# Patient Record
Sex: FEMALE | Race: WHITE | NOT HISPANIC OR LATINO | Employment: OTHER | URBAN - METROPOLITAN AREA
[De-identification: names, ages, dates, MRNs, and addresses within clinical notes are randomized per-mention and may not be internally consistent; named-entity substitution may affect disease eponyms.]

---

## 2017-12-13 LAB
ALT SERPL-CCNC: 24 U/L (ref 9–41)
AST SERPL-CCNC: 19 U/L (ref 12–38)
CREAT SERPL-MCNC: 0.8 MG/DL (ref 0.7–1.4)
GFR SERPL CREATININE-BSD FRML MDRD: >60 ML/MIN/1.73M2
GLUCOSE SERPL-MCNC: 103 MG/DL (ref 64–112)
POTASSIUM SERPL-SCNC: 3 MMOL/L (ref 3.5–5.3)

## 2017-12-20 LAB
ALT SERPL-CCNC: 28 U/L (ref 9–41)
AST SERPL-CCNC: 22 U/L (ref 12–38)
CREAT SERPL-MCNC: 0.8 MG/DL (ref 0.7–1.4)
GFR SERPL CREATININE-BSD FRML MDRD: >60 ML/MIN/1.73M2
GLUCOSE SERPL-MCNC: 117 MG/DL (ref 64–112)
POTASSIUM SERPL-SCNC: 3.6 MMOL/L (ref 3.5–5.3)

## 2018-01-04 ENCOUNTER — TRANSFERRED RECORDS (OUTPATIENT)
Dept: HEALTH INFORMATION MANAGEMENT | Facility: CLINIC | Age: 48
End: 2018-01-04

## 2018-01-05 ENCOUNTER — TRANSFERRED RECORDS (OUTPATIENT)
Dept: HEALTH INFORMATION MANAGEMENT | Facility: CLINIC | Age: 48
End: 2018-01-05

## 2018-02-05 ENCOUNTER — TRANSFERRED RECORDS (OUTPATIENT)
Dept: HEALTH INFORMATION MANAGEMENT | Facility: CLINIC | Age: 48
End: 2018-02-05

## 2018-02-06 ENCOUNTER — TRANSFERRED RECORDS (OUTPATIENT)
Dept: HEALTH INFORMATION MANAGEMENT | Facility: CLINIC | Age: 48
End: 2018-02-06

## 2018-02-06 LAB — HEP C HIM: NORMAL

## 2018-02-07 LAB
CREAT SERPL-MCNC: 0.6 MG/DL (ref 0.7–1.2)
GLUCOSE SERPL-MCNC: 115 MG/DL (ref 60–99)
POTASSIUM SERPL-SCNC: 4.3 MEQ/L (ref 3.5–5.1)

## 2018-02-19 ENCOUNTER — TRANSFERRED RECORDS (OUTPATIENT)
Dept: HEALTH INFORMATION MANAGEMENT | Facility: CLINIC | Age: 48
End: 2018-02-19

## 2018-02-20 ENCOUNTER — TRANSFERRED RECORDS (OUTPATIENT)
Dept: HEALTH INFORMATION MANAGEMENT | Facility: CLINIC | Age: 48
End: 2018-02-20

## 2018-03-13 ENCOUNTER — TRANSFERRED RECORDS (OUTPATIENT)
Dept: HEALTH INFORMATION MANAGEMENT | Facility: CLINIC | Age: 48
End: 2018-03-13

## 2018-03-13 LAB
ALT SERPL-CCNC: 19 IU/L (ref 6–31)
AST SERPL-CCNC: 16 IU/L (ref 10–40)
CREAT SERPL-MCNC: 0.66 MG/DL (ref 0.4–1)
GLUCOSE SERPL-MCNC: 108 MG/DL (ref 70–100)
POTASSIUM SERPL-SCNC: 3 MEQ/L (ref 3.4–5.1)
TSH SERPL-ACNC: 1.21 UIU/ML (ref 0.4–3.99)

## 2018-03-14 ENCOUNTER — HOSPITAL ENCOUNTER (INPATIENT)
Facility: HOSPITAL | Age: 48
LOS: 22 days | Discharge: HOME | DRG: 885 | End: 2018-04-05
Attending: PSYCHIATRY & NEUROLOGY | Admitting: PSYCHIATRY & NEUROLOGY
Payer: COMMERCIAL

## 2018-03-14 DIAGNOSIS — F20.89 OTHER SCHIZOPHRENIA (H): Primary | ICD-10-CM

## 2018-03-14 PROBLEM — F29 PSYCHOSIS (H): Status: ACTIVE | Noted: 2018-03-14

## 2018-03-14 PROCEDURE — 99223 1ST HOSP IP/OBS HIGH 75: CPT | Performed by: NURSE PRACTITIONER

## 2018-03-14 PROCEDURE — 25000132 ZZH RX MED GY IP 250 OP 250 PS 637: Performed by: NURSE PRACTITIONER

## 2018-03-14 PROCEDURE — 12400000 ZZH R&B MH

## 2018-03-14 RX ORDER — HYDROXYZINE HYDROCHLORIDE 25 MG/1
25-50 TABLET, FILM COATED ORAL EVERY 4 HOURS PRN
Status: DISCONTINUED | OUTPATIENT
Start: 2018-03-14 | End: 2018-04-05 | Stop reason: HOSPADM

## 2018-03-14 RX ORDER — OLANZAPINE 10 MG/1
10 TABLET ORAL 3 TIMES DAILY PRN
Status: DISCONTINUED | OUTPATIENT
Start: 2018-03-14 | End: 2018-03-27

## 2018-03-14 RX ORDER — PHENOBARBITAL 32.4 MG/1
32.4 TABLET ORAL 3 TIMES DAILY
Status: DISCONTINUED | OUTPATIENT
Start: 2018-03-14 | End: 2018-03-19

## 2018-03-14 RX ORDER — ALBUTEROL SULFATE 90 UG/1
1-2 AEROSOL, METERED RESPIRATORY (INHALATION) EVERY 4 HOURS PRN
Status: DISCONTINUED | OUTPATIENT
Start: 2018-03-14 | End: 2018-04-05 | Stop reason: HOSPADM

## 2018-03-14 RX ORDER — ALUMINA, MAGNESIA, AND SIMETHICONE 2400; 2400; 240 MG/30ML; MG/30ML; MG/30ML
30 SUSPENSION ORAL EVERY 4 HOURS PRN
Status: DISCONTINUED | OUTPATIENT
Start: 2018-03-14 | End: 2018-04-05 | Stop reason: HOSPADM

## 2018-03-14 RX ORDER — ALBUTEROL SULFATE 90 UG/1
1-2 AEROSOL, METERED RESPIRATORY (INHALATION) EVERY 4 HOURS PRN
Status: ON HOLD | COMMUNITY
End: 2020-08-11

## 2018-03-14 RX ORDER — DEXTROAMPHETAMINE SACCHARATE, AMPHETAMINE ASPARTATE, DEXTROAMPHETAMINE SULFATE AND AMPHETAMINE SULFATE 5; 5; 5; 5 MG/1; MG/1; MG/1; MG/1
20 TABLET ORAL EVERY MORNING
Status: ON HOLD | COMMUNITY
End: 2018-04-04

## 2018-03-14 RX ORDER — OLANZAPINE 10 MG/2ML
10 INJECTION, POWDER, FOR SOLUTION INTRAMUSCULAR 3 TIMES DAILY PRN
Status: DISCONTINUED | OUTPATIENT
Start: 2018-03-14 | End: 2018-03-27

## 2018-03-14 RX ORDER — ACETAMINOPHEN 325 MG/1
650 TABLET ORAL EVERY 4 HOURS PRN
Status: DISCONTINUED | OUTPATIENT
Start: 2018-03-14 | End: 2018-04-05 | Stop reason: HOSPADM

## 2018-03-14 RX ORDER — DEXTROAMPHETAMINE SACCHARATE, AMPHETAMINE ASPARTATE, DEXTROAMPHETAMINE SULFATE AND AMPHETAMINE SULFATE 2.5; 2.5; 2.5; 2.5 MG/1; MG/1; MG/1; MG/1
10 TABLET ORAL DAILY
Status: ON HOLD | COMMUNITY
End: 2018-04-04

## 2018-03-14 RX ADMIN — NICOTINE POLACRILEX 4 MG: 2 GUM, CHEWING ORAL at 19:42

## 2018-03-14 RX ADMIN — PHENOBARBITAL 32.4 MG: 32.4 TABLET ORAL at 22:51

## 2018-03-14 ASSESSMENT — ACTIVITIES OF DAILY LIVING (ADL)
COGNITION: 0 - NO COGNITION ISSUES REPORTED
GROOMING: INDEPENDENT
TRANSFERRING: 0-->INDEPENDENT
DRESS: SCRUBS (BEHAVIORAL HEALTH)
TOILETING: 0-->INDEPENDENT
DRESS: SCRUBS (BEHAVIORAL HEALTH);INDEPENDENT
AMBULATION: 0-->INDEPENDENT
ORAL_HYGIENE: INDEPENDENT
SWALLOWING: 0-->SWALLOWS FOODS/LIQUIDS WITHOUT DIFFICULTY
GROOMING: INDEPENDENT
ORAL_HYGIENE: INDEPENDENT
GROOMING: INDEPENDENT
FALL_HISTORY_WITHIN_LAST_SIX_MONTHS: NO
DRESS: 0-->INDEPENDENT
DRESS: SCRUBS (BEHAVIORAL HEALTH)
BATHING: 0-->INDEPENDENT
RETIRED_EATING: 0-->INDEPENDENT
ORAL_HYGIENE: INDEPENDENT
RETIRED_COMMUNICATION: 0-->UNDERSTANDS/COMMUNICATES WITHOUT DIFFICULTY

## 2018-03-14 NOTE — PLAN OF CARE
Face to face end of shift report received from JULIA Leija. Rounding completed. Patient observed.     Caro King  3/14/2018  4:08 PM

## 2018-03-14 NOTE — PROGRESS NOTES
03/14/18 0550   Patient Belongings   Did you bring any home meds/supplements to the hospital?  Yes   Patient Belongings cell phone/electronics;clothing;glasses;keys;money (see comment);necklace;shoes;wallet;purse   Disposition of Belongings Other (see comment)   Belongings Search Yes   Clothing Search Yes   Second Staff Ana   General Info Comment [employer] blue jeans, brown belt, tan bra, brown boots, pink underwear, green socks, six lighters, 27 camel blues smokes, 2 sets of keys, brown purse, misc cards and papers, 2 cover girl jumbo lip gloss,  NYX luquid lip gloss, clear eyes, usb drive, 4 pens, purfom, karina brand purfum , mentos, glasses, 3 month recovery chip, brown wallet,     List items sent to safe: [employer], check book # 3041, # 2424 to [place], #9773 void check to [employer], void check to [employer] $ 51.28, check to PT for $1,000.00, [place] cash outs $.25, $.80, $.21, $ 35.15, $.25, $69.30, $91.90, 8 one dollar bills, one $10 bill, 9 $20 bills, [place] '[place] license not [place], and 2 PT  license one cut in half, S.S Card, Cyril's reward cards, Gold in color neckles with a gold color matt with red and white color stones, Neckles with different color stone, with rock matt  All other belongings put in assigned cubby in belongings room.     I have reviewed my belongings list on admission and verify that it is correct.     Patient signature_______________________________    Second staff witness (if patient unable to sign) ______________________________       I have received all my belongings at discharge.    Patient signature________________________________    Lilli  3/14/2018  5:55 AM
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
## 2018-03-14 NOTE — PLAN OF CARE
"Problem: Patient Care Overview  Goal: Individualization & Mutuality  Contract for safety on admission  Cooperative with admission process  Oriented to person, place and time within 2 days of admission  No harm to self or others while on unit  Will deny suicidal ideation on discharge  Safe withdrawal from medication / stable vitals  Will agree to tx team recommendations for meds and follow up care  Will take meds as prescribed while on unit  Delusions about being pregnant will subside by discharge date  Will attend 75% of groups   Outcome: No Change  Pt. Continues to be delusional and thinking she is pregnant with twins, has had no harm to self, denies suicidal ideation, attended one group briefly, then walked out, states \"I don't know why I am here\", reoriented to place, time and circumstance. Pt. Showered and applied clean scrubs, appetite poor, complains of feeling nauseated, states her abdomen is \"sore\", will continue to monitor progress.    Problem: Thought Process Alteration (Adult)  Goal: Improved Thought Process  Will have reality based conversation by discharge date   Outcome: No Change  Pt. Has not had any reality based conversation yet this shift, continues to believe she is pregnant with twins.      "

## 2018-03-14 NOTE — PLAN OF CARE
"Problem: Patient Care Overview  Goal: Individualization & Mutuality  Contract for safety on admission  Cooperative with admission process  Oriented to person, place and time within 2 days of admission  No harm to self or others while on unit  Will deny suicidal ideation on discharge  Safe withdrawal from medication / stable vitals  Will agree to tx team recommendations for meds and follow up care  Will take meds as prescribed while on unit  Delusions about being pregnant will subside by discharge date  Will attend 75% of groups   Outcome: No Change  Pt. Lying in bed awake upon arrival, alert et disoriented to time, situation, et place. Denies SI/HI ideation or thoughts. Continues delusional thinking re: pregnancy. Displays feelings of anxiety et fear, \"I just want to know what's going on and why I'm here, I'm pregnant and need this taking care of.\" \"My father has dementia and I have to get back to him.\" Out for dinner. CNP into visit patient. Verbalizes agreement with safety contract.     S/s of anxiety after dinner, \"I want my purse, I do not belong in a mental institute.\" redirected without difficulty with reassurance et orientation to situation. PRN nicotine administered x1. Pt. isolated self much of the afternoon.     Pt. Signed release for [facility]; form faxed to records release.     Awaiting orders for phenobarbital dx: Clonazepam withdrawal from CNP.    Problem: Thought Process Alteration (Adult)  Goal: Improved Thought Process  Will have reality based conversation by discharge date   Outcome: No Change  Pt. Continues delusional thoughts about being pregnant.       "

## 2018-03-14 NOTE — IP AVS SNAPSHOT
MRN:[id number]                      After Visit Summary   3/14/2018    Glenda Robins    MRN: [id number]           Thank you!     Thank you for choosing [facility] for your care. Our goal is always to provide you with excellent care. Hearing back from our patients is one way we can continue to improve our services. Please take a few minutes to complete the written survey that you may receive in the mail after you visit with us. Thank you!        Patient Information     Date Of Birth          1970        Designated Caregiver       Most Recent Value    Caregiver    Will someone help with your care after discharge? no      About your hospital stay     You were admitted on:  March 14, 2018 You last received care in the:  [facility]    You were discharged on:  April 5, 2018       Who to Call     For medical emergencies, please call 911.  For non-urgent questions about your medical care, please call your primary care provider or clinic, [phone]          Attending Provider     Provider Specialty    Luis Alfredo Yang MD Psychiatry       Primary Care Provider Office Phone # Fax #    Madison BURT Jasonmary [phone]      Your next 10 appointments already scheduled     Apr 12, 2018 10:00 AM CDT   Evaluation with [facility]   [facility] ([facility] )    [address] [place] [id number] [phone]              Further instructions from your care team       Behavioral Discharge Planning and Instructions    Summary: Glenda was admitted to  due to psychosis     Main Diagnosis: Schizophrenia by  History    Major Treatments, Procedures and Findings: Stabilize with medications, connect with community programs.    Symptoms to Report: feeling more aggressive, increased confusion, losing more sleep, mood getting worse or thoughts of suicide    Lifestyle Adjustment: Take all medications as prescribed, meet with doctor/ medication provider, out patient  therapist, , and [facility] worker as scheduled. Abstain from alcohol or any unprescribed drugs.    Psychiatry Follow-up:     [facility]- Brenda Dick- 4/30 @3   [address], [place]  [phone]  Fax [phone]      [facility]- 4/12 @10  *Check into admitting before the first appointment   5th Floor Room 546  [address] [address]  Phone: [phone] ext. [phone]  Fax: [phone]      [facility]  PCP- 4/5 @11 Yahaira Shell   [address] [place] [address]  Phone - [phone]   Fax - [phone]    Resources:   Crisis Intervention: 499.682.3261 or 239-723-8555 (TTY: 855.538.3797).  Call anytime for help.  National [place] on Mental Illness (www.[place].neo.org): 852.679.7968 or 147-261-4202.  Alcoholics Anonymous (www.alcoholics-anonymous.org): Check your phone book for your local chapter.  Suicide Awareness Voices of Education (SAVE) (www.save.org): 220-052-LBJC (5620)  National Suicide Prevention Line (www.mentalhealthmn.org): 635-428-GHDZ (5551)  Mental Health Consumer/Survivor Network of [place] (www.mhcsn.net): 162.729.9383 or 955-311-2983  Mental Health Association of [place] (www.mentalhealth.org): 994.752.4402 or 957-091-9363    General Medication Instructions:   See your medication sheet(s) for instructions.   Take all medicines as directed.  Make no changes unless your doctor suggests them.   Go to all your doctor visits.  Be sure to have all your required lab tests. This way, your medicines can be refilled on time.  Do not use any drugs not prescribed by your doctor.  Avoid alcohol.    [place] Area:  [facility], Crisis stabilization [facility]- [phone]  [facility], [facility] Crisis Line: 8-847-124-2992  Advocates For Family Peace: 077-2993  Sexual Assault Program [facility]: 348-178-6654 or 2-881-090-4979  [place] Battered Women's Program: 8-225-564-0552 Ext: 279       Calls answered Mon-Fri-8:00  "am--4:30 pm    [place]:  [facility]ates for Family Peace: 1-917.325.4775  [facility] first call for help: 3-206-562-7553  [facility]:  (930) 330-1265      [place] Area:  Warm Line: 5-145-475-4698       Calls answered Tuesday--Saturday 4:00 pm--10:00 pm  [place] Crisis Line - 784.294.2988  [place] [place] [facility] Stabil[facility] 331-692-2688    [place] Statewide:  [place] Crisis and Referral Services: 1-689-708-0490  National Suicide Prevention Lifeline: 3-237-120-TALK (6182)   - mvd7nzcu- Text  Life  to [address]  First Call for Help:   [facility] Helpline- 0-794-GVCF-HELP      Pending Results     No orders found from 3/12/2018 to 3/15/2018.            Statement of Approval     Ordered          18 1830  I have reviewed and agree with all the recommendations and orders detailed in this document.  EFFECTIVE NOW     Approved and electronically signed by:  Nohelia Ching NP             Admission Information     Date & Time Provider Department Dept. Phone    3/14/2018 Luis Alfredo Yang MD [facility] [phone]      Your Vitals Were     Blood Pressure Pulse Temperature Respirations Height Weight    115/71 103 97.7  F (36.5  C) (Tympanic) 16 1.524 m (5') 59.8 kg (131 lb 14.4 oz)    Pulse Oximetry BMI (Body Mass Index)                96% 25.76 kg/m2          [employer] Information     [employer] lets you send messages to your doctor, view your test results, renew your prescriptions, schedule appointments and more. To sign up, go to [email or web address] . Click on \"Log in\" on the left side of the screen, which will take you to the Welcome page. Then click on \"Sign up Now\" on the right side of the page.     You will be asked to enter the access code listed below, as well as some personal information. Please follow the directions to create your username and password.     Your access code is: [email or web address]  Expires: 7/3/2018  6:53 PM     Your access code will  in 90 days. If you need help or a new " code, please call your [place] clinic or [phone].        Care EveryWhere ID     This is your Care EveryWhere ID. This could be used by other organizations to access your [place] medical records  [id number]        Equal Access to Services     KATRIN ALCANTAR : Hadwili adria araujo arieso Sobreeali, waaxda luqadaha, qaybta kaalmada adeegyada, hi rosan waqar pierce laDebbisreekanth roberson. So [facility] [phone].    ATENCIÓN: Si habla español, tiene a maradiaga disposición servicios gratuitos de asistencia lingüística. [facility] [phone].    We comply with applicable federal civil rights laws and [place] laws. We do not discriminate on the basis of race, color, national origin, age, disability, sex, sexual orientation, or gender identity.               Review of your medicines      START taking        Dose / Directions    ARIPiprazole 15 MG tablet   Commonly known as:  ABILIFY        Dose:  15 mg   Take 1 tablet (15 mg) by mouth At Bedtime   Quantity:  30 tablet   Refills:  0       sennosides 8.6 MG tablet   Commonly known as:  SENOKOT        Dose:  2 tablet   Take 2 tablets by mouth 2 times daily as needed for constipation   Quantity:  120 each   Refills:  0         CONTINUE these medicines which have NOT CHANGED        Dose / Directions    albuterol 108 (90 BASE) MCG/ACT Inhaler   Commonly known as:  PROAIR HFA/PROVENTIL HFA/VENTOLIN HFA   Indication:  Asthma        Dose:  1-2 puff   Inhale 1-2 puffs into the lungs every 4 hours as needed for shortness of breath / dyspnea or wheezing   Refills:  0         STOP taking     amphetamine-dextroamphetamine 10 MG per tablet   Commonly known as:  ADDERALL           amphetamine-dextroamphetamine 20 MG per tablet   Commonly known as:  ADDERALL           ESTAZOLAM PO           KLONOPIN PO                Where to get your medicines      These medications were sent to [id number] - FAUSTO BERUMEN - [address] AT [facility] [address], [place] [address]      Phone:  [phone]     ARIPiprazole 15 MG tablet    sennosides 8.6 MG tablet                Protect others around you: Learn how to safely use, store and throw away your medicines at www.disposemymeds.org.             Medication List: This is a list of all your medications and when to take them. Check marks below indicate your daily home schedule. Keep this list as a reference.      Medications           Morning Afternoon Evening Bedtime As Needed    albuterol 108 (90 BASE) MCG/ACT Inhaler   Commonly known as:  PROAIR HFA/PROVENTIL HFA/VENTOLIN HFA   Inhale 1-2 puffs into the lungs every 4 hours as needed for shortness of breath / dyspnea or wheezing                                ARIPiprazole 15 MG tablet   Commonly known as:  ABILIFY   Take 1 tablet (15 mg) by mouth At Bedtime   Last time this was given:  15 mg on 4/4/2018  5:35 PM                                sennosides 8.6 MG tablet   Commonly known as:  SENOKOT   Take 2 tablets by mouth 2 times daily as needed for constipation   Last time this was given:  8.6 mg on 4/1/2018 10:02 AM

## 2018-03-14 NOTE — IP AVS SNAPSHOT
[facility]    [address] [place] [address]    Phone:  [phone]    Fax:  [phone]                                       After Visit Summary   3/14/2018    Glenda Robins    MRN: [id number]           After Visit Summary Signature Page     I have received my discharge instructions, and my questions have been answered. I have discussed any challenges I see with this plan with the nurse or doctor.    ..........................................................................................................................................  Patient/Patient Representative Signature      ..........................................................................................................................................  Patient Representative Print Name and Relationship to Patient    ..................................................               ................................................  Date                                            Time    ..........................................................................................................................................  Reviewed by Signature/Title    ...................................................              ..............................................  Date                                                            Time

## 2018-03-14 NOTE — PLAN OF CARE
Problem: Patient Care Overview  Goal: Team Discussion  Team Plan:   BEHAVIORAL TEAM DISCUSSION    Participants: Haleigh Kolb NP, Poornima Tucker NP,  Tootie Maria NP, Kim Linda LICSW, Sue Morton LIC, Mehnaz SALAZAR, Pushpa Kelly RN, Marlo Vargas RN. [facility], [facility] OT, Poornima Reyna OT  Progress: new patient to assess, delusional- drug use  Continued Stay Criteria/Rationale: new patient, NP and SW to assess  Medical/Physical: delusions about being pregnant  Precautions:   Behavioral Orders   Procedures     Code 1 - Restrict to Unit     Routine Programming     As clinically indicated     Self Injury Precaution     Bathroom door to remain locked until after provider evaluation     Status 15     Every 15 minutes.     Plan: new patient to assess  Rationale for change in precautions or plan: none

## 2018-03-14 NOTE — H&P
"Psychiatric Eval/H&P    Patient Name: Glenda Robins   YOB: 1970  Age: 47 year old  [id number]    Primary Physician: Madison Villarreal   Completed by LAURO KnightESTELA  :none  ARHMS:none  Primary psychiatrist/NP Jigna Tineo [facility]  Therapist:none  Family contact: refuses to give info          CC: \"im 7 months pregnant\"    Glenda is a very poor historian due to her delusions and info was retrieved from [facility] as well as the [facility] documentation from her progress notes at her clinic from 2014.       HPI   Glenda Robins is a 47 year old single  female who brought herself in due to beliefs she as pregnant. She feels she has abdominal enlargement, breast sensitivity, weight gain and \"feels life\". She had a hysterectomy when she was 29. hcg negative and UDS positive for benzodiazapine's and amphetamines which she is prescribed. She goes to [facility] on an outpatient basis and has also been hospitalized at [facility] psychiatric  Unit though I do not have the records from [facility]. I will try to obtain the records. She is not aggressive though is very irritbale. When I ask her if she knows why she is here she state \"i dont know why Im on this airplane\" I asked her If she had said airplane and she then said \"im on a mental health unit\" and smiled. She has a odd smile most of the conversation. She holds her abdomen andtells me she is 7 months pregnant. I told her that she had hystectomy when she was younger and she stated \"miracles happen\". She then told me she needed to speak with \"The Very important person\".she made another odd comment regarding some Jew content and  I ask her if she thought that she was impregnated by god she shook her head yes then smiled. When I asked her about her psychiatric history she became very gaurded and slighlty irritable. She denied that she had ever been inptient at [facility]. She denied ever having a commitment which I am unsure if she has " "or not. When I asked to call someone in her facility she said \"no. I need my purse and my cell phone so I can call the very important one. She has not done anything dangerous that I am aware of. She is not currently on a hold and has not asked to leave or sign a 12 hour intent. At this time I would like to monitor her furhter to see if there are othersymptoms such as mood related, woud like to monitor her sleep and gather collateral if possible.      [facility]     Past Psychiatric History: she did have an      was hospitalized at [facility] and diagnosed with schizophrenia.     Social History: at this time it is very difficult to get any info from her.   jared living wither her father to help take care of him. She did graduate HS and received degree in cosmetology afterward.  She has been wit hher significant other for 14 years. They are not   Has 2 children born in 1991 and 1993.     Chemical Use History: denies any alcohol or drug use     Family Psychiatric History: unknown     Medical History and ROS      No current outpatient prescriptions on file.     Allergies   Allergen Reactions     Shrimp Anaphylaxis     No past medical history on file.  She had a partial hysterectomy in her early 30's.     Current Medications   Prescription Medications as of 3/14/2018             amphetamine-dextroamphetamine (ADDERALL) 10 MG per tablet Take 10 mg by mouth daily    amphetamine-dextroamphetamine (ADDERALL) 20 MG per tablet Take 20 mg by mouth every morning    ClonazePAM (KLONOPIN PO) Take 0.5 mg by mouth 4 times daily as needed     albuterol (PROAIR HFA/PROVENTIL HFA/VENTOLIN HFA) 108 (90 BASE) MCG/ACT Inhaler Inhale 1-2 puffs into the lungs every 4 hours as needed for shortness of breath / dyspnea or wheezing    ESTAZOLAM PO Take 3 mg by mouth At Bedtime      Facility Administered Medications as of 3/14/2018             hydrOXYzine (ATARAX) tablet 25-50 mg Take 1-2 tablets (25-50 mg) by mouth every 4 hours " "as needed for anxiety    acetaminophen (TYLENOL) tablet 650 mg Take 2 tablets (650 mg) by mouth every 4 hours as needed for mild pain    alum & mag hydroxide-simethicone (MYLANTA ES/MAALOX  ES) suspension 30 mL Take 30 mLs by mouth every 4 hours as needed for indigestion    magnesium hydroxide (MILK OF MAGNESIA) suspension 30 mL Take 30 mLs by mouth nightly as needed for constipation    OLANZapine (zyPREXA) tablet 10 mg Take 1 tablet (10 mg) by mouth 3 times daily as needed for agitation (associated with psychosis or debbie)    Linked Group 1:  \"Or\" Linked Group Details     OLANZapine (zyPREXA) injection 10 mg Inject 10 mg into the muscle 3 times daily as needed for agitation (associated with psychosis or debbie)    Linked Group 1:  \"Or\" Linked Group Details     nicotine polacrilex (NICORETTE) gum 2-4 mg Place 1-2 each (2-4 mg) inside cheek every hour as needed for other (nicotine withdrawal symptoms)    albuterol (PROAIR HFA/PROVENTIL HFA/VENTOLIN HFA) Inhaler 1-2 puff Inhale 1-2 puffs into the lungs every 4 hours as needed for shortness of breath / dyspnea or wheezing              Past Psychiatric Medications Tried: unknown    Physical Exam    She appears to be well nourushed though older than her stated age. She is not willing to be physically assessed by me other than 'make sure my ovaries are ok\" she has her hands around her abdomen stating she is 7 months pregnant though when I reminded her of her hysterectomy 30 years ago she said \"miracles happen\". She is not in any apparent respiratory distress.   /74  Pulse 85  Temp 97.6  F (36.4  C) (Tympanic)  Resp 15  Ht 1.524 m (5')  Wt 59.1 kg (130 lb 6.4 oz)  SpO2 95%  BMI 25.47 kg/m2      Review of Systems:  She currently will not answer any questions and tells me that she is pregnant and having some abdominal discomfort. She was fully worked up in [facility] ER and had an abdominal CT as well as head CT. Abdominal CT was abnormal showing liver nodules though " "not contributing to current presentation. Her head CT was normal.          MSE/PSYCH  PSYCHIATRIC EXAM  /70  Pulse 100  Temp 98.5  F (36.9  C) (Tympanic)  Resp 16  Ht 1.524 m (5')  Wt 59.1 kg (130 lb 6.4 oz)  SpO2 96%  BMI 25.47 kg/m2  -Appearance/Behavior: mildly disheveled.    -Motor: psychomotor retardation  -Gait: intact  -Abnormal involuntary movements: none  -Mood: mildy irritbale though mosty very flat  -Affect: blunted  -Speech: monotone      -Thought process/associations: Loose associations.  -Thought content: paranoid delusions and Religion delusions   -Perceptual disturbances: Frequent hallucinations..              -Suicidal/Homicidal Ideation: denies  -Judgment: Limited.  -Insight: Poor.  *Orientation: time and place, person though initially though she was on \"a plane\"  *Memory: difficult to asseses  *Attention: poor  *Language: fluent, no aphasias, able to repeat phrases and name objects. Vocab intact.  *Fund of information: average  *Cognitive functioning estimate: 1 - slightly impaired.     Labs: cmp and cbc unremarkable. uds positive for benzodiazapines and amphetamines which she is prescribed. Alcohol negative. hcg negative. Ct head negative. Ct abd showed nodule/lesion on liver though non contributory to presentation     Assessment/Impression: This is a 47 year old yo female with a hx of schizophrenia who is only on prosom, klonopin and adderall for her psychiatric medications. She denies any psych history htough records from [facility] indicate she had been IP at [facility] psych unknown how long ago. She is delusional currenly believing she is pregnant with gods child. She has no uterus and I did remind her that she had a hysterectomy age 29. She answered \"miracles do happen\". She isnt asking to leave though isnt willing to take antipsychotics and currently would need a substitute decision maker if she did start some.   Educated regarding medication indications, risks, benefits, side " effects, contraindications and possible interactions. Verbally expressed understanding.     DX:   Schizophrenia by  History                         Psychosis symptoms severity: Scores 0-4  Hallucinations:3  Delusions:4  Disorganized speech:2  Abnormal psychomotor behavior:2  Negative symptoms:2  Impaired cognition:2  Depression:1  Rosaline:2                                                            Plan:     Labs:  None needed     Med changes:  Start phenobarbital  At this time she refuses medications and there is not a significnat danger factor or need to force medicate    DC planning:  At this time she is not on a hold. I would like to evaluate her for another day to make sure there is not collateral information that arises that would indicate a need to file a petition for commitment. She is not willing to take any medications at this time and there is no need to force medicate as she is not in imminent risk of harming herself or others        Anticipated length of stay 3 days

## 2018-03-14 NOTE — PLAN OF CARE
Problem: Patient Care Overview  Goal: Individualization & Mutuality  Contract for safety on admission  Cooperative with admission process  Oriented to person, place and time within 2 days of admission  No harm to self or others while on unit  Will deny suicidal ideation on discharge  Safe withdrawal from medication / stable vitals  Will agree to tx team recommendations for meds and follow up care  Will take meds as prescribed while on unit  Delusions about being pregnant will subside by discharge date  Will attend 75% of groups   ADMISSION NOTE    Reason for admission psychosis, delusional.  Safety concerns none at this time.  Risk for or history of violence none noted.  Skin: Pt skin is intact, no areas of concern noted. Pt has blotchy patches across her shoulder blades which she says is from an autoimmune disease.      Patient arrived on unit from [facility] accompanied by ambulance crew and security on 3/14/2018  0430 AM.   Status on arrival: Pt irritated and started to become uncooperative with admission protocol. Pt began demanding to have an ultrasound to prove that she is pregnant with twins. Pt informed of tests completed at ER and the stated if she didn't get an ultrasound immediately then she would start screaming. Pt redirected and explained the procedures of the unit and that there are alternative beds in the locked unit if pt chooses to be uncooperative with unit admission procedures and starts to scream. Pt calmed and began to cooperate with admission. Saline lock removed from top of pt L) hand.   /75  Pulse 97  Temp 98.6  F (37  C) (Tympanic)  Resp 16  Ht 1.524 m (5')  Wt 59.1 kg (130 lb 6.4 oz)  SpO2 96%  BMI 25.47 kg/m2  Patient given tour of unit and Welcome to  unit papers given to patient, wanding completed, belongings inventoried, and admission assessment completed.   Patient's legal status on arrival is voluntary. Appropriate legal rights discussed with and copy given  to patient. Patient Bill of Rights discussed with and copy given to patient.   Patient denies SI, HI, and thoughts of self harm and contracts for safety while on unit.      Pt went to the ER reporting that she is pregnant with twins. Pt has had a hysterectomy. Pt reports stomach becoming larger, being tired, breast enlargement and feeling the babies movement. HCG negative, Head CT completed with negative results, Abdominal CT completed which showed polycystic liver, gas distention and no obstruction. Pt denies SI at this time or for the duration of her life. Pt denies hallucinations. Pt has a reported history of ADHD and depression. Pt sees Jigna Tineo at [facility] signed and faxed. When asked about any family members or significant others being placed on her SHELL pt said she wants Eugene Bruce to know everything that is going on with her, pt then became quiet and looked at the floor and provided the names and numbers of her 2 daughters. Pt appeared to be sleeping after 0500 checks, normal respirations and position changes noted.   Aurora Mendez  3/14/2018  6:11 AM

## 2018-03-14 NOTE — PLAN OF CARE
Face to face end of shift report received from Aurora LARA RN. Rounding completed. Patient observed.     Liss Espinal  3/14/2018  7:41 AM

## 2018-03-15 PROCEDURE — 25000132 ZZH RX MED GY IP 250 OP 250 PS 637: Performed by: NURSE PRACTITIONER

## 2018-03-15 PROCEDURE — 12400000 ZZH R&B MH

## 2018-03-15 PROCEDURE — 99233 SBSQ HOSP IP/OBS HIGH 50: CPT | Performed by: NURSE PRACTITIONER

## 2018-03-15 RX ADMIN — NICOTINE POLACRILEX 2 MG: 2 GUM, CHEWING ORAL at 21:15

## 2018-03-15 RX ADMIN — PHENOBARBITAL 32.4 MG: 32.4 TABLET ORAL at 21:15

## 2018-03-15 RX ADMIN — HYDROXYZINE HYDROCHLORIDE 50 MG: 25 TABLET ORAL at 17:13

## 2018-03-15 RX ADMIN — NICOTINE POLACRILEX 2 MG: 2 GUM, CHEWING ORAL at 17:06

## 2018-03-15 RX ADMIN — PHENOBARBITAL 32.4 MG: 32.4 TABLET ORAL at 13:49

## 2018-03-15 RX ADMIN — PHENOBARBITAL 32.4 MG: 32.4 TABLET ORAL at 08:14

## 2018-03-15 ASSESSMENT — ACTIVITIES OF DAILY LIVING (ADL)
GROOMING: INDEPENDENT
LAUNDRY: UNABLE TO COMPLETE
ORAL_HYGIENE: INDEPENDENT
ORAL_HYGIENE: INDEPENDENT
DRESS: INDEPENDENT;SCRUBS (BEHAVIORAL HEALTH)
GROOMING: INDEPENDENT
DRESS: SCRUBS (BEHAVIORAL HEALTH);INDEPENDENT

## 2018-03-15 NOTE — PLAN OF CARE
Face to face end of shift report received from JULIA Elizondo. Rounding completed. Patient observed resting in bed.     HASMUKH RITTER  3/15/2018  12:37 AM

## 2018-03-15 NOTE — PLAN OF CARE
"Problem: Patient Care Overview  Goal: Individualization & Mutuality  Contract for safety on admission  Cooperative with admission process  Oriented to person, place and time within 2 days of admission  No harm to self or others while on unit  Will deny suicidal ideation on discharge  Safe withdrawal from medication / stable vitals  Will agree to tx team recommendations for meds and follow up care  Will take meds as prescribed while on unit  Delusions about being pregnant will subside by discharge date  Will attend 75% of groups   Outcome: No Change  Pt. Visiting with NP upon arrival; Patient alert et oriented to person et place, disoriented to time et situation.  Denies SI/HI ideation or thoughts, Denies hallucinations, depression, anxiety et pain. Slight improvement noted staying on task et subject. Delusional thinking continues, pt. States, \"Yes, I did have a hysterectomy but miracles do happen.\"  Pt. States, \"I should not be here in a mental hospital, my physical health is not being treated.\" Awaiting records from [facility]. Pt. Re-directed without difficulty; assurance provided, allowed feelings to be expressed. C/o of mild anxiety, 1st dose of PRN 50 mg Atarax given; which has been effective. Has been isolated et withdrawn from unit.     72 Hour Hold Rights read to patient @ 0258.     Daughter Stacy called, updated staff that mother has been missing for one week et has started drinking again after seven months of being sober.     Attended first group this shift. Cooperative, pleasant in bed @ 0565. Noted slight decrease in delusions.            Problem: Thought Process Alteration (Adult)  Goal: Improved Thought Process  Will have reality based conversation by discharge date   Outcome: No Change  Pt. Continues delusional thoughts about being pregnant.       "

## 2018-03-15 NOTE — PLAN OF CARE
Face to face end of shift report received from Raven PRICE RN. Rounding completed. Patient observed.    Chante Agrawal  3/15/2018  7:41 AM

## 2018-03-15 NOTE — PLAN OF CARE
Face to face end of shift report received from JULIA Sharif. Rounding completed. Patient observed with NP.     Caro King  3/15/2018  4:28 PM

## 2018-03-15 NOTE — PLAN OF CARE
Problem: Patient Care Overview  Goal: Team Discussion  Team Plan:   BEHAVIORAL TEAM DISCUSSION    Participants: Haleigh Kolb NP, Poornima Tucker NP,  Tootie Maria NP, Kim Linda [facility], Sue Morton [facility], Mehnaz Crowley [facility], Marbella Campbell RN,  Pushpa Kelly RN, Chante Agrawal RN, Elizabeth Schulz [employer] [facility], Jennifer Kwan OT, Poornima Reyna OT  Progress: None  Continued Stay Criteria/Rationale: Delusional  Medical/Physical: Liver nodules  Precautions:   Behavioral Orders   Procedures     Code 1 - Restrict to Unit     Routine Programming     As clinically indicated     Self Injury Precaution     Bathroom door to remain locked until after provider evaluation     Status 15     Every 15 minutes.     Plan: Provider to gain collateral information. Patient currently refusing medications.  Rationale for change in precautions or plan: None

## 2018-03-15 NOTE — PLAN OF CARE
Problem: Patient Care Overview  Goal: Individualization & Mutuality  Contract for safety on admission  Cooperative with admission process  Oriented to person, place and time within 2 days of admission  No harm to self or others while on unit  Will deny suicidal ideation on discharge  Safe withdrawal from medication / stable vitals  Will agree to tx team recommendations for meds and follow up care  Will take meds as prescribed while on unit  Delusions about being pregnant will subside by discharge date  Will attend 75% of groups   Pt appears to be sleeping in bed with eyes closed, having regular respirations and position changes. 15 minutes and PRN safety checks completed with no noted complains. Will continue to monitor.         Problem: Thought Process Alteration (Adult)  Goal: Improved Thought Process  Will have reality based conversation by discharge date   Pt appears to be sleeping in bed with eyes closed, having regular respirations and position changes. 15 minutes and PRN safety checks completed with no noted complains. Will continue to monitor.

## 2018-03-16 PROCEDURE — 12400000 ZZH R&B MH

## 2018-03-16 PROCEDURE — 25000132 ZZH RX MED GY IP 250 OP 250 PS 637: Performed by: NURSE PRACTITIONER

## 2018-03-16 PROCEDURE — 99233 SBSQ HOSP IP/OBS HIGH 50: CPT | Performed by: NURSE PRACTITIONER

## 2018-03-16 RX ADMIN — NICOTINE POLACRILEX 4 MG: 2 GUM, CHEWING ORAL at 12:51

## 2018-03-16 RX ADMIN — PHENOBARBITAL 32.4 MG: 32.4 TABLET ORAL at 21:10

## 2018-03-16 RX ADMIN — HYDROXYZINE HYDROCHLORIDE 50 MG: 25 TABLET ORAL at 12:51

## 2018-03-16 RX ADMIN — PHENOBARBITAL 32.4 MG: 32.4 TABLET ORAL at 14:14

## 2018-03-16 RX ADMIN — NICOTINE POLACRILEX 2 MG: 2 GUM, CHEWING ORAL at 21:10

## 2018-03-16 ASSESSMENT — ACTIVITIES OF DAILY LIVING (ADL)
ORAL_HYGIENE: INDEPENDENT
ORAL_HYGIENE: INDEPENDENT
GROOMING: INDEPENDENT
LAUNDRY: UNABLE TO COMPLETE
GROOMING: INDEPENDENT
DRESS: SCRUBS (BEHAVIORAL HEALTH);INDEPENDENT
DRESS: INDEPENDENT;SCRUBS (BEHAVIORAL HEALTH)

## 2018-03-16 NOTE — PLAN OF CARE
Patient requested and given hydroxyzine 50 mg po for complaints of anxiey. Patient states that she doesn't feel she needs to be here and wonders when she will be released.

## 2018-03-16 NOTE — PROGRESS NOTES
"[facility]  Psychiatric Progress Note      Impression:   Initially when we started talking she seemed more oraganized. After a couple min into the conversatino she appeared to be becoming diosrganized and paranoid. She did not bring up her delusional thoughts pregnancy immediately in conversation though asked me \"do you know my friend\"? I asked her what the friends name was and she smiled, held her abdomen  Area and said \"sylvie caro\". I asked how longhad known him and she smiled and said \"we are not really friends\". 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 She would not go into detail about how they were business partners. She then asked to have adderall back and I declined. She did not become angry though did ask to leave. I did tell her I would place her on a hold as I did not feel she could take care of herself at all and that I wanted to be able to contact her daughter still. SS did speak with her daughterand her daughter requested commitment though there is lack of severe harm to self or others that I can see. I am going to call the daughter and try to get more info from  Her. She still refuses to take any medications. She did [facility] though the reecords are not yet here.      Educated regarding medication indications, risks, benefits, side effects, contraindications and possible interactions. Verbally expressed understanding.        DIagnoses:   Schizophrenia                         Psychosis symptoms severity: Scores 0-4  Hallucinations:4  Delusions:4  Disorganized speech:2  Abnormal psychomotor behavior:1  Negative symptoms:2  Impaired cognition:3  Depression:0  Rosaline:0                                                         Attestation:  Patient has been seen and evaluated by me,  Haleigh Kolb NP          Interim History:   The patient's care was discussed with the treatment team and chart notes were reviewed.          " Medications:       PHENobarbital  32.4 mg Oral TID              10 point ROS negative        Allergies:     Allergies   Allergen Reactions     Shrimp Anaphylaxis            Psychiatric Examination:   /72  Pulse 92  Temp 97.8  F (36.6  C) (Tympanic)  Resp 15  Ht 1.524 m (5')  Wt 59.1 kg (130 lb 6.4 oz)  SpO2 97%  BMI 25.47 kg/m2  Weight is 130 lbs 6.4 oz  Body mass index is 25.47 kg/(m^2).    Appearance:  awake, alert, appeared older than stated age and poorly groomed  Attitude:  guarded  Eye Contact:  fair  Mood:  anxious  Affect:  intensity is flat  Speech:  decreased prosody  Psychomotor Behavior:  no evidence of tardive dyskinesia, dystonia, or tics  Thought Process:  disorganized and evidence of thought blocking present  Associations:  loosening of associations present  Thought Content:  no evidence of suicidal ideation or homicidal ideation and patient appears to be responding to internal stimuli  Insight:  none  Judgment:  poor  Oriented to:  time, person, and place  Attention Span and Concentration:  limited  Recent and Remote Memory:  fair  Fund of Knowledge: low-normal  Muscle Strength and Tone: normal  Gait and Station: Normal           Labs:     Results for orders placed or performed in visit on 01/24/14   MR Lumbar Spine w/o & w Contrast    Narrative    LUMBAR SPINE MRI  TECHNIQUE:  Images were obtained sagittally T1 proton density and  T2-weighted, axially proton density and T2-weighted.  FINDINGS:  The T11-T12, T12-L1 and L1-L2 disks are normal in height.  There is no evidence of disk herniation or protrusion.  There is mild  annular bulging at L2-L3 without significant thecal sac or nerve root  compression.  The L3-L4 disk is normal in height.  There is no  evidence of disk herniation or protrusion.  There is minimal annular  bulging at L4-L5 without significant thecal sac or nerve root  compression.  The L5-S1 disk appears normal.  The facet joints are  normal in appearance.  There is no  central or lateral spinal stenosis.  Intradurally, the nerves of the cauda equina and conus appear normal.  The paravertebral soft tissues appear normal.  IMPRESSION:  NEGATIVE MR OF THE LUMBAR SPINE.    Exam Date: Jan 30, 2014 10:39:00 AM  Author: ARAM MCMANUS  This report is final and signed                  Plan:     Placed on 72 hour hold today. She was asking to leave  Both daughters feel she needs to be committed though at this time there is not a significant danger factor.   Her delusions alone are not necessarily dangerous though will try to find out more info from daughters

## 2018-03-16 NOTE — PLAN OF CARE
Problem: Patient Care Overview  Goal: Team Discussion  Team Plan:   BEHAVIORAL TEAM DISCUSSION    Participants:  Poornima Tucker NP,  Tootie Maria NP, Kim KEARNEY, Mehnaz LAWRENCE, Marbella Campbell RN,  Jennifer Kwan OT, Poornima Reyna OT  Progress: delusional, pleasant, not taking meds  Continued Stay Criteria/Rationale: Delusional -daughter reports she was missing for a week and started drinking - look at possibly filing for commitment  Medical/Physical: Liver nodules  Precautions:   Behavioral Orders   Procedures     Code 1 - Restrict to Unit     Routine Programming     As clinically indicated     Status 15     Every 15 minutes.     Plan: Still refusing meds - likely filing for commitment as the patient was missing for a week  Rationale for change in precautions or plan: None

## 2018-03-16 NOTE — PROGRESS NOTES
"[facility]  Psychiatric Progress Note      Impression:     Today she denied she was pregnant when I asked her. She actually said yes initially though tried to correct herself and said she did not believe she was pregnant. She then said \"you are the one that believes in miracles\". She asked to leave and I did tell her I had placed her on a hold and that now [facility], decided I need to fill out a petition for commitment. She left [facility] after 10-12 day stay. She was not committed though the provider did contemplate commitment as she was not able to care for herself. She left [facility] AMA. She returned home and decompensated. Daughter, charo, stated that she quit taking her meds immediately. She then left the house, with the family dog at 2 am and did not tell anyone where she was going. She was gone for 2 days and family still is unsure where she was during this time. She did return home with the dog. Daughter states that she did go to cd treatment for etoh recently at [facility]. Will try to talk to providers there if she signs a release. She is still preoccupied and paranoid. She is upset when I tell her im filling out petition for commitment.       Educated regarding medication indications, risks, benefits, side effects, contraindications and possible interactions. Verbally expressed understanding.        DIagnoses:   Schizophrenia                         Psychosis symptoms severity: Scores 0-4  Hallucinations:4  Delusions:4  Disorganized speech:2  Abnormal psychomotor behavior:1  Negative symptoms:2  Impaired cognition:3  Depression:0  Rosaline:0                   etoh use disorder, unknown amount used.                                         Attestation:  Patient has been seen and evaluated by me,  Haleigh Kolb NP          Interim History:   The patient's care was discussed with the treatment team and chart notes were reviewed.          Medications:       " PHENobarbital  32.4 mg Oral TID              10 point ROS negative        Allergies:     Allergies   Allergen Reactions     Shrimp Anaphylaxis            Psychiatric Examination:   /71  Pulse 83  Temp 98.7  F (37.1  C) (Tympanic)  Resp 16  Ht 1.524 m (5')  Wt 59.1 kg (130 lb 6.4 oz)  SpO2 93%  BMI 25.47 kg/m2  Weight is 130 lbs 6.4 oz  Body mass index is 25.47 kg/(m^2).    Appearance:  awake, alert, appeared older than stated age and poorly groomed  Attitude:  uncooperative  Eye Contact:  fair  Mood:  anxious  Affect:  blunted  Speech:  decreased prosody  Psychomotor Behavior:  no evidence of tardive dyskinesia, dystonia, or tics  Thought Process:  disorganized and evidence of thought blocking present  Associations:  loosening of associations present  Thought Content:  no evidence of suicidal ideation or homicidal ideation and patient appears to be responding to internal stimuli  Insight:  none  Judgment:  poor  Oriented to:  time, person, and place  Attention Span and Concentration:  limited  Recent and Remote Memory:  fair  Fund of Knowledge: low-normal  Muscle Strength and Tone: normal  Gait and Station: Normal           Labs:     Results for orders placed or performed in visit on 01/24/14   MR Lumbar Spine w/o & w Contrast    Narrative    LUMBAR SPINE MRI  TECHNIQUE:  Images were obtained sagittally T1 proton density and  T2-weighted, axially proton density and T2-weighted.  FINDINGS:  The T11-T12, T12-L1 and L1-L2 disks are normal in height.  There is no evidence of disk herniation or protrusion.  There is mild  annular bulging at L2-L3 without significant thecal sac or nerve root  compression.  The L3-L4 disk is normal in height.  There is no  evidence of disk herniation or protrusion.  There is minimal annular  bulging at L4-L5 without significant thecal sac or nerve root  compression.  The L5-S1 disk appears normal.  The facet joints are  normal in appearance.  There is no central or lateral  spinal stenosis.  Intradurally, the nerves of the cauda equina and conus appear normal.  The paravertebral soft tissues appear normal.  IMPRESSION:  NEGATIVE MR OF THE LUMBAR SPINE.    Exam Date: Jan 30, 2014 10:39:00 AM  Author: ARAM MCMANUS  This report is final and signed                  Plan:     No meds ordered on scheduled basis other than phenobarbital  Did get collateral info from daughter and from [facility] where she was one month ago  Will fill out petition for commitment.

## 2018-03-16 NOTE — PLAN OF CARE
Problem: Patient Care Overview  Goal: Individualization & Mutuality  Contract for safety on admission  Cooperative with admission process  Oriented to person, place and time within 2 days of admission  No harm to self or others while on unit  Will deny suicidal ideation on discharge  Safe withdrawal from medication / stable vitals  Will agree to tx team recommendations for meds and follow up care  Will take meds as prescribed while on unit  Delusions about being pregnant will subside by discharge date  Will attend 75% of groups   Outcome: No Change  Patient oriented to person and place.  Patient is irritable isolative and withdrawn this shift.  Patient states she will not go into withdrawal and refuses AM scheduled phenobarbital.  Patient denies SI, HI, hallucinations, pain, depression, and states she is ready to be released.  Explained 72 hour hold but patient shows no insight into this.  Patient states she is anxious and other RN gave PRN atarax.  Will continue to monitor.    Problem: Thought Process Alteration (Adult)  Goal: Improved Thought Process  Will have reality based conversation by discharge date   Outcome: No Change  Patient unable to hold reality based conversation.

## 2018-03-16 NOTE — PLAN OF CARE
Face to face end of shift report received from Raven PRICE RN. Rounding completed. Patient observed.     Chante Agrawal  3/16/2018  7:32 AM

## 2018-03-16 NOTE — PLAN OF CARE
Face to face end of shift report received from JULIA Sharif. Rounding completed. Patient observed.     Caro King  3/16/2018  3:58 PM

## 2018-03-16 NOTE — PLAN OF CARE
Face to face end of shift report received from JULIA Elizondo. Rounding completed. Patient observed resting in bed.     HASMUKH RITTER  3/15/2018  11:49 PM

## 2018-03-17 PROCEDURE — 25000132 ZZH RX MED GY IP 250 OP 250 PS 637: Performed by: NURSE PRACTITIONER

## 2018-03-17 PROCEDURE — 99233 SBSQ HOSP IP/OBS HIGH 50: CPT | Performed by: NURSE PRACTITIONER

## 2018-03-17 PROCEDURE — 12400000 ZZH R&B MH

## 2018-03-17 RX ORDER — NICOTINE 21 MG/24HR
1 PATCH, TRANSDERMAL 24 HOURS TRANSDERMAL DAILY
Status: DISCONTINUED | OUTPATIENT
Start: 2018-03-17 | End: 2018-04-05 | Stop reason: HOSPADM

## 2018-03-17 RX ADMIN — PHENOBARBITAL 32.4 MG: 32.4 TABLET ORAL at 15:34

## 2018-03-17 RX ADMIN — ACETAMINOPHEN 650 MG: 325 TABLET, FILM COATED ORAL at 11:18

## 2018-03-17 RX ADMIN — PHENOBARBITAL 32.4 MG: 32.4 TABLET ORAL at 08:25

## 2018-03-17 RX ADMIN — NICOTINE POLACRILEX 4 MG: 2 GUM, CHEWING ORAL at 16:21

## 2018-03-17 RX ADMIN — NICOTINE 1 PATCH: 21 PATCH, EXTENDED RELEASE TRANSDERMAL at 16:19

## 2018-03-17 RX ADMIN — PHENOBARBITAL 32.4 MG: 32.4 TABLET ORAL at 20:55

## 2018-03-17 ASSESSMENT — ACTIVITIES OF DAILY LIVING (ADL)
ORAL_HYGIENE: INDEPENDENT
DRESS: SCRUBS (BEHAVIORAL HEALTH);INDEPENDENT
LAUNDRY: UNABLE TO COMPLETE
GROOMING: INDEPENDENT

## 2018-03-17 NOTE — PLAN OF CARE
Face to face end of shift report received from Augustus BOB RN. Rounding completed. Patient observed Pt is in room laying in bed.     Karen Hamilton  3/17/2018  3:59 PM

## 2018-03-17 NOTE — PLAN OF CARE
Problem: Thought Process Alteration (Adult)  Goal: Improved Thought Process  Will have reality based conversation by discharge date   Outcome: No Change  Continues delusional thoughts about being pregnant.

## 2018-03-17 NOTE — PLAN OF CARE
"Problem: Patient Care Overview  Goal: Individualization & Mutuality  Contract for safety on admission  Cooperative with admission process  Oriented to person, place and time within 2 days of admission  No harm to self or others while on unit  Will deny suicidal ideation on discharge  Safe withdrawal from medication / stable vitals  Will agree to tx team recommendations for meds and follow up care  Will take meds as prescribed while on unit  Delusions about being pregnant will subside by discharge date  Will attend 75% of groups   Outcome: No Change  Patient alert et oriented to person et place, disoriented to time et situation. No change in psychological et mentation status as compared to yesterday.  Denies SI/HI ideation or thoughts, Denies hallucinations, depression, anxiety et pain.  Delusional thinking continues.  Pt. Re-directed without difficulty; assurance provided, allowed feelings to be expressed. Has been isolated et withdrawn from unit. Resting on et off via out the shift. No group participation as of present.      Pt. Resting in bed, compliant with medication administration. Mouth checks implemented. Denies anxiety, depression, et pain. Questioned \"I know next week I should be able to leave, but I think that doctor wants to keep me here longer.\" \" I need to go be home and take care of my dad.\"  Caro King RN on 3/16/2018 at 10:00 PM  \    "

## 2018-03-17 NOTE — PLAN OF CARE
Face to face end of shift report received from Caro DUEÑAS RN. Rounding completed. Patient observed in bed, appears asleep, respirations observed.     Africa Carroll  3/17/2018  12:01 AM

## 2018-03-17 NOTE — PROGRESS NOTES
"[facility]  Psychiatric Progress Note      Impression:     I tried to talk  with Glenda again about taking medications to treat some of her mood issues and abnormal thoughts.  She becomes very upset when she hears the word psychosis or schizophrenia.  I spoke in depth to the provider that worked with her at [facility] last month and she had told me that the patient becomes very upset when she hears the word schizophrenia. She became a bit irritable with me and  Very dismissive when I started talking about medications for her symptoms.  She told me that the only symptoms she had was  difficulty focusing\" Because it was related to being or Adderall.  She states the only medication she is willing ot take is adderall.  She is not willing to take any medications at this time I cannot force her.  I did try to contact her daughter this morning and did leave a message.  I did tell her daughter that I believe that she would  be getting screened by the [facility] tomorrow. I also told Glenda this. She rolled her eyes.  She told me \".  Jelena Barakat is making me stay here. Isnt he?\" I asked who that was and she told her that that was her significant other.  She told me she did not want me contact though has allowed me to contact her [email or web address]      Educated regarding medication indications, risks, benefits, side effects, contraindications and possible interactions. Verbally expressed understanding.        DIagnoses:   Schizophrenia                         Psychosis symptoms severity: Scores 0-4  Hallucinations:4  Delusions:4  Disorganized speech:2  Abnormal psychomotor behavior:1  Negative symptoms:2  Impaired cognition:3  Depression:0  Rosaline:0                   etoh use disorder, unknown amount used.                                         Attestation:  Patient has been seen and evaluated by me,  Haleigh Kolb NP          Interim History:   The patient's care was discussed with the treatment team and chart " notes were reviewed.          Medications:       nicotine   Transdermal Q8H     [START ON 3/18/2018] nicotine   Transdermal Daily     nicotine  1 patch Transdermal Daily     PHENobarbital  32.4 mg Oral TID              10 point ROS negative        Allergies:     Allergies   Allergen Reactions     Shrimp Anaphylaxis     Risperdal [Risperidone] Other (See Comments)     Elevated prolactin            Psychiatric Examination:   /64  Pulse 97  Temp 98.2  F (36.8  C) (Tympanic)  Resp 18  Ht 1.524 m (5')  Wt 59.1 kg (130 lb 6.4 oz)  SpO2 96%  BMI 25.47 kg/m2  Weight is 130 lbs 6.4 oz  Body mass index is 25.47 kg/(m^2).    Appearance:  awake, alert, appeared older than stated age and poorly groomed  Attitude:  Somewhat cooperative  Eye Contact:  fair  Mood:  anxious  Affect:  blunted  Speech:  decreased prosody  Psychomotor Behavior:  no evidence of tardive dyskinesia, dystonia, or tics  Thought Process:  disorganized and evidence of thought blocking present  Associations:  loosening of associations present  Thought Content:  no evidence of suicidal ideation or homicidal ideation and patient appears to be responding to internal stimuli  Insight:  none  Judgment:  poor  Oriented to:  time, person, and place  Attention Span and Concentration:  limited  Recent and Remote Memory:  fair  Fund of Knowledge: low-normal  Muscle Strength and Tone: normal  Gait and Station: Normal           Labs:     Results for orders placed or performed in visit on 01/24/14   MR Lumbar Spine w/o & w Contrast    Narrative    LUMBAR SPINE MRI  TECHNIQUE:  Images were obtained sagittally T1 proton density and  T2-weighted, axially proton density and T2-weighted.  FINDINGS:  The T11-T12, T12-L1 and L1-L2 disks are normal in height.  There is no evidence of disk herniation or protrusion.  There is mild  annular bulging at L2-L3 without significant thecal sac or nerve root  compression.  The L3-L4 disk is normal in height.  There is  no  evidence of disk herniation or protrusion.  There is minimal annular  bulging at L4-L5 without significant thecal sac or nerve root  compression.  The L5-S1 disk appears normal.  The facet joints are  normal in appearance.  There is no central or lateral spinal stenosis.  Intradurally, the nerves of the cauda equina and conus appear normal.  The paravertebral soft tissues appear normal.  IMPRESSION:  NEGATIVE MR OF THE LUMBAR SPINE.    Exam Date: Jan 30, 2014 10:39:00 AM  Author: ARAM MCMANUS  This report is final and signed                  Plan:     Petition was filled out yesterday. No medications ordered to target psychosis as she refuses medications

## 2018-03-17 NOTE — PLAN OF CARE
Problem: Patient Care Overview  Goal: Individualization & Mutuality  Contract for safety on admission  Cooperative with admission process  Oriented to person, place and time within 2 days of admission  No harm to self or others while on unit  Will deny suicidal ideation on discharge  Safe withdrawal from medication / stable vitals  Will agree to tx team recommendations for meds and follow up care  Will take meds as prescribed while on unit  Delusions about being pregnant will subside by discharge date  Will attend 75% of groups   Outcome: Improving  Pt has been in bed with eyes closed and regular respirations observed all night. Will continue to monitor.

## 2018-03-17 NOTE — PLAN OF CARE
Problem: Patient Care Overview  Goal: Individualization & Mutuality  Safe withdrawal from medication / stable vitals  Will agree to tx team recommendations for meds and follow up care.  Will attend >75% of groups.   Outcome: No Change  Patient denied anxiety and depression, HI/SI and hallucinations. She was irritable and seemed frustrated because she explained she came here to see a doctor about her muscle pain and hands. She did ask for tylenol at 11:18 for this generalized muscle pain she described as 5 of 10. Patient showered. She isolated to her room and was withdrawn. She was tense and irritable with nursing questions. She did not attend groups.     Problem: Thought Process Alteration (Adult)  Goal: Improved Thought Process  Patient will sleep >6 hours per night.  Patient will be free of delusions by discharge.    Outcome: Improving  Progress made towards goals.

## 2018-03-17 NOTE — PLAN OF CARE
Face to face end of shift report received from Africa TRIANA RN. Rounding completed. Patient observed in room.     Augustus Chow  3/17/2018  8:01 AM

## 2018-03-18 PROCEDURE — 99233 SBSQ HOSP IP/OBS HIGH 50: CPT | Performed by: NURSE PRACTITIONER

## 2018-03-18 PROCEDURE — 12400000 ZZH R&B MH

## 2018-03-18 PROCEDURE — 25000132 ZZH RX MED GY IP 250 OP 250 PS 637: Performed by: NURSE PRACTITIONER

## 2018-03-18 RX ADMIN — PHENOBARBITAL 32.4 MG: 32.4 TABLET ORAL at 21:30

## 2018-03-18 RX ADMIN — PHENOBARBITAL 32.4 MG: 32.4 TABLET ORAL at 14:51

## 2018-03-18 RX ADMIN — PHENOBARBITAL 32.4 MG: 32.4 TABLET ORAL at 08:16

## 2018-03-18 RX ADMIN — NICOTINE 1 PATCH: 21 PATCH, EXTENDED RELEASE TRANSDERMAL at 08:15

## 2018-03-18 RX ADMIN — OLANZAPINE 10 MG: 10 TABLET, FILM COATED ORAL at 21:30

## 2018-03-18 ASSESSMENT — ACTIVITIES OF DAILY LIVING (ADL)
DRESS: INDEPENDENT;SCRUBS (BEHAVIORAL HEALTH)
ORAL_HYGIENE: INDEPENDENT
LAUNDRY: UNABLE TO COMPLETE
GROOMING: INDEPENDENT

## 2018-03-18 NOTE — PLAN OF CARE
Face to face end of shift report received from Marleny PRITCHETT RN. Rounding completed. Patient observed.     Augustus Chow  3/18/2018  7:34 AM

## 2018-03-18 NOTE — PLAN OF CARE
Face to face end of shift report received from Augustus BOB RN. Rounding completed. Patient observed in room laying in bed.     Karen Hamilton  3/18/2018  3:38 PM

## 2018-03-18 NOTE — PLAN OF CARE
"Problem: Patient Care Overview  Goal: Individualization & Mutuality  Safe withdrawal from medication / stable vitals  Will agree to tx team recommendations for meds and follow up care.  Will attend >75% of groups.    Outcome: No Change  Pt isolates to her room throughout most of this shift pt does attend 2 groups, she eats her meal in the lounge. However, she does not engage in conversation with peers. Pt keeps conversation short with this writer. When asked to take her medication pt states \"again\" while rolling her eyes. Pt is compliant with medications. She does not have any delusional conversation with nurse however, she does not participate in conversation, she answers yes and no. She denies SI, HI, anxiety, depression, and hallucinations.     Problem: Thought Process Alteration (Adult)  Goal: Improved Thought Process  Patient will sleep >6 hours per night.  Patient will be free of delusions by discharge.     Outcome: No Change  Pt is free of delusional speaking however, pt does not engage in conversation with nursing.   Pt reports that he vomited after dinner tonight, he states he would like to loose 15 pounds, reporting he is worried about gaining weight while he is here. Pt is informed that he will need to spend a half hour in staff sight after meals.  "

## 2018-03-18 NOTE — PLAN OF CARE
"Problem: Patient Care Overview  Goal: Individualization & Mutuality  Safe withdrawal from medication / stable vitals  Will agree to tx team recommendations for meds and follow up care.  Will attend >75% of groups.    3/17/18 patient to spend a half hour in the lounge or group after meals due to vomiting after meals.   Outcome: No Change  Pt has been in bed with eyes closed and regular respirations. 15 minute and PRN checks all night. Pt. C/O moderate ABD pain @ 0015. Pt. afebrile, active bowel sounds in all quadrants, hyper active to Right upper quadrent. Pt. Offered PRN Mylanta or tylenol. Pt. Reports \" I feel like its labor.\" Pt. Refusing PRNs. Pt. Reporting \"I'm just going to lie down.\" Pt. Encouraged to lie on her left side. Will continue to monitor.    Pt. Wondering hallway. Appears responding to internal stimuli. Smiling inappropriately. Pt. observed walking in entrance of peers room @ 0230. Pt. Self redirected as staff rounding. Pt. Returned to her room.     0310 Pt. Ambulating to entrance. Pt. observed attempting to pull on door and badge reader.     0540 Pt. attempting to enter peers room. Pt. Redirected. \"I'm sorry I didn't know I couldn't\"     Marleny Singh  3/18/2018  12:22 AM        "

## 2018-03-18 NOTE — PLAN OF CARE
"Problem: Patient Care Overview  Goal: Individualization & Mutuality  Safe withdrawal from medication / stable vitals  Will agree to tx team recommendations for meds and follow up care.  Will attend >75% of groups.        Outcome: No Change  Pt does not display s/s of withdrawal at this time, she is compliant with medications. Pt denies SI, HI, depression, anxiety, and hallucinations. Pt is encouraged to take a PRN Zyprexa due to her continued delusional thinking and not sleeping for 24 hours. At the time that nurse attempts to administer PRN pt is laying in bed, she states \"I was just dozing off\" pt did get out of bed 10 minutes after nurse approached her, she is attending groups. Pt slowly walks through the halls she does not engage in conversation with peers but will stand next to them and stare at the table they are sitting at or at them.  Pt walks the halls rubbing her stomach but does not make any comments about giving birth or being pregnant this shift.  Pt did not sleep throughout this shift. When asked about depression and anxiety pt states, \"no sweaty there is nothing wrong, you can go now\". Pt is offered a Zyprexa again with her bedtime medications, pt did accept and given prn at 2130. After administering medication patient does state \"I don't like the light it is hurting my eyes\" she taps nurses leg and states \"you can go now\" pt follows nurse to door and states \"I would like to close my door and shut the light off\" pt is currently laying in bed with eyes closed.     Problem: Thought Process Alteration (Adult)  Goal: Improved Thought Process  Patient will sleep >6 hours per night.  Patient will be free of delusions by discharge.     Outcome: No Change  Pt does not sleep last night and has not slept today.      "

## 2018-03-18 NOTE — PLAN OF CARE
"Problem: Patient Care Overview  Goal: Individualization & Mutuality  Safe withdrawal from medication / stable vitals  Will agree to tx team recommendations for meds and follow up care.  Will attend >75% of groups.       Outcome: Declining  Patient denied anxiety, depression, HI/SI and hallucinations. She paces in the halls and at times she stops and stares into space. At one point she was seen with a hand on the guardrail and a hand on her abdomen with a concerned look on her face. She denies pain and is dismissive to this writer when asking questions. She was observed by staff to be standing in her room between the beds staring at the wall. She showered. Patient verbalized to staff that she will \"be having twins any day.\" Multiple times this shift patient has stood at the unit doors and stared out the windows. A few times she pulled on the door handle. She denies this.     Problem: Thought Process Alteration (Adult)  Goal: Improved Thought Process  Patient will sleep >6 hours per night.  Patient will be free of delusions by discharge.     Outcome: Declining  Patient did not nap on this shift even though staff reported she did not sleep throughout the night.       "

## 2018-03-18 NOTE — PLAN OF CARE
Face to face end of shift report received from Karen POP RN. Rounding completed. Patient observed.     Marleny Singh  3/18/2018  12:00 AM

## 2018-03-19 PROCEDURE — 99232 SBSQ HOSP IP/OBS MODERATE 35: CPT | Performed by: NURSE PRACTITIONER

## 2018-03-19 PROCEDURE — 12400000 ZZH R&B MH

## 2018-03-19 PROCEDURE — 25000132 ZZH RX MED GY IP 250 OP 250 PS 637: Performed by: NURSE PRACTITIONER

## 2018-03-19 RX ORDER — PHENOBARBITAL 32.4 MG/1
32.4 TABLET ORAL AT BEDTIME
Status: COMPLETED | OUTPATIENT
Start: 2018-03-20 | End: 2018-03-21

## 2018-03-19 RX ORDER — PHENOBARBITAL 32.4 MG/1
32.4 TABLET ORAL 2 TIMES DAILY
Status: DISCONTINUED | OUTPATIENT
Start: 2018-03-19 | End: 2018-03-19

## 2018-03-19 RX ORDER — PHENOBARBITAL 32.4 MG/1
32.4 TABLET ORAL AT BEDTIME
Status: DISCONTINUED | OUTPATIENT
Start: 2018-03-20 | End: 2018-03-19

## 2018-03-19 RX ADMIN — PHENOBARBITAL 32.4 MG: 32.4 TABLET ORAL at 20:03

## 2018-03-19 RX ADMIN — PHENOBARBITAL 32.4 MG: 32.4 TABLET ORAL at 08:37

## 2018-03-19 RX ADMIN — LORAZEPAM 1.5 MG: 1 TABLET ORAL at 12:05

## 2018-03-19 RX ADMIN — NICOTINE 1 PATCH: 21 PATCH, EXTENDED RELEASE TRANSDERMAL at 08:43

## 2018-03-19 ASSESSMENT — ACTIVITIES OF DAILY LIVING (ADL)
DRESS: SCRUBS (BEHAVIORAL HEALTH);INDEPENDENT
ORAL_HYGIENE: INDEPENDENT
GROOMING: INDEPENDENT

## 2018-03-19 NOTE — PLAN OF CARE
Face to face end of shift report received from JULIA Sellers. Rounding completed. Patient observed in bed, appears asleep. Regular respirations noted.     Kathryn Conley  3/19/2018  4:09 PM

## 2018-03-19 NOTE — PROGRESS NOTES
Face to face end of shift report received from adelina webber at 2300 report.. Rounding completed. Patient observed.     Fadia Jaquez  3/19/2018  1:27 AM

## 2018-03-19 NOTE — PLAN OF CARE
Face to face end of shift report received from JULIA Gutierrez. Rounding completed. Patient observed in hallway.      Marlo Vargas  3/19/2018  8:00 AM

## 2018-03-19 NOTE — PROGRESS NOTES
"[facility]  Psychiatric Progress Note      Impression:     Glenda is upset that I filed petition for commitment. She was coming to the window at the nursing station constantly asking for scrubs. I was unaware of this. Nursing was unsure why she was asking for scrubs repetatively. The first couple of days she was here she would repetatively ask me to get her phone so I could call Mark Russell. She was very repetative in speech, would ask the same questions to staff, with same affect, tone and nearly verbatim wording. When I tried to pilar with her today she was staring at the ceiling. I asked her how she was doing and she was very delayed. She then said \"im busy, can you come back later\". She came and found me 15 min later and I asked her if I had interrupted her praying. She did state that is what she was doing. She is extremely guarded still. I did try to call her daughter this morning and did leave a message      Educated regarding medication indications, risks, benefits, side effects, contraindications and possible interactions. Verbally expressed understanding.        DIagnoses:   Schizophrenia                         Psychosis symptoms severity: Scores 0-4  Hallucinations:4  Delusions:4  Disorganized speech:2  Abnormal psychomotor behavior:1  Negative symptoms:2  Impaired cognition:3  Depression:0  Rosaline:0                   etoh use disorder, unknown amount used.                                         Attestation:  Patient has been seen and evaluated by me,  Haleigh Kolb NP          Interim History:   The patient's care was discussed with the treatment team and chart notes were reviewed.          Medications:       nicotine   Transdermal Q8H     nicotine   Transdermal Daily     nicotine  1 patch Transdermal Daily     PHENobarbital  32.4 mg Oral TID              10 point ROS negative        Allergies:     Allergies   Allergen Reactions     Shrimp Anaphylaxis     Risperdal [Risperidone] " Other (See Comments)     Elevated prolactin            Psychiatric Examination:   /68  Pulse 87  Temp 98.2  F (36.8  C) (Tympanic)  Resp 16  Ht 1.524 m (5')  Wt 58.3 kg (128 lb 9.6 oz)  SpO2 96%  BMI 25.12 kg/m2  Weight is 128 lbs 9.6 oz  Body mass index is 25.12 kg/(m^2).    Appearance:  awake, alert, appeared older than stated age and poorly groomed  Attitude:  Somewhat cooperative  Eye Contact:  fair  Mood:  anxious  Affect:  blunted  Speech:  decreased prosody  Psychomotor Behavior:  no evidence of tardive dyskinesia, dystonia, or tics  Thought Process:  disorganized and evidence of thought blocking present perseverative on specific topics .  Associations:  loosening of associations present  Thought Content:  no evidence of suicidal ideation or homicidal ideation and patient appears to be responding to internal stimuli  Insight:  none  Judgment:  poor  Oriented to:  time, person, and place  Attention Span and Concentration:  limited  Recent and Remote Memory:  fair  Fund of Knowledge: low-normal  Muscle Strength and Tone: normal  Gait and Station: Normal           Labs:     Results for orders placed or performed in visit on 01/24/14   MR Lumbar Spine w/o & w Contrast    Narrative    LUMBAR SPINE MRI  TECHNIQUE:  Images were obtained sagittally T1 proton density and  T2-weighted, axially proton density and T2-weighted.  FINDINGS:  The T11-T12, T12-L1 and L1-L2 disks are normal in height.  There is no evidence of disk herniation or protrusion.  There is mild  annular bulging at L2-L3 without significant thecal sac or nerve root  compression.  The L3-L4 disk is normal in height.  There is no  evidence of disk herniation or protrusion.  There is minimal annular  bulging at L4-L5 without significant thecal sac or nerve root  compression.  The L5-S1 disk appears normal.  The facet joints are  normal in appearance.  There is no central or lateral spinal stenosis.  Intradurally, the nerves of the cauda  equina and conus appear normal.  The paravertebral soft tissues appear normal.  IMPRESSION:  NEGATIVE MR OF THE LUMBAR SPINE.    Exam Date: Jan 30, 2014 10:39:00 AM  Author: ARAM MCMANUS  This report is final and signed                  Plan:     Petition for commitment was filled out. Likely will be screened today.

## 2018-03-19 NOTE — PLAN OF CARE
Problem: Patient Care Overview  Goal: Team Discussion  Team Plan:   BEHAVIORAL TEAM DISCUSSION    Participants: Haleigh Kolb NP, Poornima Tuckre NP, Tootie Maria NP, Sue Morton [facility], Mehnaz Crowley [facility], Marbella Campbell RN, Pushpa Kelly RN, Marlo Vargas RN. [facility], [facility] OT, Poornima Reyna OT  Progress: none- patient refusing treatment  Continued Stay Criteria/Rationale: delusional about being pregnant, very repetitive speech, patient lacks insight   Medical/Physical: liver nodules  Precautions:   Behavioral Orders   Procedures     Code 1 - Restrict to Unit     Routine Programming     As clinically indicated     Status 15     Every 15 minutes.     Plan: filed for commitment, plan to file pop  Rationale for change in precautions or plan: none

## 2018-03-19 NOTE — PLAN OF CARE
Problem: Patient Care Overview  Goal: Individualization & Mutuality  Safe withdrawal from medication / stable vitals  Will agree to tx team recommendations for meds and follow up care.  Will attend >75% of groups.        0115 in bed with easy respirations, presenting sleeping. 0528 patient continues to sleep at this time.    Problem: Thought Process Alteration (Adult)  Goal: Identify Related Risk Factors and Signs and Symptoms  Related risk factors and signs and symptoms are identified upon initiation of Human Response Clinical Practice Guideline (CPG).   0115 in bed with easy respirations, presenting sleeping.

## 2018-03-19 NOTE — PROGRESS NOTES
[facility]  Psychiatric Progress Note      Impression:     Today the [facility] screener was here to see glenn. He is asking for documentaiton showing that she is refusing meidications. I offerred her abilify on a scheduled basis though she would not take it therefor it was not ordered. Repeatedly states the only med she needs is adderall which she has been told multiple times she will not be getting here. She did take prn ativan for a one time dose today which decreased the laucity of her speech. Her speech is much more flent though she is still  delsional and very gaurded. Hope to find out tomorrow if [facility] going to persue MI commitment.      Educated regarding medication indications, risks, benefits, side effects, contraindications and possible interactions. Verbally expressed understanding.        DIagnoses:   Schizophrenia                         Psychosis symptoms severity: Scores 0-4  Hallucinations:4  Delusions:4  Disorganized speech:2  Abnormal psychomotor behavior:1  Negative symptoms:2  Impaired cognition:3  Depression:0  Rosaline:0                   etoh use disorder, unknown amount used.                                         Attestation:  Patient has been seen and evaluated by me,  Haleigh Kolb NP          Interim History:   The patient's care was discussed with the treatment team and chart notes were reviewed.          Medications:       PHENobarbital  32.4 mg Oral BID     LORazepam  1.5 mg Oral Once     nicotine   Transdermal Q8H     nicotine   Transdermal Daily     nicotine  1 patch Transdermal Daily              10 point ROS negative        Allergies:     Allergies   Allergen Reactions     Shrimp Anaphylaxis     Risperdal [Risperidone] Other (See Comments)     Elevated prolactin            Psychiatric Examination:   /68  Pulse 87  Temp 98.2  F (36.8  C) (Tympanic)  Resp 16  Ht 1.524 m (5')  Wt 58.3 kg (128 lb 9.6 oz)  SpO2 96%  BMI 25.12 kg/m2  Weight is 128 lbs 9.6  oz  Body mass index is 25.12 kg/(m^2).    Appearance:  awake, alert, appeared older than stated age and poorly groomed  Attitude:  Somewhat cooperative  Eye Contact:  fair  Mood:  anxious  Affect:  blunted  Speech:  decreased prosody  Psychomotor Behavior:  no evidence of tardive dyskinesia, dystonia, or tics  Thought Process:  disorganized and evidence of thought blocking present perseverative on specific topics .  Associations:  loosening of associations present  Thought Content:  no evidence of suicidal ideation or homicidal ideation and patient appears to be responding to internal stimuli  Insight:  none  Judgment:  poor  Oriented to:  time, person, and place  Attention Span and Concentration:  limited  Recent and Remote Memory:  fair  Fund of Knowledge: low-normal  Muscle Strength and Tone: normal  Gait and Station: Normal           Labs:     Results for orders placed or performed in visit on 01/24/14   MR Lumbar Spine w/o & w Contrast    Narrative    LUMBAR SPINE MRI  TECHNIQUE:  Images were obtained sagittally T1 proton density and  T2-weighted, axially proton density and T2-weighted.  FINDINGS:  The T11-T12, T12-L1 and L1-L2 disks are normal in height.  There is no evidence of disk herniation or protrusion.  There is mild  annular bulging at L2-L3 without significant thecal sac or nerve root  compression.  The L3-L4 disk is normal in height.  There is no  evidence of disk herniation or protrusion.  There is minimal annular  bulging at L4-L5 without significant thecal sac or nerve root  compression.  The L5-S1 disk appears normal.  The facet joints are  normal in appearance.  There is no central or lateral spinal stenosis.  Intradurally, the nerves of the cauda equina and conus appear normal.  The paravertebral soft tissues appear normal.  IMPRESSION:  NEGATIVE MR OF THE LUMBAR SPINE.    Exam Date: Jan 30, 2014 10:39:00 AM  Author: ARAM MCMANUS  This report is final and signed                  Plan:    Decrease phenobarbital    Trial of 1.5 mg ativan po now one time dose.     Continue to offer prn zyprexa  County screener was [place] today. He is going to let us know if the commitment will be going forward

## 2018-03-19 NOTE — PLAN OF CARE
"Problem: Patient Care Overview  Goal: Individualization & Mutuality  Safe withdrawal from medication / stable vitals  Will agree to tx team recommendations for meds and follow up care.  Will attend >75% of groups.        She is up on the unit. She doesn't offer any conversation. Her affect is flat. She says that she came to the hospital because she had abdominal pain. When asked if she thought she was pregnant she states \"no\". She has a vacant look in her eyes. She denies feeling depressed or suicidal. She is delayed in her responses. She walks in the halls and tends to stand by the exits. She is noted to be touching the badge reader by the exit and doesn't redirect immediately. She did eventually move away from the door.     Problem: Thought Process Alteration (Adult)  Goal: Improved Thought Process  Patient will sleep >6 hours per night.  Patient will be free of delusions by discharge.     Delayed responses. She denies hallucinations. But is preoccupied and delayed in her responses.     Her daughter Jami updated on patients condition and petition for committment  "

## 2018-03-20 PROCEDURE — 25000132 ZZH RX MED GY IP 250 OP 250 PS 637: Performed by: NURSE PRACTITIONER

## 2018-03-20 PROCEDURE — 99232 SBSQ HOSP IP/OBS MODERATE 35: CPT | Performed by: NURSE PRACTITIONER

## 2018-03-20 PROCEDURE — 12400011

## 2018-03-20 RX ORDER — ARIPIPRAZOLE 5 MG/1
5 TABLET ORAL AT BEDTIME
Status: DISCONTINUED | OUTPATIENT
Start: 2018-03-20 | End: 2018-03-21

## 2018-03-20 RX ADMIN — ARIPIPRAZOLE 5 MG: 5 TABLET ORAL at 20:23

## 2018-03-20 RX ADMIN — OLANZAPINE 10 MG: 10 TABLET, FILM COATED ORAL at 00:26

## 2018-03-20 RX ADMIN — PHENOBARBITAL 32.4 MG: 32.4 TABLET ORAL at 20:23

## 2018-03-20 RX ADMIN — NICOTINE 1 PATCH: 21 PATCH, EXTENDED RELEASE TRANSDERMAL at 10:32

## 2018-03-20 RX ADMIN — ALUMINUM HYDROXIDE, MAGNESIUM HYDROXIDE, AND DIMETHICONE 30 ML: 400; 400; 40 SUSPENSION ORAL at 16:36

## 2018-03-20 ASSESSMENT — ACTIVITIES OF DAILY LIVING (ADL)
LAUNDRY: UNABLE TO COMPLETE
GROOMING: WITH SUPERVISION
ORAL_HYGIENE: INDEPENDENT
DRESS: SCRUBS (BEHAVIORAL HEALTH)
ORAL_HYGIENE: INDEPENDENT
LAUNDRY: WITH SUPERVISION
GROOMING: INDEPENDENT
DRESS: INDEPENDENT;SCRUBS (BEHAVIORAL HEALTH)

## 2018-03-20 NOTE — PLAN OF CARE
Problem: Patient Care Overview  Goal: Individualization & Mutuality  Safe withdrawal from medication / stable vitals  Will agree to tx team recommendations for meds and follow up care.  Will attend >75% of groups.        0015 patient has been up in the duque and to the ice machine twice since this shift started. She then went to  front of the group room door for a couple of minutes. Patient was asked if she would like to have a prn and she asked for and received her prn zyprexa 10 mg .  When asked she was feeling agitated and restless She did turn to walk away prior to take any water but was asked to swallow and cooperative with mouth check. She then went to bed.  And sleep. 0520 patient continues to sleep at this time.

## 2018-03-20 NOTE — PROGRESS NOTES
Face to face end of shift report received from lisandra webber at 2300 report.. Rounding completed. Patient observed.     Fadia Jaquez  3/20/2018  1:28 AM        .

## 2018-03-20 NOTE — PLAN OF CARE
Face to face end of shift report received from Fadia ONEAL RN. Rounding completed. Patient observed.     Crystal Cr  3/20/2018  7:48 AM

## 2018-03-20 NOTE — PLAN OF CARE
Problem: Patient Care Overview  Goal: Individualization & Mutuality  Safe withdrawal from medication / stable vitals  Will agree to tx team recommendations for meds and follow up care.  Will attend >75% of groups.      Outcome: No Change  Pt denies all mental health criteria. Her affect is blunted and flat. Responses are delayed. She appears preoccupied and does appear responding to internal stimuli at times. She does not attend groups and spends most of the shift in her room. She makes no mention of pregnancy this shift. Not noted to be hovering near exit door this shift.     2003- Pt took scheduled phenobarbital with no issue. She is initially hesitant to take med, states that she does not need it as she is not going through withdrawal. Pt does agree to take med after being reminded that practitioner is going to gradually reduce it. Pt noted to be looking off into space and laughing, appears responding to internal stimuli. Has been slowly pacing the unit.     2150- Pt noted to be standing in the lounge with her face and body inches from the wall. Pt then walks to the exit doors and presses her face to the door. She was approached by staff. She states that she is meditating. Pt was informed that she needs to stay away from the exit doors. She mutters under her breath and walks away from this writer. She is offered PRN zyprexa but she declines. Pt noted to be standing against MHICU doors.     2235- Pt noted to be standing against exit doors again. She was told that she needs to move away from the doors. Pt noted to be crying. She does not answer this writer when she is asked what is wrong. Does not answer when asked if she needs a PRN.    Problem: Thought Process Alteration (Adult)  Goal: Improved Thought Process  Patient will sleep >6 hours per night.  Patient will be free of delusions by discharge.   Outcome: No Change  .

## 2018-03-20 NOTE — PLAN OF CARE
Face to face end of shift report received from Crystal DANIELLE RN. Rounding completed. Patient observed.     Radha Ayoub  3/20/2018  3:59 PM

## 2018-03-20 NOTE — PLAN OF CARE
Problem: Thought Process Alteration (Adult)  Goal: Identify Related Risk Factors and Signs and Symptoms  Related risk factors and signs and symptoms are identified upon initiation of Human Response Clinical Practice Guideline (CPG).   Patient's thoughts will be reality based by discharge   0030 see patient care note. Patient is in bed at 0130 and presenting sleeping.

## 2018-03-20 NOTE — PLAN OF CARE
Problem: Patient Care Overview  Goal: Team Discussion  Team Plan:   Outcome: Improving  BEHAVIORAL TEAM DISCUSSION    Participants: Haleigh Kolb NP, Poornima Tucker NP,  Tootie Maria NP, Katelyn Meredith [facility], Sue Morton [facility],  Chante Agrawal RN, Mehnaz Allred LSW,  Pushpa Kelly RN, Elizabeth Schulz [facility], [facility]  Progress: minimal  Continued Stay Criteria/Rationale: medication monitoring and adjustment  Medical/Physical: none  Precautions:   Behavioral Orders   Procedures     Code 1 - Restrict to Unit     Routine Programming     As clinically indicated     Status 15     Every 15 minutes.     Plan: medication management, signed voluntary in lieu  Rationale for change in precautions or plan: none

## 2018-03-20 NOTE — PLAN OF CARE
"Problem: Patient Care Overview  Goal: Individualization & Mutuality  Safe withdrawal from medication / stable vitals  Will agree to tx team recommendations for meds and follow up care.  Will attend >75% of groups.        Outcome: Therapy, progress towards functional goals is fair  Patient presents calm, laying in bed in her room. Patient has very delayed to no response to assessment questions. Patient states, \" I am fine.\" Patient denies all critiera. Patient has been seen walking around the unit holding her abdomen, laughing and smiling to herself. Patient becomes irritable with any assessment questions. Patient did use nicotine patch as scheduled. Patient has not attended group, however has been out in the community area for meals. Patient does not often interact with others. Patient has not exhibited any withdrawal symptoms.     Patient has asked three times for shower supplies, however then does not proceed to shower. The third time the patient did shower. Patient seen walking around the halls, staring at the wall for prolonged periods of time.   "

## 2018-03-20 NOTE — PROGRESS NOTES
"[facility]  Psychiatric Progress Note      Impression:     Reminded patiten today that she signed voluntary in liey of commitment. She said \"yeah that's only beucase you guys dont believe I pregnant.\" I told her again that there is no way she could be pregnant she doesn't have a uterus. She looked at me with a smirk. She was irritable. I told her she has a diagnosis of schizophrenia and she needs to start taking a medications to treat thought disorder/delusions and she said \"im not taking anything\". I did tell her the county would be picking up on her comitment if she did not take meds and agree with the treatment care team.     She also indicated  Educated regarding medication indications, risks, benefits, side effects, contraindications and possible interactions. Verbally expressed understanding.        DIagnoses:   Schizophrenia                         Psychosis symptoms severity: Scores 0-4  Hallucinations:4  Delusions:4  Disorganized speech:2  Abnormal psychomotor behavior:1  Negative symptoms:2  Impaired cognition:3  Depression:0  Rosaline:0                   etoh use disorder, unknown amount used.                                         Attestation:  Patient has been seen and evaluated by me,  Haleigh Kolb NP          Interim History:   The patient's care was discussed with the treatment team and chart notes were reviewed.          Medications:       ARIPiprazole  5 mg Oral At Bedtime     PHENobarbital  32.4 mg Oral At Bedtime     nicotine   Transdermal Q8H     nicotine   Transdermal Daily     nicotine  1 patch Transdermal Daily              10 point ROS RUQ abdominal pain. Had CT of abdomen        Allergies:     Allergies   Allergen Reactions     Shrimp Anaphylaxis     Risperdal [Risperidone] Other (See Comments)     Elevated prolactin            Psychiatric Examination:   /84  Pulse 81  Temp 98.6  F (37  C) (Tympanic)  Resp 16  Ht 1.524 m (5')  Wt 58.3 kg (128 lb 9.6 oz)  " SpO2 97%  BMI 25.12 kg/m2  Weight is 128 lbs 9.6 oz  Body mass index is 25.12 kg/(m^2).    Appearance:  awake, alert, appeared older than stated age and poorly groomed  Attitude:  uncooperative  Eye Contact:  fair  Mood:  Irritable.   Affect:  blunted  Speech:  decreased prosody  Psychomotor Behavior:  no evidence of tardive dyskinesia, dystonia, or tics  Thought Process:  disorganized and evidence of thought blocking present perseverative on specific topics .  Associations:  loosening of associations present delusional  Thought Content:  no evidence of suicidal ideation or homicidal ideation and patient appears to be responding to internal stimuli  Insight:  none  Judgment:  poor  Oriented to:  time, person, and place  Attention Span and Concentration:  limited  Recent and Remote Memory:  fair  Fund of Knowledge: low-normal  Muscle Strength and Tone: normal  Gait and Station: Normal           Labs:     Results for orders placed or performed in visit on 01/24/14   MR Lumbar Spine w/o & w Contrast    Narrative    LUMBAR SPINE MRI  TECHNIQUE:  Images were obtained sagittally T1 proton density and  T2-weighted, axially proton density and T2-weighted.  FINDINGS:  The T11-T12, T12-L1 and L1-L2 disks are normal in height.  There is no evidence of disk herniation or protrusion.  There is mild  annular bulging at L2-L3 without significant thecal sac or nerve root  compression.  The L3-L4 disk is normal in height.  There is no  evidence of disk herniation or protrusion.  There is minimal annular  bulging at L4-L5 without significant thecal sac or nerve root  compression.  The L5-S1 disk appears normal.  The facet joints are  normal in appearance.  There is no central or lateral spinal stenosis.  Intradurally, the nerves of the cauda equina and conus appear normal.  The paravertebral soft tissues appear normal.  IMPRESSION:  NEGATIVE MR OF THE LUMBAR SPINE.    Exam Date: Jan 30, 2014 10:39:00 AM  Author: ARAM  MARIETTA  This report is final and signed                  Plan:     Voluntary in lieu of committment.     Start abilify at night . Refused to sign neuroleptics consent as she does not believe she needs to take medications though the county screener says we need to show more documentation she refuses meds.    Call [facility] once she starts refusing meds.     If she takes meds, she needs to have a substitute decision maker .

## 2018-03-21 ENCOUNTER — APPOINTMENT (OUTPATIENT)
Dept: CT IMAGING | Facility: HOSPITAL | Age: 48
DRG: 885 | End: 2018-03-21
Attending: HOSPITALIST
Payer: COMMERCIAL

## 2018-03-21 PROBLEM — F20.9 SCHIZOPHRENIA (H): Status: ACTIVE | Noted: 2018-03-21

## 2018-03-21 LAB
ALBUMIN SERPL-MCNC: 3.7 G/DL (ref 3.4–5)
ALP SERPL-CCNC: 91 U/L (ref 40–150)
ALT SERPL W P-5'-P-CCNC: 20 U/L (ref 0–50)
ANION GAP SERPL CALCULATED.3IONS-SCNC: 7 MMOL/L (ref 3–14)
AST SERPL W P-5'-P-CCNC: 16 U/L (ref 0–45)
BILIRUB SERPL-MCNC: 0.2 MG/DL (ref 0.2–1.3)
BUN SERPL-MCNC: 19 MG/DL (ref 7–30)
CALCIUM SERPL-MCNC: 9.4 MG/DL (ref 8.5–10.1)
CHLORIDE SERPL-SCNC: 102 MMOL/L (ref 94–109)
CO2 SERPL-SCNC: 30 MMOL/L (ref 20–32)
CREAT SERPL-MCNC: 0.62 MG/DL (ref 0.52–1.04)
GFR SERPL CREATININE-BSD FRML MDRD: >90 ML/MIN/1.7M2
GLUCOSE SERPL-MCNC: 112 MG/DL (ref 70–99)
HCG SERPL QL: NEGATIVE
LACTATE SERPL-SCNC: 1 MMOL/L (ref 0.4–2)
POTASSIUM SERPL-SCNC: 3.4 MMOL/L (ref 3.4–5.3)
PROT SERPL-MCNC: 7.4 G/DL (ref 6.8–8.8)
SODIUM SERPL-SCNC: 139 MMOL/L (ref 133–144)

## 2018-03-21 PROCEDURE — 25000132 ZZH RX MED GY IP 250 OP 250 PS 637: Performed by: NURSE PRACTITIONER

## 2018-03-21 PROCEDURE — 80053 COMPREHEN METABOLIC PANEL: CPT | Performed by: HOSPITALIST

## 2018-03-21 PROCEDURE — 84703 CHORIONIC GONADOTROPIN ASSAY: CPT | Performed by: HOSPITALIST

## 2018-03-21 PROCEDURE — 83605 ASSAY OF LACTIC ACID: CPT | Performed by: HOSPITALIST

## 2018-03-21 PROCEDURE — 74176 CT ABD & PELVIS W/O CONTRAST: CPT | Mod: TC

## 2018-03-21 PROCEDURE — 99232 SBSQ HOSP IP/OBS MODERATE 35: CPT | Performed by: NURSE PRACTITIONER

## 2018-03-21 PROCEDURE — 12400011

## 2018-03-21 PROCEDURE — 36415 COLL VENOUS BLD VENIPUNCTURE: CPT | Performed by: HOSPITALIST

## 2018-03-21 RX ORDER — SENNOSIDES 8.6 MG
8.6 TABLET ORAL 2 TIMES DAILY PRN
Status: DISCONTINUED | OUTPATIENT
Start: 2018-03-21 | End: 2018-04-05 | Stop reason: HOSPADM

## 2018-03-21 RX ORDER — ARIPIPRAZOLE 5 MG/1
5 TABLET ORAL DAILY PRN
Status: DISCONTINUED | OUTPATIENT
Start: 2018-03-21 | End: 2018-03-22

## 2018-03-21 RX ADMIN — OLANZAPINE 10 MG: 10 TABLET, FILM COATED ORAL at 08:33

## 2018-03-21 RX ADMIN — PHENOBARBITAL 32.4 MG: 32.4 TABLET ORAL at 22:56

## 2018-03-21 RX ADMIN — HYDROXYZINE HYDROCHLORIDE 50 MG: 25 TABLET ORAL at 22:56

## 2018-03-21 RX ADMIN — ACETAMINOPHEN 650 MG: 325 TABLET, FILM COATED ORAL at 17:24

## 2018-03-21 RX ADMIN — NICOTINE 1 PATCH: 21 PATCH, EXTENDED RELEASE TRANSDERMAL at 08:34

## 2018-03-21 ASSESSMENT — ACTIVITIES OF DAILY LIVING (ADL)
GROOMING: INDEPENDENT
GROOMING: INDEPENDENT
ORAL_HYGIENE: INDEPENDENT
LAUNDRY: WITH SUPERVISION
ORAL_HYGIENE: INDEPENDENT
DRESS: SCRUBS (BEHAVIORAL HEALTH);INDEPENDENT
DRESS: SCRUBS (BEHAVIORAL HEALTH);INDEPENDENT

## 2018-03-21 NOTE — PLAN OF CARE
Problem: Patient Care Overview  Goal: Individualization & Mutuality  Safe withdrawal from medication / stable vitals  Will agree to tx team recommendations for meds and follow up care.  Will attend >75% of groups.        2345 in bed with easy respirations. Presenting sleeping.    Problem: Thought Process Alteration (Adult)  Goal: Identify Related Risk Factors and Signs and Symptoms  Related risk factors and signs and symptoms are identified upon initiation of Human Response Clinical Practice Guideline (CPG).   Patient's thoughts will be reality based by discharge   2345 in bed with easy respirations, presenting sleeping.   0500 Patient was awake from about 0210 to 0315. Staff reported that she walked to the back door of the mhicu area and they heard a noise resembling hitting the door. This writer talked with patient and she did not want any prns. She was asked not to go by the mhicu , if she wanted to walk, she could so in the other areas,  which she did. so as not to wake up those patients in the mhicu. and to not hit anything and so far she has abided to those requests. . She did go back to bed and sleep at about 0315. Woke up at about 0505 for water and then returned to bed. She is steady on feet and had no complaints of pain or discomfort.

## 2018-03-21 NOTE — PLAN OF CARE
Problem: Patient Care Overview  Goal: Individualization & Mutuality  Safe withdrawal from medication / stable vitals  Will agree to tx team recommendations for meds and follow up care.  Will attend >75% of groups.        Outcome: Improving  Pt does not appear to have symptoms of withdrawal today. Patient denies SI, HI, depression, anxiety, and hallucinations. Patient is compliant with medications she is administered Zyprexa 10 mg p.o. At 0833 For anxiety and responding as patient requested something for her anxiety.However, nurse does observe patient in her room talking and laughing to herself. Pt is calm, cooperative, she paces the hallways,she is withdrawn from others. Pt does walk through group today and does sit down from time to time but does not attend an entire group. She is currently laying in her bed.     Problem: Thought Process Alteration (Adult)  Goal: Improved Thought Process  Patient will sleep >6 hours per night.  Patient will be free of delusions by discharge.     Outcome: No Change  Patient has not made comments about her belief of being pregnant today.

## 2018-03-21 NOTE — PLAN OF CARE
Face to face end of shift report received from Fadia ONEAL RN. Rounding completed. Patient observed in room laying in bed.     Karen Hamilton  3/21/2018  8:15 AM

## 2018-03-21 NOTE — PLAN OF CARE
Face to face end of shift report received from Karen BROTHERS RN. Rounding completed. Patient observed resting in bed.     Radha Loaiza  3/21/2018  4:06 PM

## 2018-03-21 NOTE — PLAN OF CARE
"Spoke with patient. Patient states that she has not been taking schizophrenic medications and she is feeling better. Pt states the [employer] said she will be getting out of the hospital today at 3. She states she has a lot of money in her bank account and will have no problem buying her own home. Pt asked me to be patient with her because she has been in rehab since September. She states she is tired of being analyzed. Pt just wants to liver on her own with her father in [place] and get off the schizophrenic medications and restart her Adderall. She also states that she wants staff \"to stop riding her ass.\"   "

## 2018-03-21 NOTE — PROGRESS NOTES
Face to face end of shift report received from zion chow rn at 2300 report.. Rounding completed. Patient observed.     Fadia Jaquez  3/21/2018  12:09 AM

## 2018-03-21 NOTE — PROGRESS NOTES
"[facility]  Psychiatric Progress Note      Impression:   Glenda Robins was admitted on 3/14/18 for delusional thoughts that she was pregnant. States she previously had hysterectomy when she was 29.  UDS positive for benzodiazapine's and amphetamines which she is prescribed. She goes to [facility] on an outpatient basis and has also been hospitalized at [facility] for Inpatient Behavioral Health in past several months.    Today she reports she is having side effects of dry mouth and feeling sedated on medications. States she is \"much less\" sedated today and did take Abilify last night.  Reports she has not done well with antidepressants in the past and does not believe she needs to be on \"schizophrenic medication\". Discussed that Abilify is also used for depression. She is currently voluntary in lieu of commitment. Today she denies she is pregnant but does admit to having had those thoughts recently. States she slept poorly initially then changed her answer to not having any sleep issues. She states her outpatient medication prescriber wanted her to go off her medications, which led to being hospitalized at [facility] earlier this year.  She appears flat and depressed but logical and linear in conversation today. Has attended some groups today, indicating one was not interesting.    Talked with patient later in day, reviewed Neuroleptic Consent Form and uses of Abilify. She states she does not feel she has schizophrenia and that she was \"misdiagnosed\".  Does not want to take \"schizophrenic med\". Patient does not have Tineo, will continue prn Abilify.     Educated regarding medication indications, risks, benefits, side effects, contraindications and possible interactions. Verbally expressed understanding.        Diagnoses:   Schizophrenia  Sedative, Hypnotic, or Anxiolytic Withdrawal  Stimulant Use Disorder,  amphetamines  H/O Alcohol Use Disorder   H/O Depression  H/O ADHD  H/O Sleep " Disorder  Polycystic Liver                                Attestation:  Patient has been seen and evaluated by me,  MADI Soto CNP          Interim History:   The patient's care was discussed with the treatment team and chart notes were reviewed.          Medications:     Current Facility-Administered Medications   Medication     ARIPiprazole (ABILIFY) tablet 5 mg     PHENobarbital (LUMINAL) tablet 32.4 mg     nicotine Patch in Place     nicotine patch REMOVAL     nicotine (NICODERM CQ) 21 MG/24HR 24 hr patch 1 patch     hydrOXYzine (ATARAX) tablet 25-50 mg     acetaminophen (TYLENOL) tablet 650 mg     alum & mag hydroxide-simethicone (MYLANTA ES/MAALOX  ES) suspension 30 mL     magnesium hydroxide (MILK OF MAGNESIA) suspension 30 mL     OLANZapine (zyPREXA) tablet 10 mg    Or     OLANZapine (zyPREXA) injection 10 mg     nicotine polacrilex (NICORETTE) gum 2-4 mg     albuterol (PROAIR HFA/PROVENTIL HFA/VENTOLIN HFA) Inhaler 1-2 puff     10 point ROS positive for abdominal fullness. Had CT of abdomen        Allergies:     Allergies   Allergen Reactions     Shrimp Anaphylaxis     Risperdal [Risperidone] Other (See Comments)     Elevated prolactin          Psychiatric Examination:   /78  Pulse 78  Temp 97.4  F (36.3  C) (Tympanic)  Resp 18  Ht 1.524 m (5')  Wt 58.3 kg (128 lb 9.6 oz)  SpO2 97%  BMI 25.12 kg/m2  Weight is 128 lbs 9.6 oz  Body mass index is 25.12 kg/(m^2).    Appearance:  awake, alert, appeared older than stated age and poorly groomed  Attitude:  Partly cooperative  Eye Contact:  fair  Mood:  depressed   Affect:  Correlated to mood.   Speech:  clear, coherent and normal prosody  Psychomotor Behavior:  no evidence of tardive dyskinesia, dystonia, or tics  Thought Process:  disorganized and evidence of thought blocking present perseverative on specific topics .  Associations:  loosening of associations present delusional  Thought Content:  no evidence of suicidal ideation or homicidal  ideation and patient appears to be responding to internal stimuli  Insight:  none  Judgment:  poor  Oriented to:  time, person, and place  Attention Span and Concentration:  limited  Recent and Remote Memory:  fair  Fund of Knowledge: low-normal  Muscle Strength and Tone: normal  Gait and Station: Normal         Labs:     Results for orders placed or performed in visit on 01/24/14   MR Lumbar Spine w/o & w Contrast    Narrative    LUMBAR SPINE MRI  TECHNIQUE:  Images were obtained sagittally T1 proton density and  T2-weighted, axially proton density and T2-weighted.  FINDINGS:  The T11-T12, T12-L1 and L1-L2 disks are normal in height.  There is no evidence of disk herniation or protrusion.  There is mild  annular bulging at L2-L3 without significant thecal sac or nerve root  compression.  The L3-L4 disk is normal in height.  There is no  evidence of disk herniation or protrusion.  There is minimal annular  bulging at L4-L5 without significant thecal sac or nerve root  compression.  The L5-S1 disk appears normal.  The facet joints are  normal in appearance.  There is no central or lateral spinal stenosis.  Intradurally, the nerves of the cauda equina and conus appear normal.  The paravertebral soft tissues appear normal.  IMPRESSION:  NEGATIVE MR OF THE LUMBAR SPINE.    Exam Date: Jan 30, 2014 10:39:00 AM  Author: ARAM MCMANUS  This report is final and signed            Plan:   Continue Inpatient Hospitalization  Continue to provide a safe environment and therapeutic milieu.  Continue to offer medications. Refused to sign [facility]. [facility] would like documentation of medication refusal.   Abilify as needed.   Voluntary in lieu of commitment.      ELOS: 3-5 days

## 2018-03-21 NOTE — PLAN OF CARE
Problem: Patient Care Overview  Goal: Team Discussion  Team Plan:   BEHAVIORAL TEAM DISCUSSION    Participants: Nohelia Ching NP, Jose Angel Buckner NP, Arina Reza NP, Sue Morton [facility], Mehnaz SALAZARW, Marbella Campbell RN, Pushpa Kelly RN, Aurora Mendez RN. [facility] Therapy, [facility] OT, Poornima Reyna OT  Progress: minimal, slow improvement  Continued Stay Criteria/Rationale: on phenobarbital taper, started abilify- accepted yesterday, lacking insight  Medical/Physical: none known  Precautions:   Behavioral Orders   Procedures     Code 1 - Restrict to Unit     Routine Programming     As clinically indicated     Status 15     Every 15 minutes.     Plan: voluntary in lieu, medication stabilization  Rationale for change in precautions or plan: none

## 2018-03-21 NOTE — PLAN OF CARE
"Problem: Patient Care Overview  Goal: Individualization & Mutuality  Safe withdrawal from medication / stable vitals  Will agree to tx team recommendations for meds and follow up care.  Will attend >75% of groups.        Outcome: No Change  Patient has been up and ambulating on the unit without issues this afternoon. Patient ate well at supper meal and states that she is having regular bowel movements. Patient asked nurse to look at her abdomen which is distended. Bowel sounds are active throughout fields. She notes some tenderness in the lower right quadrants with more density in that same area noted. Patient complains of pain in her \"vagina\" and calls the pain \"labor\". I discussed with patient that she has previously had a hysterectomy and she agreed that she had. Offered patient Maalox for complaints of discomfort that she rated a 6 on 0-10 scale and she took about 15 ml of Maalox for discomfort. Tells writer that she feels that the medication she has taken isn't helpful for her and causes her side effects. Patient has a sad flat affect but is independent on the unit and does attend some groups. She appears reluctant to discuss much with staff or other patients. She states that she doesn't feel she should be here because she came in for abdominal pain. Requested and given items to shower in the evening.    Problem: Thought Process Alteration (Adult)  Goal: Identify Related Risk Factors and Signs and Symptoms  Related risk factors and signs and symptoms are identified upon initiation of Human Response Clinical Practice Guideline (CPG).   Patient's thoughts will be reality based by discharge   Outcome: No Change  Patient is not able to have a reality based conversation as of yet.  Goal: Improved Thought Process  Patient will sleep >6 hours per night.  Patient will be free of delusions by discharge.     Outcome: No Change  Patient continues to have delusions. Able to sleep throughout the night.      "

## 2018-03-22 PROCEDURE — 12400011

## 2018-03-22 PROCEDURE — 25000132 ZZH RX MED GY IP 250 OP 250 PS 637: Performed by: HOSPITALIST

## 2018-03-22 PROCEDURE — 25000132 ZZH RX MED GY IP 250 OP 250 PS 637: Performed by: NURSE PRACTITIONER

## 2018-03-22 PROCEDURE — 99232 SBSQ HOSP IP/OBS MODERATE 35: CPT | Performed by: NURSE PRACTITIONER

## 2018-03-22 RX ORDER — ARIPIPRAZOLE 5 MG/1
5 TABLET ORAL DAILY
Status: DISCONTINUED | OUTPATIENT
Start: 2018-03-23 | End: 2018-03-22

## 2018-03-22 RX ORDER — ARIPIPRAZOLE 5 MG/1
5 TABLET ORAL DAILY
Status: DISCONTINUED | OUTPATIENT
Start: 2018-03-23 | End: 2018-03-23

## 2018-03-22 RX ADMIN — SENNOSIDES 8.6 MG: 8.6 TABLET, FILM COATED ORAL at 18:16

## 2018-03-22 RX ADMIN — ARIPIPRAZOLE 5 MG: 5 TABLET ORAL at 08:24

## 2018-03-22 RX ADMIN — OLANZAPINE 10 MG: 10 TABLET, FILM COATED ORAL at 20:49

## 2018-03-22 RX ADMIN — NICOTINE 1 PATCH: 21 PATCH, EXTENDED RELEASE TRANSDERMAL at 08:22

## 2018-03-22 ASSESSMENT — ACTIVITIES OF DAILY LIVING (ADL)
DRESS: INDEPENDENT;PROMPTS
GROOMING: INDEPENDENT
LAUNDRY: WITH SUPERVISION
ORAL_HYGIENE: INDEPENDENT

## 2018-03-22 NOTE — PLAN OF CARE
"Problem: Patient Care Overview  Goal: Individualization & Mutuality  Safe withdrawal from medication / stable vitals  Will agree to tx team recommendations for meds and follow up care.  Will attend >75% of groups.        Outcome: No Change  Pt was given PRN Tylenol per her request at 1724 for c/o generalized aches and pains. During suppertime, patient was observed talking to herself in the lounge. She was overheard saying \"my name is Glenda and you're the father to my unborn babies.\" Pt's daughter came for visiting hours tonight - when pt was informed that her daughter was here to visit, it took her a few minutes to come out of her room. She was observed from the nurses station talking to herself in bed. Per daughter, conversation did not go well during visit. Daughter stated that her mom was saying \"I don't want to be on your show anymore Dr. Leach\" and other random delusional statements. As pt's daughter tearfully walked off the unit following the visit, pt stated \"I'm not schizophrenic.\" Pt appears preoccupied as she has been observed talking to herself and making various hand gestures. Pt was visibly upset regarding her visit with her daughter - pt was irritable and verbalized that she did not want to talk to this writer. At 1850, pt was heard repeatedly yelling out nurse from her bed. When staff went into pt's room, pt was swearing and yelling \"I need to see a fucking doctor. Get me on a gurney. I need to go to the ER now.\" Patient was holding her abdomen and had facial grimacing. She c/o RUQ pain and rib pain. Manual BP was 168/90 and pulse was 99. Bowel sounds were hypoactive x4. Abdomen was distended. On-call provider was called and stated that she was going to look into pt's chart and will call the unit back. Provider called the unit back and put in an order for a Hospitalist consult. Pt has Dx: Polycystic liver disease.  At 1910, this writer went to check on pt. Pt had her shirt pulled up so that her abdomen " "was exposed. At this time she stated \"I think I'm pregnant. Get me an OBGYN.\" , patient is sitting in the lounge on a chiar by the window holding her abdomen. , patient requested her purse and stated \"I need to give the cigarettes to my boyfriend. I'm going to be having a baby.\" , patient was seen by the Hospitalist. During  medication pass, pt refused her phenobarbital. She stated \"I'm having a  tonight. I'm not taking that.\"     Problem: Thought Process Alteration (Adult)  Goal: Improved Thought Process  Patient will sleep >6 hours per night.  Patient will be free of delusions by discharge.     Outcome: No Change  Patient continues to have delusional thinking.       "

## 2018-03-22 NOTE — PLAN OF CARE
Face to face end of shift report received from Africa TRIANA RN. Rounding completed. Patient observed in room laying in bed.     Karen Hamilton  3/22/2018  7:31 AM

## 2018-03-22 NOTE — PROGRESS NOTES
Asked to assess patient for abdominal pain  Patient complaining of abdominal pain; RUQ for 2 months; worsened tonight  Complains of abdominal distention    Exam  Gen: no acute distress  CV: RRR normal s1 s2  Lungs: CTAB  Abdom: generalized tenderness; no rebound, guarding, or tenderness      Assessment   1) Abdominal pain of unknown etiology  2) Plan; labs CMP, Lactate, CT AP WO     Choco Chawla MD      Addendum  Impression: There is a moderate to large volume of urine within the  bladder. There is a moderate to large volume of stool in the colon.  There is no evidence of bowel obstruction.     No renal or ureteral calculi are present. There is no hydronephrosis.     Innumerable hepatic cysts. An increased hepatic density suggesting  excessive iron deposition or hemachromatosis.         Bowel regimen ordered. Recommend outpatient GI evaluation to evaluate hepatic cysts and evaluation for hemachromatosis

## 2018-03-22 NOTE — PLAN OF CARE
"Problem: Patient Care Overview  Goal: Individualization & Mutuality  Safe withdrawal from medication / stable vitals  Will agree to tx team recommendations for meds and follow up care.  Will attend >75% of groups.        Outcome: No Change  Pt is withdrawn from her peers. Pt isolates to her room, she does come out for meals then lays back down in her bed. Pt has not gotten up to pace the hallway. Nurse did offer Abilify to patient with morning medications due to talking to herself in her room as well as laughing. In conversation with patient she stops and looks off to the side prior to answering nurse. Pt did take Abilify 5 mg at 0824Speech is pressured and answers are short and to the point. When asked to elaborate pt looks away from nurse and will then turn back to nurse and state \"I am fine\" pt will often ask nurse to leave her room. She states this is due to her eyes hurting and vision blurry due to the lights being too bright. Pt states \"its like being in [employer] all day.    Pt does get up after lunch pt then paces the hallway throughout the rest of the day and is seen talking to walls.  Problem: Thought Process Alteration (Adult)  Goal: Improved Thought Process  Patient will sleep >6 hours per night.  Patient will be free of delusions by discharge.     Outcome: No Change  Pt continues to appear to be paranoid, she keeps answers to questions short and to the point.       "

## 2018-03-22 NOTE — PLAN OF CARE
Face to face end of shift report received from Radha POP RN. Rounding completed. Patient observed in bed, appears asleep, respirations osberved.     Africa Carroll  3/22/2018  12:19 AM

## 2018-03-22 NOTE — PLAN OF CARE
Problem: Patient Care Overview  Goal: Team Discussion  Team Plan:   BEHAVIORAL TEAM DISCUSSION    Participants: Nohelia Ching NP,Jose Angel Buckner NP, Arina Reza NP,  Katelyn Meredith [facility],Sue Morton [facility], Mehnaz Crowley [facility] [place] [facility]\"", Marbella Campbell RN, Gopi Sue RN,  Pushpa Kelly RN, Elizabeth Schulz [facility], Jennifer Kwan OT, Poornima Reyna OT  Progress: minimal, slow improvement  Continued Stay Criteria/Rationale: on phenobarbital taper, started abilify- accepted yesterday, lacking insight  Medical/Physical: abdominal pain CT reveals full/mostly full bowel and bladder.   Precautions:   Behavioral Orders   Procedures     Code 1 - Restrict to Unit     Routine Programming     As clinically indicated     Status 15     Every 15 minutes.     Plan: voluntary in lieu refusing medication plan to notify county, medication stabilization as able.   Rationale for change in precautions or plan: none

## 2018-03-22 NOTE — PROGRESS NOTES
"[facility]  Psychiatric Progress Note      Impression:   Glenda Robins was admitted on 3/14/18 for delusional thoughts that she was pregnant. States she previously had hysterectomy when she was 29.  UDS positive for benzodiazapine's and amphetamines which she is prescribed. [facility] and has also been hospitalized at [facility] for Inpatient [facility] Health in past several months.    Today nursing reports patient observed talking to herself in her room as well as laughing out loud. In conversation with patient nurse reports she stopped and looked off to the side prior to answering nurse. Clarified that patient refused to sign NCF but is Voluntary in Lieu of Commitment so Abilify is now scheduled.  Patient interviewed and presents logical linear in conversation. Stated pain she had last night is not present today. Discussed suppository for constipation but declined. States she slept \"really well\" last night and \"quicker to respond\" to conversations this morning, which she attributes to Abilify. Discussed scheduling this as she is Voluntary in Lieu of Commitment. Reports some sedation with this medication in morning. Review of record notes she took Abilify this morning. Denies feeling depressed today, stated \"I'm Happy\". Reports having a good visit with her daughter, Stacy, last night. Does not endorse anxiety when asked today. Discussed groups with patient who scrunches up nose and states \"they're ok\" .    Patient was seen by Hospitalist last night due to abdominal pain and distention, which was thought to be related to constipation rather than polycystic liver. She was placed on Senokot.           Diagnoses:   Schizophrenia  Sedative, Hypnotic, or Anxiolytic Withdrawal  Stimulant Use Disorder,  Amphetamine type substance  H/O Alcohol Use Disorder   H/O Depression  H/O ADHD  H/O Sleep Disorder  Polycystic Liver                              "   Attestation:  Patient has been seen and evaluated by me,  MADI Soto CNP          Interim History:   The patient's care was discussed with the treatment team and chart notes were reviewed.          Medications:     Current Facility-Administered Medications   Medication     ARIPiprazole (ABILIFY) tablet 5 mg     sennosides (SENOKOT) tablet 8.6 mg     nicotine Patch in Place     nicotine patch REMOVAL     nicotine (NICODERM CQ) 21 MG/24HR 24 hr patch 1 patch     hydrOXYzine (ATARAX) tablet 25-50 mg     acetaminophen (TYLENOL) tablet 650 mg     alum & mag hydroxide-simethicone (MYLANTA ES/MAALOX  ES) suspension 30 mL     magnesium hydroxide (MILK OF MAGNESIA) suspension 30 mL     OLANZapine (zyPREXA) tablet 10 mg    Or     OLANZapine (zyPREXA) injection 10 mg     nicotine polacrilex (NICORETTE) gum 2-4 mg     albuterol (PROAIR HFA/PROVENTIL HFA/VENTOLIN HFA) Inhaler 1-2 puff     10 point ROS positive for abdominal fullness        Allergies:     Allergies   Allergen Reactions     Shrimp Anaphylaxis     Risperdal [Risperidone] Other (See Comments)     Elevated prolactin          Psychiatric Examination:   /77  Pulse 72  Temp 97.5  F (36.4  C) (Tympanic)  Resp 12  Ht 1.524 m (5')  Wt 58.3 kg (128 lb 9.6 oz)  SpO2 97%  BMI 25.12 kg/m2  Weight is 128 lbs 9.6 oz  Body mass index is 25.12 kg/(m^2).    Appearance:  awake, alert and well groomed  Attitude:  cooperative  Eye Contact:  good  Mood:  depressed   Affect:  Correlated to mood.   Speech:  clear, coherent and normal prosody  Psychomotor Behavior:  no evidence of tardive dyskinesia, dystonia, or tics  Thought Process:  linear and goal oriented   Associations:  no loose associations  Thought Content:  no evidence of suicidal ideation or homicidal ideation and patient appears to be responding to internal stimuli  Insight:  limited  Judgment:  limited  Oriented to:  time, person, and place  Attention Span and Concentration:  intact  Recent and Remote  Memory:  fair  Fund of Knowledge: appropriate for age and level of education  Muscle Strength and Tone: normal  Gait and Station: Normal         Labs:     Results for orders placed or performed during the hospital encounter of 03/14/18 (from the past 24 hour(s))   Comprehensive metabolic panel   Result Value Ref Range    Sodium 139 133 - 144 mmol/L    Potassium 3.4 3.4 - 5.3 mmol/L    Chloride 102 94 - 109 mmol/L    Carbon Dioxide 30 20 - 32 mmol/L    Anion Gap 7 3 - 14 mmol/L    Glucose 112 (H) 70 - 99 mg/dL    Urea Nitrogen 19 7 - 30 mg/dL    Creatinine 0.62 0.52 - 1.04 mg/dL    GFR Estimate >90 >60 mL/min/1.7m2    GFR Estimate If Black >90 >60 mL/min/1.7m2    Calcium 9.4 8.5 - 10.1 mg/dL    Bilirubin Total 0.2 0.2 - 1.3 mg/dL    Albumin 3.7 3.4 - 5.0 g/dL    Protein Total 7.4 6.8 - 8.8 g/dL    Alkaline Phosphatase 91 40 - 150 U/L    ALT 20 0 - 50 U/L    AST 16 0 - 45 U/L   Lactic acid   Result Value Ref Range    Lactic Acid 1.0 0.4 - 2.0 mmol/L   HCG qualitative Blood   Result Value Ref Range    HCG Qualitative Serum Negative NEG^Negative   CT Abdomen Pelvis w/o Contrast    Narrative    Exam:CT ABDOMEN PELVIS W/O CONTRAST    History:  47 years Female with abdominal pain      Technique: Axial CT imaging of the abdomen and pelvis was performed  without contrast. Coronal and sagittal reconstructions were obtained      Findings:      Lung bases:The lung bases are clear.        Kidneys:No renal or ureteral calculi are present. There is no  hydronephrosis or hydroureter.       Abdomen: Evaluation is limited due to lack of intravenous contrast.  Innumerable small hepatic cysts are present ranging from 3 mm to 3.9  cm in diameter. Density of the liver is high suggesting presence of  excessive iron to deposition.  There is a moderate large volume of stool. There is no evidence of  bowel obstruction or free air. The appendix is unremarkable.         Pelvis:There is no mass or lymphadenopathy. No abnormal fluid  collections  are present.    There is a moderate to large volume of urine within the bladder.                Impression    Impression: There is a moderate to large volume of urine within the  bladder. There is a moderate to large volume of stool in the colon.  There is no evidence of bowel obstruction.    No renal or ureteral calculi are present. There is no hydronephrosis.    Innumerable hepatic cysts. An increased hepatic density suggesting  excessive iron deposition or hemachromatosis.    RUTH MAURER MD          Plan:   Continue Inpatient Hospitalization  Continue to provide a safe environment and therapeutic milieu.  Continue medications. County would like documentation of medication refusal.   Abilify scheduled at nighttime as patient voluntary in Lieu of Commitment.      ELOS: 3-5 days

## 2018-03-22 NOTE — PLAN OF CARE
Problem: Patient Care Overview  Goal: Individualization & Mutuality  Safe withdrawal from medication / stable vitals  Will agree to tx team recommendations for meds and follow up care.  Will attend >75% of groups.        Outcome: Improving  Pt has been in bed with eyes closed and regular respirations observed all night. Will continue to monitor.

## 2018-03-22 NOTE — PLAN OF CARE
Face to face end of shift report received from JULIA Hay. Rounding completed. Patient observed in [facility].    Marlo Vargas  3/22/2018  3:45 PM

## 2018-03-22 NOTE — PLAN OF CARE
"Problem: Patient Care Overview  Goal: Individualization & Mutuality  Safe withdrawal from medication / stable vitals  Will agree to tx team recommendations for meds and follow up care.  Will attend >75% of groups.        She is up on the unit and is walking in the halls. Sometimes she is standing near a wall or door. She is preoccupied and noted to be laughing in her room alone. She walks with her hands on her abdomen. She doesn't answer the question regarding if she feels that she is pregnant. She has poor eye contact.  She is compliant with her medication. She is maintaining appropriate boundaries with staff and peers.   1816: patient accepted senna 8.6mg at this time. She states that she had a bowel movement \"a couple days ago\".   2049: patient laughing maniacally. She continues to respond to internal stimuli.  She accepts zyprexa 10mg po for her increased psychotic symptoms.   Problem: Thought Process Alteration (Adult)  Goal: Improved Thought Process  Patient will sleep >6 hours per night.  Patient will be free of delusions by discharge.     She continues with delusional thinking. She is preoccupied and responding to internal stimuli.       "

## 2018-03-23 PROCEDURE — 25000132 ZZH RX MED GY IP 250 OP 250 PS 637: Performed by: NURSE PRACTITIONER

## 2018-03-23 PROCEDURE — 12400011

## 2018-03-23 PROCEDURE — 25000132 ZZH RX MED GY IP 250 OP 250 PS 637: Performed by: HOSPITALIST

## 2018-03-23 RX ORDER — BISACODYL 10 MG
10 SUPPOSITORY, RECTAL RECTAL DAILY PRN
Status: DISCONTINUED | OUTPATIENT
Start: 2018-03-23 | End: 2018-04-05 | Stop reason: HOSPADM

## 2018-03-23 RX ADMIN — SENNOSIDES 8.6 MG: 8.6 TABLET, FILM COATED ORAL at 17:00

## 2018-03-23 RX ADMIN — NICOTINE 1 PATCH: 21 PATCH, EXTENDED RELEASE TRANSDERMAL at 08:43

## 2018-03-23 RX ADMIN — ARIPIPRAZOLE 7 MG: 2 TABLET ORAL at 20:48

## 2018-03-23 RX ADMIN — OLANZAPINE 10 MG: 10 TABLET, FILM COATED ORAL at 17:00

## 2018-03-23 ASSESSMENT — ACTIVITIES OF DAILY LIVING (ADL)
GROOMING: INDEPENDENT
ORAL_HYGIENE: INDEPENDENT
DRESS: SCRUBS (BEHAVIORAL HEALTH);INDEPENDENT

## 2018-03-23 NOTE — PLAN OF CARE
Face to face end of shift report received from JULIA Bill. Rounding completed. Patient observed in room.      Marlo Vargas  3/23/2018  3:59 PM

## 2018-03-23 NOTE — PLAN OF CARE
"Problem: Patient Care Overview  Goal: Individualization & Mutuality  Will agree to tx team recommendations for meds and follow up care.  Will attend >75% of groups.         She is up on the unit at times. She is preoccupied and responding to internal stimuli. She is visible frustrated with staff asking her questions but does answer them. She is given zyprexa 10mg at 1700 along with senna 8.6 mg. She states \"I'm good\". She paces in the halls and tends to stop either facing the wall or a door and is noted to be talking to herself. She is compliant with medications. She is maintaining appropriate boundaries with staff and peers.     Problem: Thought Process Alteration (Adult)  Goal: Improved Thought Process  Patient will sleep >6 hours per night.  Patient will be free of delusions by discharge.     She states that she slept well. She continues with delusional thinking. She is preoccupied and noted to be talking to herself.      "

## 2018-03-23 NOTE — PLAN OF CARE
Face to face end of shift report received from DALTON Carroll RN. Rounding completed. Patient observed, up on the unit.     Janel Sue  3/23/2018  8:35 AM

## 2018-03-23 NOTE — PLAN OF CARE
Problem: Patient Care Overview  Goal: Team Discussion  Team Plan:   Outcome: Improving  BEHAVIORAL TEAM DISCUSSION    Participants: Nohelia Ching NP,Jose Agnel Buckner NP, Arina Reza NP,  Katelyn Meredith [facility] Hospital[facility],Sue Morton [facility], Mehnaz Crowley LSW, Gopi Sue RN,  Poornima Reyna OT  Progress: attends some groups, sleeping better  Continued Stay Criteria/Rationale: continued medication adjustment; vol in lieu  Medical/Physical: constipated, polycystic liver  Precautions:   Behavioral Orders   Procedures     Code 1 - Restrict to Unit     Routine Programming     As clinically indicated     Status 15     Every 15 minutes.     Plan: medication monitoring and return to home  Rationale for change in precautions or plan: none

## 2018-03-23 NOTE — PLAN OF CARE
Face to face end of shift report received from Marlo CHRISTIANSEN RN. Rounding completed. Patient observed in bed, appears asleep, respirations observed.     Africa Carroll  3/22/2018  11:44 PM

## 2018-03-24 PROCEDURE — 25000132 ZZH RX MED GY IP 250 OP 250 PS 637: Performed by: NURSE PRACTITIONER

## 2018-03-24 PROCEDURE — 12400011

## 2018-03-24 PROCEDURE — 99232 SBSQ HOSP IP/OBS MODERATE 35: CPT | Performed by: NURSE PRACTITIONER

## 2018-03-24 RX ADMIN — NICOTINE 1 PATCH: 21 PATCH, EXTENDED RELEASE TRANSDERMAL at 08:53

## 2018-03-24 RX ADMIN — ARIPIPRAZOLE 7 MG: 2 TABLET ORAL at 20:01

## 2018-03-24 ASSESSMENT — ACTIVITIES OF DAILY LIVING (ADL)
DRESS: SCRUBS (BEHAVIORAL HEALTH)
ORAL_HYGIENE: INDEPENDENT
ORAL_HYGIENE: INDEPENDENT
GROOMING: INDEPENDENT
DRESS: SCRUBS (BEHAVIORAL HEALTH);INDEPENDENT
GROOMING: INDEPENDENT
LAUNDRY: UNABLE TO COMPLETE

## 2018-03-24 NOTE — PROGRESS NOTES
"[facility]  Psychiatric Progress Note      Impression:   Glenda Robins was admitted on 3/14/18 for delusional thoughts that she was pregnant. States she previously had hysterectomy when she was 29.  UDS positive for benzodiazapine's and amphetamines which she is prescribed. She goes to [facility] on an outpatient basis and has also been hospitalized at [facility] for Inpatient Behavioral Health in past several months.    Today patient presents with logical and linear thoughts during interview. States she had large BM today \"feel much better\" and does not endorse thoughts of being pregnant. She expressed desire to discharge home to be with father. She states he has dementia, which she relates to him not having ADHD medication after heart surgery.  Discussed that multiple reasons for dementia and may not be directly related to not having amphetamines. She states desire to restart amphetamines.  Candidly educated her on amphetamines potentially contributing to paranoia, hallucinations and delusions. Do not plan to restart stimulants and told patient directly. Recommend retesting for ADHD in outpatient setting and if has continued issues with focus and concentration (not observed during interviews) would recommend non stimulant options such as Wellbutrin, Strattera or Guanfacine. Patient reports benefits to walking in hallway but does not endorse benefits from group attendance. Tolerating increased Abilify well and will continue on current dose. Patient compliant with medication.           Diagnoses:   Schizophrenia  Sedative, Hypnotic, or Anxiolytic Withdrawal  Stimulant Use Disorder,  Amphetamine type substance  H/O Alcohol Use Disorder   H/O Depression  H/O ADHD  H/O Sleep Disorder  Polycystic Liver                                Attestation:  Patient has been seen and evaluated by me,  MADI Soto CNP          Interim History:   The patient's care was discussed with the treatment team and " chart notes were reviewed.          Medications:     Current Facility-Administered Medications   Medication     ARIPiprazole (ABILIFY) tablet 7 mg     bisacodyl (DULCOLAX) Suppository 10 mg     sennosides (SENOKOT) tablet 8.6 mg     nicotine Patch in Place     nicotine patch REMOVAL     nicotine (NICODERM CQ) 21 MG/24HR 24 hr patch 1 patch     hydrOXYzine (ATARAX) tablet 25-50 mg     acetaminophen (TYLENOL) tablet 650 mg     alum & mag hydroxide-simethicone (MYLANTA ES/MAALOX  ES) suspension 30 mL     magnesium hydroxide (MILK OF MAGNESIA) suspension 30 mL     OLANZapine (zyPREXA) tablet 10 mg    Or     OLANZapine (zyPREXA) injection 10 mg     nicotine polacrilex (NICORETTE) gum 2-4 mg     albuterol (PROAIR HFA/PROVENTIL HFA/VENTOLIN HFA) Inhaler 1-2 puff     10 point ROS positive for abdominal fullness        Allergies:     Allergies   Allergen Reactions     Shrimp Anaphylaxis     Risperdal [Risperidone] Other (See Comments)     Elevated prolactin          Psychiatric Examination:   /76  Pulse 71  Temp 96.7  F (35.9  C) (Tympanic)  Resp 18  Ht 1.524 m (5')  Wt 58.3 kg (128 lb 9.6 oz)  SpO2 95%  BMI 25.12 kg/m2  Weight is 128 lbs 9.6 oz  Body mass index is 25.12 kg/(m^2).    Appearance:  awake, alert and well groomed  Attitude:  cooperative  Eye Contact:  good  Mood:  depressed   Affect:  Correlated to mood.   Speech:  clear, coherent and normal prosody  Psychomotor Behavior:  no evidence of tardive dyskinesia, dystonia, or tics  Thought Process:  linear and goal oriented   Associations:  no loose associations  Thought Content:  no evidence of suicidal ideation or homicidal ideation and patient appears to be responding to internal stimuli  Insight:  limited  Judgment:  limited  Oriented to:  time, person, and place  Attention Span and Concentration:  intact  Recent and Remote Memory:  fair  Fund of Knowledge: appropriate for age and level of education  Muscle Strength and Tone: normal  Gait and Station:  Normal         Labs:     No results found for this or any previous visit (from the past 24 hour(s)).       Plan:   Continue Inpatient Hospitalization  Continue to provide a safe environment and therapeutic milieu.  Increase Abilify to 7 mg at bedtime. County would like documentation of medication refusal.   Patient voluntary in Lieu of Commitment.   Will order cognitive evaluation from OT     ELOS: 3-5 days

## 2018-03-24 NOTE — PLAN OF CARE
Face to face end of shift report received from Fadia DUMONT Rounding completed. Patient observed.     Duane Perdomo  3/24/2018  0800 AM

## 2018-03-24 NOTE — PROGRESS NOTES
Face to face end of shift report received from moisés webber at 2300 report.. Rounding completed. Patient observed.     Fadia Jaquez  3/24/2018  1:45 AM

## 2018-03-24 NOTE — PLAN OF CARE
"Problem: Patient Care Overview  Goal: Individualization & Mutuality  Will agree to tx team recommendations for meds and follow up care.  Will attend >75% of groups.         Outcome: Improving  Pt denies depression or SI or voices  Does have some delayed speech  States had a small BM this am  States feels happy today  Likes some groups \"the talk groups\"  Cooperative and attended am group      "

## 2018-03-24 NOTE — PLAN OF CARE
Face to face end of shift report received from JULIA Zepeda. Rounding completed. Patient observed laying in bed.     Pushpa Soto  3/24/2018  4:03 PM

## 2018-03-24 NOTE — PLAN OF CARE
Problem: Patient Care Overview  Goal: Individualization & Mutuality  Will agree to tx team recommendations for meds and follow up care.  Will attend >75% of groups.         0140 in bed with easy respirations, presenting sleeping. 0500 slept except for up once to the bathroom and for juice.    Problem: Thought Process Alteration (Adult)  Goal: Identify Related Risk Factors and Signs and Symptoms  Related risk factors and signs and symptoms are identified upon initiation of Human Response Clinical Practice Guideline (CPG).   Patient's thoughts will be reality based by discharge   0140 in bed with easy respirations, presenting sleeping.

## 2018-03-25 PROCEDURE — 25000132 ZZH RX MED GY IP 250 OP 250 PS 637: Performed by: NURSE PRACTITIONER

## 2018-03-25 PROCEDURE — 12400011

## 2018-03-25 RX ORDER — MINERAL OIL/HYDROPHIL PETROLAT
OINTMENT (GRAM) TOPICAL 2 TIMES DAILY PRN
Status: DISCONTINUED | OUTPATIENT
Start: 2018-03-25 | End: 2018-04-05 | Stop reason: HOSPADM

## 2018-03-25 RX ADMIN — NICOTINE 1 PATCH: 21 PATCH, EXTENDED RELEASE TRANSDERMAL at 08:42

## 2018-03-25 RX ADMIN — ARIPIPRAZOLE 7 MG: 2 TABLET ORAL at 20:23

## 2018-03-25 RX ADMIN — HYDROXYZINE HYDROCHLORIDE 25 MG: 25 TABLET ORAL at 00:42

## 2018-03-25 ASSESSMENT — ACTIVITIES OF DAILY LIVING (ADL)
ORAL_HYGIENE: INDEPENDENT
ORAL_HYGIENE: INDEPENDENT
DRESS: SCRUBS (BEHAVIORAL HEALTH);INDEPENDENT
GROOMING: INDEPENDENT
LAUNDRY: UNABLE TO COMPLETE
GROOMING: INDEPENDENT

## 2018-03-25 NOTE — PLAN OF CARE
Face to face end of shift report received from Fadia DUMONT Rounding completed. Patient observed.     Duane Perdomo  3/25/2018  0800 AM

## 2018-03-25 NOTE — PLAN OF CARE
"Problem: Patient Care Overview  Goal: Individualization & Mutuality  Will agree to tx team recommendations for meds and follow up care.  Will attend >75% of groups.         Pt was cooperative with nursing assessment and medication.  She denies all criteria.  When asked if she had a BM she said \"I lost most of what's in there.\"  She said that her stomach was hard still.  When asked what she meant she said that \"it's medical stuff.\"   She said this with a big grin on her face.   When writer asked her to elaborate she said \"It is what it is. I'll be leaving on Tuesday.  Well hopefully.\"  Staff reported to writer that she was walking the hallway rubbing her stomach and saying \"mommy's here.\"  Pt walked the hallways talking to herself.  She was isolative and withdrawn.      2255 Pt looked up at camera and pleaded \"Please give me a sign.  Can this baby be born now?\"    Staff overheard pt talking to herself saying \"Don't worry.  You, me, and the babies will be fine.   Goal: Team Discussion  Team Plan:   BEHAVIORAL TEAM DISCUSSION    Participants:   Progress:   Continued Stay Criteria/Rationale:   Medical/Physical:   Precautions:   Behavioral Orders   Procedures     Code 1 - Restrict to Unit     Routine Programming     As clinically indicated     Status 15     Every 15 minutes.     Plan:   Rationale for change in precautions or plan:       Problem: Thought Process Alteration (Adult)  Goal: Improved Thought Process  Patient will sleep >6 hours per night.  Patient will be free of delusions by discharge.     Pt appears to be delusional with thinking she is pregnant.       "

## 2018-03-25 NOTE — PROGRESS NOTES
Face to face end of shift report received from jose macario rn at 2300 report.. Rounding completed. Patient observed.     Fadia Jaquez  3/25/2018  1:30 AM

## 2018-03-25 NOTE — PLAN OF CARE
"Problem: Patient Care Overview  Goal: Individualization & Mutuality  Will agree to tx team recommendations for meds and follow up care.  Will attend >75% of groups.         0015 patient had been in bed until now. Walked down to the end of the duque by the unit door and was having an outward conversation with no one  And looking at the unit entry camera on the ceiling. Asked patient if she needed anything and she said \"no\". Asked patient not to be standing at the unit door and she did start to walk back onto the unit and asked if she did have a bm today and she smirked and said \"yeh\" as she turned and walked back to her room. Her abdomen did not seem as pronounced as previously noted. Offered prns and or food and fluids and she declined. 0042 patient asking for prn for anxiety and so her  Prn vistaril 25 mg (she only wanted this dose), given. She allowed vitals but only standing. 0115 getting into bed. 0345 patient up to the unit door swiping her her id band on the \"elopement sign\" and when she returned to her room on her own, she was asked if she needed prn and she said \"no\". 0400 heard yelling from her room and bed and she was asked and she said that it was a bad dream. She was offered prns, snack  and she refused.  0410 patient walking in duque and was over heard looking at the camera in the hallway and addressing the comment to a dr. Felt (phenetically ? \"just shoot me i'm done with this stuff. \" and she returned to her room. 0523 patient has slept maybe 2 hours. She is in bed at this time. Has had several glasses of water throughout the night and voided several times. Did have odor of bm in bathroom this shift.    Problem: Thought Process Alteration (Adult)  Goal: Identify Related Risk Factors and Signs and Symptoms  Related risk factors and signs and symptoms are identified upon initiation of Human Response Clinical Practice Guideline (CPG).   Patient's thoughts will be reality based by discharge   0015 see note " from indicidualization.

## 2018-03-25 NOTE — PLAN OF CARE
Face to face end of shift report received from Duane PRITCHETT RN. Rounding completed. Patient observed laying in bed and awake.    Chante Agrawal  3/25/2018  3:54 PM

## 2018-03-25 NOTE — PLAN OF CARE
Problem: Patient Care Overview  Goal: Individualization & Mutuality  Will agree to tx team recommendations for meds and follow up care.  Will attend >75% of groups.         Outcome: Improving  Eating okay c/o poor sleep last night  Blames being awake on meds  Denies depression SI or voices  Attended group  Have not heard delusional talk this am  Brighter  Does giggle when alone  Pt attended afternoon group and then danced down the duque backward giggling  Appears to be responding to internal stimuli

## 2018-03-25 NOTE — PLAN OF CARE
"Problem: Patient Care Overview  Goal: Individualization & Mutuality  Will agree to tx team recommendations for meds and follow up care.  Will attend >75% of groups.         Patient makes no noted delusional statements to staff members but can be hear talking to herself in a delusional manner.  Patient does answer assessment questions but becomes slightly irritable.  Patient denies SI, HI, hallucinations, depression, anxiety, pain.  Patient had a gel pen from the group room in her room at the beginning of this shift and refused to give it to staff stating \"You can't use this pen.  This is the pen that going to get me out of here.\".  Another staff approached patient and was able to redirect her.  Patient then returned to group room to use this pen.  Patient often paces the hallways rubbing her stomach.  Patient initially refused to take Abilify from this writer but approached another nurse within 5 minutes and asked for it.  Patient did take Abilify at that time.    1730: Patient asked staff \"what is the zip code here?\".  When patient was told the zip code, she turned to the wall and said \"Now you know the zip code of where to pick me up.\".      1830:  Patient tells staff \"I'm getting a  tomorrow.\".      2030:  Patient heard taking to herself stating \"I think I need to see a doctor downstairs because they aren't doing anything here for me or our unborn child.\".      Problem: Thought Process Alteration (Adult)  Goal: Improved Thought Process  Patient will sleep >6 hours per night.  Patient will be free of delusions by discharge.     Outcome: No Change  Patient makes no noted delusional statements to staff members but can be hear talking to herself in a delusional manner.      "

## 2018-03-26 PROCEDURE — 25000132 ZZH RX MED GY IP 250 OP 250 PS 637: Performed by: NURSE PRACTITIONER

## 2018-03-26 PROCEDURE — 12400011

## 2018-03-26 PROCEDURE — 40000133 ZZH STATISTIC OT WARD VISIT

## 2018-03-26 PROCEDURE — 97165 OT EVAL LOW COMPLEX 30 MIN: CPT | Mod: GO

## 2018-03-26 PROCEDURE — 99232 SBSQ HOSP IP/OBS MODERATE 35: CPT | Performed by: NURSE PRACTITIONER

## 2018-03-26 RX ADMIN — NICOTINE 1 PATCH: 21 PATCH, EXTENDED RELEASE TRANSDERMAL at 08:20

## 2018-03-26 RX ADMIN — ARIPIPRAZOLE 7 MG: 2 TABLET ORAL at 17:09

## 2018-03-26 RX ADMIN — OLANZAPINE 10 MG: 10 TABLET, FILM COATED ORAL at 10:48

## 2018-03-26 ASSESSMENT — ACTIVITIES OF DAILY LIVING (ADL)
DRESS: SCRUBS (BEHAVIORAL HEALTH)
PREVIOUS_RESPONSIBILITIES: MEAL PREP;HOUSEKEEPING;LAUNDRY;SHOPPING;YARDWORK;MEDICATION MANAGEMENT;FINANCES;DRIVING
ORAL_HYGIENE: INDEPENDENT
LAUNDRY: UNABLE TO COMPLETE
GROOMING: INDEPENDENT
DRESS: SCRUBS (BEHAVIORAL HEALTH);INDEPENDENT
ORAL_HYGIENE: INDEPENDENT
GROOMING: INDEPENDENT;SHOWER

## 2018-03-26 NOTE — PLAN OF CARE
Face to face end of shift report received from KULDIP Jaquez RN. Rounding completed. Patient observed, up on the unit.     Janel Sue  3/26/2018  7:53 AM

## 2018-03-26 NOTE — PLAN OF CARE
Problem: Patient Care Overview  Goal: Individualization & Mutuality  Will agree to tx team recommendations for meds and follow up care.  Will attend >75% of groups.         Outcome: No Change  Pt has been up and about on the unit all shift. Walks up and down duque. Loiters near exit doors and has to be redirected. Not attending groups. Affect is noted to be brighter. Pt smiling during conversation with this nurse. Oriented to person, place, time and situation. Denied pain. Denied auditory and/or visual hallucinations. Does appear to be responding to internal stimuli, however--and, at one point, was observed sitting on the floor at the end of the duque, staring up at the ceiling and laughing. PRN Zyprexa 10 mg PO given at approximately 1045. Reported that she slept well last night.     Problem: Thought Process Alteration (Adult)  Goal: Improved Thought Process  Patient will sleep >6 hours per night.  Patient will be free of delusions by discharge.     Outcome: No Change  Continues to appear to be responding to internal stimuli. Observed talking to the wall and staring at the ceiling, laughing. No known delusional statements from pt today. Charting indicates that pt slept approximately 5 hours last night.

## 2018-03-26 NOTE — PLAN OF CARE
Face to face end of shift report received from Landy POP RN. Rounding completed. Patient observed in room awake.     Nat Hampton  3/26/2018  4:00 PM

## 2018-03-26 NOTE — PLAN OF CARE
"She is very preoccupied. She is laughing loudly and talking to herself. She is staring at the walls or at the ceiling and responding to internal stimuli. She stands next to the exit doors and other room staring at the walls. She is offered and accepts zyprexa 10mg orally at 1048.  She then states that \"can't anyone reminisce\". She talks of feeling ready to discharge as this is her baseline. She then excuses nurse from her room, \"ok good bye now\".  "

## 2018-03-26 NOTE — PLAN OF CARE
Problem: Patient Care Overview  Goal: Team Discussion  Team Plan:   BEHAVIORAL TEAM DISCUSSION    Participants: Nohelia Ching NP,Jose Angel Buckner NP, Arina Reza NP,  Kim Linda LICSW,Sue Morton LICSW, Mehnaz LAWRENCE, Gopi Sue RN, Poornima Reyna OT  Progress: same   Continued Stay Criteria/Rationale: Increased her Abilify   Medical/Physical: constipated, polycystic liver  Precautions:   Behavioral Orders   Procedures     Code 1 - Restrict to Unit     Routine Programming     As clinically indicated     Status 15     Every 15 minutes.     Plan: d/c home with services possibly later this week   Rationale for change in precautions or plan: none

## 2018-03-26 NOTE — PLAN OF CARE
Problem: Patient Care Overview  Goal: Individualization & Mutuality  Will agree to tx team recommendations for meds and follow up care.  Will attend >75% of groups.         0030 and 0145 patient in bed with easy respirations, presenting sleeping. 0520 patient slept most of the night. Was up to the bathroom and back to bed. 0540 patient blood pressure checked manually cause the monitor results were low. Patient cooperative, said she felt better and feels she slept well. She was going to go back to sleep. Water at the bedside for her to drink and she is aware. Better eye contact for that short encounter.     Problem: Thought Process Alteration (Adult)  Goal: Identify Related Risk Factors and Signs and Symptoms  Related risk factors and signs and symptoms are identified upon initiation of Human Response Clinical Practice Guideline (CPG).   Patient's thoughts will be reality based by discharge   0030 and 0145 in bed with easy respirations, presenting sleeping.

## 2018-03-26 NOTE — PROGRESS NOTES
Face to face end of shift report received from teto webber at 2300 report.. Rounding completed. Patient observed.     Fadia Jaquez  3/26/2018  1:53 AM

## 2018-03-26 NOTE — PROGRESS NOTES
"   03/26/18 0900   Quick Adds   Type of Visit Initial Occupational Therapy Evaluation   Living Environment   Lives With significant other   Home Accessibility no concerns   Transportation Available car  (pt drives)   Self-Care   Regular Exercise yes   Activity/Exercise Type swimming;other (see comments)  (yardwork, gardening)   Functional Level Prior   Ambulation 0-->independent   Transferring 0-->independent   Toileting 0-->independent   Bathing 0-->independent   Dressing 0-->independent   Eating 0-->independent   Communication 0-->understands/communicates without difficulty   Swallowing 0-->swallows foods/liquids without difficulty   Cognition 0 - no cognition issues reported   Which of the above functional risks had a recent onset or change? none   Prior Functional Level Comment independent   General Information   Onset of Illness/Injury or Date of Surgery - Date 03/14/18   Referring Physician Arina Reza   Patient/Family Goals Statement To discharge soon to her fathers home in [place]   Additional Occupational Profile Info/Pertinent History of Current Problem Pt is a 47 year old female presenting to therapy for cogntive assessment. Pt was admitted the the unit after being brought to the hospital for thinking she was pregnant, pt had a hysterectomy when she was 29.  Pt is alert and oriented.  When asked why she is in the hospital she states that its because of her distended stomach.  When asked if they know what is wrong with her stomach she states \"No they dont\". Pt made no comments about being pregnant this session which occured in the morning.  States that the size of her stomach is going down and that she has very minimal pain.  States that she was previously (I) with all ADLs and completed her own meds and money management.  Pt drives and likes to garden, swim, fourwheel, and do lawncare.  States that sleep is very important to her.  Reports that her coping skills include meditation, prayer, going for a walk " "and just \"letting it go, ruminating is a waste of time\".  Social supports include her daughers and friends.  Pt does state that she would like her Adderrall back because it makes her feel more alert, organized and better able to process information.  Overall, states that she is feeling good right now and feels her meds are where they need to be.    Cognitive Status Examination   Orientation orientation to person, place and time   Level of Consciousness alert   Able to Follow Commands WNL/WFL   Personal Safety (Cognitive) WNL/WFL   Memory intact   Attention Reports problems attending   Organization/Problem Solving Reports problems with organization   Executive Function No deficits were identified   Cognitive Comment Completed the SLUMS and pt scored 25/30 indicatingmild neuro cognitive impairment.  Also completed the ACLS and scored 5.8/5.8 indicating the person may work and live alone.  Pt does report difficulty with orgnization and concentration at times.  Would like her Aderrall back for that reason.     Instrumental Activities of Daily Living (IADL)   Previous Responsibilities meal prep;housekeeping;laundry;shopping;yardwork;medication management;finances;driving   Activities of Daily Living Analysis   Impairments Contributing to Impaired Activities of Daily Living cognition impaired  (delusional thinking )   General Therapy Interventions   Intervention Comments Eval only   Clinical Impression   Criteria for Skilled Therapeutic Interventions Met evaluation only   Risks and Benefits of Treatment have been explained. Yes   Patient, Family & other staff in agreement with plan of care Yes   Clinical Impression Comments Pt did score mild neuro cog deficit on the SLUMS but does reports some difficulty with organization and concentration since not having her adderall.  Received a perfect score in the ACL which assess safety, sequencing, executive functioning.  According to testing results, pt is safe to return home and " live independently.     Total Evaluation Time   Total Evaluation Time (Minutes) 32

## 2018-03-26 NOTE — PROGRESS NOTES
"[facility]  Psychiatric Progress Note      Impression:   Glenda Robins was admitted on 3/14/18 for delusional thoughts that she was pregnant. States she previously had hysterectomy when she was 29.  UDS positive for benzodiazapine's and amphetamines which she is prescribed. She goes to [facility] on an outpatient basis and has also been hospitalized at [facility] for [facility] in past several months.    Today patient sitting on hallway floor looking at magazine.  States she is \"mad\" as nursing presented her with PRN \"said I was hallucinating and I wasn't\". Stated she felt she needed to take medication \"so I can go home\".  Interview is logical and linear thoughts during interview. Requests to have medication at 17:00 as \"gives me energy\".  When questioned about being observed talking to walls and laughing out loud, patient looks confused and shakes head side to side, indicating \"no\". States she knows she is not pregnant and is ready to go home. When asked about caring for Dad and if extra help is needed, she states it \"helps keep me busy\" to care for her Dad and \"restless\" in hospital with \"nothing to do\". Does not desire to attend groups. Patient compliant with medication. Denies suicidal thoughts. Staff have observed patient rubbing and talking to stomach along with laughing out loud and talking to self when alone. Patient denies dissociation or hallucinations.     Patient completed Cognitive Evaluation with OT this morning.          Diagnoses:   Schizophrenia  Sedative, Hypnotic, or Anxiolytic Withdrawal  Stimulant Use Disorder,  Amphetamine type substance  H/O Alcohol Use Disorder   H/O Depression  H/O ADHD  H/O Sleep Disorder  Polycystic Liver                                Attestation:  Patient has been seen and evaluated by me,  MADI Soto CNP          Interim History:   The patient's care was discussed with the treatment team and chart notes were reviewed.          " Medications:     Current Facility-Administered Medications   Medication     ARIPiprazole (ABILIFY) tablet 7 mg     mineral oil-hydrophilic petrolatum (AQUAPHOR)     bisacodyl (DULCOLAX) Suppository 10 mg     sennosides (SENOKOT) tablet 8.6 mg     nicotine Patch in Place     nicotine patch REMOVAL     nicotine (NICODERM CQ) 21 MG/24HR 24 hr patch 1 patch     hydrOXYzine (ATARAX) tablet 25-50 mg     acetaminophen (TYLENOL) tablet 650 mg     alum & mag hydroxide-simethicone (MYLANTA ES/MAALOX  ES) suspension 30 mL     magnesium hydroxide (MILK OF MAGNESIA) suspension 30 mL     OLANZapine (zyPREXA) tablet 10 mg    Or     OLANZapine (zyPREXA) injection 10 mg     nicotine polacrilex (NICORETTE) gum 2-4 mg     albuterol (PROAIR HFA/PROVENTIL HFA/VENTOLIN HFA) Inhaler 1-2 puff     10 point ROS positive for abdominal fullness        Allergies:     Allergies   Allergen Reactions     Shrimp Anaphylaxis     Risperdal [Risperidone] Other (See Comments)     Elevated prolactin          Psychiatric Examination:   /60  Pulse 96  Temp 97.9  F (36.6  C) (Tympanic)  Resp 16  Ht 1.524 m (5')  Wt 58.8 kg (129 lb 11.2 oz)  SpO2 96%  BMI 25.33 kg/m2  Weight is 129 lbs 11.2 oz  Body mass index is 25.33 kg/(m^2).    Appearance:  awake, alert and well groomed  Attitude:  cooperative  Eye Contact:  good  Mood:  angry  Affect:  Correlated to mood.   Speech:  clear, coherent and normal prosody  Psychomotor Behavior:  no evidence of tardive dyskinesia, dystonia, or tics  Thought Process:  linear and goal oriented   Associations:  no loose associations  Thought Content:  no evidence of suicidal ideation or homicidal ideation and does not appear to be responding to internal stimuli during interview.  Insight:  limited  Judgment:  limited  Oriented to:  time, person, and place  Attention Span and Concentration:  intact  Recent and Remote Memory:  fair  Fund of Knowledge: appropriate for age and level of education  Muscle Strength and  Tone: normal  Gait and Station: Normal         Labs:     No results found for this or any previous visit (from the past 24 hour(s)).       Plan:   Continue Inpatient Hospitalization  Continue to provide a safe environment and therapeutic milieu.  Change Abilify 7 mg to dinner. Consider adding morning dose  County would like documentation of medication refusal.   Patient voluntary in Lieu of Commitment.     ELOS: >5 days due to stabilization on medication

## 2018-03-27 PROCEDURE — 12400011

## 2018-03-27 PROCEDURE — 99232 SBSQ HOSP IP/OBS MODERATE 35: CPT | Performed by: NURSE PRACTITIONER

## 2018-03-27 PROCEDURE — 25000132 ZZH RX MED GY IP 250 OP 250 PS 637: Performed by: NURSE PRACTITIONER

## 2018-03-27 PROCEDURE — 25000132 ZZH RX MED GY IP 250 OP 250 PS 637: Performed by: HOSPITALIST

## 2018-03-27 RX ORDER — OLANZAPINE 10 MG/2ML
5 INJECTION, POWDER, FOR SOLUTION INTRAMUSCULAR 3 TIMES DAILY PRN
Status: DISCONTINUED | OUTPATIENT
Start: 2018-03-27 | End: 2018-04-05 | Stop reason: HOSPADM

## 2018-03-27 RX ORDER — OLANZAPINE 5 MG/1
5 TABLET ORAL 3 TIMES DAILY PRN
Status: DISCONTINUED | OUTPATIENT
Start: 2018-03-27 | End: 2018-04-05 | Stop reason: HOSPADM

## 2018-03-27 RX ADMIN — NICOTINE 1 PATCH: 21 PATCH, EXTENDED RELEASE TRANSDERMAL at 08:23

## 2018-03-27 RX ADMIN — MAGNESIUM HYDROXIDE 30 ML: 400 SUSPENSION ORAL at 17:48

## 2018-03-27 RX ADMIN — ARIPIPRAZOLE 7 MG: 2 TABLET ORAL at 17:46

## 2018-03-27 RX ADMIN — SENNOSIDES 8.6 MG: 8.6 TABLET, FILM COATED ORAL at 17:47

## 2018-03-27 ASSESSMENT — ACTIVITIES OF DAILY LIVING (ADL)
GROOMING: INDEPENDENT
DRESS: SCRUBS (BEHAVIORAL HEALTH);INDEPENDENT
ORAL_HYGIENE: INDEPENDENT
GROOMING: INDEPENDENT
DRESS: SCRUBS (BEHAVIORAL HEALTH);INDEPENDENT
ORAL_HYGIENE: INDEPENDENT

## 2018-03-27 NOTE — PROGRESS NOTES
"[facility]  Psychiatric Progress Note      Impression:   Glenda Robins was admitted on 3/14/18 for delusional thoughts that she was pregnant. States she previously had hysterectomy when she was 29.  UDS positive for benzodiazapine's and amphetamines which she is prescribed. She goes to [facility] on an outpatient basis and [facility].    Today patient reports sleeping well last night. States she was dizzy and groggy after PRN Zyprexa yesterday. Talked with her about behaviors of laughing to self, talking out loud and staring at walls prior to PRN. She states \"just happy and remembering good times\".  Does not want to have to take Zyprexa again but reports benefits to hydroxyzine. Did attend groups yesterday but \"getting repetitive\" as topic was similar to one she had previously attended.  Reports taking Abilify at 17:00 was \"very helpful\" and \"easier to sleep\". Patient compliant with medication. Denies suicidal thoughts. Does not endorse thoughts of being pregnant with this provider. Would like to return home.  Patient denies dissociation or hallucinations.          Diagnoses:   Schizophrenia  Sedative, Hypnotic, or Anxiolytic Withdrawal  Stimulant Use Disorder,  Amphetamine type substance  H/O Alcohol Use Disorder   H/O Depression  H/O ADHD  H/O Sleep Disorder  Polycystic Liver                                Attestation:  Patient has been seen and evaluated by me,  MADI Soto CNP          Interim History:   The patient's care was discussed with the treatment team and chart notes were reviewed.          Medications:     Current Facility-Administered Medications   Medication     ARIPiprazole (ABILIFY) tablet 7 mg     mineral oil-hydrophilic petrolatum (AQUAPHOR)     bisacodyl (DULCOLAX) Suppository 10 mg     sennosides (SENOKOT) tablet 8.6 mg     nicotine Patch in Place     nicotine patch REMOVAL     nicotine " (NICODERM CQ) 21 MG/24HR 24 hr patch 1 patch     hydrOXYzine (ATARAX) tablet 25-50 mg     acetaminophen (TYLENOL) tablet 650 mg     alum & mag hydroxide-simethicone (MYLANTA ES/MAALOX  ES) suspension 30 mL     magnesium hydroxide (MILK OF MAGNESIA) suspension 30 mL     OLANZapine (zyPREXA) tablet 10 mg    Or     OLANZapine (zyPREXA) injection 10 mg     nicotine polacrilex (NICORETTE) gum 2-4 mg     albuterol (PROAIR HFA/PROVENTIL HFA/VENTOLIN HFA) Inhaler 1-2 puff     10 point ROS positive for abdominal fullness        Allergies:     Allergies   Allergen Reactions     Shrimp Anaphylaxis     Risperdal [Risperidone] Other (See Comments)     Elevated prolactin          Psychiatric Examination:   /68  Pulse 92  Temp 97.5  F (36.4  C) (Tympanic)  Resp 16  Ht 1.524 m (5')  Wt 58.8 kg (129 lb 11.2 oz)  SpO2 95%  BMI 25.33 kg/m2  Weight is 129 lbs 11.2 oz  Body mass index is 25.33 kg/(m^2).    Appearance:  awake, alert and well groomed  Attitude:  cooperative  Eye Contact:  good  Mood:  angry  Affect:  Correlated to mood.   Speech:  clear, coherent and normal prosody  Psychomotor Behavior:  no evidence of tardive dyskinesia, dystonia, or tics  Thought Process:  linear and goal oriented   Associations:  no loose associations  Thought Content:  no evidence of suicidal ideation or homicidal ideation and does not appear to be responding to internal stimuli during interview.  Insight:  limited  Judgment:  limited  Oriented to:  time, person, and place  Attention Span and Concentration:  intact  Recent and Remote Memory:  fair  Fund of Knowledge: appropriate for age and level of education  Muscle Strength and Tone: normal  Gait and Station: Normal         Labs:     No results found for this or any previous visit (from the past 24 hour(s)).       Plan:   Continue Inpatient Hospitalization  Continue to provide a safe environment and therapeutic milieu.  Continue Abilify 7 mg to dinner. Decreased PRN zyprexa  [facility] would like documentation of medication refusal.   Patient voluntary in Lieu of Commitment.     ELOS: >5 days due to stabilization on medication

## 2018-03-27 NOTE — PLAN OF CARE
Face to face end of shift report received from Janel BROTHERS RN. Rounding completed. Patient observed in the lounge.     Radha Loaiza  3/27/2018  3:39 PM

## 2018-03-27 NOTE — PLAN OF CARE
Problem: Patient Care Overview  Goal: Individualization & Mutuality  Will agree to tx team recommendations for meds and follow up care.  Will attend >75% of groups.         Outcome: No Change  Pt has been up and about on the unit all shift. Spends most of her time walking in the [place]. Does not attend groups. Keeps mostly to self, but has been observed talking with peers more today. Affect is brighter. Noted to be smiling frequently. Denied pain. Pt stated that she slept well last night. No overt delusional statements. Has not been observed talking and/or laughing to self and/or wall this shift.    Problem: Thought Process Alteration (Adult)  Goal: Improved Thought Process  Patient will sleep >6 hours per night.  Patient will be free of delusions by discharge.     Outcome: No Change  No overt delusional statements noted this shift. Has not been observed talking and/or laughing to self and/or wall. Reported that she slept well last night. Charting indicates that she slept approximately 5.5 hours.

## 2018-03-27 NOTE — PLAN OF CARE
Face to face end of shift report received from KULDIP Jaquez RN. Rounding completed. Patient observed, up on unit.     Janel Sue  3/27/2018  7:45 AM

## 2018-03-27 NOTE — PLAN OF CARE
Problem: Patient Care Overview  Goal: Team Discussion  Team Plan:   BEHAVIORAL TEAM DISCUSSION    Participants: Nohelia Ching NP,Jose Angel Buckner NP, Arina Reza NP,  Kim Linda LICSW, Sue Morton LICSW, Mehnaz LAWRENCE,Gopi Sue RN, Poornima Reyna OT  Progress: Going to groups, sleeping better   Continued Stay Criteria/Rationale: monitor medications for effectiveness   Medical/Physical: constipated, polycystic liver  Precautions:   Behavioral Orders   Procedures     Code 1 - Restrict to Unit     Routine Programming     As clinically indicated     Status 15     Every 15 minutes.     Plan: D/C home when stabilized with [facility] referral   Rationale for change in precautions or plan: none

## 2018-03-27 NOTE — PLAN OF CARE
Problem: Patient Care Overview  Goal: Individualization & Mutuality  Will agree to tx team recommendations for meds and follow up care.  Will attend >75% of groups.         Outcome: No Change  Patient has been calm, cooperative, and medication compliant this shift.  She denies all mental health criteria but appears to be responding to internal stimuli.  She is able to have a reality based conversation with this writer but is seen sitting alone staring at the walls and floor.  She is friendly with staff and peers and did attend a few groups.  Denies any pain.  VS WNL.     Problem: Thought Process Alteration (Adult)  Goal: Improved Thought Process  Patient will sleep >6 hours per night.  Patient will be free of delusions by discharge.     Outcome: No Change  Patient denies hallucinations but is seen sitting at the end of the duque staring at the ceiling and talking to herself.  She does appear to be responding to internal stimuli.

## 2018-03-27 NOTE — PLAN OF CARE
Problem: Patient Care Overview  Goal: Individualization & Mutuality  Will agree to tx team recommendations for meds and follow up care.  Will attend >75% of groups.         0000 in bed with easy respirations, presenting sleeping. 0200 up for water and back to bed and sleep.0500 has gotten up 2 times this shift so far, once for water and at about 0430 for a snack and she has returned to bed and sleep.    Problem: Thought Process Alteration (Adult)  Goal: Identify Related Risk Factors and Signs and Symptoms  Related risk factors and signs and symptoms are identified upon initiation of Human Response Clinical Practice Guideline (CPG).   Patient's thoughts will be reality based by discharge   0000 in bed with easy respirations, presenting sleeping.

## 2018-03-27 NOTE — PROGRESS NOTES
Face to face end of shift report received from jeramie webber at 2300 report.. Rounding completed. Patient observed.     Fadia Jaquez  3/27/2018  12:10 AM

## 2018-03-28 PROCEDURE — 12400011

## 2018-03-28 PROCEDURE — 25000132 ZZH RX MED GY IP 250 OP 250 PS 637: Performed by: NURSE PRACTITIONER

## 2018-03-28 PROCEDURE — 99232 SBSQ HOSP IP/OBS MODERATE 35: CPT | Performed by: NURSE PRACTITIONER

## 2018-03-28 RX ORDER — ARIPIPRAZOLE 15 MG/1
15 TABLET ORAL AT BEDTIME
Status: DISCONTINUED | OUTPATIENT
Start: 2018-03-29 | End: 2018-04-05 | Stop reason: HOSPADM

## 2018-03-28 RX ADMIN — NICOTINE 1 PATCH: 21 PATCH, EXTENDED RELEASE TRANSDERMAL at 08:29

## 2018-03-28 RX ADMIN — ARIPIPRAZOLE 7 MG: 2 TABLET ORAL at 16:41

## 2018-03-28 RX ADMIN — MAGNESIUM HYDROXIDE 30 ML: 400 SUSPENSION ORAL at 09:07

## 2018-03-28 ASSESSMENT — ACTIVITIES OF DAILY LIVING (ADL)
DRESS: SCRUBS (BEHAVIORAL HEALTH);INDEPENDENT
ORAL_HYGIENE: INDEPENDENT
LAUNDRY: UNABLE TO COMPLETE
GROOMING: INDEPENDENT
DRESS: INDEPENDENT;SCRUBS (BEHAVIORAL HEALTH)
LAUNDRY: UNABLE TO COMPLETE
ORAL_HYGIENE: INDEPENDENT
GROOMING: INDEPENDENT

## 2018-03-28 NOTE — PROGRESS NOTES
Face to face end of shift report received from zion macario rn at 2300 report.. Rounding completed. Patient observed.     Fadia Jaquez  3/28/2018  12:23 AM

## 2018-03-28 NOTE — PLAN OF CARE
Problem: Patient Care Overview  Goal: Individualization & Mutuality  Will agree to tx team recommendations for meds and follow up care.  Will attend >75% of groups.         0010 patient is up to get some water and then returned to bed. 0509 patient has been up to the bathroom and for ice otherwise she has slept mostly.     Problem: Thought Process Alteration (Adult)  Goal: Identify Related Risk Factors and Signs and Symptoms  Related risk factors and signs and symptoms are identified upon initiation of Human Response Clinical Practice Guideline (CPG).   Patient's thoughts will be reality based by discharge   0010 patient up for water and then back to bed.

## 2018-03-28 NOTE — PLAN OF CARE
Problem: Patient Care Overview  Goal: Team Discussion  Team Plan:   BEHAVIORAL TEAM DISCUSSION    Participants: Haleigh Kolb NP, Poornima Tucker NP,  Tootie Maria NP, Sue Morton [facility], Mehnaz SALAZARW, Marbella Campbell RN, Jennifer Kwan OT, Poornima Reyna OT  Progress: Going to groups, sleeping better   Continued Stay Criteria/Rationale: monitor medications for effectiveness. Increase Abilify. Decreased PRN zyprexa.   Medical/Physical: constipated, polycystic liver  Precautions:   Behavioral Orders   Procedures     Code 1 - Restrict to Unit     Routine Programming     As clinically indicated     Status 15     Every 15 minutes.     Plan: D/C home when stabilized with [facility] referral. OT eval completed for cognition and DC.   Rationale for change in precautions or plan: none

## 2018-03-28 NOTE — PLAN OF CARE
Face to face end of shift report received from Karen DUMONT. Rounding completed. Patient observed in dayroom and introduced to nursing for the shift.    Roman Braxton  3/28/2018  3:57 PM

## 2018-03-28 NOTE — PLAN OF CARE
Problem: Patient Care Overview  Goal: Individualization & Mutuality  Will agree to tx team recommendations for meds and follow up care.  Will attend >75% of groups.         Outcome: Improving  Pt continues to be compliant with medications. Pt does laugh to her self from time to time she is not seen talking to herself today. Pt is compliant with nursing assessment, however she denies hallucinations. She does appear to be responding. She does socialize with her peers. She paces the hallway, she does attend groups as well today. Pt eats well at meals. Pt.'s daughter Stacy called to get Patients car keys as her car is parked in the [facility] Parking lot and may be towed. Pt does approve of this nurse did talk with Supervisor and this is approved upon pt's approval. Pt does approve of this as well.     Problem: Thought Process Alteration (Adult)  Goal: Improved Thought Process  Patient will sleep >6 hours per night.  Patient will be free of delusions by discharge.     Outcome: Improving  Pt is seen in bed holding her stomach but she has not been walking the halls rubbing or holding her belly.     Pt request Milk of Magnesum due to c/o constipation. Nurse does administer MOM at 0907.    Pt is seen in the lounge talking to a chair, pt does look up and looks out of the corner of her eye at nurses station.

## 2018-03-28 NOTE — PLAN OF CARE
Face to face end of shift report received from Fadia ONEAL RN. Rounding completed. Patient observed in room laying in bed.     Karen Hamilton  3/28/2018  8:11 AM

## 2018-03-29 PROCEDURE — 25000132 ZZH RX MED GY IP 250 OP 250 PS 637: Performed by: NURSE PRACTITIONER

## 2018-03-29 PROCEDURE — 12400011

## 2018-03-29 RX ADMIN — NICOTINE 1 PATCH: 21 PATCH, EXTENDED RELEASE TRANSDERMAL at 08:33

## 2018-03-29 RX ADMIN — ARIPIPRAZOLE 15 MG: 15 TABLET ORAL at 18:34

## 2018-03-29 ASSESSMENT — ACTIVITIES OF DAILY LIVING (ADL)
DRESS: INDEPENDENT;SCRUBS (BEHAVIORAL HEALTH)
ORAL_HYGIENE: INDEPENDENT
DRESS: INDEPENDENT;SCRUBS (BEHAVIORAL HEALTH)
LAUNDRY: UNABLE TO COMPLETE
GROOMING: INDEPENDENT
LAUNDRY: UNABLE TO COMPLETE
GROOMING: INDEPENDENT
ORAL_HYGIENE: INDEPENDENT
GROOMING: INDEPENDENT
DRESS: SCRUBS (BEHAVIORAL HEALTH);INDEPENDENT
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
## 2018-03-29 NOTE — PLAN OF CARE
Problem: Patient Care Overview  Goal: Individualization & Mutuality  Will agree to tx team recommendations for meds and follow up care.  Will attend >75% of groups.      Patient will sleep >6 hours per night.  Patient will be free of delusions by discharge.     0030 in bed with easy respirations, presenting sleeping.0503 continues to sleep at this time.    Problem: Thought Process Alteration (Adult)  Goal: Identify Related Risk Factors and Signs and Symptoms  Related risk factors and signs and symptoms are identified upon initiation of Human Response Clinical Practice Guideline (CPG).   Patient's thoughts will be reality based by discharge   0030 in bed with easy respirations, presenting sleeping.

## 2018-03-29 NOTE — PLAN OF CARE
Problem: Patient Care Overview  Goal: Individualization & Mutuality  Will agree to tx team recommendations for meds and follow up care.  Will attend >75% of groups.      Patient will sleep >6 hours per night.  Patient will be free of delusions by discharge.     Pt reported sleeping well, had no complaints today. Does attend group, is somewhat withdrawn but interacts with peers and behavior is appropriate. Pt denied SI, HI, anxiety and depression. Denied hallucinations today, does not appear to be responding to internal stimuli today, but spends time blankly staring out window at times.     Problem: Thought Process Alteration (Adult)  Goal: Improved Thought Process  Patient will sleep >6 hours per night.  Patient will be free of delusions by discharge.     Reported adequate sleep last night. No delusional content to conversation today.
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
## 2018-03-29 NOTE — PROGRESS NOTES
Face to face end of shift report received from reymundo rn at 2300 report.. Rounding completed. Patient observed.     Fadia Jaquez  3/29/2018  12:43 AM

## 2018-03-29 NOTE — PLAN OF CARE
Face to face end of shift report received from Jade DUMONT. Rounding completed. Patient observed in dayroom and introduced to nursing for the shift.    Roman Braxton  3/29/2018  5:09 PM

## 2018-03-29 NOTE — PLAN OF CARE
Face to face end of shift report received from JULIA Loaiza. Rounding completed. Patient observed.     Jade Chavez  3/29/2018  9:33 AM

## 2018-03-29 NOTE — PLAN OF CARE
Problem: Patient Care Overview  Goal: Team Discussion  Team Plan:   BEHAVIORAL TEAM DISCUSSION    Participants: Haleigh Kolb NP, Poornima Tucker NP,  Tootie Maria NP, Sue Morton [facility], Mehnaz SALAZARW, Kirti Singh RN, Jennifer Kwan OT, Poornima Reyna OT  Progress: Going to groups, sleeping better   Continued Stay Criteria/Rationale: monitor medications for effectiveness. Will increase today dose of abilify to 15 mg.   Medical/Physical: constipated, polycystic liver  Precautions:   Behavioral Orders   Procedures     Code 1 - Restrict to Unit     Routine Programming     As clinically indicated     Status 15     Every 15 minutes.     Plan: IRTS referral sent. [facility] referral.  Rationale for change in precautions or plan: none

## 2018-03-29 NOTE — PROGRESS NOTES
"[facility]  Psychiatric Progress Note      Impression:   Glenda Robins was admitted on 3/14/18 for delusional thoughts that she was pregnant. States she previously had hysterectomy when she was 29.  UDS positive for benzodiazapine's and amphetamines which she is prescribed. She goes to [facility] on an outpatient basis and has also been hospitalized at [facility] in past several months.    She is laughing, almost uncontrollably in our meeting today. SHe tells em she is feeling great and laughs as she says it. She states \"that abilify makes me feel like a teenager who just smoked a joint\". She believes she had 4 hours of sleep last night and states 'thats really good\". She tells me she would like to dc to her boyfreinds home in [place]. She tells me his name. She states I can speak with him. I have not spke with her daughter charo since 15th. She did come to visit today to get her mothers keys. I was unable to meet with her. Glenda is upset when I tell her that my recommendation is an irts favility. Even though she states she is not happy about this, she smiles and continues to be very euphoric. Only on 7mg of abilify. stattes she doesn't wish to have an increase though I did explain how she seemed manic and it would target those sx. She then states she only needs to be on adderall.           Diagnoses:   Schizophrenia  Sedative, Hypnotic, or Anxiolytic Withdrawal  Stimulant Use Disorder,  Amphetamine type substance  H/O Alcohol Use Disorder   H/O Depression  H/O ADHD  H/O Sleep Disorder  Polycystic Liver                                Attestation:  Patient has been seen and evaluated by me,  Haleigh Kolb NP          Interim History:   The patient's care was discussed with the treatment team and chart notes were reviewed.          Medications:     Current Facility-Administered Medications   Medication     OLANZapine (zyPREXA) tablet 5 mg    Or     OLANZapine " (zyPREXA) injection 5 mg     ARIPiprazole (ABILIFY) tablet 7 mg     mineral oil-hydrophilic petrolatum (AQUAPHOR)     bisacodyl (DULCOLAX) Suppository 10 mg     sennosides (SENOKOT) tablet 8.6 mg     nicotine Patch in Place     nicotine patch REMOVAL     nicotine (NICODERM CQ) 21 MG/24HR 24 hr patch 1 patch     hydrOXYzine (ATARAX) tablet 25-50 mg     acetaminophen (TYLENOL) tablet 650 mg     alum & mag hydroxide-simethicone (MYLANTA ES/MAALOX  ES) suspension 30 mL     magnesium hydroxide (MILK OF MAGNESIA) suspension 30 mL     nicotine polacrilex (NICORETTE) gum 2-4 mg     albuterol (PROAIR HFA/PROVENTIL HFA/VENTOLIN HFA) Inhaler 1-2 puff     10 point ROS positive for abdominal fullness        Allergies:     Allergies   Allergen Reactions     Shrimp Anaphylaxis     Risperdal [Risperidone] Other (See Comments)     Elevated prolactin          Psychiatric Examination:   /76  Pulse 84  Temp 97.3  F (36.3  C) (Tympanic)  Resp 16  Ht 1.524 m (5')  Wt 58.8 kg (129 lb 11.2 oz)  SpO2 97%  BMI 25.33 kg/m2  Weight is 129 lbs 11.2 oz  Body mass index is 25.33 kg/(m^2).    Appearance:  awake, alert and well groomed  Attitude:  cooperative  Eye Contact:  good  Mood:  angry  Affect:  Correlated to mood.   Speech:  clear, coherent and normal prosody  Psychomotor Behavior:  no evidence of tardive dyskinesia, dystonia, or tics  Thought Process:  linear and goal oriented   Associations:  no loose associations  Thought Content:  no evidence of suicidal ideation or homicidal ideation and does not appear to be responding to internal stimuli during interview.  Insight:  limited  Judgment:  limited  Oriented to:  time, person, and place  Attention Span and Concentration:  intact  Recent and Remote Memory:  fair  Fund of Knowledge: appropriate for age and level of education  Muscle Strength and Tone: normal  Gait and Station: Normal         Labs:     No results found for this or any previous visit (from the past 24 hour(s)).        Plan:   Someone will have to make contact with daughter in the morning to see if she feels her mother Is at or close to baseline    Will increase tomorrows dose of abilify to 15 mg   I did speak with her about irts referral    ELOS: >5 days due to stabilization on medication

## 2018-03-30 LAB
ALBUMIN SERPL-MCNC: 3.8 G/DL (ref 3.4–5)
ALP SERPL-CCNC: 93 U/L (ref 40–150)
ALT SERPL W P-5'-P-CCNC: 28 U/L (ref 0–50)
AST SERPL W P-5'-P-CCNC: 14 U/L (ref 0–45)
BILIRUB DIRECT SERPL-MCNC: <0.1 MG/DL (ref 0–0.2)
BILIRUB SERPL-MCNC: 0.2 MG/DL (ref 0.2–1.3)
PROT SERPL-MCNC: 7.4 G/DL (ref 6.8–8.8)

## 2018-03-30 PROCEDURE — 36415 COLL VENOUS BLD VENIPUNCTURE: CPT | Performed by: NURSE PRACTITIONER

## 2018-03-30 PROCEDURE — 25000132 ZZH RX MED GY IP 250 OP 250 PS 637: Performed by: NURSE PRACTITIONER

## 2018-03-30 PROCEDURE — 80076 HEPATIC FUNCTION PANEL: CPT | Performed by: NURSE PRACTITIONER

## 2018-03-30 PROCEDURE — 12400011

## 2018-03-30 RX ORDER — BENZOCAINE/MENTHOL 6 MG-10 MG
LOZENGE MUCOUS MEMBRANE 2 TIMES DAILY
Status: DISCONTINUED | OUTPATIENT
Start: 2018-03-30 | End: 2018-04-05 | Stop reason: HOSPADM

## 2018-03-30 RX ADMIN — HYDROCORTISONE: 1 CREAM TOPICAL at 20:49

## 2018-03-30 RX ADMIN — NICOTINE 1 PATCH: 21 PATCH, EXTENDED RELEASE TRANSDERMAL at 08:25

## 2018-03-30 RX ADMIN — ARIPIPRAZOLE 15 MG: 15 TABLET ORAL at 18:27

## 2018-03-30 RX ADMIN — HYDROXYZINE HYDROCHLORIDE 50 MG: 25 TABLET ORAL at 18:30

## 2018-03-30 ASSESSMENT — ACTIVITIES OF DAILY LIVING (ADL)
GROOMING: INDEPENDENT
DRESS: SCRUBS (BEHAVIORAL HEALTH);INDEPENDENT
ORAL_HYGIENE: INDEPENDENT
LAUNDRY: UNABLE TO COMPLETE

## 2018-03-30 NOTE — PROGRESS NOTES
Face to face end of shift report received from reymundo rn at 2300 report.. Rounding completed. Patient observed.     Fadia Jaquez  3/30/2018  1:19 AM

## 2018-03-30 NOTE — PLAN OF CARE
Face to face end of shift report received from Fadia ONEAL RN. Rounding completed. Patient observed laying in bed awake. No requests at this time.     Rosa Gonzales  3/30/2018  9:14 AM

## 2018-03-30 NOTE — PLAN OF CARE
"Problem: Patient Care Overview  Goal: Individualization & Mutuality  Will agree to tx team recommendations for meds and follow up care.  Will attend >75% of groups.    Patient will sleep >6 hours per night.  Patient will be free of delusions by discharge.     Patient withdrawn/isolative laying in bedroom for majority of shift. Patient believes she is discharging this morning and asking numerous questions about going through belongings and making statements like \"I just need to go to the other floor\" and \"my  is coming and he just needs to sign me out\". Patient was calmly redirected by this writer each time. Flat/blunt affect, flight of ideas and appears to be looking passed this writer during parts of conversation. Out to day room for meals only. Declining group encouragements. Showered after lunch this shift. Patient appears confused this afternoon, dancing in hallways and feeling different door handles. Reoriented well with staff direction. Continue to monitor at this time.     Problem: Thought Process Alteration (Adult)  Goal: Identify Related Risk Factors and Signs and Symptoms  Related risk factors and signs and symptoms are identified upon initiation of Human Response Clinical Practice Guideline (CPG).   Patient's thoughts will be reality based by discharge   Continue to monitor at this time.   Goal: Improved Thought Process  Patient will sleep >6 hours per night.  Patient will be free of delusions by discharge.     Continue to monitor at this time.      "

## 2018-03-30 NOTE — PLAN OF CARE
Problem: Patient Care Overview  Goal: Team Discussion  Team Plan:   BEHAVIORAL TEAM DISCUSSION    Participants: Haleigh Kolb NP, Poornima Tucker NP,  Tootie Maria NP, Robyn Pierre LSW,  Priya Lind LSW, Rosa DUMONT, Jennifer Kwan OT.  Progress: Attending programming, sleeping better   Continued Stay Criteria/Rationale: monitor medications for effectiveness. Will increase today dose of abilify to 15 mg.   Medical/Physical: constipated, polycystic liver  Precautions:   Behavioral Orders   Procedures     Code 1 - Restrict to Unit     Routine Programming     As clinically indicated     Status 15     Every 15 minutes.     Plan: IRTS referral sent. [facility] referral  Rationale for change in precautions or plan: none
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
## 2018-03-30 NOTE — PLAN OF CARE
"Problem: Patient Care Overview  Goal: Individualization & Mutuality  Will agree to tx team recommendations for meds and follow up care.  Will attend >75% of groups.      Patient will sleep >6 hours per night.  Patient will be free of delusions by discharge.     0045 has been up in the duque and now awake in her bed in room after getting some juice. Asked patient if she wanted some food, or needed any prns and she said no. She was laughing /smiling . \"i'm ok\". 0330  patient came to the door to ask for towels to go take  a shower. She was oriented as to the time.  That the room mate is sleeping. She is smiling while coming to ask for towels and then she returned to bed.   She has had very little sleep. Did not want any prns. Has been in bed a lot of the night, awake when not sleeping. Redirects easily.    Problem: Thought Process Alteration (Adult)  Goal: Identify Related Risk Factors and Signs and Symptoms  Related risk factors and signs and symptoms are identified upon initiation of Human Response Clinical Practice Guideline (CPG).   Patient's thoughts will be reality based by discharge   0045 see individualization note.      "

## 2018-03-31 PROCEDURE — 25000132 ZZH RX MED GY IP 250 OP 250 PS 637: Performed by: NURSE PRACTITIONER

## 2018-03-31 PROCEDURE — 99232 SBSQ HOSP IP/OBS MODERATE 35: CPT | Performed by: NURSE PRACTITIONER

## 2018-03-31 PROCEDURE — 12400011

## 2018-03-31 RX ADMIN — NICOTINE 1 PATCH: 21 PATCH, EXTENDED RELEASE TRANSDERMAL at 08:32

## 2018-03-31 RX ADMIN — MAGNESIUM HYDROXIDE 30 ML: 400 SUSPENSION ORAL at 18:24

## 2018-03-31 RX ADMIN — HYDROCORTISONE: 1 CREAM TOPICAL at 08:33

## 2018-03-31 RX ADMIN — HYDROXYZINE HYDROCHLORIDE 50 MG: 25 TABLET ORAL at 17:17

## 2018-03-31 RX ADMIN — HYDROXYZINE HYDROCHLORIDE 50 MG: 25 TABLET ORAL at 12:56

## 2018-03-31 RX ADMIN — ARIPIPRAZOLE 15 MG: 15 TABLET ORAL at 17:17

## 2018-03-31 ASSESSMENT — ACTIVITIES OF DAILY LIVING (ADL)
LAUNDRY: UNABLE TO COMPLETE
ORAL_HYGIENE: INDEPENDENT
GROOMING: INDEPENDENT
DRESS: INDEPENDENT;SCRUBS (BEHAVIORAL HEALTH)

## 2018-03-31 NOTE — PLAN OF CARE
Face to face end of shift report received from Roman PRICE RN. Rounding completed. Patient observed.     Willis Romero  3/30/2018  11:50 PM

## 2018-03-31 NOTE — PLAN OF CARE
Problem: Patient Care Overview  Goal: Individualization & Mutuality  Will agree to tx team recommendations for meds and follow up care.  Will attend >75% of groups.      Patient will sleep >6 hours per night.  Patient will be free of delusions by discharge.     Outcome: No Change  Pt has been in bed with eyes closed and regular respirations observed all night. Will continue to monitor.

## 2018-03-31 NOTE — PROGRESS NOTES
[facility]  Psychiatric Progress Note      Impression:     Pt admitted on 3/14/18 with psychotic symptoms and concern she is pregnant with twins, from [facility]. Pt had hysterectomy at 29 years old. Previously on benzodiazepines & adderall at [facility].     Discussed plan of care with patient. Pt is angry that she is in a mental health unit after going to seek medical care at [facility]. Pt spoke of previous hospitalization 1 month prior at [facility] when she was diagnosed with schizophrenia and how she is irritated of the diagnosis; refused to take medications upon discharge. Pt denies that she has any mental health illness other than ADHD.   Pt has been compliant with Abilify with an increase to 15mg daily. Pt states she will continue to take this medication during this hospitalization but just does not want to take any zyprexa. Pt reports improved sleep during this hospitalization and good appetite. Denies hallucination, confusion, headache, SI, weakness.   Physical concerns include: hyperpigmented patches across back and lower extremities; abdominal swelling. Pt states the hyperpigmented areas have been present for 2 years and she has had the areas sent for biopsy with her PCP. Pt reports a continued swelling of lower abdominal area, not increased, but is concerned. Informed her that she has had 2 different CT scans ([facility] and here) with polycystic appearance and possible buildup of RBC in the area. Will plan on lab work today.  From the previous hospitalization and ED, pt has had hypokalemia and hypomagnesemia also. K+3.4 last week, will recheck e-s today.    Educated regarding medication indications, risks, benefits, side effects, contraindications and possible interactions. Verbally expressed understanding.        DIagnoses:   Schizophrenia  H/o substance use disorder  Polycystic liver   Hemochromatosis      Attestation:  Patient has been seen and evaluated by me,  Jose Antonio TRIANA  Mike          Interim History:   The patient's care was discussed with the treatment team and chart notes were reviewed.          Medications:   I have reviewed this patient's current medications  Current Facility-Administered Medications   Medication     hydrocortisone (CORTAID) 1 % cream     ARIPiprazole (ABILIFY) tablet 15 mg     OLANZapine (zyPREXA) tablet 5 mg    Or     OLANZapine (zyPREXA) injection 5 mg     mineral oil-hydrophilic petrolatum (AQUAPHOR)     bisacodyl (DULCOLAX) Suppository 10 mg     sennosides (SENOKOT) tablet 8.6 mg     nicotine Patch in Place     nicotine patch REMOVAL     nicotine (NICODERM CQ) 21 MG/24HR 24 hr patch 1 patch     hydrOXYzine (ATARAX) tablet 25-50 mg     acetaminophen (TYLENOL) tablet 650 mg     alum & mag hydroxide-simethicone (MYLANTA ES/MAALOX  ES) suspension 30 mL     magnesium hydroxide (MILK OF MAGNESIA) suspension 30 mL     nicotine polacrilex (NICORETTE) gum 2-4 mg     albuterol (PROAIR HFA/PROVENTIL HFA/VENTOLIN HFA) Inhaler 1-2 puff             10 point ROS negative        Allergies:     Allergies   Allergen Reactions     Shrimp Anaphylaxis     Risperdal [Risperidone] Other (See Comments)     Elevated prolactin            Psychiatric Examination:   /69  Pulse 93  Temp 97.9  F (36.6  C) (Tympanic)  Resp 14  Ht 1.524 m (5')  Wt 58.8 kg (129 lb 11.2 oz)  SpO2 95%  BMI 25.33 kg/m2  Weight is 129 lbs 11.2 oz  Body mass index is 25.33 kg/(m^2).    Appearance:  awake, alert, adequately groomed, dressed in hospital scrubs and appeared as age stated  Attitude:  cooperative  Eye Contact:  good  Mood:  better  Affect:  appropriate and in normal range  Speech:  clear, coherent  Psychomotor Behavior:  no evidence of tardive dyskinesia, dystonia, or tics  Thought Process:  logical  Associations:  no loose associations  Thought Content:  no evidence of suicidal ideation or homicidal ideation  Insight:  limited  Judgment:  limited  Oriented to:  time, person, and  place  Attention Span and Concentration:  fair  Recent and Remote Memory:  fair  Fund of Knowledge: appropriate  Muscle Strength and Tone: normal  Gait and Station: Normal           Labs:   CBC, CMP, Iron           Plan:     Voluntary in onesimo of committment   Continue Abilify 15mg  Monitor labs    Agree with above plan of care Haleigh Kolb

## 2018-03-31 NOTE — PLAN OF CARE
Face to face end of shift report received from Rosa DUMONT. Rounding completed. Patient observed in dayroom and introduced to nursing for the shift.    Roman Braxton  3/30/2018  1600

## 2018-03-31 NOTE — PLAN OF CARE
Face to face end of shift report received from Willis POP RN. Rounding completed. Patient observed sitting in day room with peers. No requests at this time.     Rosa Gonzales  3/31/2018  7:43 AM

## 2018-03-31 NOTE — PLAN OF CARE
Face to face end of shift report received from Rosa DUMONT. Rounding completed. Patient observed in dayroom and introduced to nursing for the shift.    Roman Braxton  3/31/2018  3:56 PM

## 2018-03-31 NOTE — PLAN OF CARE
"Problem: Patient Care Overview  Goal: Individualization & Mutuality  Will agree to tx team recommendations for meds and follow up care.  Will attend >75% of groups.    Patient will sleep >6 hours per night.  Patient will be free of delusions by discharge.     Patient has a brighter affect than previous shift with this writer. Socializing in day room with peers and attending groups on own. Scattered bruising areas noted over patient's back. Largest cluster mid back with a few smaller ones scattered higher and lower. Skin intact with no open or raised areas noted. Patient reports \"they itch\" and having some around knee areas also. Hydrocortisone cream applied this morning. Continues to have some confusion during conversation stating \"Mark will come get me when I'm ready\", reoriented well with staff direction. Showered in beginning of shift and in day room for meals.   1256- Requested/Received PRN Atarax 50 mg for anxiety rated 5/10. Reported relief shortly after.   Patient laying down to rest/nap at end of shift. Continue to monitor at this time.     Problem: Thought Process Alteration (Adult)  Goal: Identify Related Risk Factors and Signs and Symptoms  Related risk factors and signs and symptoms are identified upon initiation of Human Response Clinical Practice Guideline (CPG).   Patient's thoughts will be reality based by discharge   Continue to monitor at this time.   Goal: Improved Thought Process  Patient will sleep >6 hours per night.  Patient will be free of delusions by discharge.     Continue to monitor at this time.       "

## 2018-03-31 NOTE — PLAN OF CARE
Problem: Patient Care Overview  Goal: Individualization & Mutuality  Will agree to tx team recommendations for meds and follow up care.  Will attend >75% of groups.      Patient will sleep >6 hours per night.  Patient will be free of delusions by discharge.     Outcome: Improving  Pt has been very withdrawn and struggling with delusions tonight. She said that her daughter was coming to pick her up tonight.  I explained to her what the plan is and that she is not leaving today. She told me that she wants to  Mark so that she can leave the hospital and not be committed. She struggled accepting that Mark is not involved in her care.  She said that Mark is controlling us all and is keeping her here.  PT received PRN Hydroxyzine with some benefit.  She has been complaining of itching and rashes where she has bruises.  The provider was called and hydrocortisone was ordered.

## 2018-04-01 PROCEDURE — 25000132 ZZH RX MED GY IP 250 OP 250 PS 637: Performed by: NURSE PRACTITIONER

## 2018-04-01 PROCEDURE — 25000132 ZZH RX MED GY IP 250 OP 250 PS 637: Performed by: HOSPITALIST

## 2018-04-01 PROCEDURE — 99232 SBSQ HOSP IP/OBS MODERATE 35: CPT | Performed by: NURSE PRACTITIONER

## 2018-04-01 PROCEDURE — 12400011

## 2018-04-01 RX ADMIN — MAGNESIUM HYDROXIDE 30 ML: 400 SUSPENSION ORAL at 18:54

## 2018-04-01 RX ADMIN — HYDROCORTISONE: 1 CREAM TOPICAL at 08:39

## 2018-04-01 RX ADMIN — HYDROXYZINE HYDROCHLORIDE 50 MG: 25 TABLET ORAL at 20:09

## 2018-04-01 RX ADMIN — ARIPIPRAZOLE 15 MG: 15 TABLET ORAL at 18:30

## 2018-04-01 RX ADMIN — SENNOSIDES 8.6 MG: 8.6 TABLET, FILM COATED ORAL at 10:02

## 2018-04-01 RX ADMIN — HYDROXYZINE HYDROCHLORIDE 50 MG: 25 TABLET ORAL at 01:27

## 2018-04-01 RX ADMIN — HYDROXYZINE HYDROCHLORIDE 50 MG: 25 TABLET ORAL at 11:38

## 2018-04-01 RX ADMIN — HYDROXYZINE HYDROCHLORIDE 50 MG: 25 TABLET ORAL at 16:08

## 2018-04-01 RX ADMIN — NICOTINE 1 PATCH: 21 PATCH, EXTENDED RELEASE TRANSDERMAL at 08:38

## 2018-04-01 ASSESSMENT — ACTIVITIES OF DAILY LIVING (ADL)
DRESS: SCRUBS (BEHAVIORAL HEALTH);INDEPENDENT
ORAL_HYGIENE: INDEPENDENT
GROOMING: INDEPENDENT
DRESS: INDEPENDENT;SCRUBS (BEHAVIORAL HEALTH)
GROOMING: INDEPENDENT
ORAL_HYGIENE: INDEPENDENT
LAUNDRY: UNABLE TO COMPLETE
LAUNDRY: UNABLE TO COMPLETE

## 2018-04-01 NOTE — PLAN OF CARE
Problem: Patient Care Overview  Goal: Individualization & Mutuality  Will agree to tx team recommendations for meds and follow up care.  Will attend >75% of groups.      Patient will sleep >6 hours per night.  Patient will be free of delusions by discharge.     Outcome: Improving  PT has been up and active all shift. She attended most of the groups this evening.  She did go to bed early at 2000 and was sleeping soundly and did not have the cortizone applied.  Pt's thinking did not have delusional content today and she understood the rationale for her discharge planning.  Pt told me that the itching on her back and shins has been much better. She received Atarax once for anxiety early in the shift.

## 2018-04-01 NOTE — PLAN OF CARE
Face to face end of shift report received from Roman PRICE RN. Rounding completed. Patient observed resting in bed.     Willis Romero  3/31/2018  11:47 PM

## 2018-04-01 NOTE — PROGRESS NOTES
"[facility]  Psychiatric Progress Note      Impression:     She is brighter today and talking to me more than she has before. No debbie is noted. She tells me about her children and tells me what they do for work. She is not gaurded like she usually is. She does state she wishes she was not going to have ot go to \"a 90 day program after\". She would like to move in with her father. She states that his house is in her name. She denies any side effects of medications. Plan will still be to look into IRTS. She has been attending some groups.   Educated regarding medication indications, risks, benefits, side effects, contraindications and possible interactions. Verbally expressed understanding.        DIagnoses:   Schizophrenia  H/o substance use disorder  Polycystic liver   Hemochromatosis      Attestation:  Patient has been seen and evaluated by me,  Haleigh Kolb NP          Interim History:   The patient's care was discussed with the treatment team and chart notes were reviewed.          Medications:   I have reviewed this patient's current medications  Current Facility-Administered Medications   Medication     hydrocortisone (CORTAID) 1 % cream     ARIPiprazole (ABILIFY) tablet 15 mg     OLANZapine (zyPREXA) tablet 5 mg    Or     OLANZapine (zyPREXA) injection 5 mg     mineral oil-hydrophilic petrolatum (AQUAPHOR)     bisacodyl (DULCOLAX) Suppository 10 mg     sennosides (SENOKOT) tablet 8.6 mg     nicotine Patch in Place     nicotine patch REMOVAL     nicotine (NICODERM CQ) 21 MG/24HR 24 hr patch 1 patch     hydrOXYzine (ATARAX) tablet 25-50 mg     acetaminophen (TYLENOL) tablet 650 mg     alum & mag hydroxide-simethicone (MYLANTA ES/MAALOX  ES) suspension 30 mL     magnesium hydroxide (MILK OF MAGNESIA) suspension 30 mL     nicotine polacrilex (NICORETTE) gum 2-4 mg     albuterol (PROAIR HFA/PROVENTIL HFA/VENTOLIN HFA) Inhaler 1-2 puff             10 point ROS negative        Allergies: "     Allergies   Allergen Reactions     Shrimp Anaphylaxis     Risperdal [Risperidone] Other (See Comments)     Elevated prolactin            Psychiatric Examination:   /64  Pulse 75  Temp 97.9  F (36.6  C) (Tympanic)  Resp 16  Ht 1.524 m (5')  Wt 59.8 kg (131 lb 14.4 oz)  SpO2 97%  BMI 25.76 kg/m2  Weight is 131 lbs 14.4 oz  Body mass index is 25.76 kg/(m^2).    Appearance:  awake, alert, adequately groomed, dressed in hospital scrubs and appeared as age stated  Attitude:  cooperative  Eye Contact:  good  Mood:  better  Affect:  appropriate and in normal range  Speech:  clear, coherent  Psychomotor Behavior:  no evidence of tardive dyskinesia, dystonia, or tics  Thought Process:  logical  Associations:  no loose associations  Thought Content:  no evidence of suicidal ideation or homicidal ideation  Insight:  limited  Judgment:  limited  Oriented to:  time, person, and place  Attention Span and Concentration:  fair  Recent and Remote Memory:  fair  Fund of Knowledge: appropriate  Muscle Strength and Tone: normal  Gait and Station: Normal           Labs:   CBC, CMP, Iron           Plan:     Voluntary in onesimo of committment   Continue abilify 15 mg   Will order, cbc, cmp and iron

## 2018-04-01 NOTE — PLAN OF CARE
Face to face end of shift report received from Ashanti DUMONT. Rounding completed. Patient observed in dayroom and introduced.    Roman Braxton  4/1/2018  3:32 PM

## 2018-04-01 NOTE — PLAN OF CARE
Face to face end of shift report received from Willis Romero RN. Rounding completed. Patient observed.     Ashanti Ryan  4/1/2018  7:54 AM

## 2018-04-01 NOTE — PLAN OF CARE
Problem: Patient Care Overview  Goal: Individualization & Mutuality  Will agree to tx team recommendations for meds and follow up care.  Will attend >75% of groups.      Patient will sleep >6 hours per night.  Patient will be free of delusions by discharge.     Outcome: No Change  0127- Pt received PRN hydroxyzine for anxiety. Pt stated that she is also having issues sleeping due to nurses station door opening and closing causing the anxiety. Will continue to monitor.     0600- Pt appeared to be sleeping through the remainder of the night with eyes closed and respirations regular. Will continue to monitor.

## 2018-04-01 NOTE — PLAN OF CARE
Problem: Patient Care Overview  Goal: Individualization & Mutuality  Will agree to tx team recommendations for meds and follow up care.  Will attend >75% of groups.      Patient will sleep >6 hours per night.  Patient will be free of delusions by discharge.     Pt is calm and cooperative with this writer during nursing assessment. Pt presents with full range affect. Pt denies having suicidal ideation, anxiety, or hallucinations. Pt does endorse having depression related to being here on Easter. Pt states she did a lot of the cooking during the holidays. She is able to have reality based conversations. She does deny having illness. Pt did apply hydrocortisone cream to shins and needed assistance with application on back. Pt states it is helping and has not had any itching. Pt has been out on the open unit. She took a shower this morning. Pt has no questions regarding plan of care at this time. Pt does request PRN for constipation. Pt received PRN senna 8.6 mg PO at 1002.  Will continue to monitor and document any changes.   1138: Pt requested and received PRN atarax 50 mg PO for anxiety.   Problem: Thought Process Alteration (Adult)  Goal: Improved Thought Process  Patient will sleep >6 hours per night.  Patient will be free of delusions by discharge.     Pt reports sleeping well. Pt has not made any delusional statements. She is able to have reality based conversation. Pt able to track conversation.

## 2018-04-02 LAB
ALBUMIN SERPL-MCNC: 3.2 G/DL (ref 3.4–5)
ALP SERPL-CCNC: 84 U/L (ref 40–150)
ALT SERPL W P-5'-P-CCNC: 26 U/L (ref 0–50)
ANION GAP SERPL CALCULATED.3IONS-SCNC: 6 MMOL/L (ref 3–14)
AST SERPL W P-5'-P-CCNC: 16 U/L (ref 0–45)
BASOPHILS # BLD AUTO: 0 10E9/L (ref 0–0.2)
BASOPHILS NFR BLD AUTO: 0.6 %
BILIRUB SERPL-MCNC: 0.1 MG/DL (ref 0.2–1.3)
BUN SERPL-MCNC: 10 MG/DL (ref 7–30)
CALCIUM SERPL-MCNC: 9 MG/DL (ref 8.5–10.1)
CHLORIDE SERPL-SCNC: 106 MMOL/L (ref 94–109)
CO2 SERPL-SCNC: 27 MMOL/L (ref 20–32)
CREAT SERPL-MCNC: 0.63 MG/DL (ref 0.52–1.04)
DIFFERENTIAL METHOD BLD: ABNORMAL
EOSINOPHIL # BLD AUTO: 0.4 10E9/L (ref 0–0.7)
EOSINOPHIL NFR BLD AUTO: 5.5 %
ERYTHROCYTE [DISTWIDTH] IN BLOOD BY AUTOMATED COUNT: 12.1 % (ref 10–15)
GFR SERPL CREATININE-BSD FRML MDRD: >90 ML/MIN/1.7M2
GLUCOSE SERPL-MCNC: 100 MG/DL (ref 70–99)
HCT VFR BLD AUTO: 38.7 % (ref 35–47)
HGB BLD-MCNC: 13.5 G/DL (ref 11.7–15.7)
IMM GRANULOCYTES # BLD: 0 10E9/L (ref 0–0.4)
IMM GRANULOCYTES NFR BLD: 0.1 %
IRON SERPL-MCNC: 150 UG/DL (ref 35–180)
LYMPHOCYTES # BLD AUTO: 2.4 10E9/L (ref 0.8–5.3)
LYMPHOCYTES NFR BLD AUTO: 35.6 %
MCH RBC QN AUTO: 34.3 PG (ref 26.5–33)
MCHC RBC AUTO-ENTMCNC: 34.9 G/DL (ref 31.5–36.5)
MCV RBC AUTO: 98 FL (ref 78–100)
MONOCYTES # BLD AUTO: 0.6 10E9/L (ref 0–1.3)
MONOCYTES NFR BLD AUTO: 8.6 %
NEUTROPHILS # BLD AUTO: 3.4 10E9/L (ref 1.6–8.3)
NEUTROPHILS NFR BLD AUTO: 49.6 %
NRBC # BLD AUTO: 0 10*3/UL
NRBC BLD AUTO-RTO: 0 /100
PLATELET # BLD AUTO: 303 10E9/L (ref 150–450)
POTASSIUM SERPL-SCNC: 4.2 MMOL/L (ref 3.4–5.3)
PROT SERPL-MCNC: 6.3 G/DL (ref 6.8–8.8)
RBC # BLD AUTO: 3.94 10E12/L (ref 3.8–5.2)
SODIUM SERPL-SCNC: 139 MMOL/L (ref 133–144)
WBC # BLD AUTO: 6.9 10E9/L (ref 4–11)

## 2018-04-02 PROCEDURE — 80053 COMPREHEN METABOLIC PANEL: CPT | Performed by: NURSE PRACTITIONER

## 2018-04-02 PROCEDURE — 12400011

## 2018-04-02 PROCEDURE — 25000132 ZZH RX MED GY IP 250 OP 250 PS 637: Performed by: NURSE PRACTITIONER

## 2018-04-02 PROCEDURE — 36415 COLL VENOUS BLD VENIPUNCTURE: CPT | Performed by: NURSE PRACTITIONER

## 2018-04-02 PROCEDURE — 85025 COMPLETE CBC W/AUTO DIFF WBC: CPT | Performed by: NURSE PRACTITIONER

## 2018-04-02 PROCEDURE — 83540 ASSAY OF IRON: CPT | Performed by: NURSE PRACTITIONER

## 2018-04-02 RX ADMIN — HYDROXYZINE HYDROCHLORIDE 50 MG: 25 TABLET ORAL at 19:24

## 2018-04-02 RX ADMIN — NICOTINE 1 PATCH: 21 PATCH, EXTENDED RELEASE TRANSDERMAL at 08:16

## 2018-04-02 RX ADMIN — HYDROXYZINE HYDROCHLORIDE 50 MG: 25 TABLET ORAL at 09:37

## 2018-04-02 RX ADMIN — HYDROXYZINE HYDROCHLORIDE 50 MG: 25 TABLET ORAL at 14:28

## 2018-04-02 RX ADMIN — HYDROCORTISONE: 1 CREAM TOPICAL at 08:44

## 2018-04-02 RX ADMIN — ARIPIPRAZOLE 15 MG: 15 TABLET ORAL at 17:08

## 2018-04-02 RX ADMIN — HYDROCORTISONE: 1 CREAM TOPICAL at 20:20

## 2018-04-02 ASSESSMENT — ACTIVITIES OF DAILY LIVING (ADL)
LAUNDRY: UNABLE TO COMPLETE
ORAL_HYGIENE: INDEPENDENT
DRESS: INDEPENDENT;SCRUBS (BEHAVIORAL HEALTH)
GROOMING: INDEPENDENT

## 2018-04-02 NOTE — PLAN OF CARE
Problem: Patient Care Overview  Goal: Individualization & Mutuality  Will agree to tx team recommendations for meds and follow up care.  Will attend >75% of groups.      Patient will sleep >6 hours per night.  Patient will be free of delusions by discharge.     Outcome: Improving  Pt was up and active during most of the shift though she went to bed at 2030.  She was social and talkative with peers in the dayroom and in group.  She asked appropriate questions in the nursing group and asked for a hand out on the Abilify that she is taking.  She reported to me that she feels that the medication is working though she does report feeling restless right after taking it. She did not share any delusion thoughts this evening. She does feel depressed about being in the hospital during Easter and no came to visit her.  She did receive PRN Visteril twice during the shift for her anxiety. She declined to use the HS hydrocortisone at bedtime.

## 2018-04-02 NOTE — PLAN OF CARE
"Problem: Patient Care Overview  Goal: Individualization & Mutuality  Will agree to tx team recommendations for meds and follow up care.  Will attend >75% of groups.      Patient will sleep >6 hours per night.  Patient will be free of delusions by discharge.     Patient laying down for first hour of shift, getting up shortly before dinner. Reports feeling better stating \"it's been up and down all day, I keep eating crackers so I feel better right now\". In groups and sitting in day room with peers visiting appropriately for majority of afternoon. Compliant with scheduled medications. Conversation is more clear than previous shifts with this writer, smiling and making eye contact. This writer did not observe patient responding to internal stimuli this shift.   1925- Requested/Received PRN Atarax 50 mg for anxiety rated 6/10. Attended group for a little longer and then in bed to rest by 2000 for remainder of shift.      Problem: Thought Process Alteration (Adult)  Goal: Identify Related Risk Factors and Signs and Symptoms  Related risk factors and signs and symptoms are identified upon initiation of Human Response Clinical Practice Guideline (CPG).   Patient's thoughts will be reality based by discharge   Continue to monitor at this time.   Goal: Improved Thought Process  Patient will sleep >6 hours per night.  Patient will be free of delusions by discharge.     Continue to monitor at this time.       "

## 2018-04-02 NOTE — PLAN OF CARE
Face to face end of shift report received from Marleny PRITCHETT RN. Rounding completed. Patient observed laying in bed with eyes closed, left side, non-labored breathing.      Rosa Gonzales  4/2/2018  3:43 PM

## 2018-04-02 NOTE — PLAN OF CARE
Problem: Patient Care Overview  Goal: Team Discussion  Team Plan:   Outcome: Improving  BEHAVIORAL TEAM DISCUSSION    Participants: Haleigh Kolb NP, Poornima Tucker NP,  Tootie Maria NP, Katelyn Meredith [facility], Mehnaz Crowley [facility], Marbella Campbell RN, Gopi Sue RN, Poornima Reyna OT  Progress: improved hygiene, able to track thoughts  Continued Stay Criteria/Rationale: medication adjustment and monitoring  Medical/Physical: autoimmune issues  Precautions:   Behavioral Orders   Procedures     Code 1 - Restrict to Unit     Routine Programming     As clinically indicated     Status 15     Every 15 minutes.     Plan: will be discharged to IRTS when opening  Rationale for change in precautions or plan:

## 2018-04-02 NOTE — PLAN OF CARE
Problem: Patient Care Overview  Goal: Individualization & Mutuality  Will agree to tx team recommendations for meds and follow up care.  Will attend >75% of groups.      Patient will sleep >6 hours per night.  Patient will be free of delusions by discharge.     Outcome: No Change  Pt. Denies HI, SI, depression, hallucinations and pain. Pt. Reporting increasing anxiety and restlessness. PRN Atarax 50 mg po administered @ 0937 per Pt. Request. Pt. In agreement to update staff to thoughts feelings of wanting to harm self or others. Pt. cooperative with medications and nursing assessment. Pt. Eating WDL. Pt scheduled Hydrocortisone cream applied to rash on back. Pt. Shave per her request. Pt. Out to lounge socializing with peers. Pt. Attending groups. Pt. Offers no c/o issues with bowel and bladder. Pt. Reports she is nauseated and feels more so when standing. Provider sticky noted with Pt. Request for antiemetic. Pt. Administered ginger ale. Pt. Reporting ginger ale helps. Pt. Offered PRN Mylanta for indigestion. Pt. Refusing at this time. Provider called and updated with new orders for ortho B/P. VSS as noted in flow sheet.

## 2018-04-02 NOTE — PLAN OF CARE
Face to face end of shift report received from Willis POP RN. Rounding completed. Patient observed.     Marleny Singh  4/2/2018  08:00 AM

## 2018-04-02 NOTE — PLAN OF CARE
Face to face end of shift report received from Roman PRICE RN. Rounding completed. Patient observed resting in bed.     Willis Romero  4/2/2018  12:01 AM

## 2018-04-03 PROCEDURE — 12400011

## 2018-04-03 PROCEDURE — 25000132 ZZH RX MED GY IP 250 OP 250 PS 637: Performed by: NURSE PRACTITIONER

## 2018-04-03 PROCEDURE — 99232 SBSQ HOSP IP/OBS MODERATE 35: CPT | Performed by: NURSE PRACTITIONER

## 2018-04-03 RX ADMIN — HYDROXYZINE HYDROCHLORIDE 50 MG: 25 TABLET ORAL at 10:04

## 2018-04-03 RX ADMIN — ARIPIPRAZOLE 15 MG: 15 TABLET ORAL at 17:52

## 2018-04-03 RX ADMIN — HYDROXYZINE HYDROCHLORIDE 50 MG: 25 TABLET ORAL at 02:43

## 2018-04-03 RX ADMIN — HYDROCORTISONE: 1 CREAM TOPICAL at 20:04

## 2018-04-03 RX ADMIN — HYDROCORTISONE: 1 CREAM TOPICAL at 09:02

## 2018-04-03 RX ADMIN — HYDROXYZINE HYDROCHLORIDE 50 MG: 25 TABLET ORAL at 14:26

## 2018-04-03 RX ADMIN — NICOTINE 1 PATCH: 21 PATCH, EXTENDED RELEASE TRANSDERMAL at 08:33

## 2018-04-03 RX ADMIN — HYDROXYZINE HYDROCHLORIDE 50 MG: 25 TABLET ORAL at 18:26

## 2018-04-03 ASSESSMENT — ACTIVITIES OF DAILY LIVING (ADL)
GROOMING: INDEPENDENT
ORAL_HYGIENE: INDEPENDENT
LAUNDRY: UNABLE TO COMPLETE
DRESS: INDEPENDENT;SCRUBS (BEHAVIORAL HEALTH)

## 2018-04-03 NOTE — PLAN OF CARE
Problem: Patient Care Overview  Goal: Individualization & Mutuality  Will agree to tx team recommendations for meds and follow up care.  Will attend >75% of groups.    Patient will sleep >6 hours per night.  Patient will be free of delusions by discharge.     Patient changed rooms appropriately in beginning of shift. Friendly and cooperative all shift. Sitting in day room with peers watching television and participating in groups throughout shift. Full range affect, clear speech and making eye contact during conversation. This writer did not observe patient responding to internal stimuli this shift. Compliant with scheduled Abilify this afternoon.   1826- Requested/Received PRN Atarax 50 mg for anxiety.   Patient laying in bed by 2000 again this evening to rest for remainder of shift.     Problem: Thought Process Alteration (Adult)  Goal: Identify Related Risk Factors and Signs and Symptoms  Related risk factors and signs and symptoms are identified upon initiation of Human Response Clinical Practice Guideline (CPG).   Patient's thoughts will be reality based by discharge   Continue to monitor at this time.   Goal: Improved Thought Process  Patient will sleep >6 hours per night.  Patient will be free of delusions by discharge.     Continue to monitor at this time.

## 2018-04-03 NOTE — PLAN OF CARE
Face to face end of shift report received from Marleny PRITCHETT RN. Rounding completed. Patient observed participating in group at this time.     Rosa Gonzales  4/3/2018  3:45 PM

## 2018-04-03 NOTE — PLAN OF CARE
Problem: Thought Process Alteration (Adult)  Goal: Improved Thought Process  Patient will sleep >6 hours per night.  Patient will be free of delusions by discharge.     Pt has been asleep majority of shift. No delusions or hallucinations noted. Pt able to carry a reality based conversation with staff.

## 2018-04-03 NOTE — PROGRESS NOTES
[facility]  Psychiatric Progress Note      Impression:     Improving signfiicantly. She is not disorganized or preoccupied. Since she is voluntary in liey and  The least restrictive is best, we will no longer refer to IRTS. She is very happy to hear that we are planning to have her home instead of the irts program. She is not disorganized. She is no longer talking about pregnancy or any other dleusions. Religous ideation was present on admission and now it is not.       Educated regarding medication indications, risks, benefits, side effects, contraindications and possible interactions. Verbally expressed understanding.        DIagnoses:   Schizophrenia  H/o substance use disorder  Polycystic liver   Hemochromatosis      Attestation:  Patient has been seen and evaluated by me,  Haleigh Kolb NP          Interim History:   The patient's care was discussed with the treatment team and chart notes were reviewed.          Medications:   I have reviewed this patient's current medications  Current Facility-Administered Medications   Medication     hydrocortisone (CORTAID) 1 % cream     ARIPiprazole (ABILIFY) tablet 15 mg     OLANZapine (zyPREXA) tablet 5 mg    Or     OLANZapine (zyPREXA) injection 5 mg     mineral oil-hydrophilic petrolatum (AQUAPHOR)     bisacodyl (DULCOLAX) Suppository 10 mg     sennosides (SENOKOT) tablet 8.6 mg     nicotine Patch in Place     nicotine patch REMOVAL     nicotine (NICODERM CQ) 21 MG/24HR 24 hr patch 1 patch     hydrOXYzine (ATARAX) tablet 25-50 mg     acetaminophen (TYLENOL) tablet 650 mg     alum & mag hydroxide-simethicone (MYLANTA ES/MAALOX  ES) suspension 30 mL     magnesium hydroxide (MILK OF MAGNESIA) suspension 30 mL     nicotine polacrilex (NICORETTE) gum 2-4 mg     albuterol (PROAIR HFA/PROVENTIL HFA/VENTOLIN HFA) Inhaler 1-2 puff             10 point ROS negative        Allergies:     Allergies   Allergen Reactions     Shrimp Anaphylaxis     Risperdal  [Risperidone] Other (See Comments)     Elevated prolactin            Psychiatric Examination:   /74  Pulse 69  Temp 97.1  F (36.2  C) (Tympanic)  Resp 13  Ht 1.524 m (5')  Wt 59.8 kg (131 lb 14.4 oz)  SpO2 96%  BMI 25.76 kg/m2  Weight is 131 lbs 14.4 oz  Body mass index is 25.76 kg/(m^2).    Appearance:  awake, alert, adequately groomed, dressed in hospital scrubs and appeared as age stated  Attitude:  cooperative  Eye Contact:  good  Mood:  better  Affect:  appropriate and in normal range  Speech:  clear, coherent  Psychomotor Behavior:  no evidence of tardive dyskinesia, dystonia, or tics  Thought Process:  logical  Associations:  no loose associations  Thought Content:  no evidence of suicidal ideation or homicidal ideation  Insight:  limited  Judgment:  limited  Oriented to:  time, person, and place  Attention Span and Concentration:  fair  Recent and Remote Memory:  fair  Fund of Knowledge: appropriate  Muscle Strength and Tone: normal  Gait and Station: Normal           Labs:   CBC, CMP, Iron           Plan:     Will likely dc home with arhms and also with php  She will be staying with her father who lives in [place].   Likely dc home once contacting family (father or daughter)  Will need pcp referall though I believe SS has done this in regards to liver nodules

## 2018-04-03 NOTE — PLAN OF CARE
Problem: Patient Care Overview  Goal: Individualization & Mutuality  Will agree to tx team recommendations for meds and follow up care.  Will attend >75% of groups.      Patient will sleep >6 hours per night.  Patient will be free of delusions by discharge.     Pt presented with s/s of sleep in bed upon arrival, up at 0230, snack provided. C/o anxiety et difficulties sleeping, PRN atarax 50 mg given @ 0243. VSS. Alert et oriented, improved mentation status noted, pt able to carry appropriate conversation. No delusional statements. Denies pain or discomfort. Returned back to bed.

## 2018-04-03 NOTE — PLAN OF CARE
Face to face end of shift report received from JULIA Lee. Rounding completed. Patient observed lying on back with eyes closed, audible respirations with ease.     Caro King  4/2/2018  11:49 PM

## 2018-04-03 NOTE — PLAN OF CARE
Problem: Patient Care Overview  Goal: Individualization & Mutuality  Will agree to tx team recommendations for meds and follow up care.  Will attend >75% of groups.      Patient will sleep >6 hours per night.  Patient will be free of delusions by discharge.     Outcome: No Change  Pt. Denies anxiety, depression, HI, SI, hallucinations and pain.  Pt. Out to lounge socializing with peers and staff. Pt. Showered this a.m. attending groups. Pt. In agreement to update staff to thoughts feelings of wanting to harm self or others. Pt. cooperative with medications and nursing assessment. Pt. Eating WDL. PT. Offers no c/o issues with bowel and bladder.

## 2018-04-03 NOTE — PLAN OF CARE
Spoke with daughter and she states that she is worried about mom and asked what happens if mother does not follow the discharge plan and digress. Daughter did feel more comfortable knowing that the county can always bring her back to court for a full commitment. Daughter gave me the number to the [facility] [phone] or [phone]

## 2018-04-03 NOTE — PLAN OF CARE
Face to face end of shift report received from Caro DUEÑAS RN. Rounding completed. Patient observed.     Marleny Singh  4/3/2018  7:38 AM

## 2018-04-03 NOTE — PLAN OF CARE
Problem: Patient Care Overview  Goal: Team Discussion  Team Plan:   BEHAVIORAL TEAM DISCUSSION    Participants: Haleigh Kolb NP, Poornima Tucker NP,  Tootie Maria NP, Sue Morton [facility], Mehnaz SALAZARW, Gopi Sue RN, [facility] [employer], Poornima Reyna OT  Progress: Doing well, going to group and socializing with peers   Continued Stay Criteria/Rationale: monitor medications   Medical/Physical: autoimmune issues   Precautions:   Behavioral Orders   Procedures     Code 1 - Restrict to Unit     Routine Programming     As clinically indicated     Status 15     Every 15 minutes.     Plan: D/C home possibly Thursday with [facility] and PHP   Rationale for change in precautions or plan: none

## 2018-04-04 PROCEDURE — 25000132 ZZH RX MED GY IP 250 OP 250 PS 637: Performed by: NURSE PRACTITIONER

## 2018-04-04 PROCEDURE — 12400011

## 2018-04-04 PROCEDURE — 99239 HOSP IP/OBS DSCHRG MGMT >30: CPT | Performed by: NURSE PRACTITIONER

## 2018-04-04 RX ORDER — ARIPIPRAZOLE 15 MG/1
15 TABLET ORAL AT BEDTIME
Qty: 30 TABLET | Refills: 0 | Status: ON HOLD | OUTPATIENT
Start: 2018-04-05 | End: 2020-08-11

## 2018-04-04 RX ORDER — SENNOSIDES 8.6 MG
2 TABLET ORAL 2 TIMES DAILY PRN
Qty: 120 EACH | Refills: 0 | Status: ON HOLD | OUTPATIENT
Start: 2018-04-04 | End: 2020-08-11

## 2018-04-04 RX ADMIN — ARIPIPRAZOLE 15 MG: 15 TABLET ORAL at 17:35

## 2018-04-04 RX ADMIN — HYDROXYZINE HYDROCHLORIDE 50 MG: 25 TABLET ORAL at 14:09

## 2018-04-04 RX ADMIN — HYDROXYZINE HYDROCHLORIDE 50 MG: 25 TABLET ORAL at 19:08

## 2018-04-04 RX ADMIN — NICOTINE 1 PATCH: 21 PATCH, EXTENDED RELEASE TRANSDERMAL at 08:14

## 2018-04-04 RX ADMIN — HYDROXYZINE HYDROCHLORIDE 50 MG: 25 TABLET ORAL at 05:43

## 2018-04-04 RX ADMIN — HYDROXYZINE HYDROCHLORIDE 50 MG: 25 TABLET ORAL at 09:30

## 2018-04-04 ASSESSMENT — ACTIVITIES OF DAILY LIVING (ADL)
GROOMING: INDEPENDENT
ORAL_HYGIENE: INDEPENDENT
DRESS: INDEPENDENT;SCRUBS (BEHAVIORAL HEALTH)
LAUNDRY: UNABLE TO COMPLETE
ORAL_HYGIENE: INDEPENDENT
GROOMING: INDEPENDENT
DRESS: SCRUBS (BEHAVIORAL HEALTH);INDEPENDENT

## 2018-04-04 NOTE — PLAN OF CARE
"Problem: Patient Care Overview  Goal: Individualization & Mutuality  Will agree to tx team recommendations for meds and follow up care.  Will attend >75% of groups.    Patient will sleep >6 hours per night.  Patient will be free of delusions by discharge.      Pt displays s/s of sleep, lying on left side, even non-labored respirations noted.     0300: Pt awake requesting hydroxyzine; BP 93/70, Snack provided. Pt educated on possible risks, in agreement with plan of care, pt returned back to bed. Denies pain, delusions, SI/HI ideation et/or thoughts.      0634: Pt c/o mild anxiety et difficulties sleeping, \" I just feel anxious et I\"m having a hard time sleeping.\" \"I did sleep a lot better tonight. \" PRN atarax given at 0543; Pt returned back to room. Pt is out in [facility] at present, in good spirits, drinking coffee.     Problem: Thought Process Alteration (Adult)  Goal: Identify Related Risk Factors and Signs and Symptoms  Related risk factors and signs and symptoms are identified upon initiation of Human Response Clinical Practice Guideline (CPG).   Patient's thoughts will be reality based by discharge   Pt continues to demonstrate improved reality based conversations.  Goal: Improved Thought Process  Patient will sleep >6 hours per night.  Patient will be free of delusions by discharge.     Pt denies delusions et/or hallucinations.       "

## 2018-04-04 NOTE — PLAN OF CARE
Face to face end of shift report received from JULIA Islas. Rounding completed. Patient observed in group.     Milly Lanza  4/4/2018  3:45 PM

## 2018-04-04 NOTE — PLAN OF CARE
Face to face end of shift report received from DALTON King, JULIA. Rounding completed. Patient observed, up and about on the unit.     Janel Sue  4/4/2018  7:51 AM

## 2018-04-04 NOTE — DISCHARGE SUMMARY
"Psychiatric Discharge Summary    Glenda Robins MRN# [id number]   Age: 47 year old YOB: 1970     Date of Admission:  3/14/2018  Date of Discharge:  4/5/2018  Admitting Physician:  Luis Alfredo Yang MD  Discharge Physician:  Nohelia Ching NP          Event Leading to Hospitalization and Hospital Stay   Glenda Robins is a 47 year old single  female who brought herself in due to beliefs she as pregnant. She feels she has abdominal enlargement, breast sensitivity, weight gain and \"feels life\". She had a hysterectomy when she was 29. hcg negative and UDS positive for benzodiazapine's and amphetamines which she is prescribed. [facility] goes to [facility] basis and has also been hospitalized at [facility] psychiatric  Unit though I do not have the records from [facility]. I will try to obtain the records. She is not aggressive though is very irritbale. When I ask her if she knows why she is here she state \"i dont know why Im on this airplane\" I asked her If she had said airplane and she then said \"im on a mental health unit\" and smiled. She has a odd smile most of the conversation. She holds her abdomen andtells me she is 7 months pregnant. I told her that she had hystectomy when she was younger and she stated \"miracles happen\". She then told me she needed to speak with \"The Very important person\".she made another odd comment regarding some Anglican content and  I ask her if she thought that she was impregnated by god she shook her head yes then smiled. When I asked her about her psychiatric history she became very gaurded and slighlty irritable. She denied that she had ever been inptient at [facility]. She denied ever having a commitment which I am unsure if she has or not. When I asked to call someone in her facility she said \"no. I need my purse and my cell phone so I can call the very important one. She has not done anything dangerous that I am aware of. She is not currently on a hold and " has not asked to leave or sign a 12 hour intent. At this time I would like to monitor her furhter to see if there are othersymptoms such as mood related, woud like to monitor her sleep and gather collateral if possible.     After starting on abilify, Glenda did appear to stabilize rather well on a 15 mg daily dose. She was tapered off of benzodiazepines and stimulants. She is no longer under the belief that she is pregnant. Due to her ongoing abdominal pain a medical consult was done with no significant findings. Glenda will be discharged to home with additional services. Glenda has follow up appointments scheduled as well. She denies any suicidal or homicidal ideations.           At time of discharge, there is no evidence that patient is in immediate danger of self or others.        DIagnoses:     Schizophrenia  Sedative, Hypnotic, or Anxiolytic Withdrawal  Stimulant Use Disorder,  amphetamines  H/O Alcohol Use Disorder   H/O Depression  H/O ADHD  H/O Sleep Disorder  Polycystic Liver         Labs:     Results for orders placed or performed during the hospital encounter of 03/14/18   CT Abdomen Pelvis w/o Contrast    Narrative    Exam:CT ABDOMEN PELVIS W/O CONTRAST    History:  47 years Female with abdominal pain      Technique: Axial CT imaging of the abdomen and pelvis was performed  without contrast. Coronal and sagittal reconstructions were obtained      Findings:      Lung bases:The lung bases are clear.        Kidneys:No renal or ureteral calculi are present. There is no  hydronephrosis or hydroureter.       Abdomen: Evaluation is limited due to lack of intravenous contrast.  Innumerable small hepatic cysts are present ranging from 3 mm to 3.9  cm in diameter. Density of the liver is high suggesting presence of  excessive iron to deposition.  There is a moderate large volume of stool. There is no evidence of  bowel obstruction or free air. The appendix is unremarkable.         Pelvis:There is no mass or  lymphadenopathy. No abnormal fluid  collections are present.    There is a moderate to large volume of urine within the bladder.                Impression    Impression: There is a moderate to large volume of urine within the  bladder. There is a moderate to large volume of stool in the colon.  There is no evidence of bowel obstruction.    No renal or ureteral calculi are present. There is no hydronephrosis.    Innumerable hepatic cysts. An increased hepatic density suggesting  excessive iron deposition or hemachromatosis.    RUTH MAURER MD   Comprehensive metabolic panel   Result Value Ref Range    Sodium 139 133 - 144 mmol/L    Potassium 3.4 3.4 - 5.3 mmol/L    Chloride 102 94 - 109 mmol/L    Carbon Dioxide 30 20 - 32 mmol/L    Anion Gap 7 3 - 14 mmol/L    Glucose 112 (H) 70 - 99 mg/dL    Urea Nitrogen 19 7 - 30 mg/dL    Creatinine 0.62 0.52 - 1.04 mg/dL    GFR Estimate >90 >60 mL/min/1.7m2    GFR Estimate If Black >90 >60 mL/min/1.7m2    Calcium 9.4 8.5 - 10.1 mg/dL    Bilirubin Total 0.2 0.2 - 1.3 mg/dL    Albumin 3.7 3.4 - 5.0 g/dL    Protein Total 7.4 6.8 - 8.8 g/dL    Alkaline Phosphatase 91 40 - 150 U/L    ALT 20 0 - 50 U/L    AST 16 0 - 45 U/L   Lactic acid   Result Value Ref Range    Lactic Acid 1.0 0.4 - 2.0 mmol/L   HCG qualitative Blood   Result Value Ref Range    HCG Qualitative Serum Negative NEG^Negative   Hepatic panel   Result Value Ref Range    Bilirubin Direct <0.1 0.0 - 0.2 mg/dL    Bilirubin Total 0.2 0.2 - 1.3 mg/dL    Albumin 3.8 3.4 - 5.0 g/dL    Protein Total 7.4 6.8 - 8.8 g/dL    Alkaline Phosphatase 93 40 - 150 U/L    ALT 28 0 - 50 U/L    AST 14 0 - 45 U/L   Iron   Result Value Ref Range    Iron 150 35 - 180 ug/dL   CBC with platelets differential   Result Value Ref Range    WBC 6.9 4.0 - 11.0 10e9/L    RBC Count 3.94 3.8 - 5.2 10e12/L    Hemoglobin 13.5 11.7 - 15.7 g/dL    Hematocrit 38.7 35.0 - 47.0 %    MCV 98 78 - 100 fl    MCH 34.3 (H) 26.5 - 33.0 pg    MCHC 34.9 31.5 - 36.5  g/dL    RDW 12.1 10.0 - 15.0 %    Platelet Count 303 150 - 450 10e9/L    Diff Method Automated Method     % Neutrophils 49.6 %    % Lymphocytes 35.6 %    % Monocytes 8.6 %    % Eosinophils 5.5 %    % Basophils 0.6 %    % Immature Granulocytes 0.1 %    Nucleated RBCs 0 0 /100    Absolute Neutrophil 3.4 1.6 - 8.3 10e9/L    Absolute Lymphocytes 2.4 0.8 - 5.3 10e9/L    Absolute Monocytes 0.6 0.0 - 1.3 10e9/L    Absolute Eosinophils 0.4 0.0 - 0.7 10e9/L    Absolute Basophils 0.0 0.0 - 0.2 10e9/L    Abs Immature Granulocytes 0.0 0 - 0.4 10e9/L    Absolute Nucleated RBC 0.0    Comprehensive metabolic panel   Result Value Ref Range    Sodium 139 133 - 144 mmol/L    Potassium 4.2 3.4 - 5.3 mmol/L    Chloride 106 94 - 109 mmol/L    Carbon Dioxide 27 20 - 32 mmol/L    Anion Gap 6 3 - 14 mmol/L    Glucose 100 (H) 70 - 99 mg/dL    Urea Nitrogen 10 7 - 30 mg/dL    Creatinine 0.63 0.52 - 1.04 mg/dL    GFR Estimate >90 >60 mL/min/1.7m2    GFR Estimate If Black >90 >60 mL/min/1.7m2    Calcium 9.0 8.5 - 10.1 mg/dL    Bilirubin Total 0.1 (L) 0.2 - 1.3 mg/dL    Albumin 3.2 (L) 3.4 - 5.0 g/dL    Protein Total 6.3 (L) 6.8 - 8.8 g/dL    Alkaline Phosphatase 84 40 - 150 U/L    ALT 26 0 - 50 U/L    AST 16 0 - 45 U/L                  Discharge Medications:     Current Discharge Medication List      START taking these medications    Details   ARIPiprazole (ABILIFY) 15 MG tablet Take 1 tablet (15 mg) by mouth At Bedtime  Qty: 30 tablet, Refills: 0    Associated Diagnoses: Other schizophrenia (H)      sennosides (SENOKOT) 8.6 MG tablet Take 2 tablets by mouth 2 times daily as needed for constipation  Qty: 120 each, Refills: 0    Associated Diagnoses: Other schizophrenia (H)         CONTINUE these medications which have NOT CHANGED    Details   albuterol (PROAIR HFA/PROVENTIL HFA/VENTOLIN HFA) 108 (90 BASE) MCG/ACT Inhaler Inhale 1-2 puffs into the lungs every 4 hours as needed for shortness of breath / dyspnea or wheezing         STOP taking  these medications       amphetamine-dextroamphetamine (ADDERALL) 10 MG per tablet Comments:   Reason for Stopping:         amphetamine-dextroamphetamine (ADDERALL) 20 MG per tablet Comments:   Reason for Stopping:         ClonazePAM (KLONOPIN PO) Comments:   Reason for Stopping:         ESTAZOLAM PO Comments:   Reason for Stopping:                 Justification for dual anti-psychotic use: Not applicable       Psychiatric Examination:   Appearance:  awake, alert, adequately groomed and dressed in hospital scrubs  Attitude:  cooperative  Eye Contact:  good  Mood:  better  Affect:  appropriate and in normal range and mood congruent  Speech:  clear, coherent  Psychomotor Behavior:  no evidence of tardive dyskinesia, dystonia, or tics and intact station, gait and muscle tone  Thought Process:  logical, linear and goal oriented  Associations:  no loose associations  Thought Content:  no evidence of suicidal ideation or homicidal ideation, no evidence of psychotic thought, no auditory hallucinations present and no visual hallucinations present  Insight:  good  Judgment:  intact  Oriented to:  time, person, and place  Attention Span and Concentration:  intact  Recent and Remote Memory:  intact  Fund of Knowledge: appropriate  Muscle Strength and Tone: normal  Gait and Station: Normal  Perception: No perceptual disturbances noted       Discharge Plan:       Psychiatry Follow-up:      [facility] Therapy- Brenda Dick- 4/30 @3   [address]  [phone]  Fax [phone]        [facility]- 4/12 @10  *Check into admitting before the first appointment   5th Floor Room 121 [address] [address]  Phone: [phone]. [phone]  Fax: [phone]        [facility]  PCP- 4/5 @11 Yahaira Shell   [address] [place] [address]  Phone - [phone]   Fax - [phone]    Attestation:  The patient has been seen and evaluated by  me,  Nohelia Ching NP         Discharge Services Provided:    60 minutes spent on discharge services, including:  Final examination of patient.  Review and discussion of Hospital stay.  Instructions for continued outpatient care/goals.  Preparation of discharge records.  Preparation of medications refills and new prescriptions.  Preparation of Applicable referral forms.

## 2018-04-04 NOTE — PLAN OF CARE
"Problem: Patient Care Overview  Goal: Individualization & Mutuality  Will agree to tx team recommendations for meds and follow up care.  Will attend >75% of groups.    Patient will sleep >6 hours per night.  Patient will be free of delusions by discharge.      Outcome: No Change  Pt has been up and about on the unit all shift. Attending groups. Social with peers. Affect bright. Pleasant and cooperative. Reports increased anxiety today. Requested and received PRN Hydroxyzine 50 mg PO x 2 this shift. Denied depression and/or SI. Engages in appropriate and reality-based conversation. No delusional statements.     Problem: Thought Process Alteration (Adult)  Goal: Improved Thought Process  Patient will sleep >6 hours per night.  Patient will be free of delusions by discharge.     Outcome: No Change  Pt reported that she slept \"okay\" last night. Charting indicates that pt slept approximately 6 hours last night. No delusional type statements this shift. Does not appear to be responding to internal stimuli.      "

## 2018-04-04 NOTE — PLAN OF CARE
Face to face end of shift report received from JULIA Lee. Rounding completed. Patient observed lying on left side, eyes closed, visible rise et fall of chest, respirations regular with ease.   Caro King  4/4/2018  12:01 AM

## 2018-04-04 NOTE — PROGRESS NOTES
[facility]  Psychiatric Progress Note      Impression:   Glenda is bright and social on the unit. She is participating in programming. She denies any delusional beliefs and is able to have a logical and linear conversation. She is looking forward to discharge tomorrow and verbalized no concerns other than thinking her clothes may be a bit snug as she has gained some weight.        Educated regarding medication indications, risks, benefits, side effects, contraindications and possible interactions. Verbally expressed understanding.        DIagnoses:   Schizophrenia  H/o substance use disorder  Polycystic liver   Hemochromatosis      Attestation:  Patient has been seen and evaluated by me,  Nohelia Ching NP          Interim History:   The patient's care was discussed with the treatment team and chart notes were reviewed.          Medications:   I have reviewed this patient's current medications  Current Facility-Administered Medications   Medication     hydrocortisone (CORTAID) 1 % cream     ARIPiprazole (ABILIFY) tablet 15 mg     OLANZapine (zyPREXA) tablet 5 mg    Or     OLANZapine (zyPREXA) injection 5 mg     mineral oil-hydrophilic petrolatum (AQUAPHOR)     bisacodyl (DULCOLAX) Suppository 10 mg     sennosides (SENOKOT) tablet 8.6 mg     nicotine Patch in Place     nicotine patch REMOVAL     nicotine (NICODERM CQ) 21 MG/24HR 24 hr patch 1 patch     hydrOXYzine (ATARAX) tablet 25-50 mg     acetaminophen (TYLENOL) tablet 650 mg     alum & mag hydroxide-simethicone (MYLANTA ES/MAALOX  ES) suspension 30 mL     magnesium hydroxide (MILK OF MAGNESIA) suspension 30 mL     nicotine polacrilex (NICORETTE) gum 2-4 mg     albuterol (PROAIR HFA/PROVENTIL HFA/VENTOLIN HFA) Inhaler 1-2 puff             10 point ROS negative        Allergies:     Allergies   Allergen Reactions     Shrimp Anaphylaxis     Risperdal [Risperidone] Other (See Comments)     Elevated prolactin            Psychiatric Examination:   BP  115/71  Pulse 103  Temp 97.7  F (36.5  C) (Tympanic)  Resp 16  Ht 1.524 m (5')  Wt 59.8 kg (131 lb 14.4 oz)  SpO2 96%  BMI 25.76 kg/m2  Weight is 131 lbs 14.4 oz  Body mass index is 25.76 kg/(m^2).    Appearance:  awake, alert, adequately groomed, dressed in hospital scrubs and appeared as age stated  Attitude:  cooperative  Eye Contact:  good  Mood:  better  Affect:  appropriate and in normal range  Speech:  clear, coherent  Psychomotor Behavior:  no evidence of tardive dyskinesia, dystonia, or tics  Thought Process:  logical  Associations:  no loose associations  Thought Content:  no evidence of suicidal ideation or homicidal ideation  Insight:  limited  Judgment:  limited  Oriented to:  time, person, and place  Attention Span and Concentration:  fair  Recent and Remote Memory:  fair  Fund of Knowledge: appropriate  Muscle Strength and Tone: normal  Gait and Station: Normal           Labs:   CBC, CMP, Iron           Plan:     Will likely dc home with arhms and also with php  She will be staying with her father who lives in [place].   Likely dc home once contacting family (father or daughter)  Will need pcp referall though I believe SS has done this in regards to liver nodules

## 2018-04-05 VITALS
OXYGEN SATURATION: 97 % | HEIGHT: 60 IN | RESPIRATION RATE: 14 BRPM | TEMPERATURE: 97.4 F | BODY MASS INDEX: 25.9 KG/M2 | DIASTOLIC BLOOD PRESSURE: 72 MMHG | HEART RATE: 79 BPM | SYSTOLIC BLOOD PRESSURE: 108 MMHG | WEIGHT: 131.9 LBS

## 2018-04-05 PROCEDURE — 25000132 ZZH RX MED GY IP 250 OP 250 PS 637: Performed by: NURSE PRACTITIONER

## 2018-04-05 RX ORDER — HYDROXYZINE HYDROCHLORIDE 25 MG/1
25-50 TABLET, FILM COATED ORAL EVERY 4 HOURS PRN
Qty: 120 TABLET | Refills: 0 | Status: ON HOLD | OUTPATIENT
Start: 2018-04-05 | End: 2020-08-11

## 2018-04-05 RX ADMIN — NICOTINE 1 PATCH: 21 PATCH, EXTENDED RELEASE TRANSDERMAL at 08:13

## 2018-04-05 RX ADMIN — HYDROXYZINE HYDROCHLORIDE 50 MG: 25 TABLET ORAL at 05:34

## 2018-04-05 RX ADMIN — HYDROXYZINE HYDROCHLORIDE 50 MG: 25 TABLET ORAL at 01:34

## 2018-04-05 NOTE — DISCHARGE INSTRUCTIONS
Behavioral Discharge Planning and Instructions    Summary: Glenda was admitted to  due to psychosis     Main Diagnosis: Schizophrenia by  History    Major Treatments, Procedures and Findings: Stabilize with medications, connect with community programs.    Symptoms to Report: feeling more aggressive, increased confusion, losing more sleep, mood getting worse or thoughts of suicide    Lifestyle Adjustment: Take all medications as prescribed, meet with doctor/ medication provider, out patient therapist, , and [facility] worker as scheduled. Abstain from alcohol or any unprescribed drugs.    Psychiatry Follow-up:     [facility] [place]  Yobani Castellanos-   [address] [facility] [address] [address]  Phone:  [phone]   Fax - [phone]    [facility] Therapy- Brenda Dick- 4/30 @3   [facility] [address], [place]  [phone]  Fax [phone]      [facility]- 4/12 @10  *Check into admitting before the first appointment   5th Floor Room 546  [address] [address]  Phone: [phone]. [phone]  Fax: [phone]      [facility]  PCP- 4/5 @11 Yahaira Shell   [address], [place] [address]  Phone - [phone]   Fax - [phone]    Resources:   Crisis Intervention: 625.869.2440 or 166-002-3667 (TTY: 618.983.3237).  Call anytime for help.  National [place] on Mental Illness (www.[place].neo.org): 693.556.3704 or 750-949-6801.  Alcoholics Anonymous (www.alcoholics-anonymous.org): Check your phone book for your local chapter.  Suicide Awareness Voices of Education (SAVE) (www.save.org): 009-478-OPRQ (4432)  National Suicide Prevention Line (www.mentalhealthmn.org): 092-314-ROUS (9183)  Mental Health Consumer/Survivor Network of [place] (www.mhcsn.net): 600.804.8081 or 897-105-9602  Mental Health Association of [place] (www.mentalhealth.org): 342.435.5370 or 176-243-0806    General Medication Instructions:   See your  medication sheet(s) for instructions.   Take all medicines as directed.  Make no changes unless your doctor suggests them.   Go to all your doctor visits.  Be sure to have all your required lab tests. This way, your medicines can be refilled on time.  Do not use any drugs not prescribed by your doctor.  Avoid alcohol.    [place] Area:  [facility], Crisis stabilization [facility]- [phone]  [facility] Crisis Line: 1-721.414.5335  Advocates For Family Peace: 883-2605  Sexual Assault Program [facility]: 733.237.4277 or 1-408.403.3016  [place] Battered Women's Program: 1-610.732.7693 Ext: 279       Calls answered Mon-Fri-8:00 am--4:30 pm    [place]:  Advocates for Family Peace: 1-155.843.1532  [facility] first call for help: 2-946-937-7356  [facility]:  (727) 691-6333      [place] Area:  Warm Line: 1-694.756.5997       Calls answered Tuesday--Saturday 4:00 pm--10:00 pm  [place] Crisis Line - 927.942.6504  [place] Crisis Stabilization 514-400-8219    [place] Statewide:  [place] Crisis and Referral Services: 1-728.196.2749  National Suicide Prevention Lifeline: 3-735-239-TALK (8048)   - oqf6nuzg- Text  Life  to 19499  First Call for Help: 2-1-1  [facility] Helpline- 1-804-UBKN-HELP

## 2018-04-05 NOTE — PLAN OF CARE
Problem: Patient Care Overview  Goal: Individualization & Mutuality  Will agree to tx team recommendations for meds and follow up care.  Will attend >75% of groups.    Patient will sleep >6 hours per night.  Patient will be free of delusions by discharge.      Pt presents with s/s of sleep. Offers no complaints of pain, anxiety et/ depression at this time.     0144: Pt c/o mild anxiety d/t anticipated discharge.  PRN Atarax given at 0134; Full range affect, in good spirits et hopeful. Snack provided.     0623: Pt up et down multiple times through out the night inquiring questions re: discharge. Staff provided reassurance et educated.       Problem: Thought Process Alteration (Adult)  Goal: Identify Related Risk Factors and Signs and Symptoms  Related risk factors and signs and symptoms are identified upon initiation of Human Response Clinical Practice Guideline (CPG).   Patient's thoughts will be reality based by discharge   Pt continues reality based conversations with staff et peers.   Goal: Improved Thought Process  Patient will sleep >6 hours per night.  Patient will be free of delusions by discharge.     Pt denies delusions et/or hallucinations.

## 2018-04-05 NOTE — PLAN OF CARE
Face to face end of shift report received from JULIA Atkins. Rounding completed. Patient observed lying in bed on left side, visible rise et fall of chest, respirations regular with ease @ 2345.         Caro King  4/5/2018  12:20 AM

## 2018-04-05 NOTE — PLAN OF CARE
Face to face end of shift report received from Caro DUMONT. Rounding completed. Patient observed.     Aurora Mendez  4/5/2018  7:40 AM

## 2018-04-05 NOTE — PLAN OF CARE
"Problem: Patient Care Overview  Goal: Plan of Care/Patient Progress Review  Pt denies SI, HI, and hallucinations. She has been up on the unit socializing with peers and attending groups. Says she is \"antsie\" to go home tomorrow. PRN atarax 50 mg po at 1908. Denies depression and pain. Pt got upset this evening when her daughter called to talk to her. Pt tells staff \"I don't want anymore calls from my daughter, I can't deal with that.\" Pt is looking forward to discharging tomorrow. Will continue to monitor.     Problem: Thought Process Alteration (Adult)  Goal: Improved Thought Process  Patient will sleep >6 hours per night.  Patient will be free of delusions by discharge.     Pt hasn't made any delusional statements this shift.       "

## 2018-04-05 NOTE — PLAN OF CARE
Problem: Patient Care Overview  Goal: Individualization & Mutuality  Will agree to tx team recommendations for meds and follow up care.  Will attend >75% of groups.    Patient will sleep >6 hours per night.  Patient will be free of delusions by discharge.      Discharge Note    Patient Discharged to home on 4/5/2018 8:59 AM via Private Car accompanied by staff.     Patient informed of discharge instructions in AVS. patient verbalizes understanding and denies having any questions pertaining to AVS. Patient stable at time of discharge. Patient denies SI, HI, and thoughts of self harm at time of discharge. All personal belongings returned to patient. Discharge prescriptions sent to [facility] via electronic communication. Psych evaluation, history and physical, AVS, and discharge summary faxed to next level of care- by .     Aurora Mendez  4/5/2018  9:10 AM

## 2018-04-12 ENCOUNTER — HOSPITAL ENCOUNTER (OUTPATIENT)
Dept: BEHAVIORAL HEALTH | Facility: HOSPITAL | Age: 48
End: 2018-04-12
Attending: PSYCHIATRY & NEUROLOGY
Payer: COMMERCIAL

## 2018-04-12 ASSESSMENT — ANXIETY QUESTIONNAIRES
GAD7 TOTAL SCORE: 3
5. BEING SO RESTLESS THAT IT IS HARD TO SIT STILL: NOT AT ALL
3. WORRYING TOO MUCH ABOUT DIFFERENT THINGS: SEVERAL DAYS
7. FEELING AFRAID AS IF SOMETHING AWFUL MIGHT HAPPEN: NOT AT ALL
IF YOU CHECKED OFF ANY PROBLEMS ON THIS QUESTIONNAIRE, HOW DIFFICULT HAVE THESE PROBLEMS MADE IT FOR YOU TO DO YOUR WORK, TAKE CARE OF THINGS AT HOME, OR GET ALONG WITH OTHER PEOPLE: SOMEWHAT DIFFICULT
1. FEELING NERVOUS, ANXIOUS, OR ON EDGE: SEVERAL DAYS
6. BECOMING EASILY ANNOYED OR IRRITABLE: SEVERAL DAYS
2. NOT BEING ABLE TO STOP OR CONTROL WORRYING: NOT AT ALL

## 2018-04-12 ASSESSMENT — PATIENT HEALTH QUESTIONNAIRE - PHQ9: 5. POOR APPETITE OR OVEREATING: NOT AT ALL

## 2018-04-12 NOTE — PROGRESS NOTES
Patient came to [facility] for scheduled diagnostic assessment.  She presents as clear, on task and focused. She states that she is presently taking care of her dad because he recently had knee surgery and some memory issues possibly dementia. She states she moved him into her home so that she can better care for him. We discussed the PHP schedule and due to her current schedule and location the [facility] program would be difficult for her to attend.  She does have individual therapy and [facility] through [facility] and goes through her PCP for medication. Patient will not attend [facility] at this point.  She is encouraged that if she feels it would benefit her that she call and we will schedule a DA for her.  Patient denies any thoughts of SI and has not experienced auditory or visual hallucinations. She was very cooperative during this visit, we did not complete a DA due to her not planning to attend the program and since she has already had one done by her therapist at [facility].

## 2018-04-13 ASSESSMENT — ANXIETY QUESTIONNAIRES: GAD7 TOTAL SCORE: 3

## 2018-04-13 ASSESSMENT — PATIENT HEALTH QUESTIONNAIRE - PHQ9: SUM OF ALL RESPONSES TO PHQ QUESTIONS 1-9: 1

## 2018-12-27 NOTE — PLAN OF CARE
"Problem: Patient Care Overview  Goal: Individualization & Mutuality  Will agree to tx team recommendations for meds and follow up care.  Will attend >75% of groups.        Outcome: No Change  Pt up and about on the unit most of the shift. Spend much of the time walking in the [place]. Offers little conversation and minimal responses to questions. Denies all criteria when asked. Did not make any delusional type statements to this nurse. Denied pain. Observed holding her abdomen on numerous occasions this shift. When this nurse asked her if she had any results from the PRN [place] she received last evening, pt appeared irritated and mumble \"no\" while walking away.    Problem: Thought Process Alteration (Adult)  Goal: Improved Thought Process  Patient will sleep >6 hours per night.  Patient will be free of delusions by discharge.     Outcome: No Change  Per report, pt slept well last night--charting indicates approximately 6 hours. Pt was observed by staff giggling/laughing to herself.      " Emergency Department Discharge Information for Drew Russell was seen in the [facility] Emergency Department today for tooth pain by Dr. Robert Carlos and Dr. Billie Rincon    We recommend that you follow up with pediatric dentistry today. We called them and they are expecting you to come after you leave the ED.       For fever or pain, Drew can have:  Acetaminophen (Tylenol) every 4 to 6 hours as needed (up to 5 doses in 24 hours). His dose is: 2 regular strength tabs (650 mg)                                     (43.2+ kg/96+ lb)   Or  Ibuprofen (Advil, Motrin) every 6 hours as needed. His dose is:   1 tab of the 400 mg prescription tabs                                                                  (40-60 kg/ lb)    If necessary, it is safe to give both Tylenol and ibuprofen, as long as you are careful not to give Tylenol more than every 4 hours or ibuprofen more than every 6 hours.    Note: If your Tylenol came with a dropper marked with 0.4 and 0.8 ml, call us ([phone]) or check with your doctor about the correct dose.     These doses are based on your child?s weight. If you have a prescription for these medicines, the dose may be a little different. Either dose is safe. If you have questions, ask a doctor or pharmacist.     Please return to the ED or contact his primary physician if he becomes much more ill, if he won't drink, he has severe pain, develops a fever or if you have any other concerns.      Please follow up in the pediatric dentistry clinic today, immediately after leaving the emergency department.     Medication side effect information:  All medicines may cause side effects. However, most people have no side effects or only have minor side effects.     People can be allergic to any medicine. Signs of an allergic reaction include rash, difficulty breathing or swallowing, wheezing, or unexplained swelling. If he has difficulty breathing or  swallowing, call 911 or go right to the Emergency Department. For rash or other concerns, call his doctor.     If you have questions about side effects, please ask our staff. If you have questions about side effects or allergic reactions after you go home, ask your doctor or a pharmacist.     Some possible side effects of the medicines we are recommending for Drew are:     Acetaminophen (Tylenol, for fever or pain)  - Upset stomach or vomiting  - Talk to your doctor if you have liver disease    Ibuprofen  (Motrin, Advil. For fever or pain.)  - Upset stomach or vomiting  - Long term use may cause bleeding in the stomach or intestines. See his doctor if he has black or bloody vomit or stool (poop).

## 2020-08-10 ENCOUNTER — HOSPITAL ENCOUNTER (INPATIENT)
Facility: HOSPITAL | Age: 50
LOS: 39 days | Discharge: PSYCHIATRIC OR SUBSTANCE USE FACILITY | DRG: 885 | End: 2020-09-18
Attending: PHYSICIAN ASSISTANT | Admitting: PSYCHIATRY & NEUROLOGY
Payer: MEDICARE

## 2020-08-10 DIAGNOSIS — F29 PSYCHOSIS (H): ICD-10-CM

## 2020-08-10 DIAGNOSIS — J45.20 MILD INTERMITTENT ASTHMA, UNSPECIFIED WHETHER COMPLICATED: ICD-10-CM

## 2020-08-10 DIAGNOSIS — F25.0 SCHIZOAFFECTIVE DISORDER, BIPOLAR TYPE (H): Primary | ICD-10-CM

## 2020-08-10 DIAGNOSIS — K04.7 DENTAL ABSCESS: ICD-10-CM

## 2020-08-10 PROBLEM — R46.89 DISORGANIZED BEHAVIOR: Status: ACTIVE | Noted: 2020-08-10

## 2020-08-10 LAB
ALBUMIN SERPL-MCNC: 3.2 G/DL (ref 3.4–5)
ALP SERPL-CCNC: 131 U/L (ref 40–150)
ALT SERPL W P-5'-P-CCNC: 57 U/L (ref 0–50)
ANION GAP SERPL CALCULATED.3IONS-SCNC: 9 MMOL/L (ref 3–14)
AST SERPL W P-5'-P-CCNC: 32 U/L (ref 0–45)
BASOPHILS # BLD AUTO: 0 10E9/L (ref 0–0.2)
BASOPHILS NFR BLD AUTO: 0.2 %
BILIRUB SERPL-MCNC: 0.7 MG/DL (ref 0.2–1.3)
BUN SERPL-MCNC: 9 MG/DL (ref 7–30)
CALCIUM SERPL-MCNC: 9.1 MG/DL (ref 8.5–10.1)
CHLORIDE SERPL-SCNC: 95 MMOL/L (ref 94–109)
CO2 SERPL-SCNC: 25 MMOL/L (ref 20–32)
CREAT SERPL-MCNC: 0.54 MG/DL (ref 0.52–1.04)
DIFFERENTIAL METHOD BLD: ABNORMAL
EOSINOPHIL # BLD AUTO: 0 10E9/L (ref 0–0.7)
EOSINOPHIL NFR BLD AUTO: 0.3 %
ERYTHROCYTE [DISTWIDTH] IN BLOOD BY AUTOMATED COUNT: 12.6 % (ref 10–15)
ETHANOL SERPL-MCNC: <0.01 G/DL
GFR SERPL CREATININE-BSD FRML MDRD: >90 ML/MIN/{1.73_M2}
GLUCOSE SERPL-MCNC: 107 MG/DL (ref 70–99)
HCT VFR BLD AUTO: 49.2 % (ref 35–47)
HGB BLD-MCNC: 17.2 G/DL (ref 11.7–15.7)
IMM GRANULOCYTES # BLD: 0.1 10E9/L (ref 0–0.4)
IMM GRANULOCYTES NFR BLD: 0.4 %
LYMPHOCYTES # BLD AUTO: 1.4 10E9/L (ref 0.8–5.3)
LYMPHOCYTES NFR BLD AUTO: 10.4 %
MCH RBC QN AUTO: 33.8 PG (ref 26.5–33)
MCHC RBC AUTO-ENTMCNC: 35 G/DL (ref 31.5–36.5)
MCV RBC AUTO: 97 FL (ref 78–100)
MONOCYTES # BLD AUTO: 0.9 10E9/L (ref 0–1.3)
MONOCYTES NFR BLD AUTO: 6.3 %
NEUTROPHILS # BLD AUTO: 11.3 10E9/L (ref 1.6–8.3)
NEUTROPHILS NFR BLD AUTO: 82.4 %
NRBC # BLD AUTO: 0 10*3/UL
NRBC BLD AUTO-RTO: 0 /100
PLATELET # BLD AUTO: 185 10E9/L (ref 150–450)
POTASSIUM SERPL-SCNC: 4.1 MMOL/L (ref 3.4–5.3)
PROT SERPL-MCNC: 7.3 G/DL (ref 6.8–8.8)
RBC # BLD AUTO: 5.09 10E12/L (ref 3.8–5.2)
SODIUM SERPL-SCNC: 129 MMOL/L (ref 133–144)
WBC # BLD AUTO: 13.7 10E9/L (ref 4–11)

## 2020-08-10 PROCEDURE — 85025 COMPLETE CBC W/AUTO DIFF WBC: CPT | Performed by: PHYSICIAN ASSISTANT

## 2020-08-10 PROCEDURE — 99285 EMERGENCY DEPT VISIT HI MDM: CPT

## 2020-08-10 PROCEDURE — 81001 URINALYSIS AUTO W/SCOPE: CPT | Performed by: PHYSICIAN ASSISTANT

## 2020-08-10 PROCEDURE — 36415 COLL VENOUS BLD VENIPUNCTURE: CPT | Performed by: PHYSICIAN ASSISTANT

## 2020-08-10 PROCEDURE — 80306 DRUG TEST PRSMV INSTRMNT: CPT | Performed by: PHYSICIAN ASSISTANT

## 2020-08-10 PROCEDURE — 80053 COMPREHEN METABOLIC PANEL: CPT | Performed by: PHYSICIAN ASSISTANT

## 2020-08-10 PROCEDURE — 99283 EMERGENCY DEPT VISIT LOW MDM: CPT | Mod: Z6 | Performed by: PHYSICIAN ASSISTANT

## 2020-08-10 PROCEDURE — 80320 DRUG SCREEN QUANTALCOHOLS: CPT | Performed by: PHYSICIAN ASSISTANT

## 2020-08-10 PROCEDURE — 87635 SARS-COV-2 COVID-19 AMP PRB: CPT | Performed by: PHYSICIAN ASSISTANT

## 2020-08-10 PROCEDURE — 12400000 ZZH R&B MH

## 2020-08-10 RX ORDER — ACETAMINOPHEN 325 MG/1
650 TABLET ORAL EVERY 4 HOURS PRN
Status: DISCONTINUED | OUTPATIENT
Start: 2020-08-10 | End: 2020-09-18 | Stop reason: HOSPADM

## 2020-08-10 RX ORDER — HYDROXYZINE HYDROCHLORIDE 10 MG/1
30-50 TABLET, FILM COATED ORAL EVERY 4 HOURS PRN
Status: DISCONTINUED | OUTPATIENT
Start: 2020-08-10 | End: 2020-09-06

## 2020-08-10 RX ORDER — OLANZAPINE 5 MG/1
5-10 TABLET ORAL 3 TIMES DAILY PRN
Status: DISCONTINUED | OUTPATIENT
Start: 2020-08-10 | End: 2020-09-18 | Stop reason: HOSPADM

## 2020-08-10 RX ORDER — LORAZEPAM 1 MG/1
1-2 TABLET ORAL EVERY 4 HOURS PRN
Status: DISCONTINUED | OUTPATIENT
Start: 2020-08-10 | End: 2020-09-05

## 2020-08-10 RX ORDER — OLANZAPINE 10 MG/2ML
10 INJECTION, POWDER, FOR SOLUTION INTRAMUSCULAR 3 TIMES DAILY PRN
Status: DISCONTINUED | OUTPATIENT
Start: 2020-08-10 | End: 2020-09-16

## 2020-08-10 ASSESSMENT — ACTIVITIES OF DAILY LIVING (ADL)
FALL_HISTORY_WITHIN_LAST_SIX_MONTHS: NO
TOILETING: 0-->INDEPENDENT
SWALLOWING: 0-->SWALLOWS FOODS/LIQUIDS WITHOUT DIFFICULTY
RETIRED_EATING: 0-->INDEPENDENT
HYGIENE/GROOMING: INDEPENDENT
COGNITION: 0 - NO COGNITION ISSUES REPORTED
DRESS: 0-->INDEPENDENT
TRANSFERRING: 0-->INDEPENDENT
AMBULATION: 0-->INDEPENDENT
RETIRED_COMMUNICATION: 0-->UNDERSTANDS/COMMUNICATES WITHOUT DIFFICULTY
BATHING: 0-->INDEPENDENT

## 2020-08-10 ASSESSMENT — MIFFLIN-ST. JEOR: SCORE: 1439.16

## 2020-08-10 NOTE — LETTER
[facility]  [address]  [place] [address]  Phone: [phone]  Fax: [phone]    09/11/20    Glenda Robins  [address] [facility] [address]      [facility]       Ms Robins was started on abilify and has been compliant though states she feels her anxiety, debbie, and sleep have been controlled better in the past while on Seroquel. I am requesting on the patients behalf to have seroquel up to 800 mg added to her pop. I do feel her sleep would have more improvement with this medication. Thank you for your consideration.      Sincerely,      MADI Knight, CNP,

## 2020-08-10 NOTE — ED TRIAGE NOTES
"Pt here with daughter for mental health evaluation. Daughter is concerned with her mother's behavior. 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   "

## 2020-08-10 NOTE — ED PROVIDER NOTES
History     Chief Complaint   Patient presents with     Psychiatric Evaluation     The history is provided by the patient and a relative.     Glenda Robins is a 50 year old female who presented to the emergency department along with daughter for evaluation of possible psychosis.  The daughter tells me that the patient carries a history of schizophrenia with a recent hospitalization in 2018.  She was admitted to our facility on March 14 of 2018 and discharged on April 5.  She is supposed to take Abilify.  Daughter reports that she has episodes of virtually 4 months at a time.  Returns disheveled and delusional.  On her presentation here the patient had internal stimuli, delusional thoughts, disheveled appearance.  She denied any thoughts of harming herself or others.  Daughter however endorse the fact that her mother talked about killing the patient's father and other man on the way to the emergency room.    Allergies:  Allergies   Allergen Reactions     Shrimp Anaphylaxis     Risperdal [Risperidone] Other (See Comments)     Elevated prolactin       Problem List:    Patient Active Problem List    Diagnosis Date Noted     Schizophrenia (H) 03/21/2018     Priority: Medium     Psychosis (H) 03/14/2018     Priority: Medium        Past Medical History:    No past medical history on file.    Past Surgical History:    No past surgical history on file.    Family History:    No family history on file.    Social History:  Marital Status:  Single [1]  Social History     Tobacco Use     Smoking status: Not on file   Substance Use Topics     Alcohol use: Not on file     Drug use: Not on file        Medications:    albuterol (PROAIR HFA/PROVENTIL HFA/VENTOLIN HFA) 108 (90 BASE) MCG/ACT Inhaler  ARIPiprazole (ABILIFY) 15 MG tablet  hydrOXYzine (ATARAX) 25 MG tablet  sennosides (SENOKOT) 8.6 MG tablet          Review of Systems   Unable to perform ROS: Psychiatric disorder       Physical Exam   BP: 141/92  Pulse: 91  Temp: 98.8   F (37.1  C)  Resp: 20  SpO2: 92 %      Physical Exam  Vitals signs and nursing note reviewed.   Constitutional:       Appearance: Normal appearance. She is obese.      Comments: Unkept and disheveled.  Smells of campfire and body odor.   Cardiovascular:      Rate and Rhythm: Normal rate and regular rhythm.   Pulmonary:      Effort: Pulmonary effort is normal.   Skin:     General: Skin is warm and dry.      Capillary Refill: Capillary refill takes less than 2 seconds.   Neurological:      General: No focal deficit present.      Mental Status: She is alert.   Psychiatric:      Comments: Normal eye contact.  She does have obvious internal stimuli with delusional thought process.         ED Course        Procedures               Critical Care time:  none               Results for orders placed or performed during the hospital encounter of 08/10/20 (from the past 24 hour(s))   CBC with platelets differential   Result Value Ref Range    WBC 13.7 (H) 4.0 - 11.0 10e9/L    RBC Count 5.09 3.8 - 5.2 10e12/L    Hemoglobin 17.2 (H) 11.7 - 15.7 g/dL    Hematocrit 49.2 (H) 35.0 - 47.0 %    MCV 97 78 - 100 fl    MCH 33.8 (H) 26.5 - 33.0 pg    MCHC 35.0 31.5 - 36.5 g/dL    RDW 12.6 10.0 - 15.0 %    Platelet Count 185 150 - 450 10e9/L    Diff Method Automated Method     % Neutrophils 82.4 %    % Lymphocytes 10.4 %    % Monocytes 6.3 %    % Eosinophils 0.3 %    % Basophils 0.2 %    % Immature Granulocytes 0.4 %    Nucleated RBCs 0 0 /100    Absolute Neutrophil 11.3 (H) 1.6 - 8.3 10e9/L    Absolute Lymphocytes 1.4 0.8 - 5.3 10e9/L    Absolute Monocytes 0.9 0.0 - 1.3 10e9/L    Absolute Eosinophils 0.0 0.0 - 0.7 10e9/L    Absolute Basophils 0.0 0.0 - 0.2 10e9/L    Abs Immature Granulocytes 0.1 0 - 0.4 10e9/L    Absolute Nucleated RBC 0.0    Comprehensive metabolic panel   Result Value Ref Range    Sodium 129 (L) 133 - 144 mmol/L    Potassium 4.1 3.4 - 5.3 mmol/L    Chloride 95 94 - 109 mmol/L    Carbon Dioxide 25 20 - 32 mmol/L    Anion  Gap 9 3 - 14 mmol/L    Glucose 107 (H) 70 - 99 mg/dL    Urea Nitrogen 9 7 - 30 mg/dL    Creatinine 0.54 0.52 - 1.04 mg/dL    GFR Estimate >90 >60 mL/min/[1.73_m2]    GFR Estimate If Black >90 >60 mL/min/[1.73_m2]    Calcium 9.1 8.5 - 10.1 mg/dL    Bilirubin Total 0.7 0.2 - 1.3 mg/dL    Albumin 3.2 (L) 3.4 - 5.0 g/dL    Protein Total 7.3 6.8 - 8.8 g/dL    Alkaline Phosphatase 131 40 - 150 U/L    ALT 57 (H) 0 - 50 U/L    AST 32 0 - 45 U/L   Alcohol ethyl   Result Value Ref Range    Ethanol g/dL <0.01 0.01 g/dL       Medications - No data to display    Assessments & Plan (with Medical Decision Making)   Appreciate DEC assessment as well.  They also believe the patient needs acute psychiatric hold and treatment.  I totally agree.  I believe that she is a risk to herself or others and is suffering from acute psychosis.  72-hour hold was placed.  Laboratory evaluation as above. Graciously accepted by Adry EDMONDSON.       This document was prepared using a combination of typing and voice generated software.  While every attempt was made for accuracy, spelling and grammatical errors may exist.    I have reviewed the nursing notes.    I have reviewed the findings, diagnosis, plan and need for follow up with the patient.       New Prescriptions    No medications on file       Final diagnoses:   Psychosis (H)       8/10/2020   [facility]     Lisa Ann PA-C  08/10/20 1902       Lisa Ann PA-C  08/10/20 1958

## 2020-08-10 NOTE — ED NOTES
"Pt's daughter here with mother, states she is concerned with her mother's mental health. Patient states she does not feel that she needs to be evaluated. Daughter states that mother has had episodes similar to this every couple of years. Episodes include patient going \"missing\" and avoiding her family for weeks to even months at a time and then patient only contacting her family when she gets into legal issues. For example daughter states that patient was pulled over in [place] last night and has a revoked license so she contacted her daughter to pick her up. Pt denies any drug or alcohol use. Daughter states this is untrue and states her mother abuses alcohol and other various drugs, states \"she just popped some type of pill out in the lobby, I don't know what it was.\" Daughter states that during these episodes, patients's mental state \"gets bad, she gets mean and vulgar, and says mean and rude things.\" Pt states that when she is saying these \"things,\" she is trying to \"help heal the earth, helping to calm the storms and tsunami's.\" Patient states that she is a psychic medium and \"spirits come into her and speak through her.\" States she can hear these voices speaking to her, but denies hearing or seeing things that are not there. Pt denies wanting to harm herself or anyone else. Denies feeling down or depressed. Daughter remains at bedside.   "

## 2020-08-11 LAB
ALBUMIN UR-MCNC: 10 MG/DL
AMPHETAMINES UR QL: NOT DETECTED NG/ML
APPEARANCE UR: ABNORMAL
BACTERIA #/AREA URNS HPF: ABNORMAL /HPF
BARBITURATES UR QL SCN: NOT DETECTED NG/ML
BENZODIAZ UR QL SCN: NOT DETECTED NG/ML
BILIRUB UR QL STRIP: NEGATIVE
BUPRENORPHINE UR QL: NOT DETECTED NG/ML
CANNABINOIDS UR QL: NOT DETECTED NG/ML
COCAINE UR QL SCN: NOT DETECTED NG/ML
COLOR UR AUTO: YELLOW
D-METHAMPHET UR QL: NOT DETECTED NG/ML
GLUCOSE UR STRIP-MCNC: NEGATIVE MG/DL
HGB UR QL STRIP: NEGATIVE
KETONES UR STRIP-MCNC: NEGATIVE MG/DL
LABORATORY COMMENT REPORT: NORMAL
LEUKOCYTE ESTERASE UR QL STRIP: ABNORMAL
METHADONE UR QL SCN: NOT DETECTED NG/ML
MUCOUS THREADS #/AREA URNS LPF: PRESENT /LPF
NITRATE UR QL: NEGATIVE
OPIATES UR QL SCN: NOT DETECTED NG/ML
OXYCODONE UR QL SCN: NOT DETECTED NG/ML
PCP UR QL SCN: NOT DETECTED NG/ML
PH UR STRIP: 6 PH (ref 4.7–8)
PROPOXYPH UR QL: NOT DETECTED NG/ML
RBC #/AREA URNS AUTO: 4 /HPF (ref 0–2)
SARS-COV-2 RNA SPEC QL NAA+PROBE: NEGATIVE
SARS-COV-2 RNA SPEC QL NAA+PROBE: NORMAL
SOURCE: ABNORMAL
SP GR UR STRIP: 1.02 (ref 1–1.03)
SPECIMEN SOURCE: NORMAL
SPECIMEN SOURCE: NORMAL
SQUAMOUS #/AREA URNS AUTO: 19 /HPF (ref 0–1)
TRICYCLICS UR QL SCN: NOT DETECTED NG/ML
UROBILINOGEN UR STRIP-MCNC: NORMAL MG/DL (ref 0–2)
WBC #/AREA URNS AUTO: 18 /HPF (ref 0–5)

## 2020-08-11 PROCEDURE — 12400000 ZZH R&B MH

## 2020-08-11 PROCEDURE — 99221 1ST HOSP IP/OBS SF/LOW 40: CPT | Performed by: NURSE PRACTITIONER

## 2020-08-11 PROCEDURE — 99223 1ST HOSP IP/OBS HIGH 75: CPT | Mod: AI | Performed by: NURSE PRACTITIONER

## 2020-08-11 RX ORDER — MULTIPLE VITAMINS W/ MINERALS TAB 9MG-400MCG
1 TAB ORAL DAILY
Status: DISCONTINUED | OUTPATIENT
Start: 2020-08-11 | End: 2020-08-24

## 2020-08-11 RX ORDER — LACTOBACILLUS RHAMNOSUS GG 10B CELL
1 CAPSULE ORAL 2 TIMES DAILY
Status: DISPENSED | OUTPATIENT
Start: 2020-08-11 | End: 2020-08-16

## 2020-08-11 RX ORDER — NITROFURANTOIN 25; 75 MG/1; MG/1
100 CAPSULE ORAL EVERY 12 HOURS SCHEDULED
Status: DISPENSED | OUTPATIENT
Start: 2020-08-11 | End: 2020-08-16

## 2020-08-11 RX ORDER — ALBUTEROL SULFATE 90 UG/1
1-2 AEROSOL, METERED RESPIRATORY (INHALATION) EVERY 4 HOURS PRN
Status: DISCONTINUED | OUTPATIENT
Start: 2020-08-11 | End: 2020-09-18 | Stop reason: HOSPADM

## 2020-08-11 RX ORDER — CLONIDINE HYDROCHLORIDE 0.1 MG/1
0.1 TABLET ORAL 2 TIMES DAILY PRN
Status: DISCONTINUED | OUTPATIENT
Start: 2020-08-11 | End: 2020-09-05

## 2020-08-11 RX ORDER — QUETIAPINE FUMARATE 25 MG/1
25-50 TABLET, FILM COATED ORAL EVERY 6 HOURS PRN
Status: DISCONTINUED | OUTPATIENT
Start: 2020-08-11 | End: 2020-09-08

## 2020-08-11 RX ORDER — CLONIDINE HYDROCHLORIDE 0.1 MG/1
0.1 TABLET ORAL 2 TIMES DAILY
Status: DISCONTINUED | OUTPATIENT
Start: 2020-08-11 | End: 2020-08-11

## 2020-08-11 RX ORDER — FOLIC ACID 1 MG/1
1 TABLET ORAL DAILY
Status: DISCONTINUED | OUTPATIENT
Start: 2020-08-11 | End: 2020-08-21

## 2020-08-11 RX ORDER — LACTOBACILLUS RHAMNOSUS GG 10B CELL
1 CAPSULE ORAL 2 TIMES DAILY
Status: DISCONTINUED | OUTPATIENT
Start: 2020-08-11 | End: 2020-08-11

## 2020-08-11 RX ORDER — DIAZEPAM 5 MG
10 TABLET ORAL EVERY 30 MIN PRN
Status: DISCONTINUED | OUTPATIENT
Start: 2020-08-11 | End: 2020-08-14 | Stop reason: CLARIF

## 2020-08-11 ASSESSMENT — ACTIVITIES OF DAILY LIVING (ADL)
HYGIENE/GROOMING: INDEPENDENT
DRESS: SCRUBS (BEHAVIORAL HEALTH);INDEPENDENT
ORAL_HYGIENE: INDEPENDENT
LAUNDRY: UNABLE TO COMPLETE

## 2020-08-11 NOTE — PLAN OF CARE
"ADMISSION NOTE    Reason for admission-altered thought process  Safety concerns no.  Risk for or history of violence no.   Full skin assessment: Yes- WDL-intact.    Patient arrived on unit from [facility] accompanied by security staff on 8/10/2020  1932 PM.   Status on arrival: A&O, gait steady. Flat affect. Cooperative with contraband check and changes into unit scrubs. Poor eye contact.   Denies all criteria including: SI, SIB, HI or hallucinations. 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 States she can \"turn it off\" and is doing that now. States if it seems she is speaking to someone it is not a hallucination but a spirit. Has not taken prescription medications for 4-5 years per pt. Talks about driving in [place] and not being able to see in the fog and [place]. Pt then states \"I know what's going on here and I am going to sleep now.\"   Paranoid-guarded with answers. States \"I will just keep it to a minimum until I can go.\"  Pt states she has been drinking daily when asked how many drinks pt states \"well [id number] depends.\"    /89   Pulse 113   Temp 98.6  F (37  C) (Tympanic)   Resp 20   Ht 1.524 m (5')   Wt 90.3 kg (199 lb)   SpO2 96%   BMI 38.86 kg/m    Patient given tour of unit and Welcome to  unit papers given to patient, wanding completed, belongings inventoried, and admission assessment completed.   Patient's legal status on arrival is 72 hour hold. Appropriate legal rights discussed with and copy given to patient. Patient Bill of Rights discussed with and copy given to patient.   Patient denies SI, HI, and thoughts of self harm and contracts for safety while on unit.      Ashley Colorado RN  8/10/2020  10:11 PM    Problem: Adult Behavioral Health Plan of Care  Goal: Patient-Specific Goal (Individualization)  Description:   Will have reality based conversations by discharge.   Cooperate with treatment " team recommendations including medications.   Will sleep 6-8 hrs nightly.  Attend at least 50% groups.     Outcome: No Change  Note:      VSS, denies pain. Denies all criteria including: SI, SIB, HI or hallucinations.  Sleeps rest of shift. Hat for urine specimen placed. Declines signing SHELL or any other paperwork tonight.   Spends evening in room sleeping.     Problem: Thought Process Alteration  Goal: Optimal Thought Clarity  Outcome: No Change     Face to face end of shift report communicated to oncoming shift.     Ashley Colorado RN  8/10/2020  10:47 PM

## 2020-08-11 NOTE — PLAN OF CARE
Social Service Psychosocial Assessment  Presenting Problem:   Per DEC assessment, pt came in with her daughter for possible psychosis. Pt presented with delusional thoughts and was responding to internal stimuli in the ED. 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  Pt declined having the assessment completed and kicked me out of her room indefinitely at this time. Information has been gathered from past notes including DEC assessment, ED notes, NP notes, nursing notes, and prior social service psychosocial assessment from 2018.  Marital Status:    Spouse / Children:  Has two daughters  Psychiatric TX HX:  Pt has a history of Schizophrenia per current dec assessment. Pt's past admission notes state a history of ADHD and depression. Pt was here on the behavorial heath inpatient unit on 3/14/2018-4/5/2018 for delusion thinking. Pt thought she was pregnant with twins, even though she has had a hysterectomy. Pt was filed on in 2018, although was not committed and was voluntary in lieu.  Suicide Risk Assessment:  Pt was not suicidal in the ED during this admission. Pt does not have a history of SI or SA. Unknown if pt is feeling suicidal today, due to unable to complete assessment.  Access to Lethal Means (explain):  Pt declined assessment so unknown  Family Psych HX:  Dad has dementia. Other family psych history is unknown.  A & Ox:  x3  Medication Adherence:  See H&P  Medical Issues:  See H&P  Visual -Motor Functioning:  Good  Communication Skills /Needs: Good  Ethnicity:   White     Spirituality/Jehovah's witness Affiliation:  Alevism    Clergy Request:   No   History:  None   Living Situation:  Living in ex[place]s cabin. Ex would like for pt to leave residence.  ADL s:  Independent  Education: Graduate HS. Went to school for Cosmetology.  Financial Situation: Unknown.  Occupation: Unemployed  Leisure & Recreation: Pt enjoys prayer groups.  Childhood  History:  Pt grew up in [place], [place]. Pt lived with mom, dad, and older brother.   Trauma Abuse HX:  Unknown  Relationship / Sexuality:  Unknown. Pt was dating boyfriend of 13 years in 2018. Unknown if this relationship is current.  Substance Use/ Abuse: Pt has a history of alcohol abuse  Chemical Dependency Treatment HX:  Treatment in 2017 at [facility].  Legal Issues:  Unknown  Significant Life Events: Mother's death.  Strengths: Supportive daughter, has utilized MH services in the past  Challenges /Limitation:  History of MH and lacks insight into her symptoms.   Patient Support Contact (Include name, relationship, number, and summary of conversation): Pt did sign any SHELL's at this time.  Interventions:      Community-Based Programs- would benefit     Medical/Dental Care- PCP-Yanci Shell (last seen in 2018)    CD Evaluation/Rule 25/Aftercare- unknown    Medication Management- unknown. Notes show pt saw PCP Yanci Farah in 2018 for medication management.     Individual Therapy- unknown    Housing/Placement- needs a new place to reside    Insurance Coverage- Medicare and Medicaid [place]    Financial Assistance- unknown    Suicide Risk Assessment- Pt was not suicidal in the ED during this admission. Pt does not have a history of SI or SA. Unknown if pt is feeling suicidal today, due to unable to complete assessment.    High Risk Safety Plan- Talk to supports; Call crisis lines; Go to local ER if feeling suicidal.  RY Piña, LGSW  8/11/2020  8:45 AM

## 2020-08-11 NOTE — H&P
"[facility]    History and Physical  Medical Services       Date of Admission:  8/10/2020  Date of Service (when I saw the patient): 08/11/20    Assessment & Plan     Principal Problem:    Disorganized behavior- pt sitting in lounge alone, appears to be talking to someone. When I introduced myself pt states \"I don't wanna talk to you, Alice the hospital  is letting me go.\" When asked if we could step in her room pt states \"no, I am not going anywhere with you\". Asked if we could talk there and if I could listen to her heart and lungs pt stated \"No I am not talking to you and No you can't listen to anything\". Pt then turned and returned to her conversation stating \"yes, I don't know. I am getting rid of everyone, I am not sure how they got in here\"       Active Medical Problem:  Asthma- pt has albuterol on med list indications listed is for asthma. Due to pt mental status unable to confirm. Checked care everywhere and can't find an asthma diagnosis. Ordered prn albuterol for pt safety. Pt refused ROS and Physical exam. Pt talking in complete sentences, no distress, symmetric rise and fall of chest, no audible wheeze.     UTI- UA collected this morning revealed bacteria, moderate leukocyte esterase. Ordered nitrofurantoin and probiotic. Not sure if pt is symptomatic due to mental status.      covid- pending      Code Status: Full Code    Ana Wiggins CNP    Primary Care Physician   Physician No Ref-Primary    Chief Complaint   Psych evaluation     Unable to obtain a history from the patient due to mental status, history obtained through medical chart     History of Present Illness   (per ED) Glenda Robins is a 50 year old female who presented to the emergency department along with daughter for evaluation of possible psychosis.  The daughter tells me that the patient carries a history of schizophrenia with a recent hospitalization in 2018.  She was admitted to our facility on March 14 of " 2018 and discharged on April 5.  She is supposed to take Abilify.  Daughter reports that she has episodes of virtually 4 months at a time.  Returns disheveled and delusional.  On her presentation here the patient had internal stimuli, delusional thoughts, disheveled appearance.  She denied any thoughts of harming herself or others.  Daughter however endorse the fact that her mother talked about killing the patient's father and other man on the way to the emergency room.    Past Medical History    I have reviewed this patient's medical history and updated it with pertinent information if needed.   No past medical history on file.    Past Surgical History   I have reviewed this patient's surgical history and updated it with pertinent information if needed.  No past surgical history on file.    Prior to Admission Medications   Prior to Admission Medications   Prescriptions Last Dose Informant Patient Reported? Taking?   ARIPiprazole (ABILIFY) 15 MG tablet More than a month at Unknown time  No No   Sig: Take 1 tablet (15 mg) by mouth At Bedtime   albuterol (PROAIR HFA/PROVENTIL HFA/VENTOLIN HFA) 108 (90 BASE) MCG/ACT Inhaler More than a month at Unknown time  Yes No   Sig: Inhale 1-2 puffs into the lungs every 4 hours as needed for shortness of breath / dyspnea or wheezing   hydrOXYzine (ATARAX) 25 MG tablet More than a month at Unknown time  No No   Sig: Take 1-2 tablets (25-50 mg) by mouth every 4 hours as needed for anxiety   sennosides (SENOKOT) 8.6 MG tablet More than a month at Unknown time  No No   Sig: Take 2 tablets by mouth 2 times daily as needed for constipation      Facility-Administered Medications: None     Allergies   Allergies   Allergen Reactions     Shrimp Anaphylaxis     Risperdal [Risperidone] Other (See Comments)     Elevated prolactin       Social History   I have reviewed this patient's social history and updated it with pertinent information if needed. Glenda Robins      Family History   I  have reviewed this patient's family history and updated it with pertinent information if needed.   No family history on file.    Review of Systems   Review of systems not obtainable due to patient factors - abnormal mental status    Physical Exam   Temp: 97.8  F (36.6  C) Temp src: Tympanic BP: 129/77 Pulse: 100   Resp: 16 SpO2: 97 % O2 Device: None (Room air)    Vital Signs with Ranges  Temp:  [97.8  F (36.6  C)-98.8  F (37.1  C)] 97.8  F (36.6  C)  Pulse:  [] 100  Resp:  [16-20] 16  BP: (129-142)/(77-92) 129/77  SpO2:  [92 %-97 %] 97 %  199 lbs 0 oz    Constitutional: awake, alert, uncooperative, no apparent distress, vitals stable  Eyes: Lids and lashes normal, pupils equal, round and reactive to light, extra ocular muscles intact, sclera clear, conjunctiva normal  ENT: refused  Hematologic / Lymphatic: refused   Respiratory: symmetric rise and fall of chest, no distress, speaks in complete sentences, no audible wheeze, refused auscultation   Cardiovascular: refused   GI: refused   Genitounirinary: deferred  Skin: no visible rash, lesions, bruising, no cyanosis   Musculoskeletal: refused  Neurologic: Mental Status Exam:  Level of Alertness:   Awake, confused,   Neuropsychiatric: General: restless, fidgeting and poor eye contact  Level of consciousness: alert / normal  Orientation: unable to assess, nonsensical talking     Data   Data reviewed today:   Recent Labs   Lab 08/10/20  1742   WBC 13.7*   HGB 17.2*   MCV 97      *   POTASSIUM 4.1   CHLORIDE 95   CO2 25   BUN 9   CR 0.54   ANIONGAP 9   BEST 9.1   *   ALBUMIN 3.2*   PROTTOTAL 7.3   BILITOTAL 0.7   ALKPHOS 131   ALT 57*   AST 32       No results found for this or any previous visit (from the past 24 hour(s)).

## 2020-08-11 NOTE — PLAN OF CARE
"Face to face report received from uArora HOFFMAN RN. Pt. Observed.     Problem: Adult Behavioral Health Plan of Care  Goal: Patient-Specific Goal (Individualization)  Description: Will have reality based conversations by discharge.   Cooperate with treatment team recommendations including medications.   Will sleep 6-8 hrs nightly.  Attend at least 50% groups.     8/11/2020 1334 by Marleny Singh RN  Outcome: No Change  Note:   Pt. Denies all criteria. Pt. Denies HI, SI, anxiety, depression, hallucinations and pain. Pt. Appears responding to internal stimuli. Pt. Appears to be having a conversation but is siting by herself in [facility]. Pt. Will continue conversation with person I am unwilling to see during this writers assessment. When pt. Is updated provider will be starting antibiotics for a UTI pt. Raises voice and reports \"I will not take any medications here. I will take them when I leave here.\" Pt. Offered scheduled medications x 2 with pt. Refusing at this time. Pt. Appears to be napping x 2 hours this a.m. shift. Pt. Refusing unit programing and participation at this time.     This writer called [facility] for PTA list. Per [facility] Pharmacist, pt. Has not filled there in over 1.5 years. No medications were on file.     This writer called [facility] pharmacy in [place] for PTA list. Per Oly Lobato Pharmacist, pt. Has not filled there since 2017.     Face to face end of shift report to be communicated to oncoming RN.     Marleny Singh RN  8/11/2020  "

## 2020-08-11 NOTE — H&P
"Psychiatric Eval/H&P  Patient Name: Glenda Robins   YOB: 1970  Age: 50 year old  [id number]    Primary Physician: No Ref-Primary, Physician   Completed By: MADI Albarado CNP     CC:  Delusion/Hallucination    (per ED) Glenda Robins is a 50 year old female who presented to the emergency department along with daughter for evaluation of possible psychosis.  The daughter tells me that the patient carries a history of schizophrenia with a recent hospitalization in 2018.  She was admitted to our facility on March 14 of 2018 and discharged on April 5.  She is supposed to take Abilify.  Daughter reports that she has episodes of virtually 4 months at a time.  Returns disheveled and delusional.  On her presentation here the patient had internal stimuli, delusional thoughts, disheveled appearance.  She denied any thoughts of harming herself or others.  Daughter however endorse the fact that her mother talked about killing the patient's father and other man on the way to the emergency room.     HPI  Patient is lying in bed.  She states that she is able to go home now, remind her she was placed on 72 hour hold.  She then states \"oh no, your orders have changed now, go back and look at your computer screen\".   Patient denies any mood changes, denies suicidal or homicidal thoughts and denies hearing voices - she states \"I am refusing all medications\". Attempt to ask more questions, she then states \"I know who you are\" and I ask if we have met before, she again states \"I know who you are\" and then tells me she is currently on a teleconference with people downstairs and proceeds to conduct herself as if she is in a phone conversation - with her head facing opposite me, she states \"yes, she is back so I need security to come up right away and remove this woman from my room ... yes ...no .. I'm not sure how she got back here just send security please, thank you\".  And then she turns back to me and said " "\"you just need to go look at your computer screen now\".  She dismisses me.    Sparse information taken from last admission:    Past Psychiatric History:   Last known inpatient stay was here at [facility] from 3/14/18 - 4/4/18 - at that time they filed petition for commitment, and patient granted Voluntary in-lieu and started on Abilify, discharged to home.  Past inpatient at [facility] prior to that and diagnoses with schizophrenia.     Social History:   Patient reports she has a house in [place] and a friend named Annelise - unclear if this is true.  Historic record indicates she is a high school graduate, degree in cosmetology and has 2 children.     Chemical Use History:  Denies use of alcohol or illicit drugs, although noted she talked about drinking everyday on admission.       Family Psychiatric History:   Unknown       MSE/PSYCH  PSYCHIATRIC EXAM  /89   Pulse 113   Temp 98.6  F (37  C) (Tympanic)   Resp 20   Ht 1.524 m (5')   Wt 90.3 kg (199 lb)   SpO2 96%   BMI 38.86 kg/m       -Appearance/Behavior: Disheveled  {attitude:guarded and dismissive   -Motor: normal or unremarkable.  -Gait: Normal.    -Abnormal involuntary movements: None noted  -Mood: paranoid and labile.  -Affect: Blunted/Flat.  -Speech: Normal - delusional                 -Thought process/associations: Tangential and Loose associations.  -Thought content: paranoid delusions and bizarre delusions, tangential.  -Perceptual disturbances: Frequent hallucinations..              -Suicidal/Homicidal Ideation: Denies  -Judgment: Limited.  -Insight: Limited.  *Orientation: place.  *Memory: Not a good historian  *Attention: Poor  *Language: fluent, no aphasias, able to repeat phrases and name objects. Vocab intact.  *Fund of information: not assessed.  *Cognitive functioning estimate: 2 - very impaired - due to psychosis          Medical Review of Systems:   Medical History and ROS  Prior to Admission medications    Medication Sig Start Date End Date " Taking? Authorizing Provider   albuterol (PROAIR HFA/PROVENTIL HFA/VENTOLIN HFA) 108 (90 BASE) MCG/ACT Inhaler Inhale 1-2 puffs into the lungs every 4 hours as needed for shortness of breath / dyspnea or wheezing    Reported, Patient   ARIPiprazole (ABILIFY) 15 MG tablet Take 1 tablet (15 mg) by mouth At Bedtime 4/5/18   Alaspa, Nohelia L, NP   hydrOXYzine (ATARAX) 25 MG tablet Take 1-2 tablets (25-50 mg) by mouth every 4 hours as needed for anxiety 4/5/18   Alaspa, Nohelia L, NP   sennosides (SENOKOT) 8.6 MG tablet Take 2 tablets by mouth 2 times daily as needed for constipation 4/4/18   Alaspa, Nohelia L, NP     Allergies   Allergen Reactions     Shrimp Anaphylaxis     Risperdal [Risperidone] Other (See Comments)     Elevated prolactin     No past medical history on file.  No past surgical history on file.      Physical Exam performed by TASHA Fairbanks 8/10/20  No change or discrepancies noted    Review of Systems   Unable to perform ROS: Psychiatric disorder         Physical Exam   BP: 141/92  Pulse: 91  Temp: 98.8  F (37.1  C)  Resp: 20  SpO2: 92 %        Physical Exam  Vitals signs and nursing note reviewed.   Constitutional:       Appearance: Normal appearance. She is obese.      Comments: Unkept and disheveled.  Smells of campfire and body odor.   Cardiovascular:      Rate and Rhythm: Normal rate and regular rhythm.   Pulmonary:      Effort: Pulmonary effort is normal.   Skin:     General: Skin is warm and dry.      Capillary Refill: Capillary refill takes less than 2 seconds.   Neurological:      General: No focal deficit present.      Mental Status: She is alert.   Psychiatric:      Comments: Normal eye contact.  She does have obvious internal stimuli with delusional thought process.         Labs:   Results for orders placed or performed during the hospital encounter of 08/10/20   CBC with platelets differential     Status: Abnormal   Result Value Ref [facility]    WBC 13.7 (H) 4.0 - 11.0 10e9/L    RBC Count 5.09  3.8 - 5.2 10e12/L    Hemoglobin 17.2 (H) 11.7 - 15.7 g/dL    Hematocrit 49.2 (H) 35.0 - 47.0 %    MCV 97 78 - 100 fl    MCH 33.8 (H) 26.5 - 33.0 pg    MCHC 35.0 31.5 - 36.5 g/dL    RDW 12.6 10.0 - 15.0 %    Platelet Count 185 150 - 450 10e9/L    Diff Method Automated Method     % Neutrophils 82.4 %    % Lymphocytes 10.4 %    % Monocytes 6.3 %    % Eosinophils 0.3 %    % Basophils 0.2 %    % Immature Granulocytes 0.4 %    Nucleated RBCs 0 0 /100    Absolute Neutrophil 11.3 (H) 1.6 - 8.3 10e9/L    Absolute Lymphocytes 1.4 0.8 - 5.3 10e9/L    Absolute Monocytes 0.9 0.0 - 1.3 10e9/L    Absolute Eosinophils 0.0 0.0 - 0.7 10e9/L    Absolute Basophils 0.0 0.0 - 0.2 10e9/L    Abs Immature Granulocytes 0.1 0 - 0.4 10e9/L    Absolute Nucleated RBC 0.0    Comprehensive metabolic panel     Status: Abnormal   Result Value Ref [facility]    Sodium 129 (L) 133 - 144 mmol/L    Potassium 4.1 3.4 - 5.3 mmol/L    Chloride 95 94 - 109 mmol/L    Carbon Dioxide 25 20 - 32 mmol/L    Anion Gap 9 3 - 14 mmol/L    Glucose 107 (H) 70 - 99 mg/dL    Urea Nitrogen 9 7 - 30 mg/dL    Creatinine 0.54 0.52 - 1.04 mg/dL    GFR Estimate >90 >60 mL/min/[1.73_m2]    GFR Estimate If Black >90 >60 mL/min/[1.73_m2]    Calcium 9.1 8.5 - 10.1 mg/dL    Bilirubin Total 0.7 0.2 - 1.3 mg/dL    Albumin 3.2 (L) 3.4 - 5.0 g/dL    Protein Total 7.3 6.8 - 8.8 g/dL    Alkaline Phosphatase 131 40 - 150 U/L    ALT 57 (H) 0 - 50 U/L    AST 32 0 - 45 U/L   Alcohol ethyl     Status: None   Result Value Ref [facility]    Ethanol g/dL <0.01 0.01 g/dL        Assessment/Impression:   Glenda Robins is a 50 year old female who presented to the emergency department along with daughter for evaluation of possible psychosis.  The daughter tells me that the patient carries a history of schizophrenia with a recent hospitalization in 2018.  She was admitted to our facility on March 14 of 2018 and discharged on April 5.  She is supposed to take Abilify.  Daughter reports that she has episodes  of virtually 4 months at a time.  Returns disheveled and delusional.  On her presentation here the patient had internal stimuli, delusional thoughts, disheveled appearance.  She denied any thoughts of harming herself or others.  Daughter however endorse the fact that her mother talked about killing the patient's father and other man on the way to the emergency room.      Patient is lying in bed, good eye contact, flat affect and clearly delusional and hallucinating.  She declines needing any medications and denies all criteria.  Will place orders for prn medications and monitor symptoms - re-assess tomorrow and may need to file petition for commitment as she may be a danger to herself or others if mental status continues and without intervention.      Educated regarding medication indications, risks, benefits, side effects, contraindications and possible interactions. Verbally expressed understanding.     DX:  Schizophrenia, paranoid type       Plan:  Admit to Unit: [address] [place]  Attending: Poornima Tucker  Patient is: 72 hour mental health hold    Monitor for target symptoms:   Provide a safe environment and therapeutic milieu.     Encourage prn's as appropriate    Anticipated length of stay:       Poornima Tucker, APRN, CNP

## 2020-08-11 NOTE — PROGRESS NOTES
08/10/20 2129   Patient Belongings   Did you bring any home meds/supplements to the hospital?  Yes   Disposition of meds  Sent to security/pharmacy per site process   Patient Belongings locker;sent to security per site process   Patient Belongings Put in Hospital Secure Location (Security or Locker, etc.) cash/credit card;cell phone/electronics;clothing;necklace;purse/wallet;shoes   Belongings Search Yes   Clothing Search Yes   Second Staff Ang   Comment blue shirt, black shorts, tan sandals, 5 lighters, tan purse, tan wallet, 2 boxes of matches, 3 crest white strips, iphone , misc.mail and papers, 1 box of marb kathy with one halfee, wire brush, carbon copies of a check book, computer drive in package, necklace with rg colored stone   List items sent to safe: iphone with clear case, proof of car insurance, car title, 3 labor union ID cards, labor membership card, OSHA card, 11 keys, 1 fob, cut ID card, check #s 9852, [phone], [phone], empty check book with carbon copies, cosmoprof card, social security card, 3 ucare cards, 2 mastercards, 2 discover cards, 3 RAKESH taar cards, 2 herbergercards, khols card, 1 $20 bill, 3-$5 bills, 10-$1 bills, $14.30 in change, 3 month sobriety coin, cosmetology license, [place] drivers license of [place], check#1001, 2 [employer] credit cards  All other belongings put in assigned cubby in belongings room.       I have reviewed my belongings list on admission and verify that it is correct.     Patient signature_______________________________    Second staff witness (if patient unable to sign) ______________________________       I have received all my belongings at discharge.    Patient signature________________________________    Giovana   8/10/2020  9:37 PM

## 2020-08-11 NOTE — PLAN OF CARE
Problem: Adult Behavioral Health Plan of Care  Goal: Patient-Specific Goal (Individualization)  Description: Will have reality based conversations by discharge.   Cooperate with treatment team recommendations including medications.   Will sleep 6-8 hrs nightly.  Attend at least 50% groups.     8/10/2020 2339 by Aurora Mendez, RN  Outcome: No Change  Note:      Face to face shift report received from Ashley DUMONT. Rounding completed, pt observed.     Pt awake at the beginning of this shift. Pt in and out of lobby a few times. Pt can be heard talking in her room. Pt appeared to only sleep 3 hours this shift.     Face to face report will be communicated to oncoming RN.    Aurora Mendez RN  8/11/2020  6:25 AM

## 2020-08-11 NOTE — PLAN OF CARE
Most recent medications that I could find -coincides with what patient told nurse. No current medications found. Checked [facility] too. See below for med history.     Zayda Qiu on 8/11/2020 at 4:07 PM      Yanci Shell DO - 04/05/2018 11:00 AM CDT  Formatting of this note may be different from the original.  Chief Complaint   Patient presents with     Medication Check     Glenda Robins is a 48 year old female is here today to be seen for recheck meds  She was in [place] for 30 days  She has been living with someone for years and has recently moved to her Dad's in [place]  Was on Adderal and clonazepam for many years and was taken off by Jigna Tineo  She wants back on her usual meds.  She is currently on Abilify for a month    Current Outpatient Prescriptions   Medication Sig     hydrOXYzine HCl (ATARAX) 50 MG tablet Take 50 mg by mouth every four hours as needed.     ARIPiprazole (ABILIFY) 15 MG tablet Take 15 mg by mouth at bedtime.     KLONOPIN 0.5 MG OR TABS     amphetamine-dextroamphetamine (ADDERALL) 20 MG tablet Take 20 mg by mouth every morning. To avoid insomnia, last daily dose should be taken no less than 6 hours before bed.     amphetamine-dextroamphetamine (ADDERALL) 10 MG tablet Take 10 mg by mouth one time a day. Takes this in the afternoon

## 2020-08-12 PROCEDURE — 99233 SBSQ HOSP IP/OBS HIGH 50: CPT | Performed by: NURSE PRACTITIONER

## 2020-08-12 PROCEDURE — 12400000 ZZH R&B MH

## 2020-08-12 PROCEDURE — 99231 SBSQ HOSP IP/OBS SF/LOW 25: CPT | Performed by: NURSE PRACTITIONER

## 2020-08-12 ASSESSMENT — ACTIVITIES OF DAILY LIVING (ADL)
DRESS: SCRUBS (BEHAVIORAL HEALTH)
LAUNDRY: UNABLE TO COMPLETE
HYGIENE/GROOMING: INDEPENDENT
HYGIENE/GROOMING: INDEPENDENT
ORAL_HYGIENE: INDEPENDENT

## 2020-08-12 NOTE — PLAN OF CARE
"  Problem: Adult Behavioral Health Plan of Care  Goal: Patient-Specific Goal (Individualization)  Description: Will have reality based conversations by discharge.   Cooperate with treatment team recommendations including medications.   Will sleep 6-8 hrs nightly.  Attend at least 50% groups.     8/12/2020 1536 by Aurora Mendez RN  Outcome: No Change     Problem: Thought Process Alteration  Goal: Optimal Thought Clarity  Outcome: No Change     Face to face shift report received from Kirti DUMONT. Rounding completed, pt observed.     Pt out in lobby talking to self at the beginning of this shift. Pt denies needing anything. Encouraged pt to shower and change clothes this shift. Pt says all the she needs is to leave as everyone here \"has been healed now.\"    Pt transferred to [facility] at 1645. Report given to Ashley OGDEN RN.    "

## 2020-08-12 NOTE — PLAN OF CARE
"Face to face report received from Aurora HOFFMAN RN. Pt. Observed.     Problem: Adult Behavioral Health Plan of Care  Goal: Patient-Specific Goal (Individualization)  Description: Will have reality based conversations by discharge.   Cooperate with treatment team recommendations including medications.   Will sleep 6-8 hrs nightly.  Attend at least 50% groups.     8/12/2020 1131 by Marleny Singh RN  Outcome: No Change  Note: Pt. Denies all criteria. Pt. Denies HI, SI, anxiety, depression, hallucinations and pain. Pt. In agreement to update staff to thoughts feelings of wanting to harm self or others. Pt. Appears responding to internal stimuli. Pt. Appears to be having a conversation but is in room and or lounge by herself.  Pt. reports to this writer \"I am a medium. I am giving advice right now. I want to leave so I can smoke.\" Pt. reports she can channel the voices through her. \"I can channel you. Just let me. See can you tell.\" Pt. reporting the voices are channeled one at a time. Pt. Offered scheduled medications x 2 with pt. Refusing at this time. Pt. Refusing unit programing and participation at this time.      Face to face end of shift report to be communicated to oncoming RN.     Marleny Singh RN  8/12/2020  "

## 2020-08-12 NOTE — PROGRESS NOTES
"[facility]    Medical Services Progress Note    Date of Service (when I saw the patient): 08/12/2020    Assessment & Plan     Principal Problem:    Disorganized behavior- pt sitting in lounge alone, appears to be talking to someone. When I introduced myself pt states \"I don't wanna talk to you, [facility] the hospital  is letting me go.\" When asked if we could step in her room pt states \"no, I am not going anywhere with you\". Asked if we could talk there and if I could listen to her heart and lungs pt stated \"No I am not talking to you and No you can't listen to anything\". Pt then turned and returned to her conversation stating \"yes, I don't know. I am getting rid of everyone, I am not sure how they got in here\"        Active Medical Problem:  Asthma- pt has albuterol on med list indications listed is for asthma. Due to pt mental status unable to confirm. Checked care everywhere and can't find an asthma diagnosis. Ordered prn albuterol for pt safety. Pt refused ROS and Physical exam. Pt talking in complete sentences, no distress, symmetric rise and fall of chest, no audible wheeze.   8/12/20- denies chest pain, sob. Pt talking to internal stimuli stating she is a medium and she is leaving.      UTI- UA collected this morning revealed bacteria, moderate leukocyte esterase. Ordered nitrofurantoin and probiotic. Not sure if pt is symptomatic due to mental status.    8/12/20- pt is refusing antibiotic and probiotic. She states she does not have a UTI and will not take any medications. She reports that she is leaving the hospital and if she needs them then she will get them from her doctor. Pt denies any urinary symptoms, burning with urination, painful urination, hematuria. After stating this pt turns as if she is talking to someone, however no one was there, and states \"yes, I know, I know, I will call them. I am leaving today\". Pt is afebrile. Nurse will report new symptoms or if pt becomes " symptomatic. Nurse will continue to offer medications and encourage water intake.     covid screening- negative     Pt medically stable, no acute medical concerns. Chronic medical problems stable. Will sign off. Please consult for any new medical issues or concerns.       Code Status: Full Code.    Ana Wiggins CNP        -Data reviewed today: I reviewed all new labs and imaging results over the last 24 hours.     Physical Exam   Temp: 98.4  F (36.9  C) Temp src: Tympanic BP: 142/74 Pulse: 75   Resp: 16 SpO2: 95 % O2 Device: None (Room air)    Vitals:    08/10/20 1932   Weight: 90.3 kg (199 lb)     Vital Signs with Ranges  Temp:  [97.6  F (36.4  C)-98.4  F (36.9  C)] 98.4  F (36.9  C)  Pulse:  [75-87] 75  Resp:  [16] 16  BP: (131-142)/(74-90) 142/74  SpO2:  [95 %-96 %] 95 %  No intake/output data recorded.    Constitutional: awake, alert, somewhat cooperative, no apparent distress, and appears stated age.  Respiratory: No increased work of breathing, good air exchange, clear to auscultation bilaterally, no crackles or wheezing  Cardiovascular: Normal apical impulse, regular rate and rhythm, normal S1 and S2, no S3 or S4, and no murmur noted  GI: No scars, normal bowel sounds, soft, non-distended, non-tender, no masses palpated, no hepatosplenomegally  Skin: normal skin color, texture, turgor, no redness, warmth, or swelling and no rashes  Musculoskeletal: There is no redness, warmth, or swelling of the joints.  Full range of motion noted.  Motor strength is 5 out of 5 all extremities bilaterally.  Tone is normal.  Neurologic: Awake, alert, oriented to name, place and time.    Neuropsychiatric: somewhat uncooperative, agitated.     Medications     folic acid  1 mg Oral Daily     lactobacillus rhamnosus (GG)  1 capsule Oral BID     multivitamin w/minerals  1 tablet Oral Daily     nitroFURantoin macrocrystal-monohydrate  100 mg Oral Q12H AB     thiamine  100 mg Oral Daily       Data   Recent Labs   Lab 08/10/20  2903    WBC 13.7*   HGB 17.2*   MCV 97      *   POTASSIUM 4.1   CHLORIDE 95   CO2 25   BUN 9   CR 0.54   ANIONGAP 9   BEST 9.1   *   ALBUMIN 3.2*   PROTTOTAL 7.3   BILITOTAL 0.7   ALKPHOS 131   ALT 57*   AST 32       No results found for this or any previous visit (from the past 24 hour(s)).

## 2020-08-12 NOTE — PLAN OF CARE
"  Problem: Thought Process Alteration  Goal: Optimal Thought Clarity  Outcome: no change    Pt continues to be delusional and paranoid.       Problem: Adult Behavioral Health Plan of Care  Goal: Patient-Specific Goal (Individualization)  Description: Will have reality based conversations by discharge.   Cooperate with treatment team recommendations including medications.   Will sleep 6-8 hrs nightly.  Attend at least 50% groups.     8/11/2020 2139 by Roman Braxton RN  Outcome: Improving  Note: Shift Summery:      Patient's Stated Goal for Shift:  \"I want to live\"    Goal Status:  In Process    Pt was withdrawn to her room most or the shift. She told me that she is not going to shower, take meds here, or cooperate.  She told me that she knows who owns this hospital and does not feel that it is right that she was put on a hold. She is worried about her dog that is with her daughter.  I discussed the UTI that she has and the need to take the antibiotic. She told me that she will take the antibiotic on an outpatient basis but not while she is being kept here against her will. She has rambling rapid speech with  frequent grandiose statements.  Pt does not believe she has a mental illness and does not want to take medications period.  Pt refused the Macrobid and the probiotic. She spent time talking to voices in her room in an interactive manner.  Pt appears to be having visual and auditory hallucinations.    Pt' color appears off like grey and tonya.       "

## 2020-08-12 NOTE — PROGRESS NOTES
"[facility]  Psychiatric Progress Note      Impression:   This is my first time meeting with Glenda.  She was very delusional.  She is extremely disheveled and unkempt.  She was sitting in the lounge in a corner talking to herself when asked to meet with her.  She agreed to talk with the though stated \"I am only talking to you if you are the person that is releasing me\" I told her that I would not be discharging her that she would be staying throughout her 72-hour hold and that I would be filling out the petition for commitment as well\".  I did tell her that I had read in documentation that she had threatened to kill her daughter's father.  She laughs and states \"he has been dead since last year\" she then tells me not to be upset with her because she is a\" medium\".  She started talking to hallucinations and told me that my boss was going to be going into her body and talking through her.  She told me that she is  to the man by the name of Hill, that used on the hospital.  She states \"since we are still , my name would be as an owner of this hospital as well as his\".  She is very grandiose and makes multiple grandiose statements about wanting the hospital and appears to truly believe that she does.    Educated regarding medication indications, risks, benefits, side effects, contraindications and possible interactions. Verbally expressed understanding.        Diagnoses:     Schizophrenia, paranoid type    r/o schizoaffective disorder, bipolar type    Attestation:  Patient has been seen and evaluated by me,  April Pearl Kolb NP          Interim History:   The patient's care was discussed with the treatment team and chart notes were reviewed.            Medications:     Current Facility-Administered Medications Ordered in Epic   Medication Dose Route Frequency Last Rate Last Dose     acetaminophen (TYLENOL) tablet 650 mg  650 mg Oral Q4H PRN         albuterol (PROAIR HFA/PROVENTIL " HFA/VENTOLIN HFA) 108 (90 Base) MCG/ACT inhaler 1-2 puff  1-2 puff Inhalation Q4H PRN         cloNIDine (CATAPRES) tablet 0.1 mg  0.1 mg Oral BID PRN         diazepam (VALIUM) tablet 10 mg  10 mg Oral Q30 Min PRN         folic acid (FOLVITE) tablet 1 mg  1 mg Oral Daily         hydrOXYzine (ATARAX) tablet 30-50 mg  30-50 mg Oral Q4H PRN         lactobacillus rhamnosus (GG) (CULTURELL) capsule 1 capsule  1 capsule Oral BID         LORazepam (ATIVAN) tablet 1-2 mg  1-2 mg Oral Q4H PRN         magnesium hydroxide (MILK OF MAGNESIA) suspension 30 mL  30 mL Oral At Bedtime PRN         multivitamin w/minerals (THERA-VIT-M) tablet 1 tablet  1 tablet Oral Daily         nicotine (NICORETTE) gum 2-4 mg  2-4 mg Buccal Q1H PRN         nitroFURantoin macrocrystal-monohydrate (MACROBID) capsule 100 mg  100 mg Oral Q12H AB         OLANZapine (zyPREXA) tablet 5-10 mg  5-10 mg Oral TID PRN        Or     OLANZapine (zyPREXA) injection 10 mg  10 mg Intramuscular TID PRN         QUEtiapine (SEROquel) tablet 25-50 mg  25-50 mg Oral Q6H PRN         thiamine tablet 100 mg  100 mg Oral Daily         No current Epic-ordered outpatient medications on file.              10 point ROS denies uti symptoms though ua positive       Allergies:     Allergies   Allergen Reactions     Shrimp Anaphylaxis     Risperdal [Risperidone] Other (See Comments)     Elevated prolactin            Psychiatric Examination:   /74   Pulse 75   Temp 98.4  F (36.9  C) (Tympanic)   Resp 16   Ht 1.524 m (5')   Wt 90.3 kg (199 lb)   SpO2 95%   BMI 38.86 kg/m    Weight is 199 lbs 0 oz  Body mass index is 38.86 kg/m .    MSE/PSYCH  PSYCHIATRIC EXAM  /74   Pulse 75   Temp 98.4  F (36.9  C) (Tympanic)   Resp 16   Ht 1.524 m (5')   Wt 90.3 kg (199 lb)   SpO2 95%   BMI 38.86 kg/m    -Appearance/Behavior: disorganized disheveled  -Motor: intact  -Gait: intact  -Abnormal involuntary movements: none  -Mood: grandiose, irritable  -Affect:  restritcted  -Speech: pressured disorganized  -Thought process/associations: disorganized  -Thought content:  hallucinations though likely present. Grandiose/paranoid  -Perceptual disturbances: hallucinations apparent though denies.   Preoccupied: significantly preoccupied.            -Suicidal/Homicidal Ideation: denies any   -Judgment: poor  -Insight: poor  *Orientation: time, place and person.  *Memory: difficult to assess due to debbie  *Attention: poor  *Language: fluent, no aphasias, able to repeat phrases and name objects. Vocab intact.  *Fund of information: difficult to assess due to psychosis  *Cognitive functioning estimate: 0 - independent.           Labs:     Results for orders placed or performed during the hospital encounter of 08/10/20   CBC with platelets differential     Status: Abnormal   Result Value Ref Range    WBC 13.7 (H) 4.0 - 11.0 10e9/L    RBC Count 5.09 3.8 - 5.2 10e12/L    Hemoglobin 17.2 (H) 11.7 - 15.7 g/dL    Hematocrit 49.2 (H) 35.0 - 47.0 %    MCV 97 78 - 100 fl    MCH 33.8 (H) 26.5 - 33.0 pg    MCHC 35.0 31.5 - 36.5 g/dL    RDW 12.6 10.0 - 15.0 %    Platelet Count 185 150 - 450 10e9/L    Diff Method Automated Method     % Neutrophils 82.4 %    % Lymphocytes 10.4 %    % Monocytes 6.3 %    % Eosinophils 0.3 %    % Basophils 0.2 %    % Immature Granulocytes 0.4 %    Nucleated RBCs 0 0 /100    Absolute Neutrophil 11.3 (H) 1.6 - 8.3 10e9/L    Absolute Lymphocytes 1.4 0.8 - 5.3 10e9/L    Absolute Monocytes 0.9 0.0 - 1.3 10e9/L    Absolute Eosinophils 0.0 0.0 - 0.7 10e9/L    Absolute Basophils 0.0 0.0 - 0.2 10e9/L    Abs Immature Granulocytes 0.1 0 - 0.4 10e9/L    Absolute Nucleated RBC 0.0    Comprehensive metabolic panel     Status: Abnormal   Result Value Ref Range    Sodium 129 (L) 133 - 144 mmol/L    Potassium 4.1 3.4 - 5.3 mmol/L    Chloride 95 94 - 109 mmol/L    Carbon Dioxide 25 20 - 32 mmol/L    Anion Gap 9 3 - 14 mmol/L    Glucose 107 (H) 70 - 99 mg/dL    Urea Nitrogen 9 7 - 30  mg/dL    Creatinine 0.54 0.52 - 1.04 mg/dL    GFR Estimate >90 >60 mL/min/[1.73_m2]    GFR Estimate If Black >90 >60 mL/min/[1.73_m2]    Calcium 9.1 8.5 - 10.1 mg/dL    Bilirubin Total 0.7 0.2 - 1.3 mg/dL    Albumin 3.2 (L) 3.4 - 5.0 g/dL    Protein Total 7.3 6.8 - 8.8 g/dL    Alkaline Phosphatase 131 40 - 150 U/L    ALT 57 (H) 0 - 50 U/L    AST 32 0 - 45 U/L   Alcohol ethyl     Status: None   Result Value Ref Range    Ethanol g/dL <0.01 0.01 g/dL   UA reflex to Microscopic     Status: Abnormal   Result Value Ref Range    Color Urine Yellow     Appearance Urine Slightly Cloudy     Glucose Urine Negative NEG^Negative mg/dL    Bilirubin Urine Negative NEG^Negative    Ketones Urine Negative NEG^Negative mg/dL    Specific Gravity Urine 1.017 1.003 - 1.035    Blood Urine Negative NEG^Negative    pH Urine 6.0 4.7 - 8.0 pH    Protein Albumin Urine 10 (A) NEG^Negative mg/dL    Urobilinogen mg/dL Normal 0.0 - 2.0 mg/dL    Nitrite Urine Negative NEG^Negative    Leukocyte Esterase Urine Moderate (A) NEG^Negative    Source Midstream Urine     RBC Urine 4 (H) 0 - 2 /HPF    WBC Urine 18 (H) 0 - 5 /HPF    Bacteria Urine Few (A) NEG^Negative /HPF    Squamous Epithelial /HPF Urine 19 (H) 0 - 1 /HPF    Mucous Urine Present (A) NEG^Negative /LPF   Urine Drugs of Abuse Screen Panel 13     Status: None   Result Value Ref Range    Cannabinoids (74-szw-1-carboxy-9-THC) Not Detected NDET^Not Detected ng/mL    Phencyclidine (Phencyclidine) Not Detected NDET^Not Detected ng/mL    Cocaine (Benzoylecgonine) Not Detected NDET^Not Detected ng/mL    Methamphetamine (d-Methamphetamine) Not Detected NDET^Not Detected ng/mL    Opiates (Morphine) Not Detected NDET^Not Detected ng/mL    Amphetamine (d-Amphetamine) Not Detected NDET^Not Detected ng/mL    Benzodiazepines (Nordiazepam) Not Detected NDET^Not Detected ng/mL    Tricyclic Antidepressants (Desipramine) Not Detected NDET^Not Detected ng/mL    Methadone (Methadone) Not Detected NDET^Not  Detected ng/mL    Barbiturates (Butalbital) Not Detected NDET^Not Detected ng/mL    Oxycodone (Oxycodone) Not Detected NDET^Not Detected ng/mL    Propoxyphene (Norpropoxyphene) Not Detected NDET^Not Detected ng/mL    Buprenorphine (Buprenorphine) Not Detected NDET^Not Detected ng/mL   Asymptomatic COVID-19 Virus (Coronavirus) by PCR     Status: None    Specimen: Nasopharyngeal   Result Value Ref Range    COVID-19 Virus PCR to [facility] - Source Nasopharyngeal     COVID-19 Virus PCR to [facility] - Result       Test received-See reflex to [facility] [place] test SARS CoV2 (COVID-19) Virus RT-PCR   SARS-CoV-2 COVID-19 Virus (Coronavirus) RT-PCR Nasopharyngeal     Status: None    Specimen: Nasopharyngeal   Result Value Ref Range    SARS-CoV-2 Virus Specimen Source Nasopharyngeal     SARS-CoV-2 PCR Result NEGATIVE     SARS-CoV-2 PCR Comment       Testing was performed using the Xpert Xpress SARS-CoV-2 Assay on the Cepheid Gene-Xpert   Instrument Systems. Additional information about this Emergency Use Authorization (EUA)   assay can be found via the Lab Guide.                Plan/Treatment Team     BEHAVIORAL TEAM DISCUSSION    Progress: none  Continued Stay Criteria/Rationale: grossly delusional. Grandiose and paranoid. Significant hallucinations    Medical/Physical: has uti and refusing treatment    Precautions: none    Falls precaution?: No  Behavioral Orders   Procedures     Code 1 - Restrict to Unit     Routine Programming     As clinically indicated     Status 15     Every 15 minutes.                     Plan for this encounter/Med Changes/Labs:       If [facility] supports commitment, pop will be needed.               Participants: Haleigh Kolb NP,   Robyn Ledesma LSW,  Priya Lind LSW, Chun Dixon LSW,  Jennifer Kwan OT, Poornima Reyna OT,          ELOS likely greater than 7 days if [facility] picks up commitment.

## 2020-08-12 NOTE — PLAN OF CARE
Judit Carrasco requested a Face Sheet emailed to her.    CHRIS faxed Face Sheet to Judit Carrasco.

## 2020-08-12 NOTE — PLAN OF CARE
"  Problem: Adult Behavioral Health Plan of Care  Goal: Patient-Specific Goal (Individualization)  Description: Will have reality based conversations by discharge.   Cooperate with treatment team recommendations including medications.   Will sleep 6-8 hrs nightly.  Attend at least 50% groups.     8/12/2020 1835 by Ashley Colorado, RN  Outcome: Declining  Note:      VSS-bp slightly elevated, afebrile. Denies all criteria including: SI, SIB, HI or hallucinations. Continues to refuse all medications.  Withdrawn, dismissive, somewhat grandiose demeanor. Continues saying she is not having hallucinations. 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   Sits in lounge, talks to self. Paranoid-difficult time getting pt to answer assessment questions. Also states  \"there is a sandwich downstairs that has Ketamine in it, you can't give me that.\" \"I know what your up to.\" declines dinner. Sealed foods ordered up for her. Fluid intake adequate. CIWA score-0  Pt spitting on floor of room.       Problem: Thought Process Alteration  Goal: Optimal Thought Clarity  8/12/2020 1835 by Ashley Colorado, RN  Outcome: Declining    Face to face end of shift report communicated to oncoming shift.     Ashley Colorado RN  8/12/2020  6:47 PM           "

## 2020-08-12 NOTE — PLAN OF CARE
"  Problem: Adult Behavioral Health Plan of Care  Goal: Patient-Specific Goal (Individualization)  Description: Will have reality based conversations by discharge.   Cooperate with treatment team recommendations including medications.   Will sleep 6-8 hrs nightly.  Attend at least 50% groups.     8/11/2020 2339 by Aurora Mendez RN  Outcome: No Change  Note:        Problem: Thought Process Alteration  Goal: Optimal Thought Clarity  8/11/2020 2339 by Aurora Mendez RN  Outcome: No Change     Face to face shift report received from Roman DUMONT. Rounding completed, pt observed.     Pt appeared to sleep a total of 5.5 hours this shift. Pt did yell out once during the night and when staff asked what was wrong pt talked about being a medium and \"they were in me and I had to get them out.\" Pt out to lobby and sits at table responding to internal stimuli.    Face to face report will be communicated to oncoming RN.    Aurora Mendez RN  8/12/2020  6:19 AM  "

## 2020-08-13 PROCEDURE — 25000132 ZZH RX MED GY IP 250 OP 250 PS 637: Mod: GY | Performed by: NURSE PRACTITIONER

## 2020-08-13 PROCEDURE — 99233 SBSQ HOSP IP/OBS HIGH 50: CPT | Performed by: NURSE PRACTITIONER

## 2020-08-13 PROCEDURE — 12400000 ZZH R&B MH

## 2020-08-13 RX ADMIN — NITROFURANTOIN (MONOHYDRATE/MACROCRYSTALS) 100 MG: 75; 25 CAPSULE ORAL at 21:02

## 2020-08-13 ASSESSMENT — ACTIVITIES OF DAILY LIVING (ADL)
HYGIENE/GROOMING: PROMPTS;INDEPENDENT
LAUNDRY: UNABLE TO COMPLETE
LAUNDRY: UNABLE TO COMPLETE
ORAL_HYGIENE: INDEPENDENT
DRESS: SCRUBS (BEHAVIORAL HEALTH);INDEPENDENT
DRESS: SCRUBS (BEHAVIORAL HEALTH)
HYGIENE/GROOMING: PROMPTS;INDEPENDENT
ORAL_HYGIENE: INDEPENDENT

## 2020-08-13 NOTE — PLAN OF CARE
"Judit called from the [facility] to screen pt for committment. After phone call, pt looked at this writer and asked \"who gave you your job back\". Pt was given her commitment rights. Pt states she is feeling fine and does not need anything from the  at this time.    Spoke with Judit from the [facility], she is is supporting the commitment. Pt will have court tomorrow morning around 8:30. Pt spoke with their  Thierry this afternoon.  "

## 2020-08-13 NOTE — PLAN OF CARE
"  Problem: Adult Behavioral Health Plan of Care  Goal: Patient-Specific Goal (Individualization)  Description: Will have reality based conversations by discharge.   Cooperate with treatment team recommendations including medications.   Will sleep 6-8 hrs nightly.  Attend at least 50% groups.   Wean as appropriate.    8/13/2020 0836 by Caro Mosqueda, RN  Outcome: No Change  Note: 0745:Received end of shift report JULIA Leija. Pt displaying s/s of sleep upon arrival--     0838: Irritable/blunted affect, reluctant to take AM medication administration, disorganized, paranoid thought process. Yells at this writer, \"Get the hell out of my room!!\" \"No ones getting answers from me today!!\" Educated on risks et benefits re: ABX-- Refusal of breakfast    1105: Continues to isolate self in room et refuses medication administration--     1502: Continues to isolate self to room, disorganized, paranoid behavior continues--- Poor appetite; Ate 25% of lunch only--     Face to face end of shift report to be communicated to on-coming staff.     Caro Mosqueda RN  8/13/2020  3:03 PM               Problem: Adult Behavioral Health Plan of Care  Goal: Adheres to Safety Considerations for Self and Others  8/13/2020 0836 by Caro Mosqueda RN  Outcome: No Change  Note: Remains in MH-ICU;      Problem: Thought Process Alteration  Goal: Optimal Thought Clarity  8/13/2020 0836 by Caro Mosqueda, RN  Outcome: No Change     "

## 2020-08-13 NOTE — PROGRESS NOTES
"[facility]  Psychiatric Progress Note      Impression:   Glenda is no different than she was. 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 She started telling me a story \"I know you and deepthi were doing ...\" I told her that I had no clue what she was talking about. She continued to make a delusional story about me and said it wasn't her talking but someone was talking through her telling this story.     Educated regarding medication indications, risks, benefits, side effects, contraindications and possible interactions. Verbally expressed understanding.        Diagnoses:     Schizophrenia, paranoid type    r/o schizoaffective disorder, bipolar type    Attestation:  Patient has been seen and evaluated by me,  Haleigh Kolb NP          Interim History:   The patient's care was discussed with the treatment team and chart notes were reviewed.            Medications:     Current Facility-Administered Medications Ordered in Epic   Medication Dose Route Frequency Last Rate Last Dose     acetaminophen (TYLENOL) tablet 650 mg  650 mg Oral Q4H PRN         albuterol (PROAIR HFA/PROVENTIL HFA/VENTOLIN HFA) 108 (90 Base) MCG/ACT inhaler 1-2 puff  1-2 puff Inhalation Q4H PRN         cloNIDine (CATAPRES) tablet 0.1 mg  0.1 mg Oral BID PRN         diazepam (VALIUM) tablet 10 mg  10 mg Oral Q30 Min PRN         folic acid (FOLVITE) tablet 1 mg  1 mg Oral Daily         hydrOXYzine (ATARAX) tablet 30-50 mg  30-50 mg Oral Q4H PRN         lactobacillus rhamnosus (GG) (CULTURELL) capsule 1 capsule  1 capsule Oral BID         LORazepam (ATIVAN) tablet 1-2 mg  1-2 mg Oral Q4H PRN         magnesium hydroxide (MILK OF MAGNESIA) suspension 30 mL  30 mL Oral At Bedtime PRN         multivitamin w/minerals " (THERA-VIT-M) tablet 1 tablet  1 tablet Oral Daily         nicotine (NICORETTE) gum 2-4 mg  2-4 mg Buccal Q1H PRN         nitroFURantoin macrocrystal-monohydrate (MACROBID) capsule 100 mg  100 mg Oral Q12H AB         OLANZapine (zyPREXA) tablet 5-10 mg  5-10 mg Oral TID PRN        Or     OLANZapine (zyPREXA) injection 10 mg  10 mg Intramuscular TID PRN         QUEtiapine (SEROquel) tablet 25-50 mg  25-50 mg Oral Q6H PRN         thiamine tablet 100 mg  100 mg Oral Daily         No current Epic-ordered outpatient medications on file.              10 point ROS denies uti symptoms though ua positive       Allergies:     Allergies   Allergen Reactions     Shrimp Anaphylaxis     Risperdal [Risperidone] Other (See Comments)     Elevated prolactin            Psychiatric Examination:   /100   Pulse 94   Temp 98  F (36.7  C) (Tympanic)   Resp 18   Ht 1.524 m (5')   Wt 90.3 kg (199 lb)   SpO2 92%   BMI 38.86 kg/m    Weight is 199 lbs 0 oz  Body mass index is 38.86 kg/m .    MSE/PSYCH  PSYCHIATRIC EXAM  /100   Pulse 94   Temp 98  F (36.7  C) (Tympanic)   Resp 18   Ht 1.524 m (5')   Wt 90.3 kg (199 lb)   SpO2 92%   BMI 38.86 kg/m    -Appearance/Behavior: disorganized disheveled  -Motor: intact  -Gait: intact  -Abnormal involuntary movements: none  -Mood: grandiose, irritable  -Affect: restritcted  -Speech: pressured disorganized  -Thought process/associations: disorganized  -Thought content:  hallucinations though likely present. Grandiose/paranoid  -Perceptual disturbances: hallucinations apparent though denies.   Preoccupied: significantly preoccupied.            -Suicidal/Homicidal Ideation: denies any   -Judgment: poor  -Insight: poor  *Orientation: time, place and person.  *Memory: difficult to assess due to debbie  *Attention: poor  *Language: fluent, no aphasias, able to repeat phrases and name objects. Vocab intact.  *Fund of information: difficult to assess due to psychosis  *Cognitive  functioning estimate: 0 - independent.           Labs:     Results for orders placed or performed during the hospital encounter of 08/10/20   CBC with platelets differential     Status: Abnormal   Result Value Ref Range    WBC 13.7 (H) 4.0 - 11.0 10e9/L    RBC Count 5.09 3.8 - 5.2 10e12/L    Hemoglobin 17.2 (H) 11.7 - 15.7 g/dL    Hematocrit 49.2 (H) 35.0 - 47.0 %    MCV 97 78 - 100 fl    MCH 33.8 (H) 26.5 - 33.0 pg    MCHC 35.0 31.5 - 36.5 g/dL    RDW 12.6 10.0 - 15.0 %    Platelet Count 185 150 - 450 10e9/L    Diff Method Automated Method     % Neutrophils 82.4 %    % Lymphocytes 10.4 %    % Monocytes 6.3 %    % Eosinophils 0.3 %    % Basophils 0.2 %    % Immature Granulocytes 0.4 %    Nucleated RBCs 0 0 /100    Absolute Neutrophil 11.3 (H) 1.6 - 8.3 10e9/L    Absolute Lymphocytes 1.4 0.8 - 5.3 10e9/L    Absolute Monocytes 0.9 0.0 - 1.3 10e9/L    Absolute Eosinophils 0.0 0.0 - 0.7 10e9/L    Absolute Basophils 0.0 0.0 - 0.2 10e9/L    Abs Immature Granulocytes 0.1 0 - 0.4 10e9/L    Absolute Nucleated RBC 0.0    Comprehensive metabolic panel     Status: Abnormal   Result Value Ref Range    Sodium 129 (L) 133 - 144 mmol/L    Potassium 4.1 3.4 - 5.3 mmol/L    Chloride 95 94 - 109 mmol/L    Carbon Dioxide 25 20 - 32 mmol/L    Anion Gap 9 3 - 14 mmol/L    Glucose 107 (H) 70 - 99 mg/dL    Urea Nitrogen 9 7 - 30 mg/dL    Creatinine 0.54 0.52 - 1.04 mg/dL    GFR Estimate >90 >60 mL/min/[1.73_m2]    GFR Estimate If Black >90 >60 mL/min/[1.73_m2]    Calcium 9.1 8.5 - 10.1 mg/dL    Bilirubin Total 0.7 0.2 - 1.3 mg/dL    Albumin 3.2 (L) 3.4 - 5.0 g/dL    Protein Total 7.3 6.8 - 8.8 g/dL    Alkaline Phosphatase 131 40 - 150 U/L    ALT 57 (H) 0 - 50 U/L    AST 32 0 - 45 U/L   Alcohol ethyl     Status: None   Result Value Ref Range    Ethanol g/dL <0.01 0.01 g/dL   UA reflex to Microscopic     Status: Abnormal   Result Value Ref Range    Color Urine Yellow     Appearance Urine Slightly Cloudy     Glucose Urine Negative  NEG^Negative mg/dL    Bilirubin Urine Negative NEG^Negative    Ketones Urine Negative NEG^Negative mg/dL    Specific Gravity Urine 1.017 1.003 - 1.035    Blood Urine Negative NEG^Negative    pH Urine 6.0 4.7 - 8.0 pH    Protein Albumin Urine 10 (A) NEG^Negative mg/dL    Urobilinogen mg/dL Normal 0.0 - 2.0 mg/dL    Nitrite Urine Negative NEG^Negative    Leukocyte Esterase Urine Moderate (A) NEG^Negative    Source Midstream Urine     RBC Urine 4 (H) 0 - 2 /HPF    WBC Urine 18 (H) 0 - 5 /HPF    Bacteria Urine Few (A) NEG^Negative /HPF    Squamous Epithelial /HPF Urine 19 (H) 0 - 1 /HPF    Mucous Urine Present (A) NEG^Negative /LPF   Urine Drugs of Abuse Screen Panel 13     Status: None   Result Value Ref Range    Cannabinoids (80-enl-6-carboxy-9-THC) Not Detected NDET^Not Detected ng/mL    Phencyclidine (Phencyclidine) Not Detected NDET^Not Detected ng/mL    Cocaine (Benzoylecgonine) Not Detected NDET^Not Detected ng/mL    Methamphetamine (d-Methamphetamine) Not Detected NDET^Not Detected ng/mL    Opiates (Morphine) Not Detected NDET^Not Detected ng/mL    Amphetamine (d-Amphetamine) Not Detected NDET^Not Detected ng/mL    Benzodiazepines (Nordiazepam) Not Detected NDET^Not Detected ng/mL    Tricyclic Antidepressants (Desipramine) Not Detected NDET^Not Detected ng/mL    Methadone (Methadone) Not Detected NDET^Not Detected ng/mL    Barbiturates (Butalbital) Not Detected NDET^Not Detected ng/mL    Oxycodone (Oxycodone) Not Detected NDET^Not Detected ng/mL    Propoxyphene (Norpropoxyphene) Not Detected NDET^Not Detected ng/mL    Buprenorphine (Buprenorphine) Not Detected NDET^Not Detected ng/mL   Asymptomatic COVID-19 Virus (Coronavirus) by PCR     Status: None    Specimen: Nasopharyngeal   Result Value Ref Range    COVID-19 Virus PCR to [facility] - Source Nasopharyngeal     COVID-19 Virus PCR to [facility] - Result       Test received-See reflex to [facility] [place] test SARS CoV2 (COVID-19) Virus RT-PCR   SARS-CoV-2 COVID-19 Virus  (Coronavirus) RT-PCR Nasopharyngeal     Status: None    Specimen: Nasopharyngeal   Result Value Ref Range    SARS-CoV-2 Virus Specimen Source Nasopharyngeal     SARS-CoV-2 PCR Result NEGATIVE     SARS-CoV-2 PCR Comment       Testing was performed using the Xpert Xpress SARS-CoV-2 Assay on the Cepheid Gene-Xpert   Instrument Systems. Additional information about this Emergency Use Authorization (EUA)   assay can be found via the Lab Guide.                Plan/Treatment Team     BEHAVIORAL TEAM DISCUSSION    Progress: none  Continued Stay Criteria/Rationale: grossly delusional. Grandiose and paranoid. Significant hallucinations    Medical/Physical: has uti and refusing treatment    Precautions: none    Falls precaution?: No  Behavioral Orders   Procedures     Code 1 - Restrict to Unit     Routine Programming     As clinically indicated     Status 15     Every 15 minutes.                     Plan for this encounter/Med Changes/Labs:     Waiting for [facility] to respond regarding commitment.   If [facility] supports commitment, pop will be needed.               Participants: Haleigh Kolb NP,   Robyn Ledesma LSW,  Priya Lind LSW, Chun Dixon LSW,  Jennifer Kwan OT, Poornima Reyna OT,          ELOS likely greater than 7 days if [facility] picks up commitment.

## 2020-08-13 NOTE — PLAN OF CARE
Problem: Adult Behavioral Health Plan of Care  Goal: Patient-Specific Goal (Individualization)  Description: Will have reality based conversations by discharge.   Cooperate with treatment team recommendations including medications.   Will sleep 6-8 hrs nightly.  Attend at least 50% groups.   Wean as appropriate.    8/13/2020 0436 by Liss Nichols, RN  Outcome: No Change  Note: Report received from Ashley cunningham. Pt observed sleeping in left side lying position with regular and unlabored respirations.    Pt has been in bed with eyes closed and regular respirations off and on this NOC shift but for the most part staying in bed with her eyes closed. 15 minute and PRN checks all night. No complaints offered. Will continue to monitor.    0306- Pt awake with request for sprite. Pt advised we do not have sprite. Pt refused all other options.    0345- Pt sleeping again    0600- Pt had an elevated /100 but refused recheck, P 94, R 18, Temp 98.0F, O2 92% on room air.     Pt slept approx 5.75 hours.    Face to face end of shift report communicated to oncdavid RN.    August 13, 2020  4:36 AM          Problem: Thought Process Alteration  Goal: Optimal Thought Clarity  8/13/2020 0436 by Liss Nichols, RN  Outcome: No Change  Note:  Unable to assess due to pt sleeping. No issues or concerns noted at this time while pt was up briefly.

## 2020-08-14 PROCEDURE — 25000132 ZZH RX MED GY IP 250 OP 250 PS 637: Mod: GY | Performed by: NURSE PRACTITIONER

## 2020-08-14 PROCEDURE — 12400000 ZZH R&B MH

## 2020-08-14 RX ADMIN — NITROFURANTOIN (MONOHYDRATE/MACROCRYSTALS) 100 MG: 75; 25 CAPSULE ORAL at 08:45

## 2020-08-14 RX ADMIN — NITROFURANTOIN (MONOHYDRATE/MACROCRYSTALS) 100 MG: 75; 25 CAPSULE ORAL at 19:24

## 2020-08-14 ASSESSMENT — ACTIVITIES OF DAILY LIVING (ADL)
DRESS: SCRUBS (BEHAVIORAL HEALTH)
ORAL_HYGIENE: INDEPENDENT
HYGIENE/GROOMING: PROMPTS;INDEPENDENT
LAUNDRY: UNABLE TO COMPLETE

## 2020-08-14 NOTE — PLAN OF CARE
Pt had court this morning and is now under confinement until her commitment hearing on August 25th @ 8:15am.

## 2020-08-14 NOTE — PLAN OF CARE
"  Problem: Adult Behavioral Health Plan of Care  Goal: Patient-Specific Goal (Individualization)  Description: Will have reality based conversations by discharge.   Cooperate with treatment team recommendations including medications.   Will sleep 6-8 hrs nightly.  Attend at least 50% groups.   Wean as appropriate.    8/13/2020 2128 by Joanna Lama, RN  Outcome: No Change  Note:   Patient slept most of this shift. Did get up at one point and come to the windows of the nurse's stations then went back to bed.  To antibiotic this evening but refused Culturell Capsule.  Did not ask to come out to open unit.  Did comment, \"I'm feeling better\".  Did not eat dinner or snack this evening.  No groups.  [employer] papers were served this evening at 1638. Patient did read some of paper work.    End of shift report given to oncdavid night shift RN.       "

## 2020-08-14 NOTE — PLAN OF CARE
"Face to face shift report received from Liss POP RN.       Problem: Adult Behavioral Health Plan of Care  Goal: Patient-Specific Goal (Individualization)  Description: Will have reality based conversations by discharge.   Cooperate with treatment team recommendations including medications.   Will sleep 6-8 hrs nightly.  Attend at least 50% groups.   Wean as appropriate.    8/14/2020 0947 by Priya Renteria, RN  Outcome: No Change   Remains in MHICU due to possibility of unpredictable behaviors. Weaned to open unit following breakfast. Observed to sit in lounge area, withdrawn to self. Pt appears to be responding to internal stimuli, observed to be talking to self in lounge area. Offers little during conversation. Compliant with Macrobid only, declined all other medications. Reports back pain, declines nursing intervention. Pt reports \"I'll get up and stretch and that will help. Pt attended preliminary commitment hearing this AM. Per SW pt is now confined. Final commitment hearing 8/25/20 @0815. CIWA: 3 @0815. CIWA Discontinued at 1000 by provider.     Problem: Thought Process Alteration  Goal: Optimal Thought Clarity  8/14/2020 0947 by Priya Renteria, RN  Outcome: No Change         Face to face end of shift report to be communicated to oncoming RN.     "

## 2020-08-14 NOTE — PLAN OF CARE
Problem: Adult Behavioral Health Plan of Care  Goal: Patient-Specific Goal (Individualization)  Description: Will have reality based conversations by discharge.   Cooperate with treatment team recommendations including medications.   Will sleep 6-8 hrs nightly.  Attend at least 50% groups.   Wean as appropriate.    8/14/2020 0151 by Liss Nichols, RN  Outcome: No Change  Note: Report received from Joanna cunningham. Pt observed sleeping in left side lying position with regular and unlabored respirations. Pt did appear to be woken by a noisy peer in the MHICU but did seem to fall asleep again without issue. Per evening RN report pt did take her abx this evening although refused the AM dose.    Pt has been in bed with eyes closed and regular respirations although she was awake off and on throughout the night. 15 minute and PRN checks all night. No complaints offered. Will continue to monitor.    Pt only slept approx 3 hours    Face to face end of shift report communicated to oncdavid RN.    August 14, 2020  1:52 AM          Problem: Thought Process Alteration  Goal: Optimal Thought Clarity  Outcome: No Change  Note: Unable to assess due to pt sleeping. No issues or concerns noted at this time.

## 2020-08-15 PROCEDURE — 12400000 ZZH R&B MH

## 2020-08-15 PROCEDURE — 25000132 ZZH RX MED GY IP 250 OP 250 PS 637: Mod: GY | Performed by: NURSE PRACTITIONER

## 2020-08-15 PROCEDURE — 99233 SBSQ HOSP IP/OBS HIGH 50: CPT | Performed by: NURSE PRACTITIONER

## 2020-08-15 RX ADMIN — NITROFURANTOIN (MONOHYDRATE/MACROCRYSTALS) 100 MG: 75; 25 CAPSULE ORAL at 08:13

## 2020-08-15 RX ADMIN — NITROFURANTOIN (MONOHYDRATE/MACROCRYSTALS) 100 MG: 75; 25 CAPSULE ORAL at 20:27

## 2020-08-15 ASSESSMENT — ACTIVITIES OF DAILY LIVING (ADL)
HYGIENE/GROOMING: PROMPTS
DRESS: SCRUBS (BEHAVIORAL HEALTH)
ORAL_HYGIENE: INDEPENDENT;PROMPTS
LAUNDRY: UNABLE TO COMPLETE

## 2020-08-15 NOTE — PLAN OF CARE
"  Problem: Adult Behavioral Health Plan of Care  Goal: Patient-Specific Goal (Individualization)  Description: Will have reality based conversations by discharge.   Cooperate with treatment team recommendations including medications.   Will sleep 6-8 hrs nightly.  Attend at least 50% groups.   Wean as appropriate.    8/14/2020 2056 by oJanna Lama, RN  Outcome: Improving  Note:   [facility] shift.  Does not attend groups but sits in lounge area watching what is going on with peers. Is not social with peers and does not attend groups.  Ate 75% of dinner and wanted to make [facility] was being [facility].    Encouraged to shower but,  did not shower this shift.  Answers question with yes/no or \"I'm fine\".   When asked how she felt about her court today states, \"I'm optimistic that everything will be just fine\".    End of shift report given to oncoming midnight shift RN.     "

## 2020-08-15 NOTE — PLAN OF CARE
"  Problem: Adult Behavioral Health Plan of Care  Goal: Patient-Specific Goal (Individualization)  Description: Will have reality based conversations by discharge.   Cooperate with treatment team recommendations including medications.   Will sleep 6-8 hrs nightly.  Attend at least 50% groups.   Wean as appropriate.    8/15/2020 0459 by Liss Nichols RN  Outcome: Improving  Note: Report received from Tsering cunningham. Pt observed sleeping in supine position with regular and unlabored respirations.    0130- Pt up and to the ICU duque. When asked if she needed anything and was doing alright, pt stated she was just \"stretching out her back.\" Pt returned to bed right after this and was sleeping again not long after.     Pt has been in bed with eyes closed and regular respirations. 15 minute and PRN checks all night. No complaints offered. Will continue to monitor.    Pt slept approx 5.5 hours last night    Face to face end of shift report communicated to michele RN.    August 15, 2020  4:59 AM          Problem: Thought Process Alteration  Goal: Optimal Thought Clarity  Outcome: Improving  Note: Pt did not make any grandiose, paranoid, or delusional statements during brief interaction before returning to bed.     "

## 2020-08-15 NOTE — PLAN OF CARE
"Face to face shift report received from Liss POP RN.       Problem: Adult Behavioral Health Plan of Care  Goal: Patient-Specific Goal (Individualization)  Description: Will have reality based conversations by discharge.   Cooperate with treatment team recommendations including medications.   Will sleep 6-8 hrs nightly.  Attend at least 50% groups.   Wean as appropriate.    8/15/2020 0844 by Priya Renteria RN  Outcome: No Change  Note: Remains in MHICU due to possibility of unpredictable behaviors. Weaned to open unit prior to breakfast and until after lunch. Returned to open unit at 1430. Observed to sit in lounge area, does not interact with others. Appears to be responding to internal stimuli, observed to be talking to self. Offers little during conversation, mainly \"yes\" and \"no\" responses. When questioned reason for admission pt reports \"I am a medium. Do you know what a medium is? I am gifted... I wanted to go home, but the ER doctor told me I should be sedated. So I said okay sedate me. I've been sleeping for 5 days and now I'm ready to go... I'm going to be going today... The [employer] and [employer] are going to come today and break down your door and take me out of here.\" Writer questioned pt if she will attempt to leave unit. Pt reports \"no, how could I? I can't leave, but the FBI and [employer] will be here later and they will take me with them.\" Pt observed to be sitting in lounge area at table furthest away from unit entrance in front of refrigerator, pt not observed to be watching doors, or loitering near entrances. Pt appears to be participating in other conversations while speaking with writer. Pt observed to turn head in the direction of wall and stated \"you there, sitting there with your whitey tighties, she is holding me against my will... Yes they will be here soon to pick me up.\"  At times pt refers to herself as others such as: \"Tamika in admitting, go behind your windows and call me on the phone,\" and \"Inez " "Shahla,\" Offered Zyprexa 10mg PO for psychosis/agitation at 1116. Pt declined, stating \"I don't need your medication.\" Denies thoughts of harming self or others. Denies depression and anxiety. Compliant with Macrobid, refused all other medications this shift. Pt malodorous and unkempt. Hair appears to be matted on back of head. Writer encouraged pt to shower, offered conditioner for matting of hair. Pt declined stating \"we'll see later.\" Ate 100% of both breakfast and lunch.          Problem: Thought Process Alteration  Goal: Optimal Thought Clarity  8/15/2020 0844 by Priya Renteria, RN  Outcome: No Change   Delusional and grandiose during conversation.       Face to face end of shift report to be communicated to oncoming RN.     "

## 2020-08-15 NOTE — PLAN OF CARE
"  Problem: Adult Behavioral Health Plan of Care  Goal: Patient-Specific Goal (Individualization)  Description: Will have reality based conversations by discharge.   Cooperate with treatment team recommendations including medications.   Will sleep 6-8 hrs nightly.  Attend at least 50% groups.   Remains in MHICU due to possibility of unpredictable behaviors. Patient able to wean if behavior is appropriate.     8/15/2020 1855 by Joanna Lama RN  Outcome: No Change  Note:   Requested to come out of the back to open unit.  Sits in lounge area keeping to self. Advised patient that she hadn't showered since arriving on the unit. States, \"I'll do it later\".    Talked about the commitment process and how she felt. States, \"I was at the [employer] and my ex was suppose to check on me every half hour on the hour but no my daughter sent my Dad who has dementia and a problem with molestation. He kept bugging me for 3 hours trying to take me home with him to play with my you know what & now I'm here and you guys want to commit me\".   Goes back to room after dinner.  Ate 100% of each meal today.  Does not attend groups and is not social with peers.  Remains in MHICU due to unpredictable behaviors and making comments about leaving.     End of shift report given to oncdavid midnight shift RN.     "

## 2020-08-15 NOTE — PROGRESS NOTES
"[facility]  Psychiatric Progress Note      Impression:     she is still has no insight into her mental illness.  I asked her if she feels she needs to be here and she tells me know.  [facility] and she asks me \"why do not know anything about this\" I did tell her she likely received court paperwork that is in her room.  Her affect is very flat and she remains quite disheveled.  She sits in the lounge the majority of the day looking very preoccupied but it does not seem like she is talking to herself as much when I have been watching her at times during the day.  I asked her if she believes she has a mental illness and she says \"yes to have ADHD\" she has no suicidal thoughts.  She did sleep 5.5 hours last night.      Educated regarding medication indications, risks, benefits, side effects, contraindications and possible interactions. Verbally expressed understanding.        Diagnoses:     Schizophrenia, paranoid type    r/o schizoaffective disorder, bipolar type    Attestation:  Patient has been seen and evaluated by me,  Haleigh Kolb NP          Interim History:   The patient's care was discussed with the treatment team and chart notes were reviewed.            Medications:     Current Facility-Administered Medications Ordered in Epic   Medication Dose Route Frequency Last Rate Last Dose     acetaminophen (TYLENOL) tablet 650 mg  650 mg Oral Q4H PRN         albuterol (PROAIR HFA/PROVENTIL HFA/VENTOLIN HFA) 108 (90 Base) MCG/ACT inhaler 1-2 puff  1-2 puff Inhalation Q4H PRN         cloNIDine (CATAPRES) tablet 0.1 mg  0.1 mg Oral BID PRN         diazepam (VALIUM) tablet 10 mg  10 mg Oral Q30 Min PRN         folic acid (FOLVITE) tablet 1 mg  1 mg Oral Daily         hydrOXYzine (ATARAX) tablet 30-50 mg  30-50 mg Oral Q4H PRN         lactobacillus rhamnosus (GG) (CULTURELL) capsule 1 capsule  1 capsule Oral BID         LORazepam (ATIVAN) tablet " 1-2 mg  1-2 mg Oral Q4H PRN         magnesium hydroxide (MILK OF MAGNESIA) suspension 30 mL  30 mL Oral At Bedtime PRN         multivitamin w/minerals (THERA-VIT-M) tablet 1 tablet  1 tablet Oral Daily         nicotine (NICORETTE) gum 2-4 mg  2-4 mg Buccal Q1H PRN         nitroFURantoin macrocrystal-monohydrate (MACROBID) capsule 100 mg  100 mg Oral Q12H AB         OLANZapine (zyPREXA) tablet 5-10 mg  5-10 mg Oral TID PRN        Or     OLANZapine (zyPREXA) injection 10 mg  10 mg Intramuscular TID PRN         QUEtiapine (SEROquel) tablet 25-50 mg  25-50 mg Oral Q6H PRN         thiamine tablet 100 mg  100 mg Oral Daily         No current Epic-ordered outpatient medications on file.              10 point ROS denies uti symptoms though ua positive       Allergies:     Allergies   Allergen Reactions     Shrimp Anaphylaxis     Risperdal [Risperidone] Other (See Comments)     Elevated prolactin            Psychiatric Examination:   /78   Pulse 88   Temp 97.9  F (36.6  C) (Tympanic)   Resp 16   Ht 1.524 m (5')   Wt 90.3 kg (199 lb)   SpO2 93%   BMI 38.86 kg/m    Weight is 199 lbs 0 oz  Body mass index is 38.86 kg/m .    MSE/PSYCH  PSYCHIATRIC EXAM  /78   Pulse 88   Temp 97.9  F (36.6  C) (Tympanic)   Resp 16   Ht 1.524 m (5')   Wt 90.3 kg (199 lb)   SpO2 93%   BMI 38.86 kg/m    -Appearance/Behavior: disorganized disheveled  -Motor: intact  -Gait: intact  -Abnormal involuntary movements: none  -Mood: grandiose, irritable  -Affect: restritcted  -Speech: pressured disorganized  -Thought process/associations: disorganized more guarded.  -Thought content:  hallucinations though likely present. Grandiose/paranoid  -Perceptual disturbances: hallucinations apparent though denies.   Preoccupied: significantly preoccupied.            -Suicidal/Homicidal Ideation: denies any   -Judgment: poor  -Insight: poor  *Orientation: time, place and person.  *Memory: difficult to assess due to debbie  *Attention:  poor  *Language: fluent, no aphasias, able to repeat phrases and name objects. Vocab intact.  *Fund of information: difficult to assess due to psychosis  *Cognitive functioning estimate: 0 - independent.           Labs:     Results for orders placed or performed during the hospital encounter of 08/10/20   CBC with platelets differential     Status: Abnormal   Result Value Ref Range    WBC 13.7 (H) 4.0 - 11.0 10e9/L    RBC Count 5.09 3.8 - 5.2 10e12/L    Hemoglobin 17.2 (H) 11.7 - 15.7 g/dL    Hematocrit 49.2 (H) 35.0 - 47.0 %    MCV 97 78 - 100 fl    MCH 33.8 (H) 26.5 - 33.0 pg    MCHC 35.0 31.5 - 36.5 g/dL    RDW 12.6 10.0 - 15.0 %    Platelet Count 185 150 - 450 10e9/L    Diff Method Automated Method     % Neutrophils 82.4 %    % Lymphocytes 10.4 %    % Monocytes 6.3 %    % Eosinophils 0.3 %    % Basophils 0.2 %    % Immature Granulocytes 0.4 %    Nucleated RBCs 0 0 /100    Absolute Neutrophil 11.3 (H) 1.6 - 8.3 10e9/L    Absolute Lymphocytes 1.4 0.8 - 5.3 10e9/L    Absolute Monocytes 0.9 0.0 - 1.3 10e9/L    Absolute Eosinophils 0.0 0.0 - 0.7 10e9/L    Absolute Basophils 0.0 0.0 - 0.2 10e9/L    Abs Immature Granulocytes 0.1 0 - 0.4 10e9/L    Absolute Nucleated RBC 0.0    Comprehensive metabolic panel     Status: Abnormal   Result Value Ref Range    Sodium 129 (L) 133 - 144 mmol/L    Potassium 4.1 3.4 - 5.3 mmol/L    Chloride 95 94 - 109 mmol/L    Carbon Dioxide 25 20 - 32 mmol/L    Anion Gap 9 3 - 14 mmol/L    Glucose 107 (H) 70 - 99 mg/dL    Urea Nitrogen 9 7 - 30 mg/dL    Creatinine 0.54 0.52 - 1.04 mg/dL    GFR Estimate >90 >60 mL/min/[1.73_m2]    GFR Estimate If Black >90 >60 mL/min/[1.73_m2]    Calcium 9.1 8.5 - 10.1 mg/dL    Bilirubin Total 0.7 0.2 - 1.3 mg/dL    Albumin 3.2 (L) 3.4 - 5.0 g/dL    Protein Total 7.3 6.8 - 8.8 g/dL    Alkaline Phosphatase 131 40 - 150 U/L    ALT 57 (H) 0 - 50 U/L    AST 32 0 - 45 U/L   Alcohol ethyl     Status: None   Result Value Ref Range    Ethanol g/dL <0.01 0.01 g/dL   UA  reflex to Microscopic     Status: Abnormal   Result Value Ref Range    Color Urine Yellow     Appearance Urine Slightly Cloudy     Glucose Urine Negative NEG^Negative mg/dL    Bilirubin Urine Negative NEG^Negative    Ketones Urine Negative NEG^Negative mg/dL    Specific Gravity Urine 1.017 1.003 - 1.035    Blood Urine Negative NEG^Negative    pH Urine 6.0 4.7 - 8.0 pH    Protein Albumin Urine 10 (A) NEG^Negative mg/dL    Urobilinogen mg/dL Normal 0.0 - 2.0 mg/dL    Nitrite Urine Negative NEG^Negative    Leukocyte Esterase Urine Moderate (A) NEG^Negative    Source Midstream Urine     RBC Urine 4 (H) 0 - 2 /HPF    WBC Urine 18 (H) 0 - 5 /HPF    Bacteria Urine Few (A) NEG^Negative /HPF    Squamous Epithelial /HPF Urine 19 (H) 0 - 1 /HPF    Mucous Urine Present (A) NEG^Negative /LPF   Urine Drugs of Abuse Screen Panel 13     Status: None   Result Value Ref Range    Cannabinoids (61-idr-7-carboxy-9-THC) Not Detected NDET^Not Detected ng/mL    Phencyclidine (Phencyclidine) Not Detected NDET^Not Detected ng/mL    Cocaine (Benzoylecgonine) Not Detected NDET^Not Detected ng/mL    Methamphetamine (d-Methamphetamine) Not Detected NDET^Not Detected ng/mL    Opiates (Morphine) Not Detected NDET^Not Detected ng/mL    Amphetamine (d-Amphetamine) Not Detected NDET^Not Detected ng/mL    Benzodiazepines (Nordiazepam) Not Detected NDET^Not Detected ng/mL    Tricyclic Antidepressants (Desipramine) Not Detected NDET^Not Detected ng/mL    Methadone (Methadone) Not Detected NDET^Not Detected ng/mL    Barbiturates (Butalbital) Not Detected NDET^Not Detected ng/mL    Oxycodone (Oxycodone) Not Detected NDET^Not Detected ng/mL    Propoxyphene (Norpropoxyphene) Not Detected NDET^Not Detected ng/mL    Buprenorphine (Buprenorphine) Not Detected NDET^Not Detected ng/mL   Asymptomatic COVID-19 Virus (Coronavirus) by PCR     Status: None    Specimen: Nasopharyngeal   Result Value Ref Range    COVID-19 Virus PCR to [facility] - Source Nasopharyngeal      COVID-19 Virus PCR to [facility] - Result       Test received-See reflex to [place] test SARS CoV2 (COVID-19) Virus RT-PCR   SARS-CoV-2 COVID-19 Virus (Coronavirus) RT-PCR Nasopharyngeal     Status: None    Specimen: Nasopharyngeal   Result Value Ref Range    SARS-CoV-2 Virus Specimen Source Nasopharyngeal     SARS-CoV-2 PCR Result NEGATIVE     SARS-CoV-2 PCR Comment       Testing was performed using the Xpert Xpress SARS-CoV-2 Assay on the Cepheid Gene-Xpert   Instrument Systems. Additional information about this Emergency Use Authorization (EUA)   assay can be found via the Lab Guide.                Plan/Treatment Team     BEHAVIORAL TEAM DISCUSSION    Progress: none  Continued Stay Criteria/Rationale: grossly delusional. Grandiose and paranoid. Significant hallucinations    Medical/Physical: has uti and refusing treatment    Precautions: none    Falls precaution?: No  Behavioral Orders   Procedures     Code 1 - Restrict to Unit     Routine Programming     As clinically indicated     Status 15     Every 15 minutes.                     Plan for this encounter/Med Changes/Labs:     Going to need to fill out a petition for [employer].  Will order another BMP as her sodium was low at 129 upon admission though likely she will refuse the lab.  We will also order another CBC as her white blood cell count was high secondary to UTI upon admission.              Participants: Haleigh Kolb NP,   Robyn Ledesma LSW,  Priya Lind LSW, Chun Dixon LSW,  Jennifer Kwan OT, Poornima Reyna OT,          ANTHONY likely greater than 7 days will need to fill out [employer] paperwork and start medications.

## 2020-08-15 NOTE — PHARMACY-MEDICATION REGIMEN REVIEW
[facility]    Pharmacy      Antimicrobial Stewardship Note     Current antimicrobial therapy:  Anti-infectives (From now, onward)    Start     Dose/Rate Route Frequency Ordered Stop    08/11/20 1200  nitroFURantoin macrocrystal-monohydrate (MACROBID) capsule 100 mg      100 mg Oral EVERY 12 HOURS SCHEDULED 08/11/20 1150 08/16/20 0759          Indication: UTI    Days of Therapy: 3/5     Pertinent labs:  Creatinine   Creatinine   Date Value Ref Range Status   08/10/2020 0.54 0.52 - 1.04 mg/dL Final   04/02/2018 0.63 0.52 - 1.04 mg/dL Final   03/21/2018 0.62 0.52 - 1.04 mg/dL Final     WBC   WBC   Date Value Ref Range Status   08/10/2020 13.7 (H) 4.0 - 11.0 10e9/L Final   04/02/2018 6.9 4.0 - 11.0 10e9/L Final     Procalcitonin No results found for: PCAL  CRP No results found for: CRP    Culture Results: none     Recommendations/Interventions:   1. Add urine culture to urine that was collected on admit (if possible)  2. Patient refused first 2 days of treatment, so will need to extend duration by 2 days to ensure full course given.    Mike Bone, [facility]  August 15, 2020

## 2020-08-16 LAB
ANION GAP SERPL CALCULATED.3IONS-SCNC: 7 MMOL/L (ref 3–14)
BUN SERPL-MCNC: 12 MG/DL (ref 7–30)
CALCIUM SERPL-MCNC: 8.8 MG/DL (ref 8.5–10.1)
CHLORIDE SERPL-SCNC: 100 MMOL/L (ref 94–109)
CO2 SERPL-SCNC: 26 MMOL/L (ref 20–32)
CREAT SERPL-MCNC: 0.52 MG/DL (ref 0.52–1.04)
GFR SERPL CREATININE-BSD FRML MDRD: >90 ML/MIN/{1.73_M2}
GLUCOSE SERPL-MCNC: 178 MG/DL (ref 70–99)
POTASSIUM SERPL-SCNC: 3.1 MMOL/L (ref 3.4–5.3)
SODIUM SERPL-SCNC: 133 MMOL/L (ref 133–144)

## 2020-08-16 PROCEDURE — 12400000 ZZH R&B MH

## 2020-08-16 PROCEDURE — 36415 COLL VENOUS BLD VENIPUNCTURE: CPT | Performed by: NURSE PRACTITIONER

## 2020-08-16 PROCEDURE — 80048 BASIC METABOLIC PNL TOTAL CA: CPT | Performed by: NURSE PRACTITIONER

## 2020-08-16 ASSESSMENT — ACTIVITIES OF DAILY LIVING (ADL)
DRESS: SCRUBS (BEHAVIORAL HEALTH)
ORAL_HYGIENE: INDEPENDENT;PROMPTS
LAUNDRY: UNABLE TO COMPLETE
HYGIENE/GROOMING: PROMPTS

## 2020-08-16 ASSESSMENT — MIFFLIN-ST. JEOR: SCORE: 1415.5

## 2020-08-16 NOTE — PLAN OF CARE
Face to face shift report received from Liss POP RN.       Problem: Adult Behavioral Health Plan of Care  Goal: Patient-Specific Goal (Individualization)  Description: Will have reality based conversations by discharge.   Cooperate with treatment team recommendations including medications.   Will sleep 6-8 hrs nightly.  Attend at least 50% groups.   Remains in MHICU due to possibility of unpredictable behaviors. 8/16/20 Patient able to wean if behavior is appropriate.     8/16/2020 0851 by Priya Renteria, RN  Outcome: No Change  Note: Remains in MHICU due to possibility of unpredictable behaviors. Weaned to open unit this shift without incident. Calm and cooperative. Declined folic acid and multivitamin this AM. Appears to be preoccupied and responding to internal stimuli. Observed to be talking to self in lounge area. Pleasant but offers little during conversation. Withdrawn to self. Malodorous and unkempt. Declined shower. No reports of pain.          Problem: Thought Process Alteration  Goal: Optimal Thought Clarity  8/16/2020 0851 by Priya Renteria, RN  Outcome: No Change         Face to face end of shift report to be communicated to oncoming RN.

## 2020-08-16 NOTE — PLAN OF CARE
Problem: Adult Behavioral Health Plan of Care  Goal: Patient-Specific Goal (Individualization)  Description: Will have reality based conversations by discharge.   Cooperate with treatment team recommendations including medications.   Will sleep 6-8 hrs nightly.  Attend at least 50% groups.   Remains in MHICU due to possibility of unpredictable behaviors. Patient able to wean if behavior is appropriate.     8/16/2020 0506 by Liss Nichols RN  Outcome: Improving  Note: Report received from Joanna cunningham. Pt observed sleeping in supine position with regular and unlabored respirations.    Pt has been in bed with eyes closed and regular respirations. 15 minute and PRN checks all night. No complaints offered. Will continue to monitor.    Pt slept approx 7 hours    Face to face end of shift report communicated to oncoming RN.    August 16, 2020  5:06 AM          Problem: Thought Process Alteration  Goal: Optimal Thought Clarity  Outcome: Improving  Note: Pt is able to maintain brief conversations with this writer. She has not made any paranoid or delusional statements but does appear to avoid longer conversations.

## 2020-08-17 PROCEDURE — 25000132 ZZH RX MED GY IP 250 OP 250 PS 637: Mod: GY | Performed by: NURSE PRACTITIONER

## 2020-08-17 PROCEDURE — 12400000 ZZH R&B MH

## 2020-08-17 RX ORDER — NITROFURANTOIN 25; 75 MG/1; MG/1
100 CAPSULE ORAL EVERY 12 HOURS SCHEDULED
Status: COMPLETED | OUTPATIENT
Start: 2020-08-17 | End: 2020-08-19

## 2020-08-17 RX ORDER — POTASSIUM CHLORIDE 1500 MG/1
40 TABLET, EXTENDED RELEASE ORAL ONCE
Status: COMPLETED | OUTPATIENT
Start: 2020-08-17 | End: 2020-08-17

## 2020-08-17 RX ADMIN — POTASSIUM CHLORIDE 40 MEQ: 1500 TABLET, EXTENDED RELEASE ORAL at 16:12

## 2020-08-17 RX ADMIN — NITROFURANTOIN (MONOHYDRATE/MACROCRYSTALS) 100 MG: 75; 25 CAPSULE ORAL at 17:44

## 2020-08-17 ASSESSMENT — ACTIVITIES OF DAILY LIVING (ADL)
HYGIENE/GROOMING: PROMPTS
ORAL_HYGIENE: INDEPENDENT;PROMPTS
DRESS: SCRUBS (BEHAVIORAL HEALTH)

## 2020-08-17 NOTE — PROGRESS NOTES
potassiumm low. Spoke with vira. Advised to order one time 40 meq k dur. Recheck in am. Also needs 2 more days of macrobid to complete abx therapy.did place order for macrobid

## 2020-08-17 NOTE — PLAN OF CARE
"  Problem: Adult Behavioral Health Plan of Care  Goal: Patient-Specific Goal (Individualization)  Description: Will have reality based conversations by discharge.   Cooperate with treatment team recommendations including medications.   Will sleep 6-8 hrs nightly.  Attend at least 50% groups.   Remains in MHICU due to possibility of unpredictable behaviors. 8/16/20 Patient able to wean if behavior is appropriate. Treatment Team to address daily.    8/16/2020 2100 by Joanna Lama RN  Outcome: No Change  Note:   Patient irritable today, does not have conversation with writer. Answers are short blunted.  Did ask staff for pen to fill out menu at dinner time than stated,\"oh I better give this pen back before I try to kill myself\".  Sits in lounge area watching what is going on around her but does not interact with anyone.    Staff has not been able to get her to shower, says, \"I'll be fine until I get home\".  Back to room shortly after dinner, did come out of room for snack than returned to bed.      End of shift report given to michele midnight shift RN.  "

## 2020-08-17 NOTE — PLAN OF CARE
"Talked 1:1 with pt to gather insight on how they have been doing. Pt states that they are doing \"good\" and that they feel the same from when they first came in. Pt denies having any current troubles or MH concerns. Only request from  at this time was to get them a grape juice.   "

## 2020-08-17 NOTE — PLAN OF CARE
"  Problem: Adult Behavioral Health Plan of Care  Goal: Patient-Specific Goal (Individualization)  Description: Will have reality based conversations by discharge.   Cooperate with treatment team recommendations including medications.   Will sleep 6-8 hrs nightly.  Attend at least 50% groups.   Remains in MHICU due to possibility of unpredictable behaviors. 8/16/20 Patient able to wean if behavior is appropriate. Treatment Team to address daily.    8/17/2020 1706 by Ashley Colorado, RN  Outcome: No Change  Note:   VSS, denies pain. Denies all criteria including: SI, SIB, HI or hallucinations-Appears responding. Gives short, dismissive answers to assessment questions. Guarded demeanor.  Explain to pt her K+ in low and how the heart can become irritable due to low k+-pt agrees to take and also states she will start eating bananas. Pt denies having diarrhea, loose stools or emesis. abx restarted as two days pt refused. Pt states \"I'm done with that\". Pt agrees to take and understands rationale.   Spends time in lounge-few interactions with peers noted.        Problem: Thought Process Alteration  Goal: Optimal Thought Clarity  8/17/2020 1706 by Ashley Colorado, RN  Outcome: No Change     Face to face end of shift report communicated to oncoming shift.     Ashley Colorado RN  8/17/2020  5:09 PM        "

## 2020-08-17 NOTE — PROVIDER NOTIFICATION
April Kolb notified pt K+ as of yesterday 3.1 (4.1 on admission.) Also abx completed in Epic but pt had refused for two days.

## 2020-08-17 NOTE — PLAN OF CARE
Face to face shift report received from JULIA Carbajal.       Problem: Adult Behavioral Health Plan of Care  Goal: Patient-Specific Goal (Individualization)  Description: Will have reality based conversations by discharge.   Cooperate with treatment team recommendations including medications.   Will sleep 6-8 hrs nightly.  Attend at least 50% groups.   Remains in MHICU due to possibility of unpredictable behaviors. 8/16/20 Patient able to wean if behavior is appropriate. Treatment Team to address daily.    8/17/2020 1006 by Priya Renteria RN  Outcome: No Change   Remains in MHICU due to possibility of unpredictable behaviors. Weaned to open unit this shift during meals, but rested in bed majority of shift. Irritable on approach. Declined AM medications. Offers little and dismissive during conversation. Appears to be responding to internal stimuli, observed to be talking to self at times. Malodorous and disheveled. Hair appears matted and dirty. Pt declines to shower. Denies pain. Good appetite.     Problem: Thought Process Alteration  Goal: Optimal Thought Clarity  8/17/2020 1006 by Priya Renteria, RN  Outcome: No Change         Face to face end of shift report to be communicated to oncoming RN.

## 2020-08-17 NOTE — PLAN OF CARE
Face to face end of shift report will be communicated to oncoming RN.     Problem: Adult Behavioral Health Plan of Care  Goal: Plan of Care Review  Outcome: No Change     Problem: Thought Process Alteration  Goal: Optimal Thought Clarity  Outcome: No Change   Face to face end of shift report obtained from Joanna KUHN RN. Pt observed resting in bed.  Pt appears to be sleeping in bed with eyes closed, having regular respirations, and position changes. 15 minutes and PRN safety checks completed with no noted complains. Will continue to monitor.   0600- Pt woke up on and off asking numerous times when breakfast was. Pt declined snacks with exception of juice. Pt continues to be paranoid. Pt appeared to have slept 4 hours so far this shift. Will continue to monitor.

## 2020-08-18 LAB — POTASSIUM SERPL-SCNC: 4.5 MMOL/L (ref 3.4–5.3)

## 2020-08-18 PROCEDURE — 84132 ASSAY OF SERUM POTASSIUM: CPT | Performed by: NURSE PRACTITIONER

## 2020-08-18 PROCEDURE — 36415 COLL VENOUS BLD VENIPUNCTURE: CPT | Performed by: NURSE PRACTITIONER

## 2020-08-18 PROCEDURE — 12400000 ZZH R&B MH

## 2020-08-18 PROCEDURE — 99232 SBSQ HOSP IP/OBS MODERATE 35: CPT | Performed by: NURSE PRACTITIONER

## 2020-08-18 PROCEDURE — 25000132 ZZH RX MED GY IP 250 OP 250 PS 637: Mod: GY | Performed by: NURSE PRACTITIONER

## 2020-08-18 RX ADMIN — NITROFURANTOIN (MONOHYDRATE/MACROCRYSTALS) 100 MG: 75; 25 CAPSULE ORAL at 20:37

## 2020-08-18 RX ADMIN — NITROFURANTOIN (MONOHYDRATE/MACROCRYSTALS) 100 MG: 75; 25 CAPSULE ORAL at 08:07

## 2020-08-18 ASSESSMENT — ACTIVITIES OF DAILY LIVING (ADL)
DRESS: INDEPENDENT
LAUNDRY: UNABLE TO COMPLETE
HYGIENE/GROOMING: PROMPTS
HYGIENE/GROOMING: PROMPTS
ORAL_HYGIENE: INDEPENDENT

## 2020-08-18 NOTE — PLAN OF CARE
Problem: Adult Behavioral Health Plan of Care  Goal: Patient-Specific Goal (Individualization)  Description: Will have reality based conversations by discharge.   Cooperate with treatment team recommendations including medications.   Will sleep 6-8 hrs nightly.  Attend at least 50% groups.   Remains in MHICU due to possibility of unpredictable behaviors. 8/16/20 Patient able to wean if behavior is appropriate. Treatment Team to address daily.    8/18/2020 1727 by Ashley Colorado, RN  Outcome: No Change  Note:      VSS, temp 99.1 and 99.2. bp elevated at 150/69 with HR 99/min-pt had been sleeping at time of check.    denies pain.  Denies all criteria including:  SI, SIB, HI or hallucinations--appears responding to internal stimuli, talking to self. Appears to be responding less than admission. Gives short, dismissive, somewhat guarded answers to assessment questions. Out in lounge much of shift-few interactions with peers though appropriate.   Oral intake adequate.     Problem: Thought Process Alteration  Goal: Optimal Thought Clarity  8/18/2020 1727 by Ashley Colorado, RN  Outcome: No Change    Face to face end of shift report communicated to oncoming shift.     Ashley Colorado RN  8/18/2020  5:37 PM

## 2020-08-18 NOTE — PLAN OF CARE
"Face to face shift report received from Raven PRICE RN. Rounding completed, pt observed.     Patient in MHICU at the start of shift. Patient weaned to open unit at 0740 AM.   Patient did take her Macrobid 100 mg but refused her Folic acid and multivitamin. Patient stated, \"I do not take vitamins.\" Patient refused SI, HI, depression, anxiety or pain. After breakfast patient went back to MHICU and laid down.   Patient took a shower this morning. Patient did change her clothing and brought out her dirty linen. Patient was appreciative for bringing items to her to clean up.   Patient sitting out on unit.     Face to face report will be communicated to oncdavid RN.    Tsering Johnson RN  8/18/2020  2:53 PM    Problem: Adult Behavioral Health Plan of Care  Goal: Patient-Specific Goal (Individualization)  Description: Will have reality based conversations by discharge.   Cooperate with treatment team recommendations including medications.   Will sleep 6-8 hrs nightly.  Attend at least 50% groups.   Remains in MHICU due to possibility of unpredictable behaviors. 8/16/20 Patient able to wean if behavior is appropriate. Treatment Team to address daily.    8/18/2020 0748 by Tsering Johnson, RN  Outcome: No Change  Note:        Problem: Thought Process Alteration  Goal: Optimal Thought Clarity  8/18/2020 0748 by Tsering Johnson, RN  Outcome: No Change     "

## 2020-08-18 NOTE — PROGRESS NOTES
"[facility]  Psychiatric Progress Note      Impression:     Has stablized on abilify in the past so I tried to speak with her about htat today. She stated \"I wont take any of that stuff\". I told her I read she did well on it and she said \"I really only need methylphenidate, estazolam and risperdal. She will not take risperdal without the methylphenidate. I did not put risperdal on the pop though did put invega on it. I did not find out she took risperdal until after I submitted the pop.   She is not talking to herlsef like she was earlier in the week. jessica has been taking macrobid for uti which the uti could have exacerbated some psychotic symptoms. jessica still believes \"I have a special gift.  neena a medium though jessica states \"im not letting them talk through me\". jessica doesn't talk to herself as much as she was. She is not as disheveled.     Educated regarding medication indications, risks, benefits, side effects, contraindications and possible interactions. Verbally expressed understanding.        Diagnoses:     Schizophrenia, paranoid type    r/o schizoaffective disorder, bipolar type    Attestation:  Patient has been seen and evaluated by me,  Haleigh Kolb NP          Interim History:   The patient's care was discussed with the treatment team and chart notes were reviewed.            Medications:     Current Facility-Administered Medications Ordered in Epic   Medication Dose Route Frequency Last Rate Last Dose     acetaminophen (TYLENOL) tablet 650 mg  650 mg Oral Q4H PRN         albuterol (PROAIR HFA/PROVENTIL HFA/VENTOLIN HFA) 108 (90 Base) MCG/ACT inhaler 1-2 puff  1-2 puff Inhalation Q4H PRN         cloNIDine (CATAPRES) tablet 0.1 mg  0.1 mg Oral BID PRN         diazepam (VALIUM) tablet 10 mg  10 mg Oral Q30 Min PRN         folic acid (FOLVITE) tablet 1 mg  1 mg Oral Daily         hydrOXYzine (ATARAX) tablet 30-50 mg  30-50 mg Oral Q4H PRN         lactobacillus rhamnosus (GG) " (CULTURELL) capsule 1 capsule  1 capsule Oral BID         LORazepam (ATIVAN) tablet 1-2 mg  1-2 mg Oral Q4H PRN         magnesium hydroxide (MILK OF MAGNESIA) suspension 30 mL  30 mL Oral At Bedtime PRN         multivitamin w/minerals (THERA-VIT-M) tablet 1 tablet  1 tablet Oral Daily         nicotine (NICORETTE) gum 2-4 mg  2-4 mg Buccal Q1H PRN         nitroFURantoin macrocrystal-monohydrate (MACROBID) capsule 100 mg  100 mg Oral Q12H AB         OLANZapine (zyPREXA) tablet 5-10 mg  5-10 mg Oral TID PRN        Or     OLANZapine (zyPREXA) injection 10 mg  10 mg Intramuscular TID PRN         QUEtiapine (SEROquel) tablet 25-50 mg  25-50 mg Oral Q6H PRN         thiamine tablet 100 mg  100 mg Oral Daily         No current Epic-ordered outpatient medications on file.              10 point ROS denies uti symptoms though ua positive       Allergies:     Allergies   Allergen Reactions     Shrimp Anaphylaxis     Risperdal [Risperidone] Other (See Comments)     Elevated prolactin            Psychiatric Examination:   /80   Pulse 94   Temp 98.4  F (36.9  C) (Tympanic)   Resp 15   Ht 1.524 m (5')   Wt 87.9 kg (193 lb 12.6 oz)   SpO2 95%   BMI 37.85 kg/m    Weight is 193 lbs 12.55 oz  Body mass index is 37.85 kg/m .    MSE/PSYCH  PSYCHIATRIC EXAM  /80   Pulse 94   Temp 98.4  F (36.9  C) (Tympanic)   Resp 15   Ht 1.524 m (5')   Wt 87.9 kg (193 lb 12.6 oz)   SpO2 95%   BMI 37.85 kg/m    -Appearance/Behavior: disorganized disheveled  -Motor: intact  -Gait: intact  -Abnormal involuntary movements: none  -Mood: grandiose, irritable  -Affect: restritcted  -Speech: pressured disorganized  -Thought process/associations: disorganized more guarded.  -Thought content:  hallucinations though likely present. Grandiose/paranoid  -Perceptual disturbances: hallucinations apparent though denies.   Preoccupied: significantly preoccupied.            -Suicidal/Homicidal Ideation: denies any   -Judgment: poor  -Insight:  poor  *Orientation: time, place and person.  *Memory: difficult to assess due to debbie  *Attention: poor  *Language: fluent, no aphasias, able to repeat phrases and name objects. Vocab intact.  *Fund of information: difficult to assess due to psychosis  *Cognitive functioning estimate: 0 - independent.           Labs:     Results for orders placed or performed during the hospital encounter of 08/10/20   CBC with platelets differential     Status: Abnormal   Result Value Ref Range    WBC 13.7 (H) 4.0 - 11.0 10e9/L    RBC Count 5.09 3.8 - 5.2 10e12/L    Hemoglobin 17.2 (H) 11.7 - 15.7 g/dL    Hematocrit 49.2 (H) 35.0 - 47.0 %    MCV 97 78 - 100 fl    MCH 33.8 (H) 26.5 - 33.0 pg    MCHC 35.0 31.5 - 36.5 g/dL    RDW 12.6 10.0 - 15.0 %    Platelet Count 185 150 - 450 10e9/L    Diff Method Automated Method     % Neutrophils 82.4 %    % Lymphocytes 10.4 %    % Monocytes 6.3 %    % Eosinophils 0.3 %    % Basophils 0.2 %    % Immature Granulocytes 0.4 %    Nucleated RBCs 0 0 /100    Absolute Neutrophil 11.3 (H) 1.6 - 8.3 10e9/L    Absolute Lymphocytes 1.4 0.8 - 5.3 10e9/L    Absolute Monocytes 0.9 0.0 - 1.3 10e9/L    Absolute Eosinophils 0.0 0.0 - 0.7 10e9/L    Absolute Basophils 0.0 0.0 - 0.2 10e9/L    Abs Immature Granulocytes 0.1 0 - 0.4 10e9/L    Absolute Nucleated RBC 0.0    Comprehensive metabolic panel     Status: Abnormal   Result Value Ref Range    Sodium 129 (L) 133 - 144 mmol/L    Potassium 4.1 3.4 - 5.3 mmol/L    Chloride 95 94 - 109 mmol/L    Carbon Dioxide 25 20 - 32 mmol/L    Anion Gap 9 3 - 14 mmol/L    Glucose 107 (H) 70 - 99 mg/dL    Urea Nitrogen 9 7 - 30 mg/dL    Creatinine 0.54 0.52 - 1.04 mg/dL    GFR Estimate >90 >60 mL/min/[1.73_m2]    GFR Estimate If Black >90 >60 mL/min/[1.73_m2]    Calcium 9.1 8.5 - 10.1 mg/dL    Bilirubin Total 0.7 0.2 - 1.3 mg/dL    Albumin 3.2 (L) 3.4 - 5.0 g/dL    Protein Total 7.3 6.8 - 8.8 g/dL    Alkaline Phosphatase 131 40 - 150 U/L    ALT 57 (H) 0 - 50 U/L    AST 32 0 - 45  U/L   Alcohol ethyl     Status: None   Result Value Ref Range    Ethanol g/dL <0.01 0.01 g/dL   UA reflex to Microscopic     Status: Abnormal   Result Value Ref Range    Color Urine Yellow     Appearance Urine Slightly Cloudy     Glucose Urine Negative NEG^Negative mg/dL    Bilirubin Urine Negative NEG^Negative    Ketones Urine Negative NEG^Negative mg/dL    Specific Gravity Urine 1.017 1.003 - 1.035    Blood Urine Negative NEG^Negative    pH Urine 6.0 4.7 - 8.0 pH    Protein Albumin Urine 10 (A) NEG^Negative mg/dL    Urobilinogen mg/dL Normal 0.0 - 2.0 mg/dL    Nitrite Urine Negative NEG^Negative    Leukocyte Esterase Urine Moderate (A) NEG^Negative    Source Midstream Urine     RBC Urine 4 (H) 0 - 2 /HPF    WBC Urine 18 (H) 0 - 5 /HPF    Bacteria Urine Few (A) NEG^Negative /HPF    Squamous Epithelial /HPF Urine 19 (H) 0 - 1 /HPF    Mucous Urine Present (A) NEG^Negative /LPF   Urine Drugs of Abuse Screen Panel 13     Status: None   Result Value Ref Range    Cannabinoids (73-roe-6-carboxy-9-THC) Not Detected NDET^Not Detected ng/mL    Phencyclidine (Phencyclidine) Not Detected NDET^Not Detected ng/mL    Cocaine (Benzoylecgonine) Not Detected NDET^Not Detected ng/mL    Methamphetamine (d-Methamphetamine) Not Detected NDET^Not Detected ng/mL    Opiates (Morphine) Not Detected NDET^Not Detected ng/mL    Amphetamine (d-Amphetamine) Not Detected NDET^Not Detected ng/mL    Benzodiazepines (Nordiazepam) Not Detected NDET^Not Detected ng/mL    Tricyclic Antidepressants (Desipramine) Not Detected NDET^Not Detected ng/mL    Methadone (Methadone) Not Detected NDET^Not Detected ng/mL    Barbiturates (Butalbital) Not Detected NDET^Not Detected ng/mL    Oxycodone (Oxycodone) Not Detected NDET^Not Detected ng/mL    Propoxyphene (Norpropoxyphene) Not Detected NDET^Not Detected ng/mL    Buprenorphine (Buprenorphine) Not Detected NDET^Not Detected ng/mL   Asymptomatic COVID-19 Virus (Coronavirus) by PCR     Status: None    Specimen:  Nasopharyngeal   Result Value Ref Range    COVID-19 Virus PCR to [facility] - Source Nasopharyngeal     COVID-19 Virus PCR to [facility] - Result       Test received-See reflex to [place] test SARS CoV2 (COVID-19) Virus RT-PCR   SARS-CoV-2 COVID-19 Virus (Coronavirus) RT-PCR Nasopharyngeal     Status: None    Specimen: Nasopharyngeal   Result Value Ref Range    SARS-CoV-2 Virus Specimen Source Nasopharyngeal     SARS-CoV-2 PCR Result NEGATIVE     SARS-CoV-2 PCR Comment       Testing was performed using the Xpert Xpress SARS-CoV-2 Assay on the Cepheid Gene-Xpert   Instrument Systems. Additional information about this Emergency Use Authorization (EUA)   assay can be found via the Lab Guide.     Basic metabolic panel     Status: Abnormal   Result Value Ref Range    Sodium 133 133 - 144 mmol/L    Potassium 3.1 (L) 3.4 - 5.3 mmol/L    Chloride 100 94 - 109 mmol/L    Carbon Dioxide 26 20 - 32 mmol/L    Anion Gap 7 3 - 14 mmol/L    Glucose 178 (H) 70 - 99 mg/dL    Urea Nitrogen 12 7 - 30 mg/dL    Creatinine 0.52 0.52 - 1.04 mg/dL    GFR Estimate >90 >60 mL/min/[1.73_m2]    GFR Estimate If Black >90 >60 mL/min/[1.73_m2]    Calcium 8.8 8.5 - 10.1 mg/dL   Potassium     Status: None   Result Value Ref Range    Potassium 4.5 3.4 - 5.3 mmol/L              Plan/Treatment Team     BEHAVIORAL TEAM DISCUSSION    Progress: some. Appears to have had slight improvement likely after being treated for uti.   Continued Stay Criteria/Rationale: not as delusional though still believes she is a medium. May have had some improvement after uti started clearing.     Medical/Physical: has uti and refusing treatment    Precautions: none    Falls precaution?: No  Behavioral Orders   Procedures     Code 1 - Restrict to Unit     Routine Programming     As clinically indicated     Status 15     Every 15 minutes.                     Plan for this encounter/Med Changes/Labs:     Filled out pop    Abilify, invega, latuda,  zyprexa          Participants: Haleigh Kolb NP,   Robyn Ledesma LSW,  Priya Lind LSW, Chun Dixon LSW,  Jennifer Kwan OT, Poornima Reyna OT,          ANHTONY likely greater than 7 days will need to fill out Tineo paperwork and start medications.

## 2020-08-18 NOTE — PLAN OF CARE
Faxed Tineo request paperwork to [facility]. Also called Mikhail Appiah to see if he would represent [place] for the [employer] hearing. Mikhail agreed to do so and we will keep him updated with court paperwork/court dates.

## 2020-08-18 NOTE — PLAN OF CARE
Face to face end of shift report will be communicated to oncoming RN.  Problem: Adult Behavioral Health Plan of Care  Goal: Patient-Specific Goal (Individualization)  Description: Will have reality based conversations by discharge.   Cooperate with treatment team recommendations including medications.   Will sleep 6-8 hrs nightly.  Attend at least 50% groups.   Remains in MHICU due to possibility of unpredictable behaviors. 8/16/20 Patient able to wean if behavior is appropriate. Treatment Team to address daily.    8/18/2020 0100 by Raven Larsen RN  Outcome: No Change     Problem: Thought Process Alteration  Goal: Optimal Thought Clarity  8/18/2020 0100 by Raven Larsen RN  Outcome: No Change   Face to face end of shift report obtained from Ashley OGDEN RN. Pt observed resting in bed.   Pt appears to be sleeping in bed with eyes closed, having regular respirations, and position changes. 15 minutes and PRN safety checks completed with no noted complains. Will continue to monitor.   0540- Lab here. Blood sample obtained without difficulty. Waiting results.  0555- Pt slept 4.5 hours. At times appeared pt was restless. Pt had no complains with exception of asking for juice. Pt kept behaviors appropriate. No paranoid comments noted.

## 2020-08-19 PROCEDURE — 25000132 ZZH RX MED GY IP 250 OP 250 PS 637: Mod: GY | Performed by: NURSE PRACTITIONER

## 2020-08-19 PROCEDURE — 12400000 ZZH R&B MH

## 2020-08-19 PROCEDURE — 99232 SBSQ HOSP IP/OBS MODERATE 35: CPT | Performed by: NURSE PRACTITIONER

## 2020-08-19 RX ADMIN — MULTIPLE VITAMINS W/ MINERALS TAB 1 TABLET: TAB at 08:32

## 2020-08-19 RX ADMIN — NITROFURANTOIN (MONOHYDRATE/MACROCRYSTALS) 100 MG: 75; 25 CAPSULE ORAL at 08:32

## 2020-08-19 ASSESSMENT — ACTIVITIES OF DAILY LIVING (ADL)
HYGIENE/GROOMING: INDEPENDENT
ORAL_HYGIENE: INDEPENDENT
DRESS: INDEPENDENT
HYGIENE/GROOMING: INDEPENDENT

## 2020-08-19 NOTE — PLAN OF CARE
"Face to face shift report received from Yeny RAMSEY RN. Rounding completed, pt observed.  Jaye Pickard RN  8/19/2020  8:09 AM    Problem: Adult Behavioral Health Plan of Care  Goal: Patient-Specific Goal (Individualization)  Description: Will have reality based conversations by discharge.   Cooperate with treatment team recommendations including medications.   Will sleep 6-8 hrs nightly.  Attend at least 50% groups.   Remains in MHICU due to possibility of unpredictable behaviors. 8/19/20 Patient able to wean if behavior is appropriate. Treatment Team to address daily.    Outcome: Improving  Note: Shift Summery:  Patient remains in a room in the MHICU r/t unpredictable behavior.  Patient has eaten both meals in the open unit lounge.    Patient answers all assessment questions with brief answers.  Refused scheduled Folic Acid and multivitamin but did so politely.  Took antibiotic without issue.  Patient was encouraged to attend groups.  Patient did go to her room to rest after meals.  Patient did discuss being a \"medium\" with this nurse when asked.  Patient stated that she could even \"connect with you into me\" but then stated \"but I won't do that\".  Patient does not interact with peers and minimally with staff.    Problem: Thought Process Alteration  Goal: Optimal Thought Clarity  Outcome: No Change     Face to face report will be communicated to oncoming RN.        "

## 2020-08-19 NOTE — PLAN OF CARE
"Observed and spoke with pt at this time - she did request and receive a cup of ice water - denies pain or discomfort at this time - pleasant, polite and appropriate to staff.  It is now 0630 and pt has slept majority of the noc with the exception of asking for and receiving ice water.  States \"its dry in here\" to which we all agreed.  Pleasant, polite and appropriate - returned to sleep without issue.   "

## 2020-08-19 NOTE — PLAN OF CARE
Gave pt paperwork on pop hearing, that includes day/time, rights, and 's contact information. Pt will have commitment and pop hearing at 8:15 on August 25th. Pt does not have any questions for the  at this time.

## 2020-08-19 NOTE — PLAN OF CARE
"  Problem: Adult Behavioral Health Plan of Care  Goal: Patient-Specific Goal (Individualization)  Description: Will have reality based conversations by discharge.   Cooperate with treatment team recommendations including medications.   Will sleep 6-8 hrs nightly.  Attend at least 50% groups.   Remains in MHICU due to possibility of unpredictable behaviors. 8/19/20 Patient able to wean if behavior is appropriate. Treatment Team to address daily.    8/19/2020 1637 by Ashley Colorado, RN  Outcome: No Change  Note:      Temp 99.0, bp slightly elevated, denies pain.   Denies all criteria including: SI, SIB, HI or hallucinations--appears responding, taking to herself much of evening.   Tells this writer she spoke to an Independent examiner today. \"I spoke to many of them, one was from [place] but they are from many states.\" becomes guarded then dismisses this writer. \"Thank you, you can go now.\"  Continues to deny need for medications.   Weans appropriately. Behavior appropriate with staff and peers.     Problem: Thought Process Alteration  Goal: Optimal Thought Clarity  8/19/2020 1637 by Ashley Colorado, RN  Outcome: No Change    Face to face end of shift report communicated to oncoming shift.     Ashley Colorado RN  8/19/2020  4:43 PM           "

## 2020-08-19 NOTE — PROGRESS NOTES
"[facility]  Psychiatric Progress Note      Impression:     Patient is lying in her bed in MICU, she is awake.  She reports sleeping good at night, denies suicidal or homicidal thoughts and again declines wanting or needing any kind of medication.  She states \"well you see I'm a medium, a vehicle for receiving information from the dead and transferring to you\" - and talks about having information for me and names to peoples names as she is looking up to the ceiling as if in conversation with them.  Patient admits to hearing \"lots of voices in the room\" and adds \"it is nothing your medication is going to fix, this is a gift from God\".  She remains pleasant and polite, asks if she can go to open side and get coffee, which she may do at any time she wants.  Patient is aware she has commitment court next week.      Educated regarding medication indications, risks, benefits, side effects, contraindications and possible interactions. Verbally expressed understanding.        Diagnoses:     Schizophrenia, paranoid type    r/o schizoaffective disorder, bipolar type    Attestation:  Patient has been seen and evaluated by me,  Poornima Tucker, MADI CNP          Interim History:   The patient's care was discussed with the treatment team and chart notes were reviewed.            Medications:     Current Facility-Administered Medications Ordered in Epic   Medication Dose Route Frequency Last Rate Last Dose     acetaminophen (TYLENOL) tablet 650 mg  650 mg Oral Q4H PRN         albuterol (PROAIR HFA/PROVENTIL HFA/VENTOLIN HFA) 108 (90 Base) MCG/ACT inhaler 1-2 puff  1-2 puff Inhalation Q4H PRN         cloNIDine (CATAPRES) tablet 0.1 mg  0.1 mg Oral BID PRN         diazepam (VALIUM) tablet 10 mg  10 mg Oral Q30 Min PRN         folic acid (FOLVITE) tablet 1 mg  1 mg Oral Daily         hydrOXYzine (ATARAX) tablet 30-50 mg  30-50 mg Oral Q4H PRN         lactobacillus rhamnosus (GG) (CULTURELL) capsule 1 capsule  1 " "capsule Oral BID         LORazepam (ATIVAN) tablet 1-2 mg  1-2 mg Oral Q4H PRN         magnesium hydroxide (MILK OF MAGNESIA) suspension 30 mL  30 mL Oral At Bedtime PRN         multivitamin w/minerals (THERA-VIT-M) tablet 1 tablet  1 tablet Oral Daily         nicotine (NICORETTE) gum 2-4 mg  2-4 mg Buccal Q1H PRN         nitroFURantoin macrocrystal-monohydrate (MACROBID) capsule 100 mg  100 mg Oral Q12H AB         OLANZapine (zyPREXA) tablet 5-10 mg  5-10 mg Oral TID PRN        Or     OLANZapine (zyPREXA) injection 10 mg  10 mg Intramuscular TID PRN         QUEtiapine (SEROquel) tablet 25-50 mg  25-50 mg Oral Q6H PRN         thiamine tablet 100 mg  100 mg Oral Daily         No current Epic-ordered outpatient medications on file.        10 point ROS denies uti symptoms though ua positive       Allergies:     Allergies   Allergen Reactions     Shrimp Anaphylaxis     Risperdal [Risperidone] Other (See Comments)     Elevated prolactin            Psychiatric Examination:   /83   Pulse 95   Temp 97.7  F (36.5  C) (Tympanic)   Resp 14   Ht 1.524 m (5')   Wt 87.9 kg (193 lb 12.6 oz)   SpO2 95%   BMI 37.85 kg/m    Weight is 193 lbs 12.55 oz  Body mass index is 37.85 kg/m .    MSE/PSYCH  PSYCHIATRIC EXAM  /83   Pulse 95   Temp 97.7  F (36.5  C) (Tympanic)   Resp 14   Ht 1.524 m (5')   Wt 87.9 kg (193 lb 12.6 oz)   SpO2 95%   BMI 37.85 kg/m    -Appearance/Behavior: disorganized , showered today  -Motor: intact  -Gait: intact  -Abnormal involuntary movements: none  -Mood: grandiose, cooperative  -Affect: restritcted  -Speech: less pressured, remains disorganized  -Thought process/associations: disorganized more guarded.  -Thought content:  hallucinations likely present. Grandiose/paranoid  -Perceptual disturbances: hallucinations apparent - Does admit to \"hearing a lot of people in here\"  Preoccupied: significantly preoccupied.            -Suicidal/Homicidal Ideation: denies any   -Judgment: " poor  -Insight: poor  *Orientation: time, place and person.  *Memory: difficult to assess due to debbie  *Attention: poor  *Language: fluent, no aphasias, able to repeat phrases and name objects. Vocab intact.  *Fund of information: difficult to assess due to psychosis  *Cognitive functioning estimate: 0 - independent.           Labs:     Results for orders placed or performed during the hospital encounter of 08/10/20   CBC with platelets differential     Status: Abnormal   Result Value Ref Range    WBC 13.7 (H) 4.0 - 11.0 10e9/L    RBC Count 5.09 3.8 - 5.2 10e12/L    Hemoglobin 17.2 (H) 11.7 - 15.7 g/dL    Hematocrit 49.2 (H) 35.0 - 47.0 %    MCV 97 78 - 100 fl    MCH 33.8 (H) 26.5 - 33.0 pg    MCHC 35.0 31.5 - 36.5 g/dL    RDW 12.6 10.0 - 15.0 %    Platelet Count 185 150 - 450 10e9/L    Diff Method Automated Method     % Neutrophils 82.4 %    % Lymphocytes 10.4 %    % Monocytes 6.3 %    % Eosinophils 0.3 %    % Basophils 0.2 %    % Immature Granulocytes 0.4 %    Nucleated RBCs 0 0 /100    Absolute Neutrophil 11.3 (H) 1.6 - 8.3 10e9/L    Absolute Lymphocytes 1.4 0.8 - 5.3 10e9/L    Absolute Monocytes 0.9 0.0 - 1.3 10e9/L    Absolute Eosinophils 0.0 0.0 - 0.7 10e9/L    Absolute Basophils 0.0 0.0 - 0.2 10e9/L    Abs Immature Granulocytes 0.1 0 - 0.4 10e9/L    Absolute Nucleated RBC 0.0    Comprehensive metabolic panel     Status: Abnormal   Result Value Ref Range    Sodium 129 (L) 133 - 144 mmol/L    Potassium 4.1 3.4 - 5.3 mmol/L    Chloride 95 94 - 109 mmol/L    Carbon Dioxide 25 20 - 32 mmol/L    Anion Gap 9 3 - 14 mmol/L    Glucose 107 (H) 70 - 99 mg/dL    Urea Nitrogen 9 7 - 30 mg/dL    Creatinine 0.54 0.52 - 1.04 mg/dL    GFR Estimate >90 >60 mL/min/[1.73_m2]    GFR Estimate If Black >90 >60 mL/min/[1.73_m2]    Calcium 9.1 8.5 - 10.1 mg/dL    Bilirubin Total 0.7 0.2 - 1.3 mg/dL    Albumin 3.2 (L) 3.4 - 5.0 g/dL    Protein Total 7.3 6.8 - 8.8 g/dL    Alkaline Phosphatase 131 40 - 150 U/L    ALT 57 (H) 0 - 50 U/L     AST 32 0 - 45 U/L   Alcohol ethyl     Status: None   Result Value Ref Range    Ethanol g/dL <0.01 0.01 g/dL   UA reflex to Microscopic     Status: Abnormal   Result Value Ref Range    Color Urine Yellow     Appearance Urine Slightly Cloudy     Glucose Urine Negative NEG^Negative mg/dL    Bilirubin Urine Negative NEG^Negative    Ketones Urine Negative NEG^Negative mg/dL    Specific Gravity Urine 1.017 1.003 - 1.035    Blood Urine Negative NEG^Negative    pH Urine 6.0 4.7 - 8.0 pH    Protein Albumin Urine 10 (A) NEG^Negative mg/dL    Urobilinogen mg/dL Normal 0.0 - 2.0 mg/dL    Nitrite Urine Negative NEG^Negative    Leukocyte Esterase Urine Moderate (A) NEG^Negative    Source Midstream Urine     RBC Urine 4 (H) 0 - 2 /HPF    WBC Urine 18 (H) 0 - 5 /HPF    Bacteria Urine Few (A) NEG^Negative /HPF    Squamous Epithelial /HPF Urine 19 (H) 0 - 1 /HPF    Mucous Urine Present (A) NEG^Negative /LPF   Urine Drugs of Abuse Screen Panel 13     Status: None   Result Value Ref Range    Cannabinoids (14-ueu-0-carboxy-9-THC) Not Detected NDET^Not Detected ng/mL    Phencyclidine (Phencyclidine) Not Detected NDET^Not Detected ng/mL    Cocaine (Benzoylecgonine) Not Detected NDET^Not Detected ng/mL    Methamphetamine (d-Methamphetamine) Not Detected NDET^Not Detected ng/mL    Opiates (Morphine) Not Detected NDET^Not Detected ng/mL    Amphetamine (d-Amphetamine) Not Detected NDET^Not Detected ng/mL    Benzodiazepines (Nordiazepam) Not Detected NDET^Not Detected ng/mL    Tricyclic Antidepressants (Desipramine) Not Detected NDET^Not Detected ng/mL    Methadone (Methadone) Not Detected NDET^Not Detected ng/mL    Barbiturates (Butalbital) Not Detected NDET^Not Detected ng/mL    Oxycodone (Oxycodone) Not Detected NDET^Not Detected ng/mL    Propoxyphene (Norpropoxyphene) Not Detected NDET^Not Detected ng/mL    Buprenorphine (Buprenorphine) Not Detected NDET^Not Detected ng/mL   Asymptomatic COVID-19 Virus (Coronavirus) by PCR     Status:  None    Specimen: Nasopharyngeal   Result Value Ref Range    COVID-19 Virus PCR to [facility] - Source Nasopharyngeal     COVID-19 Virus PCR to [facility] - Result       Test received-See reflex to [place] test SARS CoV2 (COVID-19) Virus RT-PCR   SARS-CoV-2 COVID-19 Virus (Coronavirus) RT-PCR Nasopharyngeal     Status: None    Specimen: Nasopharyngeal   Result Value Ref Range    SARS-CoV-2 Virus Specimen Source Nasopharyngeal     SARS-CoV-2 PCR Result NEGATIVE     SARS-CoV-2 PCR Comment       Testing was performed using the Xpert Xpress SARS-CoV-2 Assay on the Cepheid Gene-Xpert   Instrument Systems. Additional information about this Emergency Use Authorization (EUA)   assay can be found via the Lab Guide.     Basic metabolic panel     Status: Abnormal   Result Value Ref Range    Sodium 133 133 - 144 mmol/L    Potassium 3.1 (L) 3.4 - 5.3 mmol/L    Chloride 100 94 - 109 mmol/L    Carbon Dioxide 26 20 - 32 mmol/L    Anion Gap 7 3 - 14 mmol/L    Glucose 178 (H) 70 - 99 mg/dL    Urea Nitrogen 12 7 - 30 mg/dL    Creatinine 0.52 0.52 - 1.04 mg/dL    GFR Estimate >90 >60 mL/min/[1.73_m2]    GFR Estimate If Black >90 >60 mL/min/[1.73_m2]    Calcium 8.8 8.5 - 10.1 mg/dL   Potassium     Status: None   Result Value Ref Range    Potassium 4.5 3.4 - 5.3 mmol/L              Plan/Treatment Team     BEHAVIORAL TEAM DISCUSSION    Progress: Slight - Appears to have had slight improvement likely after being treated for uti.   Continued Stay Criteria/Rationale: not as delusional though still believes she is a medium. May have had some improvement after uti started clearing.     Medical/Physical: UTI    Precautions: none    Falls precaution?: No  Behavioral Orders   Procedures     Code 1 - Restrict to Unit     Routine Programming     As clinically indicated     Status 15     Every 15 minutes.       Plan for this encounter/Med Changes/Labs:     Filled out pop/Commitment court 25[address]    Abilify, invega, latuda, zyprexa    Participants:  Poornima Tucker, NP, SW, Nursing

## 2020-08-20 PROCEDURE — 12400000 ZZH R&B MH

## 2020-08-20 ASSESSMENT — ACTIVITIES OF DAILY LIVING (ADL)
DRESS: SCRUBS (BEHAVIORAL HEALTH);INDEPENDENT
ORAL_HYGIENE: INDEPENDENT
HYGIENE/GROOMING: INDEPENDENT
DRESS: INDEPENDENT
ORAL_HYGIENE: INDEPENDENT
HYGIENE/GROOMING: INDEPENDENT;PROMPTS
LAUNDRY: UNABLE TO COMPLETE

## 2020-08-20 NOTE — PLAN OF CARE
It is now 0610 and pt has slept all noc with the exception of asking for and receiving grape juice.  Pleasant, polite and appropriate.  Face to face end of shift report communicated to oncoming RN     Yeny Garcia RN  8/20/2020  6:17 AM

## 2020-08-20 NOTE — PLAN OF CARE
"Face to face shift report received from Yeny RAMSEY RN. Rounding completed, pt observed.  Jaye Pickard RN  8/20/2020  8:05 AM    Problem: Adult Behavioral Health Plan of Care  Goal: Patient-Specific Goal (Individualization)  Description: Will have reality based conversations by discharge.   Cooperate with treatment team recommendations including medications.   Will sleep 6-8 hrs nightly.  Attend at least 50% groups.   Remains in MHICU due to possibility of unpredictable behaviors. 8/20/20 Patient able to wean if behavior is appropriate. Treatment Team to address daily.    Outcome: No Change  Note: Shift Summery:  Patient remains in a room in the MHICU r/t unpredictable behavior.  Patient is weaning for meals and will sit in same place in the open unit lounge.  She keeps to herself and dose not attend groups or socialize.  Patient is calm, answers questions but denies having any mental illness.  Patient states that her back had some pain \"but it will work itself out\" and declines offer of pain reliever.  Patient refused scheduled Folic Acid and Multivitamin.  Patient chooses to return to bed after meals to \"rest\".  Patient is requesting to move to a room on the open unit.    Problem: Thought Process Alteration  Goal: Optimal Thought Clarity  Outcome: No Change  Patient denies mental illness.     Face to face report will be communicated to oncoming RN.        "

## 2020-08-20 NOTE — PLAN OF CARE
Talked 1:1 with pt to gather insight on how they have been doing. Pt states that she is doing fine and talked with the medical examiner. She states they will able to get her records to when she saw Jigna Tineo and was happy about this because she wants to ensure she is getting diagnosed correctly. Pt declines wanting to continue to see her in the future and states that she may not have a job after this. Did not prompt further on this statement as pt has made statements prior to this about thinking she can fire staff here and call security on them. Pt does not need anything further from the  at this time.

## 2020-08-20 NOTE — PLAN OF CARE
Problem: Adult Behavioral Health Plan of Care  Goal: Patient-Specific Goal (Individualization)  Description: Will have reality based conversations by discharge.   Cooperate with treatment team recommendations including medications.   Will sleep 6-8 hrs nightly.  Attend at least 50% groups.   Remains in MHICU due to possibility of unpredictable behaviors. 8/20/20 Patient able to wean if behavior is appropriate. Treatment Team to address daily.    8/20/2020 1623 by Caro Mosqueda, RN  Outcome: No Change  Note: 1540: Received end of shift report from JULIA Cee. Pt wondering out in lounge area upon arrival, blunted affect, mood is clam.     2245: Pt out on open unit up until 1915, Displaying s/s of sleep since approximately 2030. Offers little conversation with staff, no group participation, denies all criteria et need for prn pharmacological intervention this shift.     Face to face end of shift report to be  communicated to on-coming staff.     Caro Mosqueda RN  8/20/2020  10:59 PM               Problem: Adult Behavioral Health Plan of Care  Goal: Optimized Coping Skills in Response to Life Stressors  Outcome: No Change     Problem: Adult Behavioral Health Plan of Care  Goal: Adheres to Safety Considerations for Self and Others  Outcome: Improving  Note: Weans as appropriate out of MH-ICU.      Problem: Thought Process Alteration  Goal: Optimal Thought Clarity  8/20/2020 1623 by Caro Mosqueda, RN  Outcome: NO CHANGE

## 2020-08-21 PROCEDURE — 12400000 ZZH R&B MH

## 2020-08-21 PROCEDURE — 99232 SBSQ HOSP IP/OBS MODERATE 35: CPT | Performed by: NURSE PRACTITIONER

## 2020-08-21 NOTE — PLAN OF CARE
"Face to face end of shift report received from Becca CHRISTIANSEN RN. Rounding completed and patient observed in the MHICU, requesting to wean to the open unit and was allowed.     20:00 Update: Patient declined nursing assessment and appeared dismissive of this writer. She denied pain and denied needing any other PRNs. She said \"I'm not taking any meds.\" She napped but did wean for a large portion of the shift. She ate in the lounge. She did not interact with peers but sat alone on the edge of the room. She was informed she needed to give a urine sample and she said she'd do it tomorrow. She seemed almost sarcastic when speaking to this writer.    Face to face end of shift report communicated to oncoming RN.       Problem: Adult Behavioral Health Plan of Care  Goal: Patient-Specific Goal (Individualization)  Description: Will have reality based conversations by discharge.   Cooperate with treatment team recommendations including medications.   Will sleep 6-8 hrs nightly.  Attend at least 50% groups.   Remains in MHICU due to possibility of unpredictable behaviors. 8/20/20 Patient able to wean if behavior is appropriate. Treatment Team to address daily.    8/21/2020 1624 by Augustus Chow, RN  Outcome: No Change     Problem: Thought Process Alteration  Goal: Optimal Thought Clarity  8/21/2020 1624 by Augustus Chow, RN  Outcome: No Change     "

## 2020-08-21 NOTE — PLAN OF CARE
Problem: Adult Behavioral Health Plan of Care  Goal: Patient-Specific Goal (Individualization)  Description: Will have reality based conversations by discharge.   Cooperate with treatment team recommendations including medications.   Will sleep 6-8 hrs nightly.  Attend at least 50% groups.   Remains in MHICU due to possibility of unpredictable behaviors. 8/20/20 Patient able to wean if behavior is appropriate. Treatment Team to address daily.    Pt in bed at start of shift. According to charting, pt slept approx. 8 hours last night. Denies all criteria this am. Does wean out of MHICU for breakfast. Ate 100% of breakfast but did request a new grape juice and yogurt because they had been opened. Refused scheduled am medications. Requesting to be moved out of MHICU; no rooms available at this time. Pt returned to room after breakfast. Will continue to monitor.    Pt weaning out to open unit majority of morning reading magazines and conversing with peers. Writer sat with patient for awhile; pt talking about her reading on [employer], the tv  and talking about various television shows/celebrities. Pt returned to bed rest after lunch.    Outcome: No Change    Problem: Thought Process Alteration  Goal: Optimal Thought Clarity  Outcome: No Change    Face to face end of shift report communicated to oncoming RN.     8/21/2020

## 2020-08-21 NOTE — PLAN OF CARE
Observed pt lying on her right side - eyes closed - soft audible snoring.continuous camera and physical checks every 15 min.Face to face end of shift report communicated to oncoming RN     Yeny Garcia RN  8/21/2020  7:11 AM

## 2020-08-21 NOTE — PROGRESS NOTES
"[facility]  Psychiatric Progress Note      Impression:     Patient resting in her room.  She reports not sleeping well as the 2 other patients in the MICU were making lots of noise last night and the TV was on loud.  She reports that she was afraid one of them would come into her room.  Patient reports otherwise she is doing better, she is eating meals and drinking juice or coffee.  She denies suicidal or homicidal thoughts and states \"I'm doing just fine'.  She is pleasant this morning, makes better eye contact and does not make delusional statements and appears a bit more organized.  We discussed completing her antibiotic course for UTI, although she did miss a few doses.  She denies having any burning or pain on urination and agrees to have another Urine to make sure infection is gone.  She again declines needing any medications      Educated regarding medication indications, risks, benefits, side effects, contraindications and possible interactions. Verbally expressed understanding.        Diagnoses:     Schizophrenia, paranoid type    r/o schizoaffective disorder, bipolar type    Attestation:  Patient has been seen and evaluated by me,  MADI Albarado CNP          Interim History:   The patient's care was discussed with the treatment team and chart notes were reviewed.            Medications:     Current Facility-Administered Medications Ordered in Epic   Medication Dose Route Frequency Last Rate Last Dose     acetaminophen (TYLENOL) tablet 650 mg  650 mg Oral Q4H PRN         albuterol (PROAIR HFA/PROVENTIL HFA/VENTOLIN HFA) 108 (90 Base) MCG/ACT inhaler 1-2 puff  1-2 puff Inhalation Q4H PRN         cloNIDine (CATAPRES) tablet 0.1 mg  0.1 mg Oral BID PRN         diazepam (VALIUM) tablet 10 mg  10 mg Oral Q30 Min PRN         folic acid (FOLVITE) tablet 1 mg  1 mg Oral Daily         hydrOXYzine (ATARAX) tablet 30-50 mg  30-50 mg Oral Q4H PRN         lactobacillus rhamnosus (GG) " (CULTURELL) capsule 1 capsule  1 capsule Oral BID         LORazepam (ATIVAN) tablet 1-2 mg  1-2 mg Oral Q4H PRN         magnesium hydroxide (MILK OF MAGNESIA) suspension 30 mL  30 mL Oral At Bedtime PRN         multivitamin w/minerals (THERA-VIT-M) tablet 1 tablet  1 tablet Oral Daily         nicotine (NICORETTE) gum 2-4 mg  2-4 mg Buccal Q1H PRN         nitroFURantoin macrocrystal-monohydrate (MACROBID) capsule 100 mg  100 mg Oral Q12H AB         OLANZapine (zyPREXA) tablet 5-10 mg  5-10 mg Oral TID PRN        Or     OLANZapine (zyPREXA) injection 10 mg  10 mg Intramuscular TID PRN         QUEtiapine (SEROquel) tablet 25-50 mg  25-50 mg Oral Q6H PRN         thiamine tablet 100 mg  100 mg Oral Daily         No current Epic-ordered outpatient medications on file.        10 point ROS denies uti symptoms though ua positive       Allergies:     Allergies   Allergen Reactions     Shrimp Anaphylaxis     Risperdal [Risperidone] Other (See Comments)     Elevated prolactin            Psychiatric Examination:   /85   Pulse 94   Temp 98  F (36.7  C) (Tympanic)   Resp 16   Ht 1.524 m (5')   Wt 87.9 kg (193 lb 12.6 oz)   SpO2 95%   BMI 37.85 kg/m    Weight is 193 lbs 12.55 oz  Body mass index is 37.85 kg/m .    MSE/PSYCH  PSYCHIATRIC EXAM  /85   Pulse 94   Temp 98  F (36.7  C) (Tympanic)   Resp 16   Ht 1.524 m (5')   Wt 87.9 kg (193 lb 12.6 oz)   SpO2 95%   BMI 37.85 kg/m    -Appearance/Behavior: appropriate, less disorganized  -Motor: intact  -Gait: intact  -Abnormal involuntary movements: none  -Mood: grandiose, cooperative  -Affect: restritcted  -Speech: less pressured, less disorganized  -Thought process/associations:  improving  -Thought content:  Likely continued delusion, although no statements today, seems more organized  -Perceptual disturbances: denies si/hi, has been observed responding   -Suicidal/Homicidal Ideation: denies any   -Judgment: poor  -Insight: poor  *Orientation: time, place  and person.  *Memory: difficult to assess due to debbie  *Attention: poor  *Language: fluent, no aphasias, able to repeat phrases and name objects. Vocab intact.  *Fund of information: difficult to assess due to psychosis  *Cognitive functioning estimate: 0 - independent.           Labs:     Results for orders placed or performed during the hospital encounter of 08/10/20   CBC with platelets differential     Status: Abnormal   Result Value Ref Range    WBC 13.7 (H) 4.0 - 11.0 10e9/L    RBC Count 5.09 3.8 - 5.2 10e12/L    Hemoglobin 17.2 (H) 11.7 - 15.7 g/dL    Hematocrit 49.2 (H) 35.0 - 47.0 %    MCV 97 78 - 100 fl    MCH 33.8 (H) 26.5 - 33.0 pg    MCHC 35.0 31.5 - 36.5 g/dL    RDW 12.6 10.0 - 15.0 %    Platelet Count 185 150 - 450 10e9/L    Diff Method Automated Method     % Neutrophils 82.4 %    % Lymphocytes 10.4 %    % Monocytes 6.3 %    % Eosinophils 0.3 %    % Basophils 0.2 %    % Immature Granulocytes 0.4 %    Nucleated RBCs 0 0 /100    Absolute Neutrophil 11.3 (H) 1.6 - 8.3 10e9/L    Absolute Lymphocytes 1.4 0.8 - 5.3 10e9/L    Absolute Monocytes 0.9 0.0 - 1.3 10e9/L    Absolute Eosinophils 0.0 0.0 - 0.7 10e9/L    Absolute Basophils 0.0 0.0 - 0.2 10e9/L    Abs Immature Granulocytes 0.1 0 - 0.4 10e9/L    Absolute Nucleated RBC 0.0    Comprehensive metabolic panel     Status: Abnormal   Result Value Ref Range    Sodium 129 (L) 133 - 144 mmol/L    Potassium 4.1 3.4 - 5.3 mmol/L    Chloride 95 94 - 109 mmol/L    Carbon Dioxide 25 20 - 32 mmol/L    Anion Gap 9 3 - 14 mmol/L    Glucose 107 (H) 70 - 99 mg/dL    Urea Nitrogen 9 7 - 30 mg/dL    Creatinine 0.54 0.52 - 1.04 mg/dL    GFR Estimate >90 >60 mL/min/[1.73_m2]    GFR Estimate If Black >90 >60 mL/min/[1.73_m2]    Calcium 9.1 8.5 - 10.1 mg/dL    Bilirubin Total 0.7 0.2 - 1.3 mg/dL    Albumin 3.2 (L) 3.4 - 5.0 g/dL    Protein Total 7.3 6.8 - 8.8 g/dL    Alkaline Phosphatase 131 40 - 150 U/L    ALT 57 (H) 0 - 50 U/L    AST 32 0 - 45 U/L   Alcohol ethyl     Status:  None   Result Value Ref Range    Ethanol g/dL <0.01 0.01 g/dL   UA reflex to Microscopic     Status: Abnormal   Result Value Ref Range    Color Urine Yellow     Appearance Urine Slightly Cloudy     Glucose Urine Negative NEG^Negative mg/dL    Bilirubin Urine Negative NEG^Negative    Ketones Urine Negative NEG^Negative mg/dL    Specific Gravity Urine 1.017 1.003 - 1.035    Blood Urine Negative NEG^Negative    pH Urine 6.0 4.7 - 8.0 pH    Protein Albumin Urine 10 (A) NEG^Negative mg/dL    Urobilinogen mg/dL Normal 0.0 - 2.0 mg/dL    Nitrite Urine Negative NEG^Negative    Leukocyte Esterase Urine Moderate (A) NEG^Negative    Source Midstream Urine     RBC Urine 4 (H) 0 - 2 /HPF    WBC Urine 18 (H) 0 - 5 /HPF    Bacteria Urine Few (A) NEG^Negative /HPF    Squamous Epithelial /HPF Urine 19 (H) 0 - 1 /HPF    Mucous Urine Present (A) NEG^Negative /LPF   Urine Drugs of Abuse Screen Panel 13     Status: None   Result Value Ref Range    Cannabinoids (02-xul-4-carboxy-9-THC) Not Detected NDET^Not Detected ng/mL    Phencyclidine (Phencyclidine) Not Detected NDET^Not Detected ng/mL    Cocaine (Benzoylecgonine) Not Detected NDET^Not Detected ng/mL    Methamphetamine (d-Methamphetamine) Not Detected NDET^Not Detected ng/mL    Opiates (Morphine) Not Detected NDET^Not Detected ng/mL    Amphetamine (d-Amphetamine) Not Detected NDET^Not Detected ng/mL    Benzodiazepines (Nordiazepam) Not Detected NDET^Not Detected ng/mL    Tricyclic Antidepressants (Desipramine) Not Detected NDET^Not Detected ng/mL    Methadone (Methadone) Not Detected NDET^Not Detected ng/mL    Barbiturates (Butalbital) Not Detected NDET^Not Detected ng/mL    Oxycodone (Oxycodone) Not Detected NDET^Not Detected ng/mL    Propoxyphene (Norpropoxyphene) Not Detected NDET^Not Detected ng/mL    Buprenorphine (Buprenorphine) Not Detected NDET^Not Detected ng/mL   Asymptomatic COVID-19 Virus (Coronavirus) by PCR     Status: None    Specimen: Nasopharyngeal   Result Value  Ref Range    COVID-19 Virus PCR to [facility] - Source Nasopharyngeal     COVID-19 Virus PCR to [facility] - Result       Test received-See reflex to [place] test SARS CoV2 (COVID-19) Virus RT-PCR   SARS-CoV-2 COVID-19 Virus (Coronavirus) RT-PCR Nasopharyngeal     Status: None    Specimen: Nasopharyngeal   Result Value Ref Range    SARS-CoV-2 Virus Specimen Source Nasopharyngeal     SARS-CoV-2 PCR Result NEGATIVE     SARS-CoV-2 PCR Comment       Testing was performed using the Xpert Xpress SARS-CoV-2 Assay on the Cepheid Gene-Xpert   Instrument Systems. Additional information about this Emergency Use Authorization (EUA)   assay can be found via the Lab Guide.     Basic metabolic panel     Status: Abnormal   Result Value Ref Range    Sodium 133 133 - 144 mmol/L    Potassium 3.1 (L) 3.4 - 5.3 mmol/L    Chloride 100 94 - 109 mmol/L    Carbon Dioxide 26 20 - 32 mmol/L    Anion Gap 7 3 - 14 mmol/L    Glucose 178 (H) 70 - 99 mg/dL    Urea Nitrogen 12 7 - 30 mg/dL    Creatinine 0.52 0.52 - 1.04 mg/dL    GFR Estimate >90 >60 mL/min/[1.73_m2]    GFR Estimate If Black >90 >60 mL/min/[1.73_m2]    Calcium 8.8 8.5 - 10.1 mg/dL   Potassium     Status: None   Result Value Ref Range    Potassium 4.5 3.4 - 5.3 mmol/L              Plan/Treatment Team     BEHAVIORAL TEAM DISCUSSION    Progress: Moderate - Appears to have had slight improvement likely after being treated for uti.   Continued Stay Criteria/Rationale: not as delusional though still believes she is a medium, continued monitoring needed     Medical/Physical: UTI    Precautions: none    Falls precaution?: No  Behavioral Orders   Procedures     Code 1 - Restrict to Unit     Routine Programming     As clinically indicated     Status 15     Every 15 minutes.       Plan for this encounter/Med Changes/Labs:     Filled out pop/[address]    Abilify, invega, latuda, zyprexa    Participants: Poornima Tucker, NP, SW, Nursing

## 2020-08-22 LAB
ALBUMIN SERPL-MCNC: 3 G/DL (ref 3.4–5)
ALBUMIN UR-MCNC: NEGATIVE MG/DL
ALP SERPL-CCNC: 103 U/L (ref 40–150)
ALT SERPL W P-5'-P-CCNC: 61 U/L (ref 0–50)
ANION GAP SERPL CALCULATED.3IONS-SCNC: 4 MMOL/L (ref 3–14)
APPEARANCE UR: CLEAR
AST SERPL W P-5'-P-CCNC: 22 U/L (ref 0–45)
BASOPHILS # BLD AUTO: 0 10E9/L (ref 0–0.2)
BASOPHILS NFR BLD AUTO: 0.6 %
BILIRUB SERPL-MCNC: 0.3 MG/DL (ref 0.2–1.3)
BILIRUB UR QL STRIP: NEGATIVE
BUN SERPL-MCNC: 13 MG/DL (ref 7–30)
CALCIUM SERPL-MCNC: 9 MG/DL (ref 8.5–10.1)
CHLORIDE SERPL-SCNC: 104 MMOL/L (ref 94–109)
CO2 SERPL-SCNC: 27 MMOL/L (ref 20–32)
COLOR UR AUTO: NORMAL
CREAT SERPL-MCNC: 0.59 MG/DL (ref 0.52–1.04)
DIFFERENTIAL METHOD BLD: ABNORMAL
EOSINOPHIL # BLD AUTO: 0.2 10E9/L (ref 0–0.7)
EOSINOPHIL NFR BLD AUTO: 2.2 %
ERYTHROCYTE [DISTWIDTH] IN BLOOD BY AUTOMATED COUNT: 12.1 % (ref 10–15)
GFR SERPL CREATININE-BSD FRML MDRD: >90 ML/MIN/{1.73_M2}
GLUCOSE SERPL-MCNC: 111 MG/DL (ref 70–99)
GLUCOSE UR STRIP-MCNC: NEGATIVE MG/DL
HCT VFR BLD AUTO: 46.5 % (ref 35–47)
HGB BLD-MCNC: 16 G/DL (ref 11.7–15.7)
HGB UR QL STRIP: NEGATIVE
IMM GRANULOCYTES # BLD: 0 10E9/L (ref 0–0.4)
IMM GRANULOCYTES NFR BLD: 0.6 %
KETONES UR STRIP-MCNC: NEGATIVE MG/DL
LEUKOCYTE ESTERASE UR QL STRIP: NEGATIVE
LYMPHOCYTES # BLD AUTO: 1.9 10E9/L (ref 0.8–5.3)
LYMPHOCYTES NFR BLD AUTO: 26.4 %
MCH RBC QN AUTO: 33.7 PG (ref 26.5–33)
MCHC RBC AUTO-ENTMCNC: 34.4 G/DL (ref 31.5–36.5)
MCV RBC AUTO: 98 FL (ref 78–100)
MONOCYTES # BLD AUTO: 0.5 10E9/L (ref 0–1.3)
MONOCYTES NFR BLD AUTO: 7.1 %
NEUTROPHILS # BLD AUTO: 4.5 10E9/L (ref 1.6–8.3)
NEUTROPHILS NFR BLD AUTO: 63.1 %
NITRATE UR QL: NEGATIVE
NRBC # BLD AUTO: 0 10*3/UL
NRBC BLD AUTO-RTO: 0 /100
PH UR STRIP: 6.5 PH (ref 4.7–8)
PLATELET # BLD AUTO: 322 10E9/L (ref 150–450)
POTASSIUM SERPL-SCNC: 4.3 MMOL/L (ref 3.4–5.3)
PROT SERPL-MCNC: 6.7 G/DL (ref 6.8–8.8)
RBC # BLD AUTO: 4.75 10E12/L (ref 3.8–5.2)
SODIUM SERPL-SCNC: 135 MMOL/L (ref 133–144)
SOURCE: NORMAL
SP GR UR STRIP: 1.02 (ref 1–1.03)
UROBILINOGEN UR STRIP-MCNC: NORMAL MG/DL (ref 0–2)
WBC # BLD AUTO: 7.2 10E9/L (ref 4–11)

## 2020-08-22 PROCEDURE — 81003 URINALYSIS AUTO W/O SCOPE: CPT | Performed by: NURSE PRACTITIONER

## 2020-08-22 PROCEDURE — 80053 COMPREHEN METABOLIC PANEL: CPT | Performed by: NURSE PRACTITIONER

## 2020-08-22 PROCEDURE — 85025 COMPLETE CBC W/AUTO DIFF WBC: CPT | Performed by: NURSE PRACTITIONER

## 2020-08-22 PROCEDURE — 36415 COLL VENOUS BLD VENIPUNCTURE: CPT | Performed by: NURSE PRACTITIONER

## 2020-08-22 PROCEDURE — 12400000 ZZH R&B MH

## 2020-08-22 ASSESSMENT — ACTIVITIES OF DAILY LIVING (ADL)
ORAL_HYGIENE: INDEPENDENT
DRESS: SCRUBS (BEHAVIORAL HEALTH)
LAUNDRY: UNABLE TO COMPLETE
HYGIENE/GROOMING: PROMPTS
HYGIENE/GROOMING: INDEPENDENT

## 2020-08-22 NOTE — PLAN OF CARE
Spoke with pt on 15 min checks - was sitting up at bedside - denies pain or discomfort - offered juice as she usually likes a cup of grape - she politely declined.  Continuous camera and 15 min physical checks.   Does have water at bedside.  Slept all night without issue - approx 7.5  - Face to face end of shift report communicated to oncoming RN     Yeny Garcia RN  8/22/2020  6:55 AM

## 2020-08-22 NOTE — PLAN OF CARE
Problem: Adult Behavioral Health Plan of Care  Goal: Patient-Specific Goal (Individualization)  Description: Will have reality based conversations by discharge.   Cooperate with treatment team recommendations including medications.   Will sleep 6-8 hrs nightly.  Attend at least 50% groups.   Remains in [facility] due to possibility of unpredictable behaviors. 8/20/20 Patient able to wean if behavior is appropriate. Treatment Team to address daily.    Outcome: No Change  Note:     VSS, denies pain. Denies all criteria including: SI, SIB, HI or hallucinations--appears responding at times. Affect somewhat improved, remains withdrawn-gives short y/n answers to assessment questions then walks away.   Little interaction with peers while out in lounge.   Weaning appropriately.       Problem: Thought Process Alteration  Goal: Optimal Thought Clarity  Outcome: No Change    Face to face end of shift report communicated to oncoming shift.     Ashley Colorado RN  8/22/2020  1:10 PM

## 2020-08-23 PROCEDURE — 12400000 ZZH R&B MH

## 2020-08-23 PROCEDURE — 99231 SBSQ HOSP IP/OBS SF/LOW 25: CPT | Performed by: NURSE PRACTITIONER

## 2020-08-23 ASSESSMENT — ACTIVITIES OF DAILY LIVING (ADL)
ORAL_HYGIENE: PROMPTS
HYGIENE/GROOMING: PROMPTS
LAUNDRY: UNABLE TO COMPLETE
DRESS: SCRUBS (BEHAVIORAL HEALTH)

## 2020-08-23 ASSESSMENT — MIFFLIN-ST. JEOR: SCORE: 1433.26

## 2020-08-23 NOTE — PLAN OF CARE
"PT provided urine sample at the beginning of shift, no abnormal results. No symptoms or c/o from patient. PT weaning intermittently and appropriately. PT did not attend group. PT denies all criteria but appears to be responding to stimuli at times. PT appears paranoid at times stating that \"someone tried to break into my room earlier.\"  PT has remained withdrawn and isolative throughout shift.   PT room untidy and odorous. PT lacking grooming and appears disheveled.  PT appears irritable at times throughout shift with pressured speech.  PT has been in bed throughout most of shift.  PT declines any PRN medication offers.      Face to face end of shift report communicated to oncoming RN    Jessy Winter RN  8/22/2020  10:26 PM            Problem: Adult Behavioral Health Plan of Care  Goal: Patient-Specific Goal (Individualization)  Description: Will have reality based conversations by discharge.   Cooperate with treatment team recommendations including medications.   Will sleep 6-8 hrs nightly.  Attend at least 50% groups.   Remains in [facility] due to possibility of unpredictable behaviors. 8/20/20 Patient able to wean if behavior is appropriate. Treatment Team to address daily.    8/22/2020 1948 by Jessy Winter, RN  Outcome: No Change  Note:        Problem: Thought Process Alteration  Goal: Optimal Thought Clarity  8/22/2020 1948 by Jessy Winter, RN  Outcome: No Change     "

## 2020-08-23 NOTE — PLAN OF CARE
"Observed pt lying in a supine position - was awake at this time and became upset with her door being open  - pt states \"stop opening my door\" advised pt I had to do fifteen minute checks - then pt advised me the \"governor of [place] is coming in the morning to get me\" she then said  \"im the governor of the room\" agreed with pt that she could be the governor of her room - but I still needed to check on her.  Denies pain or discomfort at this time.  It is now  0650 and pt has been much more pleasant with staff did request and receive ice water. Was compliant with vitals check this am - Face to face end of shift report communicated to michele DUMONT.     Yeny Garcia RN  8/23/2020  6:53 AM        "

## 2020-08-23 NOTE — PLAN OF CARE
"Face to face end of shift report communicated to oncoming shift.     Shamika Zurita RN  8/23/2020  2:52 PM    Patient does not hold a conversation with this writer.  When brought her multi-vitamin this shift patient states \"I do not need that I do not have any infections going on inside of me at this time so there is no need for that\"  \"I don't know why they prescribe it\"  explained to patient it's routine sometimes to have a multi-vitamin to maintain a healthy status.    Patient is untidy, disheveled appearance.  Patient encouraged to shower.  States \"I will later\"  Patient weans for short periods of time 20-30 minutes and requests to go back to the MHICU.    Unable to assess for HI and SI.  Patient appears responding to external stimuli.     Face to face start of shift report received from Yeny RAMSEY RN  Patient in bed at this time, will continue to monitor.     Problem: Adult Behavioral Health Plan of Care  Goal: Patient-Specific Goal (Individualization)  Description: Will have reality based conversations by discharge.   Cooperate with treatment team recommendations including medications.   Will sleep 6-8 hrs nightly.  Attend at least 50% groups.   Remains in [facility] due to possibility of unpredictable behaviors. 8/20/20 Patient able to wean if behavior is appropriate. Treatment Team to address daily.    Outcome: No Change     Problem: Thought Process Alteration  Goal: Optimal Thought Clarity  Outcome: No Change     "

## 2020-08-23 NOTE — PROGRESS NOTES
"[facility]  Psychiatric Progress Note      Impression:     Patient again resting in her room.  She reports feeling good, denies depressed mood, no evidence of manic behavior, and she states she is resting now in her room because it is cool in here now and states \"they cranked up the heat last night it was uncomfortable\" - notably cooler in her room at present.  Patient denies suicidal or homicidal thoughts and does not make any delusional statements or appear pre-occupied today - although in review of nursing notes, she continue to make delusional statements at times and at times, is dismissive.  She again declines needing any medications.  She states she will take a shower later, encourage to shower regularly.      Educated regarding medication indications, risks, benefits, side effects, contraindications and possible interactions. Verbally expressed understanding.        Diagnoses:     Schizophrenia, paranoid type    r/o schizoaffective disorder, bipolar type    Attestation:  Patient has been seen and evaluated by me,  MADI Albarado CNP          Interim History:   The patient's care was discussed with the treatment team and chart notes were reviewed.            Medications:     Current Facility-Administered Medications   Medication     acetaminophen (TYLENOL) tablet 650 mg     albuterol (PROAIR HFA/PROVENTIL HFA/VENTOLIN HFA) 108 (90 Base) MCG/ACT inhaler 1-2 puff     cloNIDine (CATAPRES) tablet 0.1 mg     hydrOXYzine (ATARAX) tablet 30-50 mg     LORazepam (ATIVAN) tablet 1-2 mg     magnesium hydroxide (MILK OF MAGNESIA) suspension 30 mL     multivitamin w/minerals (THERA-VIT-M) tablet 1 tablet     nicotine (NICORETTE) gum 2-4 mg     OLANZapine (zyPREXA) tablet 5-10 mg    Or     OLANZapine (zyPREXA) injection 10 mg     QUEtiapine (SEROquel) tablet 25-50 mg          10 point ROS denies uti symptoms though ua positive       Allergies:     Allergies   Allergen Reactions     Shrimp " Anaphylaxis     Risperdal [Risperidone] Other (See Comments)     Elevated prolactin            Psychiatric Examination:   /74   Pulse 93   Temp (!) 93  F (33.9  C) (Tympanic)   Resp 15   Ht 1.524 m (5')   Wt 89.7 kg (197 lb 11.2 oz)   SpO2 93%   BMI 38.61 kg/m    Weight is 197 lbs 11.2 oz  Body mass index is 38.61 kg/m .    MSE/PSYCH  PSYCHIATRIC EXAM  /74   Pulse 93   Temp (!) 93  F (33.9  C) (Tympanic)   Resp 15   Ht 1.524 m (5')   Wt 89.7 kg (197 lb 11.2 oz)   SpO2 93%   BMI 38.61 kg/m    -Appearance/Behavior: appropriate, less disorganized, in need of shower  -Motor: intact  -Gait: intact  -Abnormal involuntary movements: none  -Mood: grandiose, cooperative  -Affect: restritcted  -Speech: less pressured, less disorganized  -Thought process/associations:  improving  -Thought content:  Likely continued delusion, although no statements today, seems more organized  -Perceptual disturbances: denies si/hi, has been observed responding   -Suicidal/Homicidal Ideation: denies    -Judgment: poor  -Insight: poor  *Orientation: time, place and person.  *Memory: difficult to assess  *Attention: poor  *Language: fluent, no aphasias, able to repeat phrases and name objects. Vocab intact.  *Fund of information: difficult to assess, appears appropriate  *Cognitive functioning estimate: 0 - independent.           Labs:     Results for orders placed or performed during the hospital encounter of 08/10/20   CBC with platelets differential     Status: Abnormal   Result Value Ref Range    WBC 13.7 (H) 4.0 - 11.0 10e9/L    RBC Count 5.09 3.8 - 5.2 10e12/L    Hemoglobin 17.2 (H) 11.7 - 15.7 g/dL    Hematocrit 49.2 (H) 35.0 - 47.0 %    MCV 97 78 - 100 fl    MCH 33.8 (H) 26.5 - 33.0 pg    MCHC 35.0 31.5 - 36.5 g/dL    RDW 12.6 10.0 - 15.0 %    Platelet Count 185 150 - 450 10e9/L    Diff Method Automated Method     % Neutrophils 82.4 %    % Lymphocytes 10.4 %    % Monocytes 6.3 %    % Eosinophils 0.3 %    %  Basophils 0.2 %    % Immature Granulocytes 0.4 %    Nucleated RBCs 0 0 /100    Absolute Neutrophil 11.3 (H) 1.6 - 8.3 10e9/L    Absolute Lymphocytes 1.4 0.8 - 5.3 10e9/L    Absolute Monocytes 0.9 0.0 - 1.3 10e9/L    Absolute Eosinophils 0.0 0.0 - 0.7 10e9/L    Absolute Basophils 0.0 0.0 - 0.2 10e9/L    Abs Immature Granulocytes 0.1 0 - 0.4 10e9/L    Absolute Nucleated RBC 0.0    Comprehensive metabolic panel     Status: Abnormal   Result Value Ref Range    Sodium 129 (L) 133 - 144 mmol/L    Potassium 4.1 3.4 - 5.3 mmol/L    Chloride 95 94 - 109 mmol/L    Carbon Dioxide 25 20 - 32 mmol/L    Anion Gap 9 3 - 14 mmol/L    Glucose 107 (H) 70 - 99 mg/dL    Urea Nitrogen 9 7 - 30 mg/dL    Creatinine 0.54 0.52 - 1.04 mg/dL    GFR Estimate >90 >60 mL/min/[1.73_m2]    GFR Estimate If Black >90 >60 mL/min/[1.73_m2]    Calcium 9.1 8.5 - 10.1 mg/dL    Bilirubin Total 0.7 0.2 - 1.3 mg/dL    Albumin 3.2 (L) 3.4 - 5.0 g/dL    Protein Total 7.3 6.8 - 8.8 g/dL    Alkaline Phosphatase 131 40 - 150 U/L    ALT 57 (H) 0 - 50 U/L    AST 32 0 - 45 U/L   Alcohol ethyl     Status: None   Result Value Ref Range    Ethanol g/dL <0.01 0.01 g/dL   UA reflex to Microscopic     Status: Abnormal   Result Value Ref Range    Color Urine Yellow     Appearance Urine Slightly Cloudy     Glucose Urine Negative NEG^Negative mg/dL    Bilirubin Urine Negative NEG^Negative    Ketones Urine Negative NEG^Negative mg/dL    Specific Gravity Urine 1.017 1.003 - 1.035    Blood Urine Negative NEG^Negative    pH Urine 6.0 4.7 - 8.0 pH    Protein Albumin Urine 10 (A) NEG^Negative mg/dL    Urobilinogen mg/dL Normal 0.0 - 2.0 mg/dL    Nitrite Urine Negative NEG^Negative    Leukocyte Esterase Urine Moderate (A) NEG^Negative    Source Midstream Urine     RBC Urine 4 (H) 0 - 2 /HPF    WBC Urine 18 (H) 0 - 5 /HPF    Bacteria Urine Few (A) NEG^Negative /HPF    Squamous Epithelial /HPF Urine 19 (H) 0 - 1 /HPF    Mucous Urine Present (A) NEG^Negative /LPF   Urine Drugs of  Abuse Screen Panel 13     Status: None   Result Value Ref Range    Cannabinoids (72-wtm-4-carboxy-9-THC) Not Detected NDET^Not Detected ng/mL    Phencyclidine (Phencyclidine) Not Detected NDET^Not Detected ng/mL    Cocaine (Benzoylecgonine) Not Detected NDET^Not Detected ng/mL    Methamphetamine (d-Methamphetamine) Not Detected NDET^Not Detected ng/mL    Opiates (Morphine) Not Detected NDET^Not Detected ng/mL    Amphetamine (d-Amphetamine) Not Detected NDET^Not Detected ng/mL    Benzodiazepines (Nordiazepam) Not Detected NDET^Not Detected ng/mL    Tricyclic Antidepressants (Desipramine) Not Detected NDET^Not Detected ng/mL    Methadone (Methadone) Not Detected NDET^Not Detected ng/mL    Barbiturates (Butalbital) Not Detected NDET^Not Detected ng/mL    Oxycodone (Oxycodone) Not Detected NDET^Not Detected ng/mL    Propoxyphene (Norpropoxyphene) Not Detected NDET^Not Detected ng/mL    Buprenorphine (Buprenorphine) Not Detected NDET^Not Detected ng/mL   Asymptomatic COVID-19 Virus (Coronavirus) by PCR     Status: None    Specimen: Nasopharyngeal   Result Value Ref Range    COVID-19 Virus PCR to [facility] - Source Nasopharyngeal     COVID-19 Virus PCR to [facility] - Result       Test received-See reflex to [place] test SARS CoV2 (COVID-19) Virus RT-PCR   SARS-CoV-2 COVID-19 Virus (Coronavirus) RT-PCR Nasopharyngeal     Status: None    Specimen: Nasopharyngeal   Result Value Ref Range    SARS-CoV-2 Virus Specimen Source Nasopharyngeal     SARS-CoV-2 PCR Result NEGATIVE     SARS-CoV-2 PCR Comment       Testing was performed using the Xpert Xpress SARS-CoV-2 Assay on the Cepheid Gene-Xpert   Instrument Systems. Additional information about this Emergency Use Authorization (EUA)   assay can be found via the Lab Guide.     Basic metabolic panel     Status: Abnormal   Result Value Ref Range    Sodium 133 133 - 144 mmol/L    Potassium 3.1 (L) 3.4 - 5.3 mmol/L    Chloride 100 94 - 109 mmol/L    Carbon Dioxide 26 20 - 32 mmol/L     Anion Gap 7 3 - 14 mmol/L    Glucose 178 (H) 70 - 99 mg/dL    Urea Nitrogen 12 7 - 30 mg/dL    Creatinine 0.52 0.52 - 1.04 mg/dL    GFR Estimate >90 >60 mL/min/[1.73_m2]    GFR Estimate If Black >90 >60 mL/min/[1.73_m2]    Calcium 8.8 8.5 - 10.1 mg/dL   Potassium     Status: None   Result Value Ref Range    Potassium 4.5 3.4 - 5.3 mmol/L   UA reflex to Microscopic     Status: None   Result Value Ref Range    Color Urine Light Yellow     Appearance Urine Clear     Glucose Urine Negative NEG^Negative mg/dL    Bilirubin Urine Negative NEG^Negative    Ketones Urine Negative NEG^Negative mg/dL    Specific Gravity Urine 1.018 1.003 - 1.035    Blood Urine Negative NEG^Negative    pH Urine 6.5 4.7 - 8.0 pH    Protein Albumin Urine Negative NEG^Negative mg/dL    Urobilinogen mg/dL Normal 0.0 - 2.0 mg/dL    Nitrite Urine Negative NEG^Negative    Leukocyte Esterase Urine Negative NEG^Negative    Source Midstream Urine    CBC with platelets differential     Status: Abnormal   Result Value Ref Range    WBC 7.2 4.0 - 11.0 10e9/L    RBC Count 4.75 3.8 - 5.2 10e12/L    Hemoglobin 16.0 (H) 11.7 - 15.7 g/dL    Hematocrit 46.5 35.0 - 47.0 %    MCV 98 78 - 100 fl    MCH 33.7 (H) 26.5 - 33.0 pg    MCHC 34.4 31.5 - 36.5 g/dL    RDW 12.1 10.0 - 15.0 %    Platelet Count 322 150 - 450 10e9/L    Diff Method Automated Method     % Neutrophils 63.1 %    % Lymphocytes 26.4 %    % Monocytes 7.1 %    % Eosinophils 2.2 %    % Basophils 0.6 %    % Immature Granulocytes 0.6 %    Nucleated RBCs 0 0 /100    Absolute Neutrophil 4.5 1.6 - 8.3 10e9/L    Absolute Lymphocytes 1.9 0.8 - 5.3 10e9/L    Absolute Monocytes 0.5 0.0 - 1.3 10e9/L    Absolute Eosinophils 0.2 0.0 - 0.7 10e9/L    Absolute Basophils 0.0 0.0 - 0.2 10e9/L    Abs Immature Granulocytes 0.0 0 - 0.4 10e9/L    Absolute Nucleated RBC 0.0    Comprehensive metabolic panel     Status: Abnormal   Result Value Ref Range    Sodium 135 133 - 144 mmol/L    Potassium 4.3 3.4 - 5.3 mmol/L     Chloride 104 94 - 109 mmol/L    Carbon Dioxide 27 20 - 32 mmol/L    Anion Gap 4 3 - 14 mmol/L    Glucose 111 (H) 70 - 99 mg/dL    Urea Nitrogen 13 7 - 30 mg/dL    Creatinine 0.59 0.52 - 1.04 mg/dL    GFR Estimate >90 >60 mL/min/[1.73_m2]    GFR Estimate If Black >90 >60 mL/min/[1.73_m2]    Calcium 9.0 8.5 - 10.1 mg/dL    Bilirubin Total 0.3 0.2 - 1.3 mg/dL    Albumin 3.0 (L) 3.4 - 5.0 g/dL    Protein Total 6.7 (L) 6.8 - 8.8 g/dL    Alkaline Phosphatase 103 40 - 150 U/L    ALT 61 (H) 0 - 50 U/L    AST 22 0 - 45 U/L              Plan/Treatment Team     BEHAVIORAL TEAM DISCUSSION    Progress: Moderate - Appears to have had slight improvement likely after being treated for uti.   Continued Stay Criteria/Rationale: not as delusional though still believes she is a medium, continued monitoring needed     Medical/Physical: UTI    Precautions: none    Falls precaution?: No  Behavioral Orders   Procedures     Code 1 - Restrict to Unit     Routine Programming     As clinically indicated     Status 15     Every 15 minutes.       Plan for this encounter/Med Changes/Labs:     Filled out pop/[facility] [address]    Abilify, invega, latuda, zyprexa    Participants: Poornima Tucker, NP,  Nursing

## 2020-08-24 ENCOUNTER — APPOINTMENT (OUTPATIENT)
Dept: OCCUPATIONAL THERAPY | Facility: HOSPITAL | Age: 50
DRG: 885 | End: 2020-08-24
Attending: NURSE PRACTITIONER
Payer: MEDICARE

## 2020-08-24 PROCEDURE — 99231 SBSQ HOSP IP/OBS SF/LOW 25: CPT | Performed by: NURSE PRACTITIONER

## 2020-08-24 PROCEDURE — 12400000 ZZH R&B MH

## 2020-08-24 PROCEDURE — 97165 OT EVAL LOW COMPLEX 30 MIN: CPT | Mod: GO

## 2020-08-24 ASSESSMENT — ACTIVITIES OF DAILY LIVING (ADL)
DRESS: SCRUBS (BEHAVIORAL HEALTH)
ORAL_HYGIENE: PROMPTS
HYGIENE/GROOMING: INDEPENDENT
HYGIENE/GROOMING: PROMPTS
ORAL_HYGIENE: INDEPENDENT
DRESS: INDEPENDENT

## 2020-08-24 NOTE — PLAN OF CARE
"Face to face shift report received from Liss TRIANA RN. Rounding completed, pt observed.  Jaye Pickard RN  8/24/2020  4:22 PM    Problem: Adult Behavioral Health Plan of Care  Goal: Patient-Specific Goal (Individualization)  Description: Will have reality based conversations by discharge.   Cooperate with treatment team recommendations including medications.   Will sleep 6-8 hrs nightly.  Attend at least 50% groups.   Remains in [facility] due to possibility of unpredictable behaviors. 8/24/20 Patient able to wean if behavior is appropriate. Treatment Team to address daily.    8/24/2020 1619 by Jaye Pickard RN  Outcome: No Change  Note: Shift Summery:  Patient remains in a room in the MHICU r/t unpredictable behavior although mood is cooperative.  Patient able to wean to the open unit as appropriate.  Patient quite talkative with this nurse this shift.  Patient had spoken to her daughter on the phone and affect more tense.  Patient stated that her daughter told her that she will \"probably have to start taking a neuroleptic medication after the court hearing tomorrow.\"  Nurse asked her if she had ever taken them in the past.  Patient states that she took Riperdal \"for year\".  Nurse asked about it being listed as an allergy and patient stated \"that must be wrong.  You need to take that off\".  Nurse explained that it shows that she had an elevated Prolactin level is why it was listed as an allergy.  Patient asked \"Do you mean the breast thing\". Nurse confirmed and patient stated that this happened after it being increased.  States she still would prefer this medication to others and goes on to list other meds that made her feel \"like I had a lobotomy\".  Patient names Latuda as one that made her feel that way.  Patient states that she has \"ADHD\" as her only dx.  Patient thanked nurse for information on medications when returning to MHICU.    Problem: Thought Process Alteration  Goal: Optimal Thought Clarity  8/24/2020 " 1619 by Jaye Pickard, RN  Outcome: No Change     Face to face report will be communicated to oncoming RN.

## 2020-08-24 NOTE — PLAN OF CARE
Observed pt in the [facility] area - having a snack and milk - did go back to the MHICU unit without issue.  Some restlessness noted- did sleep approx 6.5 to 7 hours - only  Request was for grahams and icecubes - and that was given to her.  Pt was appropriate and pleasant to staff.Face to face end of shift report communicated to oncoming  Staff..     Yeny Garcia RN  8/24/2020  6:50 AM

## 2020-08-24 NOTE — PROGRESS NOTES
"[facility]  Psychiatric Progress Note      Impression:     Patient sleeping this morning, awakes easily to talk.  She reports \"I'm good, I feel good\" and adds that dinner last night was terrible and glad that breakfast was good so now she feels better.  She reports sleeping better at night now that there is not as much noise on the unit and her room is cooler.  Patient is encouraged to shower, which she does say she did not do yesterday but will shower today.  She denies auditory hallucination and denies suicidal thoughts, patient denies she has mental illness.  She has been refusing the multivitamin every day, which is the only scheduled medication she has, she would like it discontinued.  Patient is not asking for PRN medications for any reason.  She is aware she has court tomorrow morning and does not have any questions for me currently.  She declines needing anything else.     Educated regarding medication indications, risks, benefits, side effects, contraindications and possible interactions. Verbally expressed understanding.        Diagnoses:     Schizophrenia, paranoid type vs schizoaffective disorder, bipolar type    Attestation:  Patient has been seen and evaluated by me,  MADI Albarado CNP          Interim History:   The patient's care was discussed with the treatment team and chart notes were reviewed.            Medications:     Current Facility-Administered Medications   Medication     acetaminophen (TYLENOL) tablet 650 mg     albuterol (PROAIR HFA/PROVENTIL HFA/VENTOLIN HFA) 108 (90 Base) MCG/ACT inhaler 1-2 puff     cloNIDine (CATAPRES) tablet 0.1 mg     hydrOXYzine (ATARAX) tablet 30-50 mg     LORazepam (ATIVAN) tablet 1-2 mg     magnesium hydroxide (MILK OF MAGNESIA) suspension 30 mL     multivitamin w/minerals (THERA-VIT-M) tablet 1 tablet     nicotine (NICORETTE) gum 2-4 mg     OLANZapine (zyPREXA) tablet 5-10 mg    Or     OLANZapine (zyPREXA) injection 10 mg     " QUEtiapine (SEROquel) tablet 25-50 mg          10 point ROS denies uti symptoms though ua positive       Allergies:     Allergies   Allergen Reactions     Shrimp Anaphylaxis     Risperdal [Risperidone] Other (See Comments)     Elevated prolactin            Psychiatric Examination:   /75   Pulse 89   Temp 97.3  F (36.3  C) (Tympanic)   Resp 15   Ht 1.524 m (5')   Wt 89.7 kg (197 lb 11.2 oz)   SpO2 95%   BMI 38.61 kg/m    Weight is 197 lbs 11.2 oz  Body mass index is 38.61 kg/m .    MSE/PSYCH  PSYCHIATRIC EXAM  /75   Pulse 89   Temp 97.3  F (36.3  C) (Tympanic)   Resp 15   Ht 1.524 m (5')   Wt 89.7 kg (197 lb 11.2 oz)   SpO2 95%   BMI 38.61 kg/m    -Appearance/Behavior: appropriate, less disorganized, in need of shower  -Motor: intact  -Gait: intact  -Abnormal involuntary movements: none  -Mood: grandiose, cooperative  -Affect: restritcted  -Speech: less pressured, less disorganized  -Thought process/associations:  improving  -Thought content:  Likely continued delusion, although no statements made again today, seems more organized  -Perceptual disturbances: denies si/hi, has been observed responding   -Suicidal/Homicidal Ideation: denies    -Judgment: poor  -Insight: poor  *Orientation: time, place and person.  *Memory: difficult to assess  *Attention: poor  *Language: fluent, no aphasias, able to repeat phrases and name objects. Vocab intact.  *Fund of information: difficult to assess, appears appropriate  *Cognitive functioning estimate: 0 - independent.           Labs:     Results for orders placed or performed during the hospital encounter of 08/10/20   CBC with platelets differential     Status: Abnormal   Result Value Ref Range    WBC 13.7 (H) 4.0 - 11.0 10e9/L    RBC Count 5.09 3.8 - 5.2 10e12/L    Hemoglobin 17.2 (H) 11.7 - 15.7 g/dL    Hematocrit 49.2 (H) 35.0 - 47.0 %    MCV 97 78 - 100 fl    MCH 33.8 (H) 26.5 - 33.0 pg    MCHC 35.0 31.5 - 36.5 g/dL    RDW 12.6 10.0 - 15.0 %     Platelet Count 185 150 - 450 10e9/L    Diff Method Automated Method     % Neutrophils 82.4 %    % Lymphocytes 10.4 %    % Monocytes 6.3 %    % Eosinophils 0.3 %    % Basophils 0.2 %    % Immature Granulocytes 0.4 %    Nucleated RBCs 0 0 /100    Absolute Neutrophil 11.3 (H) 1.6 - 8.3 10e9/L    Absolute Lymphocytes 1.4 0.8 - 5.3 10e9/L    Absolute Monocytes 0.9 0.0 - 1.3 10e9/L    Absolute Eosinophils 0.0 0.0 - 0.7 10e9/L    Absolute Basophils 0.0 0.0 - 0.2 10e9/L    Abs Immature Granulocytes 0.1 0 - 0.4 10e9/L    Absolute Nucleated RBC 0.0    Comprehensive metabolic panel     Status: Abnormal   Result Value Ref Range    Sodium 129 (L) 133 - 144 mmol/L    Potassium 4.1 3.4 - 5.3 mmol/L    Chloride 95 94 - 109 mmol/L    Carbon Dioxide 25 20 - 32 mmol/L    Anion Gap 9 3 - 14 mmol/L    Glucose 107 (H) 70 - 99 mg/dL    Urea Nitrogen 9 7 - 30 mg/dL    Creatinine 0.54 0.52 - 1.04 mg/dL    GFR Estimate >90 >60 mL/min/[1.73_m2]    GFR Estimate If Black >90 >60 mL/min/[1.73_m2]    Calcium 9.1 8.5 - 10.1 mg/dL    Bilirubin Total 0.7 0.2 - 1.3 mg/dL    Albumin 3.2 (L) 3.4 - 5.0 g/dL    Protein Total 7.3 6.8 - 8.8 g/dL    Alkaline Phosphatase 131 40 - 150 U/L    ALT 57 (H) 0 - 50 U/L    AST 32 0 - 45 U/L   Alcohol ethyl     Status: None   Result Value Ref Range    Ethanol g/dL <0.01 0.01 g/dL   UA reflex to Microscopic     Status: Abnormal   Result Value Ref Range    Color Urine Yellow     Appearance Urine Slightly Cloudy     Glucose Urine Negative NEG^Negative mg/dL    Bilirubin Urine Negative NEG^Negative    Ketones Urine Negative NEG^Negative mg/dL    Specific Gravity Urine 1.017 1.003 - 1.035    Blood Urine Negative NEG^Negative    pH Urine 6.0 4.7 - 8.0 pH    Protein Albumin Urine 10 (A) NEG^Negative mg/dL    Urobilinogen mg/dL Normal 0.0 - 2.0 mg/dL    Nitrite Urine Negative NEG^Negative    Leukocyte Esterase Urine Moderate (A) NEG^Negative    Source Midstream Urine     RBC Urine 4 (H) 0 - 2 /HPF    WBC Urine 18 (H) 0 - 5  /HPF    Bacteria Urine Few (A) NEG^Negative /HPF    Squamous Epithelial /HPF Urine 19 (H) 0 - 1 /HPF    Mucous Urine Present (A) NEG^Negative /LPF   Urine Drugs of Abuse Screen Panel 13     Status: None   Result Value Ref Range    Cannabinoids (01-pvs-1-carboxy-9-THC) Not Detected NDET^Not Detected ng/mL    Phencyclidine (Phencyclidine) Not Detected NDET^Not Detected ng/mL    Cocaine (Benzoylecgonine) Not Detected NDET^Not Detected ng/mL    Methamphetamine (d-Methamphetamine) Not Detected NDET^Not Detected ng/mL    Opiates (Morphine) Not Detected NDET^Not Detected ng/mL    Amphetamine (d-Amphetamine) Not Detected NDET^Not Detected ng/mL    Benzodiazepines (Nordiazepam) Not Detected NDET^Not Detected ng/mL    Tricyclic Antidepressants (Desipramine) Not Detected NDET^Not Detected ng/mL    Methadone (Methadone) Not Detected NDET^Not Detected ng/mL    Barbiturates (Butalbital) Not Detected NDET^Not Detected ng/mL    Oxycodone (Oxycodone) Not Detected NDET^Not Detected ng/mL    Propoxyphene (Norpropoxyphene) Not Detected NDET^Not Detected ng/mL    Buprenorphine (Buprenorphine) Not Detected NDET^Not Detected ng/mL   Asymptomatic COVID-19 Virus (Coronavirus) by PCR     Status: None    Specimen: Nasopharyngeal   Result Value Ref Range    COVID-19 Virus PCR to [facility] - Source Nasopharyngeal     COVID-19 Virus PCR to [facility] - Result       Test received-See reflex to [place] test SARS CoV2 (COVID-19) Virus RT-PCR   SARS-CoV-2 COVID-19 Virus (Coronavirus) RT-PCR Nasopharyngeal     Status: None    Specimen: Nasopharyngeal   Result Value Ref Range    SARS-CoV-2 Virus Specimen Source Nasopharyngeal     SARS-CoV-2 PCR Result NEGATIVE     SARS-CoV-2 PCR Comment       Testing was performed using the Xpert Xpress SARS-CoV-2 Assay on the Cepheid Gene-Xpert   Instrument Systems. Additional information about this Emergency Use Authorization (EUA)   assay can be found via the Lab Guide.     Basic metabolic panel     Status:  Abnormal   Result Value Ref Range    Sodium 133 133 - 144 mmol/L    Potassium 3.1 (L) 3.4 - 5.3 mmol/L    Chloride 100 94 - 109 mmol/L    Carbon Dioxide 26 20 - 32 mmol/L    Anion Gap 7 3 - 14 mmol/L    Glucose 178 (H) 70 - 99 mg/dL    Urea Nitrogen 12 7 - 30 mg/dL    Creatinine 0.52 0.52 - 1.04 mg/dL    GFR Estimate >90 >60 mL/min/[1.73_m2]    GFR Estimate If Black >90 >60 mL/min/[1.73_m2]    Calcium 8.8 8.5 - 10.1 mg/dL   Potassium     Status: None   Result Value Ref Range    Potassium 4.5 3.4 - 5.3 mmol/L   UA reflex to Microscopic     Status: None   Result Value Ref Range    Color Urine Light Yellow     Appearance Urine Clear     Glucose Urine Negative NEG^Negative mg/dL    Bilirubin Urine Negative NEG^Negative    Ketones Urine Negative NEG^Negative mg/dL    Specific Gravity Urine 1.018 1.003 - 1.035    Blood Urine Negative NEG^Negative    pH Urine 6.5 4.7 - 8.0 pH    Protein Albumin Urine Negative NEG^Negative mg/dL    Urobilinogen mg/dL Normal 0.0 - 2.0 mg/dL    Nitrite Urine Negative NEG^Negative    Leukocyte Esterase Urine Negative NEG^Negative    Source Midstream Urine    CBC with platelets differential     Status: Abnormal   Result Value Ref Range    WBC 7.2 4.0 - 11.0 10e9/L    RBC Count 4.75 3.8 - 5.2 10e12/L    Hemoglobin 16.0 (H) 11.7 - 15.7 g/dL    Hematocrit 46.5 35.0 - 47.0 %    MCV 98 78 - 100 fl    MCH 33.7 (H) 26.5 - 33.0 pg    MCHC 34.4 31.5 - 36.5 g/dL    RDW 12.1 10.0 - 15.0 %    Platelet Count 322 150 - 450 10e9/L    Diff Method Automated Method     % Neutrophils 63.1 %    % Lymphocytes 26.4 %    % Monocytes 7.1 %    % Eosinophils 2.2 %    % Basophils 0.6 %    % Immature Granulocytes 0.6 %    Nucleated RBCs 0 0 /100    Absolute Neutrophil 4.5 1.6 - 8.3 10e9/L    Absolute Lymphocytes 1.9 0.8 - 5.3 10e9/L    Absolute Monocytes 0.5 0.0 - 1.3 10e9/L    Absolute Eosinophils 0.2 0.0 - 0.7 10e9/L    Absolute Basophils 0.0 0.0 - 0.2 10e9/L    Abs Immature Granulocytes 0.0 0 - 0.4 10e9/L    Absolute  Nucleated RBC 0.0    Comprehensive metabolic panel     Status: Abnormal   Result Value Ref Range    Sodium 135 133 - 144 mmol/L    Potassium 4.3 3.4 - 5.3 mmol/L    Chloride 104 94 - 109 mmol/L    Carbon Dioxide 27 20 - 32 mmol/L    Anion Gap 4 3 - 14 mmol/L    Glucose 111 (H) 70 - 99 mg/dL    Urea Nitrogen 13 7 - 30 mg/dL    Creatinine 0.59 0.52 - 1.04 mg/dL    GFR Estimate >90 >60 mL/min/[1.73_m2]    GFR Estimate If Black >90 >60 mL/min/[1.73_m2]    Calcium 9.0 8.5 - 10.1 mg/dL    Bilirubin Total 0.3 0.2 - 1.3 mg/dL    Albumin 3.0 (L) 3.4 - 5.0 g/dL    Protein Total 6.7 (L) 6.8 - 8.8 g/dL    Alkaline Phosphatase 103 40 - 150 U/L    ALT 61 (H) 0 - 50 U/L    AST 22 0 - 45 U/L              Plan/Treatment Team     BEHAVIORAL TEAM DISCUSSION    Progress: Moderate - Appears to have had slight improvement likely after being treated for uti.   Continued Stay Criteria/Rationale: not as delusional though still believes she is a medium, continued monitoring needed     Medical/Physical: UTI    Precautions: none    Falls precaution?: No  Behavioral Orders   Procedures     Code 1 - Restrict to Unit     Routine Programming     As clinically indicated     Status 15     Every 15 minutes.       Plan for this encounter/Med Changes/Labs:     Filled out pop/Commitment court 25th    Abilify, invega, latuda, zyprexa    Participants: Poornima Tucker, NP,  Nursing, SW

## 2020-08-24 NOTE — PLAN OF CARE
Discharge Planner OT   Patient plan for discharge[place]  Current status: Eval and cog testing completed.  Pt scored 26/30 on MOCA indicating normal cognition.  See more specific written in eval.    Barriers to return to prior living situation: disorganized, poor insight  Recommendations for discharge: gina MONTEJO tomorrow  Rationale for recommendations: clinical reasoning       Entered by: Poornima Reyna 08/24/2020 3:38 PM

## 2020-08-24 NOTE — PROGRESS NOTES
Behavioral Health Occupational Therapy Eval      Name: Glenda Robins MRN# [id number]   Age: 50 year old YOB: 1970     Date of Consultation: 2020  Primary care provider: Adenike Ref-Primary, Physician    Referring Physician: Poornima Tucker  Orders: Eval and Treat  Medical Diagnosis: disorganized behavior  Onset of Illness/Injury: 8/10/20    Prior Level of Function: Pt was brought the ER by daughter with possible psychosis.  Daughter reports she has episodes like this for about 4 months at a time.  Daugter father and other men.  Daughter also states that pt was making homicidal comments towards hOT evaluation for cognitive testing took place on 3/26/18.  Pt scored 25/30 in the SLUMS indicating below normal cognition (score of 27 or above is considered normal) but scored 5.8/5.8 on the ACL indicating the pt is able to live and work independently.  Recommendation at that time was for pt to return to her fathers house.      Current Level of Function: Pt is in the MHICU but is weaning appropriately.  Completed the MOCA version 8.1 and pt scored 26/30 where score of 26 or above is considered normal.  Of note, pt reports having ADHD and poor concentration and lacks the ability to focus.  Pt scored 6/6 in the area of attention on this particular cognitive assessment.  [place] [facility] [place] and had numerous distractions as pt declined wanting to go to a quieter, more private room.     Visuospatial/ Executive Functionin/5  Trail making (alternating letters and numbers): 1  Shape copy: 0/1  Clock drawing: 3/3  Namin/3  Immediate Recall (not scored): 5/5  Patient accurately verbalized 5/5 non-related words  Attention: 6/6   Number repetition:   Number reversal:   Sustained attention: Patient identifying 11/11 targets with x5 false positives  Serial subtraction: Patient completed serial 7 subtractions 5/5 calculations  Language: 3/3  Sentence  repetition: 22 when asked to repeat a sentence word-for-word  Divergent naming: Patient named 13 items beginning with the letter /f/ within one minute  Abstract Thinkin/2  Similarities between 2 items (abstract thinking): 2/2  Delayed Recall: 3/5  Patient recalled 3/5 words after approx 5 minute delay without cues.  Orientation:   Patient oriented to date, month, year, day, place and city     TOTAL SCORE: 26/30      Patient/Family Goal[place]    Fall Screen:   Have you fallen 2 or more times in the last year? No  Have you fallen and had an injury in the last year? No  Timed up & go: NA  Is patient a fall risk? No    Past Medical History:   No past medical history on file.    Past Surgical History:  No past surgical history on file.    Medications:   Current Facility-Administered Medications   Medication     acetaminophen (TYLENOL) tablet 650 mg     albuterol (PROAIR HFA/PROVENTIL HFA/VENTOLIN HFA) 108 (90 Base) MCG/ACT inhaler 1-2 puff     cloNIDine (CATAPRES) tablet 0.1 mg     hydrOXYzine (ATARAX) tablet 30-50 mg     LORazepam (ATIVAN) tablet 1-2 mg     magnesium hydroxide (MILK OF MAGNESIA) suspension 30 mL     nicotine (NICORETTE) gum 2-4 mg     OLANZapine (zyPREXA) tablet 5-10 mg    Or     OLANZapine (zyPREXA) injection 10 mg     QUEtiapine (SEROquel) tablet 25-50 mg       Reason for OT Referral:  Mental Health History: last psych inpatient admit 3/14/18-18.  Hx of being filed on for commitment but was granted voluntary in-lieu    Signs/Symptoms of compliant: disorganized, HI, delusional, poor insight  Aggravating factors/Current Life Stressors: unknown  Current Services: none    Personal Information:  Family Structure: , two daughters  Living Arrangement: lives at her ex-husbands [facility], he would like for her to leave the residence  Finances: SSDI  Medication Management: manages her own   Support System: daughters     Physical Presentation:   Mobility: no  concerns  Strength/ROM: WFL   Comfort/Pain: None reported at this time  Sensory: no concerns     Self Care:   Nutrition: I do okay  Sleep Pattern: I do okay  Exercise: I do okay  Spiritual Practice: I do okay  Leisure: I do okay  Coping Skills: I do okay    Cognition:  Orientation:  time, place and person  Memory: Intact  Safety awareness: Impaired   Attention: Normal   Motivation: Normal   Judgement/Insight: Diminished  Speech/Language: Normal  Mood: Neutral  Affect: Appropriate  Thought Content: Delusions    Goals:   Pt will participate in cog testing to aide in safe discharge planning.      Planned Interventions: MOCA version 8.1    Clinical Impressions:  Criteria for Skilled Therapeutic Intervention Met: no, eval only  OT Diagnosis: impaired cognition  Influenced by the following impairments: delusions, disorganized  Functional limitations due to impairment: home managment  Clinical presentation: Evolving/Changing  Clinical presentation rationale: chart review, pt affect  Clinical Decision making (complexity): Low Complexity  Predicted Duration of Therapy Intervention (days/wks): eval only  Risks and Benefits of therapy have been explained: Yes  Patient, Family & other staff in agreement with plan of care: Yes  Comments: Pt scored 26/30 indicating normal cognition.  Completed the assessment in a noisy and distracting environment.      Total Evaluation Time: 35

## 2020-08-24 NOTE — PLAN OF CARE
Report received from Shamika VICTORIA RN, rounding complete. No needs at this time.     PT has remained in bed throughout shift other than coming out for dinner.   PT is dismissive, and irritable.  PT declines offers for PRN medications.   PT declined encouragement for group attendance.   PT declined shower, and is odorous and disheveled.       Face to face end of shift report to be communicated to oncoming RN    Jessy Winter RN  8/23/2020  9:44 PM         Problem: Adult Behavioral Health Plan of Care  Goal: Patient-Specific Goal (Individualization)  Description: Will have reality based conversations by discharge.   Cooperate with treatment team recommendations including medications.   Will sleep 6-8 hrs nightly.  Attend at least 50% groups.   Remains in MHICU due to possibility of unpredictable behaviors. 8/20/20 Patient able to wean if behavior is appropriate. Treatment Team to address daily.    8/23/2020 2128 by Jessy Winter, RN  Outcome: No Change  Note:        Problem: Thought Process Alteration  Goal: Optimal Thought Clarity  8/23/2020 2128 by Jessy Winter, RN  Outcome: No Change

## 2020-08-24 NOTE — PLAN OF CARE
Pt's  called pt today. Pt has commitment and pop court tomorrow on 8/25 at 8:15. Court admin will be sending e-mail to Mikhail Appiah for call in information. Pt is aware of her court tomorrow morning and has been speaking to her  and has been provided the appropriate paperwork.     Mkihail received call-in info for court hearing tomorrow and requested the medical examiners notes. Court admin will be emailing this to him today.    Received a call from daughter Stacy and she shared her concerns about her mother's mental health. SHELL's have been signed so can keep her updated on her mother's care.

## 2020-08-24 NOTE — PLAN OF CARE
Problem: Adult Behavioral Health Plan of Care  Goal: Patient-Specific Goal (Individualization)  Description: Will have reality based conversations by discharge.   Cooperate with treatment team recommendations including medications.   Will sleep 6-8 hrs nightly.  Attend at least 50% groups.   Remains in MHICU due to possibility of unpredictable behaviors. 8/20/20 Patient able to wean if behavior is appropriate. Treatment Team to address daily.    8/24/2020 0930 by Liss Espinal RN  Outcome: No Change  Note:        Problem: Thought Process Alteration  Goal: Optimal Thought Clarity  8/24/2020 0930 by Liss Espinal RN  Outcome: No Change   Pt. Cooperative with treatment team recommendations, slept 7 hours last night, does not attend group therapy sessions, weaned out to open unit this morning, has been napping in bed since after breakfast, answers questions, but does not initiate conversation, does not socialize with peers, gait is balanced and steady.    Face to face end of shift report will be communicated to oncoming afternoon shift RN.     Liss Espinal RN  8/24/2020  10:26 AM

## 2020-08-25 PROCEDURE — 12400000 ZZH R&B MH

## 2020-08-25 ASSESSMENT — ACTIVITIES OF DAILY LIVING (ADL)
ORAL_HYGIENE: INDEPENDENT
DRESS: SCRUBS (BEHAVIORAL HEALTH);INDEPENDENT
DRESS: INDEPENDENT
HYGIENE/GROOMING: INDEPENDENT
ORAL_HYGIENE: INDEPENDENT
HYGIENE/GROOMING: INDEPENDENT

## 2020-08-25 NOTE — PLAN OF CARE
"  Problem: Adult Behavioral Health Plan of Care  Goal: Patient-Specific Goal (Individualization)  Description: Will have reality based conversations by discharge.   Cooperate with treatment team recommendations including medications.   Will sleep 6-8 hrs nightly.  Attend at least 50% groups.   Remains in MHICU due to possibility of unpredictable behaviors. 8/24/20 Patient able to wean if behavior is appropriate. Treatment Team to address daily.    8/25/2020 0758 by Liss Espinal, RN  Outcome: No Change  Note:        Problem: Thought Process Alteration  Goal: Optimal Thought Clarity  Outcome: No Change   Pt. Was up and ate breakfast in dayroom on open unit, appetite good, slept 6.5 hours last night, is not prescribed any medications, does not have reality based conversation, pt. States \"I am a medium,  I can calm the tsumami's in the ocean, I can tell you believe me, you and one other nurse are the only ones who sat down and talked to me\", is calm during conversation, no outbursts,  Encouraged to attend group therapy sessions, somewhat social with peers.    Face to face end of shift report [facility].     Liss Espinal, RN  8/25/2020  11:10 AM        "

## 2020-08-25 NOTE — PLAN OF CARE
"Face to face shift report received from Liss TRIANA RN. Rounding completed, pt observed.  Jaye Pickard RN  8/25/2020  4:25 PM    Problem: Adult Behavioral Health Plan of Care  Goal: Patient-Specific Goal (Individualization)  Description: Will have reality based conversations by discharge.   Cooperate with treatment team recommendations including medications.   Will sleep 6-8 hrs nightly.  Attend at least 50% groups.   Remains in MHICU due to possibility of unpredictable behaviors. 8/25/20 Patient able to wean if behavior is appropriate. Treatment Team to address daily.    8/25/2020 1624 by Jaye Pickard RN  Outcome: Improving  Note: Shift Summery:  Patient remains in a room in the MHICU as there is no open room on the unit at this time.  Behavior has been appropriate for open unit.  Patient did wean at the beginning of this shift.  Patient informed this writer that she will have a second opinion done and will have another court hearing.  Patient did state that \"I am just going to cooperate with whatever.  There is no reason to fight this as it is already happening\".  Patient ate evening meal and has been resting in bed.  Ptient's daughter Cindy called and updated on court hearing as she is on the SHELL.  Daughter spoke of her and her sister possibly going for guadrianship but states concern of taking away decisions from her mother.  Daughter was encouraged to call anytime with questions/concerns.         Problem: Thought Process Alteration  Goal: Optimal Thought Clarity  8/25/2020 1624 by Jaye Pickard RN  Outcome: Improving  Patient was able to hold a reality based conversation for a few minutes then stated she was fine and excused herself and went to her room.     Face to face report will be communicated to oncoming RN.        "

## 2020-08-25 NOTE — PLAN OF CARE
Problem: Adult Behavioral Health Plan of Care  Goal: Patient-Specific Goal (Individualization)  Description: Will have reality based conversations by discharge.   Cooperate with treatment team recommendations including medications.   Will sleep 6-8 hrs nightly.  Attend at least 50% groups.   Remains in MHICU due to possibility of unpredictable behaviors. 8/24/20 Patient able to wean if behavior is appropriate. Treatment Team to address daily.    8/25/2020 0114 by Aurora Mendez, RN  Outcome: No Change  Note:        Face to face shift report received from Jaye DUMONT. Rounding completed, pt observed.     Pt appeared to be sleeping most of this shift.    Face to face report will be communicated to oncdavid RN.    Aurora Mendez RN  8/25/2020  6:09 AM

## 2020-08-26 PROCEDURE — 99233 SBSQ HOSP IP/OBS HIGH 50: CPT | Performed by: NURSE PRACTITIONER

## 2020-08-26 PROCEDURE — 12400000 ZZH R&B MH

## 2020-08-26 ASSESSMENT — ACTIVITIES OF DAILY LIVING (ADL)
ORAL_HYGIENE: INDEPENDENT
HYGIENE/GROOMING: INDEPENDENT
DRESS: INDEPENDENT

## 2020-08-26 NOTE — PLAN OF CARE
"Face to face shift report received from JULIA Simon. Rounding completed, pt observed.   Problem: Adult Behavioral Health Plan of Care  Goal: Plan of Care Review  Outcome: No Change     Problem: Adult Behavioral Health Plan of Care  Goal: Patient-Specific Goal (Individualization)  Description: Will have reality based conversations by discharge.   Cooperate with treatment team recommendations including medications.   Will sleep 6-8 hrs nightly.  Attend at least 50% groups.   Remains in [facility] due to possibility of unpredictable behaviors. 8/25/20 Patient able to wean if behavior is appropriate. Treatment Team to address daily.    8/26/2020 0927 by Fozia Snowden, RN  Outcome: No Change  Note: Shift Summery:    Pt has been moved into the general unit. She showered this afternoon and appeared to be in a calm mood.  Pt was up for breakfast this morning, ate and went back to bed. Pt reported mood as \"I'm ok\", affect was brighter with raspy speech, pt reported that [facility] have record of possible autoimmune disease but the testing was inconclusive and she is thinking that's why her voice sounds raspy.She also reported having joint pain over the years and it is becoming worse. Prn for pain was offered pt declined and replied \"Im ok for now. Thanks\". Pt displayed safe behavior and is pleasant towards peers/staff. Nursing will continue to monitor pt for any changes in behavior.  Face to face report will be communicated to michele DUMONT.    Fozia Snowden RN  8/26/2020  9:28 AM        Problem: Adult Behavioral Health Plan of Care  Goal: Adheres to Safety Considerations for Self and Others  Outcome: No Change     Problem: Adult Behavioral Health Plan of Care  Goal: Optimized Coping Skills in Response to Life Stressors  Outcome: No Change     Problem: Adult Behavioral Health Plan of Care  Goal: Develops/Participates in Therapeutic [place] to Support Successful Transition  Outcome: No Change     Problem: Thought Process " Alteration  Goal: Optimal Thought Clarity  8/26/2020 0927 by Fozia Snowden, RN  Outcome: No Change

## 2020-08-26 NOTE — PLAN OF CARE
Face to face shift report received from Janae LUCIANO RN. Rounding completed, pt observed.  Jaye Pickard RN  8/26/2020  3:52 PM    Problem: Adult Behavioral Health Plan of Care  Goal: Patient-Specific Goal (Individualization)  Description: Will have reality based conversations by discharge.   Cooperate with treatment team recommendations including medications.   Will sleep 6-8 hrs nightly.  Attend at least 50% groups.       8/26/2020 1551 by Jaye Pickard RN  Outcome: Improving  Note: Shift Summery:  Patient was up on the unit at the start of this shift.  Patient is cooperative and expresses how relieved she is to have moved to a room on the open unit.  Patient requesting to use [facility] teeth whitening strips.  New order to use own up to 30 minutes once weekly.  Patient did use this evening after meal.  Patient indecisive about whether or not she wants to take medications.  Mood is calm.  Patient was encouraged to attend groups.    Problem: Thought Process Alteration  Goal: Optimal Thought Clarity  8/26/2020 1241 by Jaye Pickard RN  Outcome: No Change  Patient is able o make her needs known.  Patient has made no delusional statements this shift.     Face to face report will be communicated to oncoming RN.

## 2020-08-26 NOTE — PLAN OF CARE
Problem: Adult Behavioral Health Plan of Care  Goal: Patient-Specific Goal (Individualization)  Description: Will have reality based conversations by discharge.   Cooperate with treatment team recommendations including medications.   Will sleep 6-8 hrs nightly.  Attend at least 50% groups.   Remains in [facility]ICU due to possibility of unpredictable behaviors. 8/25/20 Patient able to wean if behavior is appropriate. Treatment Team to address daily.    8/26/2020 0031 by Aurora Mendez, RN  Outcome: No Change  Note:        Problem: Thought Process Alteration  Goal: Optimal Thought Clarity  8/26/2020 0031 by Aurora Mendez, RN  Outcome: No Change     Face to face shift report received from Jaye DUMONT. Rounding completed, pt observed.     Pt appeared to sleep a total of 4 hours on and off this shift.     Face to face report will be communicated to oncoming RN.    Aurora Mendez RN  8/26/2020  6:04 AM

## 2020-08-26 NOTE — PLAN OF CARE
Pt will have next hearing on September 4, 2020 at 8:15 am. Notified hospital  Mikhail Appiah and will have court admin set him up with call-in info. Will notify and give pt court paperwork today.    Talked 1:1 with pt and gave them their court paperwork for their Commitment and [place] hearing. Pt stated that she was surprised that it was only the other days that her daughters were able to talk with staff here at the hospital. Explained to pt about how no SHELL's where signed until recently. Pt denies ever being resistant to any services or signing any paperwork. Pt is now open to talking with this writer and utilizing services that can be given during her inpatient stay. Pt denies feeling suicidal and does not need anything from the  at this time.

## 2020-08-26 NOTE — PROGRESS NOTES
"[facility]  Psychiatric Progress Note      Impression:     Glenda looks much less disorganized and less preoccupied than when I saw her last week and she has not been taking any neuroleptic medications or mood stabilizers.  She is not presenting to act like a \"psychic medium\" I did not bring this up and discuss it with her today though I plan on doing this soon again to see what her thoughts are on this.  She states that she is aware she has attention deficit disorder and also believes that she has been diagnosed with a autoimmune disorder and is actually on disability for the autoimmune disorder.  In the past I believe she told me she was on disability for mental health.  She said that she has records of this autoimmune disorder in the [facility] which I likely do have computer access to and plan on reviewing.  I want to make sure that there is not a medical factor contributing to her illness.  I did tell her that when she presented to the hospital she did have a urinary tract infection which was untreated and could have definitely worsened any mental illness she had as once this was treated she started to appear much more organized in her thought process.  She did not argue with this and stated \"well that may be a somewhat true if I do have an autoimmune illness\".  She has been diagnosed with psychotic illness in the past both by [facility] as well as the psychiatric unit.  She did state that I could contact her daughter Cindy and I plan on calling her..    Educated regarding medication indications, risks, benefits, side effects, contraindications and possible interactions. Verbally expressed understanding.        Diagnoses:     Schizophrenia, paranoid type vs schizoaffective disorder, bipolar type    Attestation:  Patient has been seen and evaluated by me,  Haleigh Kolb NP          Interim History:   The patient's care was discussed with the treatment team and chart " notes were reviewed.            Medications:     Current Facility-Administered Medications   Medication     acetaminophen (TYLENOL) tablet 650 mg     albuterol (PROAIR HFA/PROVENTIL HFA/VENTOLIN HFA) 108 (90 Base) MCG/ACT inhaler 1-2 puff     cloNIDine (CATAPRES) tablet 0.1 mg     hydrOXYzine (ATARAX) tablet 30-50 mg     LORazepam (ATIVAN) tablet 1-2 mg     magnesium hydroxide (MILK OF MAGNESIA) suspension 30 mL     nicotine (NICORETTE) gum 2-4 mg     OLANZapine (zyPREXA) tablet 5-10 mg    Or     OLANZapine (zyPREXA) injection 10 mg     QUEtiapine (SEROquel) tablet 25-50 mg          10 point ROS denies uti symptoms though ua positive       Allergies:     Allergies   Allergen Reactions     Shrimp Anaphylaxis     Risperdal [Risperidone] Other (See Comments)     Elevated prolactin            Psychiatric Examination:   /85   Pulse 92   Temp 98.4  F (36.9  C) (Tympanic)   Resp 16   Ht 1.524 m (5')   Wt 89.7 kg (197 lb 11.2 oz)   SpO2 96%   BMI 38.61 kg/m    Weight is 197 lbs 11.2 oz  Body mass index is 38.61 kg/m .    MSE/PSYCH  PSYCHIATRIC EXAM  /85   Pulse 92   Temp 98.4  F (36.9  C) (Tympanic)   Resp 16   Ht 1.524 m (5')   Wt 89.7 kg (197 lb 11.2 oz)   SpO2 96%   BMI 38.61 kg/m    -Appearance/Behavior: appropriate, less disorganized, in need of shower  -Motor: intact  -Gait: intact  -Abnormal involuntary movements: none  -Mood: grandiose, cooperative  -Affect: restritcted  -Speech: less pressured, less disorganized  -Thought process/associations:  improving  -Thought content:  Likely continued delusion, although no statements made again today, seems more organized  -Perceptual disturbances: denies si/hi, has been observed responding   -Suicidal/Homicidal Ideation: denies    -Judgment: poor  -Insight: poor  *Orientation: time, place and person.  *Memory: difficult to assess  *Attention: poor  *Language: fluent, no aphasias, able to repeat phrases and name objects. Vocab intact.  *Fund of  information: difficult to assess, appears appropriate  *Cognitive functioning estimate: 0 - independent.           Labs:     Results for orders placed or performed during the hospital encounter of 08/10/20   CBC with platelets differential     Status: Abnormal   Result Value Ref Range    WBC 13.7 (H) 4.0 - 11.0 10e9/L    RBC Count 5.09 3.8 - 5.2 10e12/L    Hemoglobin 17.2 (H) 11.7 - 15.7 g/dL    Hematocrit 49.2 (H) 35.0 - 47.0 %    MCV 97 78 - 100 fl    MCH 33.8 (H) 26.5 - 33.0 pg    MCHC 35.0 31.5 - 36.5 g/dL    RDW 12.6 10.0 - 15.0 %    Platelet Count 185 150 - 450 10e9/L    Diff Method Automated Method     % Neutrophils 82.4 %    % Lymphocytes 10.4 %    % Monocytes 6.3 %    % Eosinophils 0.3 %    % Basophils 0.2 %    % Immature Granulocytes 0.4 %    Nucleated RBCs 0 0 /100    Absolute Neutrophil 11.3 (H) 1.6 - 8.3 10e9/L    Absolute Lymphocytes 1.4 0.8 - 5.3 10e9/L    Absolute Monocytes 0.9 0.0 - 1.3 10e9/L    Absolute Eosinophils 0.0 0.0 - 0.7 10e9/L    Absolute Basophils 0.0 0.0 - 0.2 10e9/L    Abs Immature Granulocytes 0.1 0 - 0.4 10e9/L    Absolute Nucleated RBC 0.0    Comprehensive metabolic panel     Status: Abnormal   Result Value Ref Range    Sodium 129 (L) 133 - 144 mmol/L    Potassium 4.1 3.4 - 5.3 mmol/L    Chloride 95 94 - 109 mmol/L    Carbon Dioxide 25 20 - 32 mmol/L    Anion Gap 9 3 - 14 mmol/L    Glucose 107 (H) 70 - 99 mg/dL    Urea Nitrogen 9 7 - 30 mg/dL    Creatinine 0.54 0.52 - 1.04 mg/dL    GFR Estimate >90 >60 mL/min/[1.73_m2]    GFR Estimate If Black >90 >60 mL/min/[1.73_m2]    Calcium 9.1 8.5 - 10.1 mg/dL    Bilirubin Total 0.7 0.2 - 1.3 mg/dL    Albumin 3.2 (L) 3.4 - 5.0 g/dL    Protein Total 7.3 6.8 - 8.8 g/dL    Alkaline Phosphatase 131 40 - 150 U/L    ALT 57 (H) 0 - 50 U/L    AST 32 0 - 45 U/L   Alcohol ethyl     Status: None   Result Value Ref Range    Ethanol g/dL <0.01 0.01 g/dL   UA reflex to Microscopic     Status: Abnormal   Result Value Ref Range    Color Urine Yellow      Appearance Urine Slightly Cloudy     Glucose Urine Negative NEG^Negative mg/dL    Bilirubin Urine Negative NEG^Negative    Ketones Urine Negative NEG^Negative mg/dL    Specific Gravity Urine 1.017 1.003 - 1.035    Blood Urine Negative NEG^Negative    pH Urine 6.0 4.7 - 8.0 pH    Protein Albumin Urine 10 (A) NEG^Negative mg/dL    Urobilinogen mg/dL Normal 0.0 - 2.0 mg/dL    Nitrite Urine Negative NEG^Negative    Leukocyte Esterase Urine Moderate (A) NEG^Negative    Source Midstream Urine     RBC Urine 4 (H) 0 - 2 /HPF    WBC Urine 18 (H) 0 - 5 /HPF    Bacteria Urine Few (A) NEG^Negative /HPF    Squamous Epithelial /HPF Urine 19 (H) 0 - 1 /HPF    Mucous Urine Present (A) NEG^Negative /LPF   Urine Drugs of Abuse Screen Panel 13     Status: None   Result Value Ref Range    Cannabinoids (67-tot-0-carboxy-9-THC) Not Detected NDET^Not Detected ng/mL    Phencyclidine (Phencyclidine) Not Detected NDET^Not Detected ng/mL    Cocaine (Benzoylecgonine) Not Detected NDET^Not Detected ng/mL    Methamphetamine (d-Methamphetamine) Not Detected NDET^Not Detected ng/mL    Opiates (Morphine) Not Detected NDET^Not Detected ng/mL    Amphetamine (d-Amphetamine) Not Detected NDET^Not Detected ng/mL    Benzodiazepines (Nordiazepam) Not Detected NDET^Not Detected ng/mL    Tricyclic Antidepressants (Desipramine) Not Detected NDET^Not Detected ng/mL    Methadone (Methadone) Not Detected NDET^Not Detected ng/mL    Barbiturates (Butalbital) Not Detected NDET^Not Detected ng/mL    Oxycodone (Oxycodone) Not Detected NDET^Not Detected ng/mL    Propoxyphene (Norpropoxyphene) Not Detected NDET^Not Detected ng/mL    Buprenorphine (Buprenorphine) Not Detected NDET^Not Detected ng/mL   Asymptomatic COVID-19 Virus (Coronavirus) by PCR     Status: None    Specimen: Nasopharyngeal   Result Value Ref Range    COVID-19 Virus PCR to [facility] - Source Nasopharyngeal     COVID-19 Virus PCR to [facility] - Result       Test received-See reflex to [place] test  SARS CoV2 (COVID-19) Virus RT-PCR   SARS-CoV-2 COVID-19 Virus (Coronavirus) RT-PCR Nasopharyngeal     Status: None    Specimen: Nasopharyngeal   Result Value Ref Range    SARS-CoV-2 Virus Specimen Source Nasopharyngeal     SARS-CoV-2 PCR Result NEGATIVE     SARS-CoV-2 PCR Comment       Testing was performed using the Xpert Xpress SARS-CoV-2 Assay on the Cepheid Gene-Xpert   Instrument Systems. Additional information about this Emergency Use Authorization (EUA)   assay can be found via the Lab Guide.     Basic metabolic panel     Status: Abnormal   Result Value Ref Range    Sodium 133 133 - 144 mmol/L    Potassium 3.1 (L) 3.4 - 5.3 mmol/L    Chloride 100 94 - 109 mmol/L    Carbon Dioxide 26 20 - 32 mmol/L    Anion Gap 7 3 - 14 mmol/L    Glucose 178 (H) 70 - 99 mg/dL    Urea Nitrogen 12 7 - 30 mg/dL    Creatinine 0.52 0.52 - 1.04 mg/dL    GFR Estimate >90 >60 mL/min/[1.73_m2]    GFR Estimate If Black >90 >60 mL/min/[1.73_m2]    Calcium 8.8 8.5 - 10.1 mg/dL   Potassium     Status: None   Result Value Ref Range    Potassium 4.5 3.4 - 5.3 mmol/L   UA reflex to Microscopic     Status: None   Result Value Ref Range    Color Urine Light Yellow     Appearance Urine Clear     Glucose Urine Negative NEG^Negative mg/dL    Bilirubin Urine Negative NEG^Negative    Ketones Urine Negative NEG^Negative mg/dL    Specific Gravity Urine 1.018 1.003 - 1.035    Blood Urine Negative NEG^Negative    pH Urine 6.5 4.7 - 8.0 pH    Protein Albumin Urine Negative NEG^Negative mg/dL    Urobilinogen mg/dL Normal 0.0 - 2.0 mg/dL    Nitrite Urine Negative NEG^Negative    Leukocyte Esterase Urine Negative NEG^Negative    Source Midstream Urine    CBC with platelets differential     Status: Abnormal   Result Value Ref Range    WBC 7.2 4.0 - 11.0 10e9/L    RBC Count 4.75 3.8 - 5.2 10e12/L    Hemoglobin 16.0 (H) 11.7 - 15.7 g/dL    Hematocrit 46.5 35.0 - 47.0 %    MCV 98 78 - 100 fl    MCH 33.7 (H) 26.5 - 33.0 pg    MCHC 34.4 31.5 - 36.5 g/dL    RDW  12.1 10.0 - 15.0 %    Platelet Count 322 150 - 450 10e9/L    Diff Method Automated Method     % Neutrophils 63.1 %    % Lymphocytes 26.4 %    % Monocytes 7.1 %    % Eosinophils 2.2 %    % Basophils 0.6 %    % Immature Granulocytes 0.6 %    Nucleated RBCs 0 0 /100    Absolute Neutrophil 4.5 1.6 - 8.3 10e9/L    Absolute Lymphocytes 1.9 0.8 - 5.3 10e9/L    Absolute Monocytes 0.5 0.0 - 1.3 10e9/L    Absolute Eosinophils 0.2 0.0 - 0.7 10e9/L    Absolute Basophils 0.0 0.0 - 0.2 10e9/L    Abs Immature Granulocytes 0.0 0 - 0.4 10e9/L    Absolute Nucleated RBC 0.0    Comprehensive metabolic panel     Status: Abnormal   Result Value Ref Range    Sodium 135 133 - 144 mmol/L    Potassium 4.3 3.4 - 5.3 mmol/L    Chloride 104 94 - 109 mmol/L    Carbon Dioxide 27 20 - 32 mmol/L    Anion Gap 4 3 - 14 mmol/L    Glucose 111 (H) 70 - 99 mg/dL    Urea Nitrogen 13 7 - 30 mg/dL    Creatinine 0.59 0.52 - 1.04 mg/dL    GFR Estimate >90 >60 mL/min/[1.73_m2]    GFR Estimate If Black >90 >60 mL/min/[1.73_m2]    Calcium 9.0 8.5 - 10.1 mg/dL    Bilirubin Total 0.3 0.2 - 1.3 mg/dL    Albumin 3.0 (L) 3.4 - 5.0 g/dL    Protein Total 6.7 (L) 6.8 - 8.8 g/dL    Alkaline Phosphatase 103 40 - 150 U/L    ALT 61 (H) 0 - 50 U/L    AST 22 0 - 45 U/L              Plan/Treatment Team     BEHAVIORAL TEAM DISCUSSION    Progress: Moderate - Appears to have had slight improvement likely after being treated for uti.   Continued Stay Criteria/Rationale: not as delusional though still believes she is a medium, continued monitoring needed     Medical/Physical: UTI    Precautions: none    Falls precaution?: No  Behavioral Orders   Procedures     Code 1 - Restrict to Unit     Routine Programming     As clinically indicated     Status 15     Every 15 minutes.       Plan for this encounter/Med Changes/Labs:     Filled out pop/Commitment court date undetermined at this time. Needs another examiner     Abilify, invega, latuda, zyprexa    Participants: Haleigh li  NP,  Nursing, SW

## 2020-08-27 PROCEDURE — 12400000 ZZH R&B MH

## 2020-08-27 PROCEDURE — 99232 SBSQ HOSP IP/OBS MODERATE 35: CPT | Performed by: NURSE PRACTITIONER

## 2020-08-27 ASSESSMENT — ACTIVITIES OF DAILY LIVING (ADL)
DRESS: INDEPENDENT
HYGIENE/GROOMING: INDEPENDENT
HYGIENE/GROOMING: INDEPENDENT
ORAL_HYGIENE: INDEPENDENT

## 2020-08-27 NOTE — PLAN OF CARE
Problem: Adult Behavioral Health Plan of Care  Goal: Patient-Specific Goal (Individualization)  Description: Will have reality based conversations by discharge.   Cooperate with treatment team recommendations including medications.   Will sleep 6-8 hrs nightly.  Attend at least 50% groups.       8/26/2020 2348 by Aurora Mendez, RN  Outcome: No Change  Note:        Problem: Thought Process Alteration  Goal: Optimal Thought Clarity  8/26/2020 2348 by Aurora Mendez, RN  Outcome: No Change     Face to face shift report received from Jaye DUMONT. Rounding completed, pt observed.     Pt appeared to be sleeping most of this shift.     Face to face report will be communicated to oncoming RN.    Aurora Mendez RN  8/27/2020  6:09 AM

## 2020-08-27 NOTE — PLAN OF CARE
"Problem: Adult Behavioral Health Plan of Care  Goal: Patient-Specific Goal (Individualization)  Description: Will have reality based conversations by discharge.   Cooperate with treatment team recommendations including medications.   Will sleep 6-8 hrs nightly.  Attend at least 50% groups.     Pt sitting up in lounge at start of shift conversing with peers. Pt compliant with assessment. Denies all criteria; states \"I'm free!\" and then did the sign of the cross.  Endorses feeling sore/stiff but states it usually goes away later in the day. Did states that she has an autoimmune disorder. States that she ate a lot and is tired and going to return to bed. Appears bright. Ate 100% of meals.Spent much of the shift out in lounge reading magazines. Did sit and talk with MHP in lounge for a while. Noted to be whispering to self while looking out of the window this afternoon. Will continue to monitor.    Release faxed for [facility]. Pt did appear slightly suspicious when told that staff could not see records/bloodwork from [facility].    Outcome: Improving    Problem: Thought Process Alteration  Goal: Optimal Thought Clarity    Outcome: Improving    Face to face end of shift report communicated to oncoming RN.     8/27/2020   "

## 2020-08-27 NOTE — PROGRESS NOTES
"[facility]  Psychiatric Progress Note      Impression:     Glenda is still appearing to be doing well. She is more organized and doesn't appear to be preoccupuied as she was. She is more social with other patients. I ask her if she still feels like she is a psychic medium and or has \"the special gifts\" to speak with spirits. She stated \"everything is quieter in my head\". she stated that [facility] doesn't believe people/spirits can enter her body and speak through her to others. She states \"I have de ja vu's but I think a lot of people do\". She does appear a bit gaurded when she talks about these issues. She states [facility] is going to sign a release so I can obtain medical records from [facility]. No records of immnuology were in care everywhere.       Educated regarding medication indications, risks, benefits, side effects, contraindications and possible interactions. Verbally expressed understanding.        Diagnoses:     Schizophrenia, paranoid type vs schizoaffective disorder, bipolar type    Attestation:  Patient has been seen and evaluated by me,  Haleigh Kolb NP          Interim History:   The patient's care was discussed with the treatment team and chart notes were reviewed.            Medications:     Current Facility-Administered Medications   Medication     acetaminophen (TYLENOL) tablet 650 mg     albuterol (PROAIR HFA/PROVENTIL HFA/VENTOLIN HFA) 108 (90 Base) MCG/ACT inhaler 1-2 puff     cloNIDine (CATAPRES) tablet 0.1 mg     hydrOXYzine (ATARAX) tablet 30-50 mg     LORazepam (ATIVAN) tablet 1-2 mg     magnesium hydroxide (MILK OF MAGNESIA) suspension 30 mL     nicotine (NICORETTE) gum 2-4 mg     OLANZapine (zyPREXA) tablet 5-10 mg    Or     OLANZapine (zyPREXA) injection 10 mg     QUEtiapine (SEROquel) tablet 25-50 mg          10 point ROS denies uti symptoms though ua positive       Allergies:     Allergies   Allergen Reactions     Shrimp Anaphylaxis     Risperdal [Risperidone] Other " (See Comments)     Elevated prolactin            Psychiatric Examination:   /80   Pulse 85   Temp 97.7  F (36.5  C) (Tympanic)   Resp 16   Ht 1.524 m (5')   Wt 89.7 kg (197 lb 11.2 oz)   SpO2 95%   BMI 38.61 kg/m    Weight is 197 lbs 11.2 oz  Body mass index is 38.61 kg/m .    MSE/PSYCH  PSYCHIATRIC EXAM  /80   Pulse 85   Temp 97.7  F (36.5  C) (Tympanic)   Resp 16   Ht 1.524 m (5')   Wt 89.7 kg (197 lb 11.2 oz)   SpO2 95%   BMI 38.61 kg/m    -Appearance/Behavior: appropriate, less disorganized, in need of shower  -Motor: intact  -Gait: intact  -Abnormal involuntary movements: none  -Mood: grandiose, cooperative  -Affect: restritcted  -Speech: less pressured, less disorganized  -Thought process/associations:  improving  -Thought content:  Likely continued delusion, although no statements made again today, seems more organized  -Perceptual disturbances: denies si/hi, has been observed responding   -Suicidal/Homicidal Ideation: denies    -Judgment: poor  -Insight: poor  *Orientation: time, place and person.  *Memory: difficult to assess  *Attention: poor  *Language: fluent, no aphasias, able to repeat phrases and name objects. Vocab intact.  *Fund of information: difficult to assess, appears appropriate  *Cognitive functioning estimate: 0 - independent.           Labs:     Results for orders placed or performed during the hospital encounter of 08/10/20   CBC with platelets differential     Status: Abnormal   Result Value Ref Range    WBC 13.7 (H) 4.0 - 11.0 10e9/L    RBC Count 5.09 3.8 - 5.2 10e12/L    Hemoglobin 17.2 (H) 11.7 - 15.7 g/dL    Hematocrit 49.2 (H) 35.0 - 47.0 %    MCV 97 78 - 100 fl    MCH 33.8 (H) 26.5 - 33.0 pg    MCHC 35.0 31.5 - 36.5 g/dL    RDW 12.6 10.0 - 15.0 %    Platelet Count 185 150 - 450 10e9/L    Diff Method Automated Method     % Neutrophils 82.4 %    % Lymphocytes 10.4 %    % Monocytes 6.3 %    % Eosinophils 0.3 %    % Basophils 0.2 %    % Immature Granulocytes  0.4 %    Nucleated RBCs 0 0 /100    Absolute Neutrophil 11.3 (H) 1.6 - 8.3 10e9/L    Absolute Lymphocytes 1.4 0.8 - 5.3 10e9/L    Absolute Monocytes 0.9 0.0 - 1.3 10e9/L    Absolute Eosinophils 0.0 0.0 - 0.7 10e9/L    Absolute Basophils 0.0 0.0 - 0.2 10e9/L    Abs Immature Granulocytes 0.1 0 - 0.4 10e9/L    Absolute Nucleated RBC 0.0    Comprehensive metabolic panel     Status: Abnormal   Result Value Ref Range    Sodium 129 (L) 133 - 144 mmol/L    Potassium 4.1 3.4 - 5.3 mmol/L    Chloride 95 94 - 109 mmol/L    Carbon Dioxide 25 20 - 32 mmol/L    Anion Gap 9 3 - 14 mmol/L    Glucose 107 (H) 70 - 99 mg/dL    Urea Nitrogen 9 7 - 30 mg/dL    Creatinine 0.54 0.52 - 1.04 mg/dL    GFR Estimate >90 >60 mL/min/[1.73_m2]    GFR Estimate If Black >90 >60 mL/min/[1.73_m2]    Calcium 9.1 8.5 - 10.1 mg/dL    Bilirubin Total 0.7 0.2 - 1.3 mg/dL    Albumin 3.2 (L) 3.4 - 5.0 g/dL    Protein Total 7.3 6.8 - 8.8 g/dL    Alkaline Phosphatase 131 40 - 150 U/L    ALT 57 (H) 0 - 50 U/L    AST 32 0 - 45 U/L   Alcohol ethyl     Status: None   Result Value Ref Range    Ethanol g/dL <0.01 0.01 g/dL   UA reflex to Microscopic     Status: Abnormal   Result Value Ref Range    Color Urine Yellow     Appearance Urine Slightly Cloudy     Glucose Urine Negative NEG^Negative mg/dL    Bilirubin Urine Negative NEG^Negative    Ketones Urine Negative NEG^Negative mg/dL    Specific Gravity Urine 1.017 1.003 - 1.035    Blood Urine Negative NEG^Negative    pH Urine 6.0 4.7 - 8.0 pH    Protein Albumin Urine 10 (A) NEG^Negative mg/dL    Urobilinogen mg/dL Normal 0.0 - 2.0 mg/dL    Nitrite Urine Negative NEG^Negative    Leukocyte Esterase Urine Moderate (A) NEG^Negative    Source Midstream Urine     RBC Urine 4 (H) 0 - 2 /HPF    WBC Urine 18 (H) 0 - 5 /HPF    Bacteria Urine Few (A) NEG^Negative /HPF    Squamous Epithelial /HPF Urine 19 (H) 0 - 1 /HPF    Mucous Urine Present (A) NEG^Negative /LPF   Urine Drugs of Abuse Screen Panel 13     Status: None    Result Value Ref Range    Cannabinoids (05-bnb-7-carboxy-9-THC) Not Detected NDET^Not Detected ng/mL    Phencyclidine (Phencyclidine) Not Detected NDET^Not Detected ng/mL    Cocaine (Benzoylecgonine) Not Detected NDET^Not Detected ng/mL    Methamphetamine (d-Methamphetamine) Not Detected NDET^Not Detected ng/mL    Opiates (Morphine) Not Detected NDET^Not Detected ng/mL    Amphetamine (d-Amphetamine) Not Detected NDET^Not Detected ng/mL    Benzodiazepines (Nordiazepam) Not Detected NDET^Not Detected ng/mL    Tricyclic Antidepressants (Desipramine) Not Detected NDET^Not Detected ng/mL    Methadone (Methadone) Not Detected NDET^Not Detected ng/mL    Barbiturates (Butalbital) Not Detected NDET^Not Detected ng/mL    Oxycodone (Oxycodone) Not Detected NDET^Not Detected ng/mL    Propoxyphene (Norpropoxyphene) Not Detected NDET^Not Detected ng/mL    Buprenorphine (Buprenorphine) Not Detected NDET^Not Detected ng/mL   Asymptomatic COVID-19 Virus (Coronavirus) by PCR     Status: None    Specimen: Nasopharyngeal   Result Value Ref Range    COVID-19 Virus PCR to [facility] - Source Nasopharyngeal     COVID-19 Virus PCR to [facility] - Result       Test received-See reflex to [place] test SARS CoV2 (COVID-19) Virus RT-PCR   SARS-CoV-2 COVID-19 Virus (Coronavirus) RT-PCR Nasopharyngeal     Status: None    Specimen: Nasopharyngeal   Result Value Ref Range    SARS-CoV-2 Virus Specimen Source Nasopharyngeal     SARS-CoV-2 PCR Result NEGATIVE     SARS-CoV-2 PCR Comment       Testing was performed using the Xpert Xpress SARS-CoV-2 Assay on the Cepheid Gene-Xpert   Instrument Systems. Additional information about this Emergency Use Authorization (EUA)   assay can be found via the Lab Guide.     Basic metabolic panel     Status: Abnormal   Result Value Ref Range    Sodium 133 133 - 144 mmol/L    Potassium 3.1 (L) 3.4 - 5.3 mmol/L    Chloride 100 94 - 109 mmol/L    Carbon Dioxide 26 20 - 32 mmol/L    Anion Gap 7 3 - 14 mmol/L    Glucose  178 (H) 70 - 99 mg/dL    Urea Nitrogen 12 7 - 30 mg/dL    Creatinine 0.52 0.52 - 1.04 mg/dL    GFR Estimate >90 >60 mL/min/[1.73_m2]    GFR Estimate If Black >90 >60 mL/min/[1.73_m2]    Calcium 8.8 8.5 - 10.1 mg/dL   Potassium     Status: None   Result Value Ref Range    Potassium 4.5 3.4 - 5.3 mmol/L   UA reflex to Microscopic     Status: None   Result Value Ref Range    Color Urine Light Yellow     Appearance Urine Clear     Glucose Urine Negative NEG^Negative mg/dL    Bilirubin Urine Negative NEG^Negative    Ketones Urine Negative NEG^Negative mg/dL    Specific Gravity Urine 1.018 1.003 - 1.035    Blood Urine Negative NEG^Negative    pH Urine 6.5 4.7 - 8.0 pH    Protein Albumin Urine Negative NEG^Negative mg/dL    Urobilinogen mg/dL Normal 0.0 - 2.0 mg/dL    Nitrite Urine Negative NEG^Negative    Leukocyte Esterase Urine Negative NEG^Negative    Source Midstream Urine    CBC with platelets differential     Status: Abnormal   Result Value Ref Range    WBC 7.2 4.0 - 11.0 10e9/L    RBC Count 4.75 3.8 - 5.2 10e12/L    Hemoglobin 16.0 (H) 11.7 - 15.7 g/dL    Hematocrit 46.5 35.0 - 47.0 %    MCV 98 78 - 100 fl    MCH 33.7 (H) 26.5 - 33.0 pg    MCHC 34.4 31.5 - 36.5 g/dL    RDW 12.1 10.0 - 15.0 %    Platelet Count 322 150 - 450 10e9/L    Diff Method Automated Method     % Neutrophils 63.1 %    % Lymphocytes 26.4 %    % Monocytes 7.1 %    % Eosinophils 2.2 %    % Basophils 0.6 %    % Immature Granulocytes 0.6 %    Nucleated RBCs 0 0 /100    Absolute Neutrophil 4.5 1.6 - 8.3 10e9/L    Absolute Lymphocytes 1.9 0.8 - 5.3 10e9/L    Absolute Monocytes 0.5 0.0 - 1.3 10e9/L    Absolute Eosinophils 0.2 0.0 - 0.7 10e9/L    Absolute Basophils 0.0 0.0 - 0.2 10e9/L    Abs Immature Granulocytes 0.0 0 - 0.4 10e9/L    Absolute Nucleated RBC 0.0    Comprehensive metabolic panel     Status: Abnormal   Result Value Ref Range    Sodium 135 133 - 144 mmol/L    Potassium 4.3 3.4 - 5.3 mmol/L    Chloride 104 94 - 109 mmol/L    Carbon Dioxide  27 20 - 32 mmol/L    Anion Gap 4 3 - 14 mmol/L    Glucose 111 (H) 70 - 99 mg/dL    Urea Nitrogen 13 7 - 30 mg/dL    Creatinine 0.59 0.52 - 1.04 mg/dL    GFR Estimate >90 >60 mL/min/[1.73_m2]    GFR Estimate If Black >90 >60 mL/min/[1.73_m2]    Calcium 9.0 8.5 - 10.1 mg/dL    Bilirubin Total 0.3 0.2 - 1.3 mg/dL    Albumin 3.0 (L) 3.4 - 5.0 g/dL    Protein Total 6.7 (L) 6.8 - 8.8 g/dL    Alkaline Phosphatase 103 40 - 150 U/L    ALT 61 (H) 0 - 50 U/L    AST 22 0 - 45 U/L              Plan/Treatment Team     BEHAVIORAL TEAM DISCUSSION    Progress: Moderate - delusions decreased greatly and is on no medicaitons.          Appears to have had slight improvement likely after being treated for uti. UTI likely exacerbated mental illness.     Continued Stay Criteria/Rationale: not as delusional though still believes she is a medium, continued monitoring needed       Medical/Physical: uti was already treated.     Precautions: none    Falls precaution?: No  Behavioral Orders   Procedures     Code 1 - Restrict to Unit     Routine Programming     As clinically indicated     Status 15     Every 15 minutes.       Plan for this encounter/Med Changes/Labs:     Filled out pop/Commitment court date undetermined at this time. Needs another examiner     Abilify, invega, latuda, zyprexa    Participants: Haleigh li NP,  Nursing,

## 2020-08-27 NOTE — PLAN OF CARE
"Face to face shift report received from Becca CHRISTIANSEN RN. Rounding completed, pt observed.  Jaye Pickard RN  8/27/2020  4:01 PM    Problem: Adult Behavioral Health Plan of Care  Goal: Patient-Specific Goal (Individualization)  Description: Will have reality based conversations by discharge.   Cooperate with treatment team recommendations including medications.   Will sleep 6-8 hrs nightly.  Attend at least 50% groups.       8/27/2020 1557 by Jaye Pickard RN  Outcome: Improving  Note: Shift Summery:  Patient was up in the lounge at the start of this shift.  Patient tells this nurse that she should have \"a bunch of records coming on the fax tonight\".  SHELL had been sent to [facility] on day shift.  Nurse informed patient that we were aware and that they will be put into her chart when they arrive.  Patient presents as more guarded today as says \"I'm doing okay\" when nurse attempted to sit and talk with her today.  She is social with peers but does not attend groups.      Problem: Thought Process Alteration  Goal: Optimal Thought Clarity  8/27/2020 0677 by Jaye Pickard RN  Outcome: No Change  Ruthven conversations today as she will not sit with nurse and talk for more than a few minutes at a time.  Patient can make needs known.     Face to face report will be communicated to oncoming RN.        "

## 2020-08-28 ENCOUNTER — APPOINTMENT (OUTPATIENT)
Dept: GENERAL RADIOLOGY | Facility: HOSPITAL | Age: 50
DRG: 885 | End: 2020-08-28
Attending: NURSE PRACTITIONER
Payer: MEDICARE

## 2020-08-28 PROCEDURE — 99232 SBSQ HOSP IP/OBS MODERATE 35: CPT | Performed by: NURSE PRACTITIONER

## 2020-08-28 PROCEDURE — 73060 X-RAY EXAM OF HUMERUS: CPT | Mod: TC,LT

## 2020-08-28 PROCEDURE — 12400000 ZZH R&B MH

## 2020-08-28 ASSESSMENT — ACTIVITIES OF DAILY LIVING (ADL)
DRESS: SCRUBS (BEHAVIORAL HEALTH)
HYGIENE/GROOMING: INDEPENDENT
ORAL_HYGIENE: INDEPENDENT
LAUNDRY: UNABLE TO COMPLETE
HYGIENE/GROOMING: INDEPENDENT

## 2020-08-28 NOTE — PLAN OF CARE
Problem: Adult Behavioral Health Plan of Care  Goal: Patient-Specific Goal (Individualization)  Description: Will have reality based conversations by discharge.   Cooperate with treatment team recommendations including medications.   Will sleep 6-8 hrs nightly.  Attend at least 50% groups.       8/27/2020 2329 by Aurora Mendez, RN  Outcome: No Change  Note:        Problem: Thought Process Alteration  Goal: Optimal Thought Clarity  8/27/2020 2329 by Aurora Mendez, RN  Outcome: No Change     Face to face shift report received from Jaye DUMONT. Rounding completed, pt observed.     Pt appeared to be sleeping most of this shift.     Face to face report will be communicated to oncoming RN.    Aurora Mendez RN  8/28/2020  5:56 AM

## 2020-08-28 NOTE — PLAN OF CARE
"  Problem: Adult Behavioral Health Plan of Care  Goal: Patient-Specific Goal (Individualization)  Description: Will have reality based conversations by discharge.   Cooperate with treatment team recommendations including medications.   Will sleep 6-8 hrs nightly.  Attend at least 50% groups.       8/28/2020 1307 by Joanna Lama, RN  Outcome: No Change  Note:   Patient slept in this morning. Got up took a shower. Complains of having hot flashes all night and not being able to sleep. Would like to have her door kept open so that the air could circulate better.  Does ramble on regarding a fall she had approximately 8 weeks ago and hurt her knee and left arm. States, \"I think my arm is broken and has healed wrong, I talked with that little skinny blonde girl and she said she would have x-ray come up here and xray it\".   Avoids questions regarding anxiety,depression or voices. Says she feels good except her arm hurting when she rolls over on it at night.   Does not attend groups, is not social with peers.  Sits in lounge area but keeps to self.     End of shift report given to oncoming afternoon RN     "

## 2020-08-28 NOTE — PLAN OF CARE
Pt has been requesting insurance card numbers so they could get replacement cards sent to them in the mail. Gave pt insurance card number info twice, although pt states this is not the right numbers. Asked pt darlene to speak to pt on this. They agreed to do so today.

## 2020-08-28 NOTE — PROGRESS NOTES
"[facility]  Psychiatric Progress Note      Impression:     Glenda is sleeping better. she is not as preoccupied. Today she states her left arm was injured a few months ago when she fell. She belives she broke her arm and states she never went in for it. she states she is unable to raised her arm. In the past she has had significant somatic complaints including believing she could be pregnant after having a hysterectomy because \"my abdomin seemed so distended\". She is asking for an x ray. I did tel lher I would order one for her left upper arm.   Will call her daughter Cindy today to try and get more collateral information. I have heard her daughters have wanted to try to get guardianship due to her illness.     Educated regarding medication indications, risks, benefits, side effects, contraindications and possible interactions. Verbally expressed understanding.        Diagnoses:     Schizophrenia, paranoid type vs schizoaffective disorder, bipolar type    Attestation:  Patient has been seen and evaluated by me,  Haleigh Kolb NP          Interim History:   The patient's care was discussed with the treatment team and chart notes were reviewed.            Medications:     Current Facility-Administered Medications   Medication     acetaminophen (TYLENOL) tablet 650 mg     albuterol (PROAIR HFA/PROVENTIL HFA/VENTOLIN HFA) 108 (90 Base) MCG/ACT inhaler 1-2 puff     cloNIDine (CATAPRES) tablet 0.1 mg     hydrOXYzine (ATARAX) tablet 30-50 mg     LORazepam (ATIVAN) tablet 1-2 mg     magnesium hydroxide (MILK OF MAGNESIA) suspension 30 mL     nicotine (NICORETTE) gum 2-4 mg     OLANZapine (zyPREXA) tablet 5-10 mg    Or     OLANZapine (zyPREXA) injection 10 mg     QUEtiapine (SEROquel) tablet 25-50 mg          10 point ROS denies uti symptoms though ua positive       Allergies:     Allergies   Allergen Reactions     Shrimp Anaphylaxis     Risperdal [Risperidone] Other (See Comments)     Elevated " prolactin            Psychiatric Examination:   /86   Pulse 92   Temp 97.8  F (36.6  C) (Tympanic)   Resp 16   Ht 1.524 m (5')   Wt 89.7 kg (197 lb 11.2 oz)   SpO2 95%   BMI 38.61 kg/m    Weight is 197 lbs 11.2 oz  Body mass index is 38.61 kg/m .    MSE/PSYCH  PSYCHIATRIC EXAM  /86   Pulse 92   Temp 97.8  F (36.6  C) (Tympanic)   Resp 16   Ht 1.524 m (5')   Wt 89.7 kg (197 lb 11.2 oz)   SpO2 95%   BMI 38.61 kg/m    -Appearance/Behavior: appropriate, less disorganized, in need of shower  -Motor: intact  -Gait: intact  -Abnormal involuntary movements: none  -Mood: grandiose, cooperative  -Affect: restritcted  -Speech: less pressured, less disorganized  -Thought process/associations:  improving  -Thought content:  Likely continued delusion, although no statements made again today, seems more organized  -Perceptual disturbances: denies si/hi, has been observed responding   -Suicidal/Homicidal Ideation: denies    -Judgment: poor  -Insight: poor  *Orientation: time, place and person.  *Memory: difficult to assess  *Attention: poor  *Language: fluent, no aphasias, able to repeat phrases and name objects. Vocab intact.  *Fund of information: difficult to assess, appears appropriate  *Cognitive functioning estimate: 0 - independent.           Labs:     Results for orders placed or performed during the hospital encounter of 08/10/20   CBC with platelets differential     Status: Abnormal   Result Value Ref Range    WBC 13.7 (H) 4.0 - 11.0 10e9/L    RBC Count 5.09 3.8 - 5.2 10e12/L    Hemoglobin 17.2 (H) 11.7 - 15.7 g/dL    Hematocrit 49.2 (H) 35.0 - 47.0 %    MCV 97 78 - 100 fl    MCH 33.8 (H) 26.5 - 33.0 pg    MCHC 35.0 31.5 - 36.5 g/dL    RDW 12.6 10.0 - 15.0 %    Platelet Count 185 150 - 450 10e9/L    Diff Method Automated Method     % Neutrophils 82.4 %    % Lymphocytes 10.4 %    % Monocytes 6.3 %    % Eosinophils 0.3 %    % Basophils 0.2 %    % Immature Granulocytes 0.4 %    Nucleated RBCs 0 0  /100    Absolute Neutrophil 11.3 (H) 1.6 - 8.3 10e9/L    Absolute Lymphocytes 1.4 0.8 - 5.3 10e9/L    Absolute Monocytes 0.9 0.0 - 1.3 10e9/L    Absolute Eosinophils 0.0 0.0 - 0.7 10e9/L    Absolute Basophils 0.0 0.0 - 0.2 10e9/L    Abs Immature Granulocytes 0.1 0 - 0.4 10e9/L    Absolute Nucleated RBC 0.0    Comprehensive metabolic panel     Status: Abnormal   Result Value Ref Range    Sodium 129 (L) 133 - 144 mmol/L    Potassium 4.1 3.4 - 5.3 mmol/L    Chloride 95 94 - 109 mmol/L    Carbon Dioxide 25 20 - 32 mmol/L    Anion Gap 9 3 - 14 mmol/L    Glucose 107 (H) 70 - 99 mg/dL    Urea Nitrogen 9 7 - 30 mg/dL    Creatinine 0.54 0.52 - 1.04 mg/dL    GFR Estimate >90 >60 mL/min/[1.73_m2]    GFR Estimate If Black >90 >60 mL/min/[1.73_m2]    Calcium 9.1 8.5 - 10.1 mg/dL    Bilirubin Total 0.7 0.2 - 1.3 mg/dL    Albumin 3.2 (L) 3.4 - 5.0 g/dL    Protein Total 7.3 6.8 - 8.8 g/dL    Alkaline Phosphatase 131 40 - 150 U/L    ALT 57 (H) 0 - 50 U/L    AST 32 0 - 45 U/L   Alcohol ethyl     Status: None   Result Value Ref Range    Ethanol g/dL <0.01 0.01 g/dL   UA reflex to Microscopic     Status: Abnormal   Result Value Ref Range    Color Urine Yellow     Appearance Urine Slightly Cloudy     Glucose Urine Negative NEG^Negative mg/dL    Bilirubin Urine Negative NEG^Negative    Ketones Urine Negative NEG^Negative mg/dL    Specific Gravity Urine 1.017 1.003 - 1.035    Blood Urine Negative NEG^Negative    pH Urine 6.0 4.7 - 8.0 pH    Protein Albumin Urine 10 (A) NEG^Negative mg/dL    Urobilinogen mg/dL Normal 0.0 - 2.0 mg/dL    Nitrite Urine Negative NEG^Negative    Leukocyte Esterase Urine Moderate (A) NEG^Negative    Source Midstream Urine     RBC Urine 4 (H) 0 - 2 /HPF    WBC Urine 18 (H) 0 - 5 /HPF    Bacteria Urine Few (A) NEG^Negative /HPF    Squamous Epithelial /HPF Urine 19 (H) 0 - 1 /HPF    Mucous Urine Present (A) NEG^Negative /LPF   Urine Drugs of Abuse Screen Panel 13     Status: None   Result Value Ref Range     Cannabinoids (33-rqp-0-carboxy-9-THC) Not Detected NDET^Not Detected ng/mL    Phencyclidine (Phencyclidine) Not Detected NDET^Not Detected ng/mL    Cocaine (Benzoylecgonine) Not Detected NDET^Not Detected ng/mL    Methamphetamine (d-Methamphetamine) Not Detected NDET^Not Detected ng/mL    Opiates (Morphine) Not Detected NDET^Not Detected ng/mL    Amphetamine (d-Amphetamine) Not Detected NDET^Not Detected ng/mL    Benzodiazepines (Nordiazepam) Not Detected NDET^Not Detected ng/mL    Tricyclic Antidepressants (Desipramine) Not Detected NDET^Not Detected ng/mL    Methadone (Methadone) Not Detected NDET^Not Detected ng/mL    Barbiturates (Butalbital) Not Detected NDET^Not Detected ng/mL    Oxycodone (Oxycodone) Not Detected NDET^Not Detected ng/mL    Propoxyphene (Norpropoxyphene) Not Detected NDET^Not Detected ng/mL    Buprenorphine (Buprenorphine) Not Detected NDET^Not Detected ng/mL   Asymptomatic COVID-19 Virus (Coronavirus) by PCR     Status: None    Specimen: Nasopharyngeal   Result Value Ref Range    COVID-19 Virus PCR to [facility] - Source Nasopharyngeal     COVID-19 Virus PCR to [facility] - Result       Test received-See reflex to [facility] test SARS CoV2 (COVID-19) Virus RT-PCR   SARS-CoV-2 COVID-19 Virus (Coronavirus) RT-PCR Nasopharyngeal     Status: None    Specimen: Nasopharyngeal   Result Value Ref Range    SARS-CoV-2 Virus Specimen Source Nasopharyngeal     SARS-CoV-2 PCR Result NEGATIVE     SARS-CoV-2 PCR Comment       Testing was performed using the Xpert Xpress SARS-CoV-2 Assay on the Cepheid Gene-Xpert   Instrument Systems. Additional information about this Emergency Use Authorization (EUA)   assay can be found via the Lab Guide.     Basic metabolic panel     Status: Abnormal   Result Value Ref Range    Sodium 133 133 - 144 mmol/L    Potassium 3.1 (L) 3.4 - 5.3 mmol/L    Chloride 100 94 - 109 mmol/L    Carbon Dioxide 26 20 - 32 mmol/L    Anion Gap 7 3 - 14 mmol/L    Glucose 178 (H) 70 - 99 mg/dL     Urea Nitrogen 12 7 - 30 mg/dL    Creatinine 0.52 0.52 - 1.04 mg/dL    GFR Estimate >90 >60 mL/min/[1.73_m2]    GFR Estimate If Black >90 >60 mL/min/[1.73_m2]    Calcium 8.8 8.5 - 10.1 mg/dL   Potassium     Status: None   Result Value Ref Range    Potassium 4.5 3.4 - 5.3 mmol/L   UA reflex to Microscopic     Status: None   Result Value Ref Range    Color Urine Light Yellow     Appearance Urine Clear     Glucose Urine Negative NEG^Negative mg/dL    Bilirubin Urine Negative NEG^Negative    Ketones Urine Negative NEG^Negative mg/dL    Specific Gravity Urine 1.018 1.003 - 1.035    Blood Urine Negative NEG^Negative    pH Urine 6.5 4.7 - 8.0 pH    Protein Albumin Urine Negative NEG^Negative mg/dL    Urobilinogen mg/dL Normal 0.0 - 2.0 mg/dL    Nitrite Urine Negative NEG^Negative    Leukocyte Esterase Urine Negative NEG^Negative    Source Midstream Urine    CBC with platelets differential     Status: Abnormal   Result Value Ref Range    WBC 7.2 4.0 - 11.0 10e9/L    RBC Count 4.75 3.8 - 5.2 10e12/L    Hemoglobin 16.0 (H) 11.7 - 15.7 g/dL    Hematocrit 46.5 35.0 - 47.0 %    MCV 98 78 - 100 fl    MCH 33.7 (H) 26.5 - 33.0 pg    MCHC 34.4 31.5 - 36.5 g/dL    RDW 12.1 10.0 - 15.0 %    Platelet Count 322 150 - 450 10e9/L    Diff Method Automated Method     % Neutrophils 63.1 %    % Lymphocytes 26.4 %    % Monocytes 7.1 %    % Eosinophils 2.2 %    % Basophils 0.6 %    % Immature Granulocytes 0.6 %    Nucleated RBCs 0 0 /100    Absolute Neutrophil 4.5 1.6 - 8.3 10e9/L    Absolute Lymphocytes 1.9 0.8 - 5.3 10e9/L    Absolute Monocytes 0.5 0.0 - 1.3 10e9/L    Absolute Eosinophils 0.2 0.0 - 0.7 10e9/L    Absolute Basophils 0.0 0.0 - 0.2 10e9/L    Abs Immature Granulocytes 0.0 0 - 0.4 10e9/L    Absolute Nucleated RBC 0.0    Comprehensive metabolic panel     Status: Abnormal   Result Value Ref Range    Sodium 135 133 - 144 mmol/L    Potassium 4.3 3.4 - 5.3 mmol/L    Chloride 104 94 - 109 mmol/L    Carbon Dioxide 27 20 - 32 mmol/L     Anion Gap 4 3 - 14 mmol/L    Glucose 111 (H) 70 - 99 mg/dL    Urea Nitrogen 13 7 - 30 mg/dL    Creatinine 0.59 0.52 - 1.04 mg/dL    GFR Estimate >90 >60 mL/min/[1.73_m2]    GFR Estimate If Black >90 >60 mL/min/[1.73_m2]    Calcium 9.0 8.5 - 10.1 mg/dL    Bilirubin Total 0.3 0.2 - 1.3 mg/dL    Albumin 3.0 (L) 3.4 - 5.0 g/dL    Protein Total 6.7 (L) 6.8 - 8.8 g/dL    Alkaline Phosphatase 103 40 - 150 U/L    ALT 61 (H) 0 - 50 U/L    AST 22 0 - 45 U/L              Plan/Treatment Team     BEHAVIORAL TEAM DISCUSSION    Progress: Moderate - delusions decreased greatly and is on no medicaitons.          Appears to have had slight improvement likely after being treated for uti. UTI likely exacerbated mental illness.     Continued Stay Criteria/Rationale: not as delusional though still believes she is a medium, continued monitoring needed       Medical/Physical: left arm pain.     Precautions: none    Falls precaution?: No  Behavioral Orders   Procedures     Code 1 - Restrict to Unit     Routine Programming     As clinically indicated     Status 15     Every 15 minutes.       Plan for this encounter/Med Changes/Labs:     Calling daughter paulette today    Ordered x ray of arm    Filled out pop/Commitment court date undetermined at this time. Needs another examiner     Abilify, invega, latuda, zyprexa    Participants: April guillermo NP,  Nursing, SW

## 2020-08-29 PROCEDURE — 12400000 ZZH R&B MH

## 2020-08-29 ASSESSMENT — ACTIVITIES OF DAILY LIVING (ADL)
HYGIENE/GROOMING: INDEPENDENT
ORAL_HYGIENE: INDEPENDENT
LAUNDRY: UNABLE TO COMPLETE
HYGIENE/GROOMING: INDEPENDENT
DRESS: SCRUBS (BEHAVIORAL HEALTH);INDEPENDENT

## 2020-08-29 NOTE — PLAN OF CARE
Face to face end of shift report communicated to Afternoon shift RN.     Ashanti Ryan RN  8/29/2020  3:07 PM

## 2020-08-29 NOTE — PLAN OF CARE
"  Problem: Adult Behavioral Health Plan of Care  Goal: Patient-Specific Goal (Individualization)  Description: Will have reality based conversations by discharge.   Cooperate with treatment team recommendations including medications.   Will sleep 6-8 hrs nightly.  Attend at least 50% groups.       8/29/2020 1158 by Ashanti Ryan RN  Outcome: No Change     Patient is calm and cooperative with this writer during nursing assessment. She is able to make needs known. Denies mental health criteria and illness. States, \" I lived 50 years without being locked up, I don't need it now.\" States she feels good, and cooperative with treatment plan although states numerous times she does not believe it is necessary. Reports sleeping well besides reporting having hot flashes. Afebrile this morning. Social out on unit, putting puzzle together. Will continue to monitor and document any changes.     "

## 2020-08-29 NOTE — PLAN OF CARE
Problem: Adult Behavioral Health Plan of Care  Goal: Patient-Specific Goal (Individualization)  Description: Will have reality based conversations by discharge.   Cooperate with treatment team recommendations including medications.   Will sleep 6-8 hrs nightly.  Attend at least 50% groups.       8/28/2020 2348 by Aurora Mendez, RN  Outcome: No Change     Problem: Thought Process Alteration  Goal: Optimal Thought Clarity  8/28/2020 2348 by Aurora Mendez, RN  Outcome: No Change     Face to face shift report received from Cece DUMONT. Rounding completed, pt observed.     Pt appeared to be sleeping most of this shift.     Face to face report will be communicated to oncoming RN.    Aurora Mendez RN  8/29/2020  5:58 AM

## 2020-08-29 NOTE — PLAN OF CARE
"  Problem: Adult Behavioral Health Plan of Care         Problem: Adult Behavioral Health Plan of Care  Goal: Patient-Specific Goal (Individualization)  Description: Will have reality based conversations by discharge.   Cooperate with treatment team recommendations including medications.   Will sleep 6-8 hrs nightly.  Attend at least 50% groups.       8/28/2020 1901 by Cece Grover, RN  Outcome: No Change  Note:  pt spending her time putting a puzzle together with peers. Somewhat irritable with writer. States she knows her arm is broken \"abby I can't lift it higher than this- you can't tell me it's not broken\".  States pain to it but declines any medication- \"I don't want to take any\".  Does attend group at times. Denies suicidal thoughts or hallucinations.        Problem: Thought Process Alteration  Goal: Optimal Thought Clarity  Outcome: No Change     Note: pt able to have an appropriate conversation with writer.   Face to face end of shift report communicated to JULIA Grover RN  8/28/2020  7:14 PM           "

## 2020-08-29 NOTE — PLAN OF CARE
Problem: Adult Behavioral Health Plan of Care  Goal: Patient-Specific Goal (Individualization)  Description: Will have reality based conversations by discharge.   Cooperate with treatment team recommendations including medications.   Will sleep 6-8 hrs nightly.  Attend at least 50% groups.       8/29/2020 1721 by Cece Grover RN  Outcome: Improving  Note:  Pt interacts with peers and staff easily. Enjoys putting puzzles together . Able to concentrate for long lengths of time putting them together. States pain to L  arm but continues to decline pain medication. Also does not rate pain. Can use arm but cannot  Lift it all the way up. Does not attend group.       Problem: Thought Process Alteration  Goal: Optimal Thought Clarity  8/29/2020 1721 by Cece Grover RN  Outcome: Improving   Pt able to have an appropriate conversation with writer . No abnormal statements or behavior  Face to face end of shift report communicated to RN.     Cece Grover RN  8/29/2020  9:42 PM

## 2020-08-30 PROCEDURE — 12400000 ZZH R&B MH

## 2020-08-30 PROCEDURE — 99233 SBSQ HOSP IP/OBS HIGH 50: CPT | Performed by: NURSE PRACTITIONER

## 2020-08-30 ASSESSMENT — ACTIVITIES OF DAILY LIVING (ADL)
HYGIENE/GROOMING: INDEPENDENT
LAUNDRY: UNABLE TO COMPLETE
ORAL_HYGIENE: INDEPENDENT
HYGIENE/GROOMING: INDEPENDENT
DRESS: SCRUBS (BEHAVIORAL HEALTH);INDEPENDENT

## 2020-08-30 ASSESSMENT — MIFFLIN-ST. JEOR: SCORE: 1430.09

## 2020-08-30 NOTE — PROGRESS NOTES
"[facility]  Psychiatric Progress Note      Impression:     I spoke with her daughter paulette today. Daughter is concerned that her mother will no longer speak with her \"if [facility] finds out that I said anything to show she is mentally ill'.     Glenda did tell family that \"Ill take medications here but I wont take them when I leave\". She has not been willing to take any medications here. I did tell her dasughter that when her UTI cleared after she agreed to antibiotics she did have some improviment in psychosis [facility] still appears gaurded likely still has delusions that she denies.     Her daughter agrees with this. diony states \" that she does not physically take care of herself such as brushing her hair or taking showers regularly while at home\".  She states that currently her mother is living in her stepfather's cabin by herself and \"the cabin is trashed.  The upstairs is filled with feces and you can tell that the dog has not been let out at all\".  Her daughter states she drinks alcohol daily and heavily.  She apparently attended treatment in 2018 for alcohol.    Daughter states that the periods of psychosis/debbie are cyclical.  At times she seems \"a bit better\".  She will stay awake for days drinking heavily and \"right bad checks\".  She had apparently left a check for $250,000 in a \"random person in [place]\" mailbox. This person reported it to the police and she was then hospitalized in [place]. This was in 2018.     When I talk to her about these type of past behaviors today she initially denies that she ever put the 250,000 check in someone's mailbox though then stated \"that was a prank that someone put me up to\". She then deflected from the conversation and said \"I had my [employer] cruiser then not my honda\". [facility] then spoke about her vehicle history and why she likes [employer]. She was quite tangential at times though able to redirect. She doesn't appear to be responding to hallucinations. Has been " social with other patients. No suicidal thoguhts or homicidal thoughts.     More labs came from [facility] showing positive MARITZA with titer 1:320. Negative SSA and SSB(sjogrens). I do not believe she followed up with rheumatology/immunology which should be done in the future.       Educated regarding medication indications, risks, benefits, side effects, contraindications and possible interactions. Verbally expressed understanding.        Diagnoses:     schizoaffective disorder, bipolar type    Attestation:  Patient has been seen and evaluated by me,  Haleigh Kolb NP          Interim History:   The patient's care was discussed with the treatment team and chart notes were reviewed.            Medications:     Current Facility-Administered Medications   Medication     acetaminophen (TYLENOL) tablet 650 mg     albuterol (PROAIR HFA/PROVENTIL HFA/VENTOLIN HFA) 108 (90 Base) MCG/ACT inhaler 1-2 puff     cloNIDine (CATAPRES) tablet 0.1 mg     hydrOXYzine (ATARAX) tablet 30-50 mg     LORazepam (ATIVAN) tablet 1-2 mg     magnesium hydroxide (MILK OF MAGNESIA) suspension 30 mL     nicotine (NICORETTE) gum 2-4 mg     OLANZapine (zyPREXA) tablet 5-10 mg    Or     OLANZapine (zyPREXA) injection 10 mg     QUEtiapine (SEROquel) tablet 25-50 mg          10 point ROS denies uti symptoms though ua positive       Allergies:     Allergies   Allergen Reactions     Shrimp Anaphylaxis     Risperdal [Risperidone] Other (See Comments)     Elevated prolactin            Psychiatric Examination:   /64   Pulse 76   Temp 96.8  F (36  C) (Tympanic)   Resp 14   Ht 1.524 m (5')   Wt 89.4 kg (197 lb)   SpO2 95%   BMI 38.47 kg/m    Weight is 197 lbs 0 oz  Body mass index is 38.47 kg/m .    MSE/PSYCH  PSYCHIATRIC EXAM  /64   Pulse 76   Temp 96.8  F (36  C) (Tympanic)   Resp 14   Ht 1.524 m (5')   Wt 89.4 kg (197 lb)   SpO2 95%   BMI 38.47 kg/m    -Appearance/Behavior: appropriate, less disorganized, in need of  "shower  -Motor: intact  -Gait: intact  -Abnormal involuntary movements: none  -Mood: grandiose, cooperative  -Affect: restritcted  -Speech: improving. Normal rate and tone.   -Thought process/associations:  improving  -Thought content:  Likely continued delusion and remains guarded when talking about \"gift\" of being psychic  -Perceptual disturbances: denies si/hi,   -Suicidal/Homicidal Ideation: denies    -Judgment: poor  -Insight: poor  *Orientation: time, place and person.  *Memory: difficult to assess  *Attention: poor  *Language: fluent, no aphasias, able to repeat phrases and name objects. Vocab intact.  *Fund of information: difficult to assess, appears appropriate  *Cognitive functioning estimate: 0 - independent.           Labs:     Results for orders placed or performed during the hospital encounter of 08/10/20   XR Humerus Port Left G/E 2 Views     Status: None    Narrative    Exam: XR HUMERUS PORT LT     History:Female, age 50 years, left upper arm pain after fall.    Comparison:  None    Technique: Two views are submitted    Findings: Bones are normally mineralized. No evidence of acute or  subacute fracture.  No evidence of dislocation.  Calcific tendinosis  of the rotator cuff.           Impression    Impression:  No evidence of acute or subacute bony abnormality.     Calcifications of the shoulder suggest calcific tendinosis of the  rotator cuff. MRI would better characterize.    LUPE SANCHEZ MD   CBC with platelets differential     Status: Abnormal   Result Value Ref Range    WBC 13.7 (H) 4.0 - 11.0 10e9/L    RBC Count 5.09 3.8 - 5.2 10e12/L    Hemoglobin 17.2 (H) 11.7 - 15.7 g/dL    Hematocrit 49.2 (H) 35.0 - 47.0 %    MCV 97 78 - 100 fl    MCH 33.8 (H) 26.5 - 33.0 pg    MCHC 35.0 31.5 - 36.5 g/dL    RDW 12.6 10.0 - 15.0 %    Platelet Count 185 150 - 450 10e9/L    Diff Method Automated Method     % Neutrophils 82.4 %    % Lymphocytes 10.4 %    % Monocytes 6.3 %    % Eosinophils 0.3 %    % " Basophils 0.2 %    % Immature Granulocytes 0.4 %    Nucleated RBCs 0 0 /100    Absolute Neutrophil 11.3 (H) 1.6 - 8.3 10e9/L    Absolute Lymphocytes 1.4 0.8 - 5.3 10e9/L    Absolute Monocytes 0.9 0.0 - 1.3 10e9/L    Absolute Eosinophils 0.0 0.0 - 0.7 10e9/L    Absolute Basophils 0.0 0.0 - 0.2 10e9/L    Abs Immature Granulocytes 0.1 0 - 0.4 10e9/L    Absolute Nucleated RBC 0.0    Comprehensive metabolic panel     Status: Abnormal   Result Value Ref Range    Sodium 129 (L) 133 - 144 mmol/L    Potassium 4.1 3.4 - 5.3 mmol/L    Chloride 95 94 - 109 mmol/L    Carbon Dioxide 25 20 - 32 mmol/L    Anion Gap 9 3 - 14 mmol/L    Glucose 107 (H) 70 - 99 mg/dL    Urea Nitrogen 9 7 - 30 mg/dL    Creatinine 0.54 0.52 - 1.04 mg/dL    GFR Estimate >90 >60 mL/min/[1.73_m2]    GFR Estimate If Black >90 >60 mL/min/[1.73_m2]    Calcium 9.1 8.5 - 10.1 mg/dL    Bilirubin Total 0.7 0.2 - 1.3 mg/dL    Albumin 3.2 (L) 3.4 - 5.0 g/dL    Protein Total 7.3 6.8 - 8.8 g/dL    Alkaline Phosphatase 131 40 - 150 U/L    ALT 57 (H) 0 - 50 U/L    AST 32 0 - 45 U/L   Alcohol ethyl     Status: None   Result Value Ref Range    Ethanol g/dL <0.01 0.01 g/dL   UA reflex to Microscopic     Status: Abnormal   Result Value Ref Range    Color Urine Yellow     Appearance Urine Slightly Cloudy     Glucose Urine Negative NEG^Negative mg/dL    Bilirubin Urine Negative NEG^Negative    Ketones Urine Negative NEG^Negative mg/dL    Specific Gravity Urine 1.017 1.003 - 1.035    Blood Urine Negative NEG^Negative    pH Urine 6.0 4.7 - 8.0 pH    Protein Albumin Urine 10 (A) NEG^Negative mg/dL    Urobilinogen mg/dL Normal 0.0 - 2.0 mg/dL    Nitrite Urine Negative NEG^Negative    Leukocyte Esterase Urine Moderate (A) NEG^Negative    Source Midstream Urine     RBC Urine 4 (H) 0 - 2 /HPF    WBC Urine 18 (H) 0 - 5 /HPF    Bacteria Urine Few (A) NEG^Negative /HPF    Squamous Epithelial /HPF Urine 19 (H) 0 - 1 /HPF    Mucous Urine Present (A) NEG^Negative /LPF   Urine Drugs of  Abuse Screen Panel 13     Status: None   Result Value Ref Range    Cannabinoids (73-vrx-1-carboxy-9-THC) Not Detected NDET^Not Detected ng/mL    Phencyclidine (Phencyclidine) Not Detected NDET^Not Detected ng/mL    Cocaine (Benzoylecgonine) Not Detected NDET^Not Detected ng/mL    Methamphetamine (d-Methamphetamine) Not Detected NDET^Not Detected ng/mL    Opiates (Morphine) Not Detected NDET^Not Detected ng/mL    Amphetamine (d-Amphetamine) Not Detected NDET^Not Detected ng/mL    Benzodiazepines (Nordiazepam) Not Detected NDET^Not Detected ng/mL    Tricyclic Antidepressants (Desipramine) Not Detected NDET^Not Detected ng/mL    Methadone (Methadone) Not Detected NDET^Not Detected ng/mL    Barbiturates (Butalbital) Not Detected NDET^Not Detected ng/mL    Oxycodone (Oxycodone) Not Detected NDET^Not Detected ng/mL    Propoxyphene (Norpropoxyphene) Not Detected NDET^Not Detected ng/mL    Buprenorphine (Buprenorphine) Not Detected NDET^Not Detected ng/mL   Asymptomatic COVID-19 Virus (Coronavirus) by PCR     Status: None    Specimen: Nasopharyngeal   Result Value Ref Range    COVID-19 Virus PCR to [facility] - Source Nasopharyngeal     COVID-19 Virus PCR to [facility] - Result       Test received-See reflex to [place] test SARS CoV2 (COVID-19) Virus RT-PCR   SARS-CoV-2 COVID-19 Virus (Coronavirus) RT-PCR Nasopharyngeal     Status: None    Specimen: Nasopharyngeal   Result Value Ref Range    SARS-CoV-2 Virus Specimen Source Nasopharyngeal     SARS-CoV-2 PCR Result NEGATIVE     SARS-CoV-2 PCR Comment       Testing was performed using the Xpert Xpress SARS-CoV-2 Assay on the Cepheid Gene-Xpert   Instrument Systems. Additional information about this Emergency Use Authorization (EUA)   assay can be found via the Lab Guide.     Basic metabolic panel     Status: Abnormal   Result Value Ref Range    Sodium 133 133 - 144 mmol/L    Potassium 3.1 (L) 3.4 - 5.3 mmol/L    Chloride 100 94 - 109 mmol/L    Carbon Dioxide 26 20 - 32 mmol/L     Anion Gap 7 3 - 14 mmol/L    Glucose 178 (H) 70 - 99 mg/dL    Urea Nitrogen 12 7 - 30 mg/dL    Creatinine 0.52 0.52 - 1.04 mg/dL    GFR Estimate >90 >60 mL/min/[1.73_m2]    GFR Estimate If Black >90 >60 mL/min/[1.73_m2]    Calcium 8.8 8.5 - 10.1 mg/dL   Potassium     Status: None   Result Value Ref Range    Potassium 4.5 3.4 - 5.3 mmol/L   UA reflex to Microscopic     Status: None   Result Value Ref Range    Color Urine Light Yellow     Appearance Urine Clear     Glucose Urine Negative NEG^Negative mg/dL    Bilirubin Urine Negative NEG^Negative    Ketones Urine Negative NEG^Negative mg/dL    Specific Gravity Urine 1.018 1.003 - 1.035    Blood Urine Negative NEG^Negative    pH Urine 6.5 4.7 - 8.0 pH    Protein Albumin Urine Negative NEG^Negative mg/dL    Urobilinogen mg/dL Normal 0.0 - 2.0 mg/dL    Nitrite Urine Negative NEG^Negative    Leukocyte Esterase Urine Negative NEG^Negative    Source Midstream Urine    CBC with platelets differential     Status: Abnormal   Result Value Ref Range    WBC 7.2 4.0 - 11.0 10e9/L    RBC Count 4.75 3.8 - 5.2 10e12/L    Hemoglobin 16.0 (H) 11.7 - 15.7 g/dL    Hematocrit 46.5 35.0 - 47.0 %    MCV 98 78 - 100 fl    MCH 33.7 (H) 26.5 - 33.0 pg    MCHC 34.4 31.5 - 36.5 g/dL    RDW 12.1 10.0 - 15.0 %    Platelet Count 322 150 - 450 10e9/L    Diff Method Automated Method     % Neutrophils 63.1 %    % Lymphocytes 26.4 %    % Monocytes 7.1 %    % Eosinophils 2.2 %    % Basophils 0.6 %    % Immature Granulocytes 0.6 %    Nucleated RBCs 0 0 /100    Absolute Neutrophil 4.5 1.6 - 8.3 10e9/L    Absolute Lymphocytes 1.9 0.8 - 5.3 10e9/L    Absolute Monocytes 0.5 0.0 - 1.3 10e9/L    Absolute Eosinophils 0.2 0.0 - 0.7 10e9/L    Absolute Basophils 0.0 0.0 - 0.2 10e9/L    Abs Immature Granulocytes 0.0 0 - 0.4 10e9/L    Absolute Nucleated RBC 0.0    Comprehensive metabolic panel     Status: Abnormal   Result Value Ref Range    Sodium 135 133 - 144 mmol/L    Potassium 4.3 3.4 - 5.3 mmol/L     Chloride 104 94 - 109 mmol/L    Carbon Dioxide 27 20 - 32 mmol/L    Anion Gap 4 3 - 14 mmol/L    Glucose 111 (H) 70 - 99 mg/dL    Urea Nitrogen 13 7 - 30 mg/dL    Creatinine 0.59 0.52 - 1.04 mg/dL    GFR Estimate >90 >60 mL/min/[1.73_m2]    GFR Estimate If Black >90 >60 mL/min/[1.73_m2]    Calcium 9.0 8.5 - 10.1 mg/dL    Bilirubin Total 0.3 0.2 - 1.3 mg/dL    Albumin 3.0 (L) 3.4 - 5.0 g/dL    Protein Total 6.7 (L) 6.8 - 8.8 g/dL    Alkaline Phosphatase 103 40 - 150 U/L    ALT 61 (H) 0 - 50 U/L    AST 22 0 - 45 U/L              Plan/Treatment Team     BEHAVIORAL TEAM DISCUSSION    Progress: Moderate - delusions decreased greatly and is on no medicaitons.          Appears to have had slight improvement likely after being treated for uti. UTI likely exacerbated mental illness.     Continued Stay Criteria/Rationale: not as delusional though still believes she is a medium, continued monitoring needed       Medical/Physical: none: tendinosis of rotator cuff likely cause of pain.     Precautions: none    Falls precaution?: No  Behavioral Orders   Procedures     Code 1 - Restrict to Unit     Routine Programming     As clinically indicated     Status 15     Every 15 minutes.       Plan for this encounter/Med Changes/Labs:         Reviewed historical labs from [facility]. Should likely have immunologist appt in future.   Spoke with her daughter Cindy today.     Filled out pop/Commitment court date undetermined at this time. Needs another examiner     Abilify, invega, latuda, zyprexa    Participants: Haleigh li NP,  Nursing, SW

## 2020-08-30 NOTE — PLAN OF CARE
Problem: Adult Behavioral Health Plan of Care  Goal: Patient-Specific Goal (Individualization)  Description: Will have reality based conversations by discharge.   Cooperate with treatment team recommendations including medications.   Will sleep 6-8 hrs nightly.  Attend at least 50% groups.       8/29/2020 2351 by Aurora Mendez, RN  Outcome: No Change     Problem: Thought Process Alteration  Goal: Optimal Thought Clarity  8/29/2020 2351 by Aurora Mendez, RN  Outcome: No Change     Face to face shift report received from Cece DUMONT. Rounding completed, pt observed.     Pt appeared to be sleeping most of this shift.     Face to face report will be communicated to oncoming RN.    Aurora Mendez RN  8/30/2020  5:55 AM

## 2020-08-30 NOTE — PLAN OF CARE
Problem: Adult Behavioral Health Plan of Care  Goal: Patient-Specific Goal (Individualization)  Description: Will have reality based conversations by discharge.   Cooperate with treatment team recommendations including medications.   Will sleep 6-8 hrs nightly.  Attend at least 50% groups.       8/30/2020 1808 by Cece Grover RN  Outcome: Improving  Note:  Pt patiently sits and puts puzzles together with peers. Did not attend group. Is social with others. Denies l arm pain today.        Problem: Thought Process Alteration  Goal: Optimal Thought Clarity  8/30/2020 1808 by Cece Grover, RN  Outcome: Improving   Pt able to have an appropriate conversation with writer. No abnormal statements or behaviors noted  Face to face end of shift report communicated to JULIA Grover RN  8/30/2020  6:10 PM

## 2020-08-30 NOTE — PLAN OF CARE
"  Problem: Adult Behavioral Health Plan of Care  Goal: Patient-Specific Goal (Individualization)  Description: Will have reality based conversations by discharge.   Cooperate with treatment team recommendations including medications.   Will sleep 6-8 hrs nightly.  Attend at least 50% groups.       8/30/2020 0720 by Aurora Mendez RN  Outcome: No Change  Note:        Problem: Thought Process Alteration  Goal: Optimal Thought Clarity  8/30/2020 0720 by Aurora Mendez RN  Outcome: No Change     Writer continued cares of pt from previous shift.    Pt pleasant and cooperative with nursing assessment. Pt does not have any scheduled medications and does not take any PRN medications this shift. Pt is social on unit and puts together puzzles with peer. 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 Pt denies hallucinations this shift. Pt says she is waiting for the court to make the decision for her future and states \"if they say I have to go to a group home then I guess that's where I do.\" Pt talks about needing to get some of her bills taken care of especially her cell phone bill.     Face to face report will be communicated to oncoming RN.    Aurora Mendez RN  8/30/2020  2:10 PM  "

## 2020-08-31 PROCEDURE — 12400000 ZZH R&B MH

## 2020-08-31 ASSESSMENT — ACTIVITIES OF DAILY LIVING (ADL)
DRESS: INDEPENDENT
LAUNDRY: UNABLE TO COMPLETE
HYGIENE/GROOMING: INDEPENDENT
ORAL_HYGIENE: INDEPENDENT

## 2020-08-31 NOTE — PLAN OF CARE
Face to face shift report received from Aurora HOFFMAN RN. Rounding completed, pt observed.   Tsering Johnson RN  8/31/2020  7:47 AM    Patient in lounge at the start of shift.   Patient denies SI, HI, depression, anxiety or pain. Patient asked nurse to take patient with a grain of salt as she has a sense of humor. Patient stated she would either be working on a puzzle or taking a nap during the day.   Patient continued to work on a puzzle in the lounge.     Face to face report will be communicated to oncoming RN.    Problem: Adult Behavioral Health Plan of Care  Goal: Patient-Specific Goal (Individualization)  Description: Will have reality based conversations by discharge.   Cooperate with treatment team recommendations including medications.   Will sleep 6-8 hrs nightly.  Attend at least 50% groups.       8/31/2020 0746 by Tsering Johnson, RN  Outcome: Improving  Note:        Problem: Thought Process Alteration  Goal: Optimal Thought Clarity  8/31/2020 0746 by Tsering Johnson, RN  Outcome: Improving

## 2020-08-31 NOTE — PLAN OF CARE
Problem: Adult Behavioral Health Plan of Care  Goal: Patient-Specific Goal (Individualization)  Description: Will have reality based conversations by discharge.   Cooperate with treatment team recommendations including medications.   Will sleep 6-8 hrs nightly.  Attend at least 50% groups.       8/30/2020 2332 by Aurora Mendez, RN  Outcome: No Change     Problem: Thought Process Alteration  Goal: Optimal Thought Clarity  8/30/2020 2332 by Aurora Mendez, RN  Outcome: No Change     Face to face shift report received from Cece DUMONT. Rounding completed, pt observed.     Pt appeared to be sleeping most of this shift.     Face to face report will be communicated to oncoming RN.    Aurora Mendez RN  8/31/2020  5:48 AM

## 2020-09-01 PROCEDURE — 99233 SBSQ HOSP IP/OBS HIGH 50: CPT | Performed by: NURSE PRACTITIONER

## 2020-09-01 PROCEDURE — 12400000 ZZH R&B MH

## 2020-09-01 ASSESSMENT — ACTIVITIES OF DAILY LIVING (ADL)
HYGIENE/GROOMING: INDEPENDENT
ORAL_HYGIENE: INDEPENDENT
LAUNDRY: UNABLE TO COMPLETE
DRESS: INDEPENDENT

## 2020-09-01 NOTE — PLAN OF CARE
Problem: Adult Behavioral Health Plan of Care  Goal: Patient-Specific Goal (Individualization)  Description: Will have reality based conversations by discharge.   Cooperate with treatment team recommendations including medications.   Will sleep 6-8 hrs nightly.  Attend at least 50% groups.     Pt appears to be sleeping comfortably with regular respirations. Up for short period sitting in lounge. Pt slept approx. 5 hours this shift; awake at 0445. No complaints at this time. Will continue to monitor.    Outcome: No Change    Problem: Thought Process Alteration  Goal: Optimal Thought Clarity    Outcome: No Change     Face to face end of shift report communicated to oncoming RN.     9/1/2020

## 2020-09-01 NOTE — PLAN OF CARE
Face to face end of shift report communicated to oncoming shift.     Shamika Zurita RN  8/31/2020  11:18 PM    Patient's conversation is minimal with this writer.  Patient denies SI, HI, AH, and VH  Patient cooperative with treatment team and medications.  Patient reports no difficulties with sleep.   Patient does not attend groups this shift, patient up for dinner and back to bed after.    Audible snoring heard from patient's room.    Face to face start of shift report received from Tsering CHRISTIANSEN RN  Patient in bed at this time, will continue to monitor.     Problem: Adult Behavioral Health Plan of Care  Goal: Patient-Specific Goal (Individualization)  Description: Will have reality based conversations by discharge.   Cooperate with treatment team recommendations including medications.   Will sleep 6-8 hrs nightly.  Attend at least 50% groups.   8/31/2020 2040 by Shamika Zurita RN  Outcome: No Change  Note:   Problem: Thought Process Alteration  Goal: Optimal Thought Clarity  8/31/2020 2040 by Shamika Zurita RN  Outcome: No Change

## 2020-09-01 NOTE — PROGRESS NOTES
"[facility]  Psychiatric Progress Note      Impression:     Glenda states she doesn't want to work with the upcoming provider and wants me to work with her. She states \"mary thinks im schizophrenic\". She then starts talking about how she feels mary wants her on medications she shouldn't take. I told her I did not think mary thought she had schizophrenia though likely schizoaffective disorder vs bipolar disorder. I then told her I believed she had this diagnosis as well. She got upset and said \"I talk to much because I have adhd\" she continues to have no insight into her delusions of being a psychic mediium upon her admission.     She said \"im willing to take medications. I will take methylphenidate, estazolam and risperdal. We discussed risperdal further and I told her I was not going to order the other medicaitons. jarad stated \"I would take risperdal\". She then said she did not want to take more than 0.25 mg of the medicaiton. I told her that I had never seen a dose of 0.25 mg being therpuetic for a person with bipolar or schizoaffective disorder. I asked her if she knew what risperdal was and if she knew what the side effects possibilities were. She shook her head and said \"I didn't have any side effects'.     Spoke with nursing after this and considered having a substitute decision maker in place since she stated she was willing to take risperdal though seeing how reluctant she was when we discussed doses larger than 0.25, a pop will be needed.     risperdal is not on the pop form though after speaking with nursing and reviewing her chart it appears she had presence of galactorrhea when on risperdal. This typically indicates that a patient is at higher risk of movement disorders. She does not comprehend this and ocntinues to think she does not have bipolar dsiorder vs schizoaffective disorder.                 Educated regarding medication indications, risks, benefits, side effects, " contraindications and possible interactions. Verbally expressed understanding.        Diagnoses:     schizoaffective disorder, bipolar type    Attestation:  Patient has been seen and evaluated by me,  Haleigh Kolb NP          Interim History:   The patient's care was discussed with the treatment team and chart notes were reviewed.            Medications:     Current Facility-Administered Medications   Medication     acetaminophen (TYLENOL) tablet 650 mg     albuterol (PROAIR HFA/PROVENTIL HFA/VENTOLIN HFA) 108 (90 Base) MCG/ACT inhaler 1-2 puff     cloNIDine (CATAPRES) tablet 0.1 mg     hydrOXYzine (ATARAX) tablet 30-50 mg     LORazepam (ATIVAN) tablet 1-2 mg     magnesium hydroxide (MILK OF MAGNESIA) suspension 30 mL     nicotine (NICORETTE) gum 2-4 mg     OLANZapine (zyPREXA) tablet 5-10 mg    Or     OLANZapine (zyPREXA) injection 10 mg     QUEtiapine (SEROquel) tablet 25-50 mg          10 point ROS denies uti symptoms though ua positive       Allergies:     Allergies   Allergen Reactions     Shrimp Anaphylaxis     Risperdal [Risperidone] Other (See Comments)     Elevated prolactin            Psychiatric Examination:   /53   Pulse 102   Temp 97.1  F (36.2  C) (Tympanic)   Resp 16   Ht 1.524 m (5')   Wt 89.4 kg (197 lb)   SpO2 94%   BMI 38.47 kg/m    Weight is 197 lbs 0 oz  Body mass index is 38.47 kg/m .    MSE/PSYCH  PSYCHIATRIC EXAM  /53   Pulse 102   Temp 97.1  F (36.2  C) (Tympanic)   Resp 16   Ht 1.524 m (5')   Wt 89.4 kg (197 lb)   SpO2 94%   BMI 38.47 kg/m    -Appearance/Behavior:  No longer disheveled  -Motor: intact  -Gait: intact  -Abnormal involuntary movements: none  -Mood: somewhat cooperative  -Affect: restritcted  -Speech: improving. Normal rate and tone. Mildly pressurred at times.   -Thought process/associations:  improving  -Thought content:  Likely continued delusion and remains guarded   -Perceptual disturbances: denies si/hi,   -Suicidal/Homicidal  Ideation: denies    -Judgment: poor  -Insight: poor  *Orientation: time, place and person.  *Memory: difficult to assess  *Attention: poor  *Language: fluent, no aphasias, able to repeat phrases and name objects. Vocab intact.  *Fund of information: difficult to assess, appears appropriate  *Cognitive functioning estimate: 0 - independent.           Labs:     Results for orders placed or performed during the hospital encounter of 08/10/20   XR Humerus Port Left G/E 2 Views     Status: None    Narrative    Exam: XR HUMERUS PORT LT     History:Female, age 50 years, left upper arm pain after fall.    Comparison:  None    Technique: Two views are submitted    Findings: Bones are normally mineralized. No evidence of acute or  subacute fracture.  No evidence of dislocation.  Calcific tendinosis  of the rotator cuff.           Impression    Impression:  No evidence of acute or subacute bony abnormality.     Calcifications of the shoulder suggest calcific tendinosis of the  rotator cuff. MRI would better characterize.    LUPE SANCHEZ MD   CBC with platelets differential     Status: Abnormal   Result Value Ref Range    WBC 13.7 (H) 4.0 - 11.0 10e9/L    RBC Count 5.09 3.8 - 5.2 10e12/L    Hemoglobin 17.2 (H) 11.7 - 15.7 g/dL    Hematocrit 49.2 (H) 35.0 - 47.0 %    MCV 97 78 - 100 fl    MCH 33.8 (H) 26.5 - 33.0 pg    MCHC 35.0 31.5 - 36.5 g/dL    RDW 12.6 10.0 - 15.0 %    Platelet Count 185 150 - 450 10e9/L    Diff Method Automated Method     % Neutrophils 82.4 %    % Lymphocytes 10.4 %    % Monocytes 6.3 %    % Eosinophils 0.3 %    % Basophils 0.2 %    % Immature Granulocytes 0.4 %    Nucleated RBCs 0 0 /100    Absolute Neutrophil 11.3 (H) 1.6 - 8.3 10e9/L    Absolute Lymphocytes 1.4 0.8 - 5.3 10e9/L    Absolute Monocytes 0.9 0.0 - 1.3 10e9/L    Absolute Eosinophils 0.0 0.0 - 0.7 10e9/L    Absolute Basophils 0.0 0.0 - 0.2 10e9/L    Abs Immature Granulocytes 0.1 0 - 0.4 10e9/L    Absolute Nucleated RBC 0.0    Comprehensive  metabolic panel     Status: Abnormal   Result Value Ref Range    Sodium 129 (L) 133 - 144 mmol/L    Potassium 4.1 3.4 - 5.3 mmol/L    Chloride 95 94 - 109 mmol/L    Carbon Dioxide 25 20 - 32 mmol/L    Anion Gap 9 3 - 14 mmol/L    Glucose 107 (H) 70 - 99 mg/dL    Urea Nitrogen 9 7 - 30 mg/dL    Creatinine 0.54 0.52 - 1.04 mg/dL    GFR Estimate >90 >60 mL/min/[1.73_m2]    GFR Estimate If Black >90 >60 mL/min/[1.73_m2]    Calcium 9.1 8.5 - 10.1 mg/dL    Bilirubin Total 0.7 0.2 - 1.3 mg/dL    Albumin 3.2 (L) 3.4 - 5.0 g/dL    Protein Total 7.3 6.8 - 8.8 g/dL    Alkaline Phosphatase 131 40 - 150 U/L    ALT 57 (H) 0 - 50 U/L    AST 32 0 - 45 U/L   Alcohol ethyl     Status: None   Result Value Ref Range    Ethanol g/dL <0.01 0.01 g/dL   UA reflex to Microscopic     Status: Abnormal   Result Value Ref Range    Color Urine Yellow     Appearance Urine Slightly Cloudy     Glucose Urine Negative NEG^Negative mg/dL    Bilirubin Urine Negative NEG^Negative    Ketones Urine Negative NEG^Negative mg/dL    Specific Gravity Urine 1.017 1.003 - 1.035    Blood Urine Negative NEG^Negative    pH Urine 6.0 4.7 - 8.0 pH    Protein Albumin Urine 10 (A) NEG^Negative mg/dL    Urobilinogen mg/dL Normal 0.0 - 2.0 mg/dL    Nitrite Urine Negative NEG^Negative    Leukocyte Esterase Urine Moderate (A) NEG^Negative    Source Midstream Urine     RBC Urine 4 (H) 0 - 2 /HPF    WBC Urine 18 (H) 0 - 5 /HPF    Bacteria Urine Few (A) NEG^Negative /HPF    Squamous Epithelial /HPF Urine 19 (H) 0 - 1 /HPF    Mucous Urine Present (A) NEG^Negative /LPF   Urine Drugs of Abuse Screen Panel 13     Status: None   Result Value Ref Range    Cannabinoids (92-qfh-0-carboxy-9-THC) Not Detected NDET^Not Detected ng/mL    Phencyclidine (Phencyclidine) Not Detected NDET^Not Detected ng/mL    Cocaine (Benzoylecgonine) Not Detected NDET^Not Detected ng/mL    Methamphetamine (d-Methamphetamine) Not Detected NDET^Not Detected ng/mL    Opiates (Morphine) Not Detected NDET^Not  Detected ng/mL    Amphetamine (d-Amphetamine) Not Detected NDET^Not Detected ng/mL    Benzodiazepines (Nordiazepam) Not Detected NDET^Not Detected ng/mL    Tricyclic Antidepressants (Desipramine) Not Detected NDET^Not Detected ng/mL    Methadone (Methadone) Not Detected NDET^Not Detected ng/mL    Barbiturates (Butalbital) Not Detected NDET^Not Detected ng/mL    Oxycodone (Oxycodone) Not Detected NDET^Not Detected ng/mL    Propoxyphene (Norpropoxyphene) Not Detected NDET^Not Detected ng/mL    Buprenorphine (Buprenorphine) Not Detected NDET^Not Detected ng/mL   Asymptomatic COVID-19 Virus (Coronavirus) by PCR     Status: None    Specimen: Nasopharyngeal   Result Value Ref Range    COVID-19 Virus PCR to [facility] - Source Nasopharyngeal     COVID-19 Virus PCR to [facility] - Result       Test received-See reflex to [place] test SARS CoV2 (COVID-19) Virus RT-PCR   SARS-CoV-2 COVID-19 Virus (Coronavirus) RT-PCR Nasopharyngeal     Status: None    Specimen: Nasopharyngeal   Result Value Ref Range    SARS-CoV-2 Virus Specimen Source Nasopharyngeal     SARS-CoV-2 PCR Result NEGATIVE     SARS-CoV-2 PCR Comment       Testing was performed using the Xpert Xpress SARS-CoV-2 Assay on the Cepheid Gene-Xpert   Instrument Systems. Additional information about this Emergency Use Authorization (EUA)   assay can be found via the Lab Guide.     Basic metabolic panel     Status: Abnormal   Result Value Ref Range    Sodium 133 133 - 144 mmol/L    Potassium 3.1 (L) 3.4 - 5.3 mmol/L    Chloride 100 94 - 109 mmol/L    Carbon Dioxide 26 20 - 32 mmol/L    Anion Gap 7 3 - 14 mmol/L    Glucose 178 (H) 70 - 99 mg/dL    Urea Nitrogen 12 7 - 30 mg/dL    Creatinine 0.52 0.52 - 1.04 mg/dL    GFR Estimate >90 >60 mL/min/[1.73_m2]    GFR Estimate If Black >90 >60 mL/min/[1.73_m2]    Calcium 8.8 8.5 - 10.1 mg/dL   Potassium     Status: None   Result Value Ref Range    Potassium 4.5 3.4 - 5.3 mmol/L   UA reflex to Microscopic     Status: None   Result  Value Ref Range    Color Urine Light Yellow     Appearance Urine Clear     Glucose Urine Negative NEG^Negative mg/dL    Bilirubin Urine Negative NEG^Negative    Ketones Urine Negative NEG^Negative mg/dL    Specific Gravity Urine 1.018 1.003 - 1.035    Blood Urine Negative NEG^Negative    pH Urine 6.5 4.7 - 8.0 pH    Protein Albumin Urine Negative NEG^Negative mg/dL    Urobilinogen mg/dL Normal 0.0 - 2.0 mg/dL    Nitrite Urine Negative NEG^Negative    Leukocyte Esterase Urine Negative NEG^Negative    Source Midstream Urine    CBC with platelets differential     Status: Abnormal   Result Value Ref Range    WBC 7.2 4.0 - 11.0 10e9/L    RBC Count 4.75 3.8 - 5.2 10e12/L    Hemoglobin 16.0 (H) 11.7 - 15.7 g/dL    Hematocrit 46.5 35.0 - 47.0 %    MCV 98 78 - 100 fl    MCH 33.7 (H) 26.5 - 33.0 pg    MCHC 34.4 31.5 - 36.5 g/dL    RDW 12.1 10.0 - 15.0 %    Platelet Count 322 150 - 450 10e9/L    Diff Method Automated Method     % Neutrophils 63.1 %    % Lymphocytes 26.4 %    % Monocytes 7.1 %    % Eosinophils 2.2 %    % Basophils 0.6 %    % Immature Granulocytes 0.6 %    Nucleated RBCs 0 0 /100    Absolute Neutrophil 4.5 1.6 - 8.3 10e9/L    Absolute Lymphocytes 1.9 0.8 - 5.3 10e9/L    Absolute Monocytes 0.5 0.0 - 1.3 10e9/L    Absolute Eosinophils 0.2 0.0 - 0.7 10e9/L    Absolute Basophils 0.0 0.0 - 0.2 10e9/L    Abs Immature Granulocytes 0.0 0 - 0.4 10e9/L    Absolute Nucleated RBC 0.0    Comprehensive metabolic panel     Status: Abnormal   Result Value Ref Range    Sodium 135 133 - 144 mmol/L    Potassium 4.3 3.4 - 5.3 mmol/L    Chloride 104 94 - 109 mmol/L    Carbon Dioxide 27 20 - 32 mmol/L    Anion Gap 4 3 - 14 mmol/L    Glucose 111 (H) 70 - 99 mg/dL    Urea Nitrogen 13 7 - 30 mg/dL    Creatinine 0.59 0.52 - 1.04 mg/dL    GFR Estimate >90 >60 mL/min/[1.73_m2]    GFR Estimate If Black >90 >60 mL/min/[1.73_m2]    Calcium 9.0 8.5 - 10.1 mg/dL    Bilirubin Total 0.3 0.2 - 1.3 mg/dL    Albumin 3.0 (L) 3.4 - 5.0 g/dL    Protein  Total 6.7 (L) 6.8 - 8.8 g/dL    Alkaline Phosphatase 103 40 - 150 U/L    ALT 61 (H) 0 - 50 U/L    AST 22 0 - 45 U/L              Plan/Treatment Team     BEHAVIORAL TEAM DISCUSSION    Progress: Moderate - her uti was likely contributing to worsening of psych symptoms. Still unstable and impulsive.        Appears to have had slight improvement likely after being treated for uti. UTI likely exacerbated mental illness.     Continued Stay Criteria/Rationale: not as delusional though still believes she is a medium, continued monitoring needed       Medical/Physical: none: tendinosis of rotator cuff likely cause of pain.     Precautions: none    Falls precaution?: No  Behavioral Orders   Procedures     Code 1 - Restrict to Unit     Routine Programming     As clinically indicated     Status 15     Every 15 minutes.       Plan for this encounter/Med Changes/Labs:         Reviewed historical labs from [facility]. Should likely have immunologist appt in future.   Spoke with her daughter  Cindy who is veyr concerned mother will not take medications unless there is a pop order.     Filled out pop/Commitment  Court friday  Abilify, invega, latuda, zyprexa    Participants: Haleigh li NP,  Nursing, SW

## 2020-09-01 NOTE — PLAN OF CARE
"Face to face shift report received from Becca CHRISTIANSEN RN. Rounding completed, pt observed.     Patient in lounge at the start of shift.   Patient denies SI, HI, anxiety, depression or pain.  Patient stated \"I will be good for you today. You will not need to worry about me.\"   Patient did go lay down after shower.   Face to face report will be communicated to oncoming RN.    Tsering Johnson RN  9/1/2020  3:09 PM    Problem: Adult Behavioral Health Plan of Care  Goal: Patient-Specific Goal (Individualization)  Description: Will have reality based conversations by discharge.   Cooperate with treatment team recommendations including medications.   Will sleep 6-8 hrs nightly.  Attend at least 50% groups.       9/1/2020 0744 by Tsering Johnson, RN  Outcome: Improving     Problem: Thought Process Alteration  Goal: Optimal Thought Clarity  9/1/2020 0744 by Tsering Johnson, RN  Outcome: Improving     "

## 2020-09-01 NOTE — PLAN OF CARE
"Face to face end of shift report communicated to oncoming shift.     Shamika Zurita RN  9/1/2020  11:13 PM    Patient's conversation is polite with this writer and reality based.   Patient denies SI, HI, AH and VH at this time.   After supper patient reports \"good night, I'm going to bed\"     Face to face start of shift report received from Tsering CHRISTIANSEN RN  Patient in [facility] at this time, will continue to monitor.     Problem: Adult Behavioral Health Plan of Care  Goal: Patient-Specific Goal (Individualization)  Description: Will have reality based conversations by discharge.   Cooperate with treatment team recommendations including medications.   Will sleep 6-8 hrs nightly.  Attend at least 50% groups.   9/1/2020 1641 by Shamika Zurita RN  Outcome: Improving  Note:   Problem: Thought Process Alteration  Goal: Optimal Thought Clarity  9/1/2020 1641 by Shamika Zurita RN  Outcome: Improving     "

## 2020-09-02 PROCEDURE — 12400000 ZZH R&B MH

## 2020-09-02 ASSESSMENT — ACTIVITIES OF DAILY LIVING (ADL)
ORAL_HYGIENE: INDEPENDENT
DRESS: INDEPENDENT
HYGIENE/GROOMING: INDEPENDENT
LAUNDRY: UNABLE TO COMPLETE

## 2020-09-02 NOTE — PLAN OF CARE
Face to face report received from Shamika PRECIADO RN. Pt. Observed.       Problem: Adult Behavioral Health Plan of Care  Goal: Patient-Specific Goal (Individualization)  Description: Will have reality based conversations by discharge.   Cooperate with treatment team recommendations including medications.   Will sleep 6-8 hrs nightly.  Attend at least 50% groups.   9/2/2020 0450 by Marleny Singh RN  Outcome: No Change   Pt has been in bed with eyes closed and regular respirations for a total of 5 hours this noc shift. 15 minute and PRN checks all night. No complaints offered. Will continue to monitor.      Face to face end of shift report to be communicated to oncoming RN.     Marleny Singh RN  9/2/2020

## 2020-09-02 NOTE — PLAN OF CARE
"Face to face end of shift report communicated to oncoming shift.     Shamika Zurita RN  9/2/2020  11:23 PM    Patient at nurses window commenting on the staff's hair \"I was a  for 26 years so I know hair\"  Multiple comments on hair made at this time.  Patient sitting in lounge currently.   Patient back to nurses station window stating \"do you think I'm going to get out of here soon?\"  Educated patient that I cannot answer that and do not know, all I know is when her hearing is.  \"ok well let me talk to Liss the charge nurse, she will know\"     1740:  Patient received phone call from examiner for second examination.  1820:  Patient at nurses window reports how the phone call made her feel.  \"I feel like she thinks I'm crazy, I told her I'm not schizophrenic or hear voices and see things\"  \"she said I made her feel anxious because I was all over the place\"    \"now I'm just starring into space, I need to think\"  Talked with patient regarding her situation and how it's out of her control now and that the conversation was probably better than she believes it to be.      Patient goes to bed shortly after phone call, patient asleep the rest of this writer's shift.     Face to face start of shift report received from Tsering CHRISTIANSEN RN  Patient in [place] at this time, will continue to monitor.     Problem: Adult Behavioral Health Plan of Care  Goal: Patient-Specific Goal (Individualization)  Description: Will have reality based conversations by discharge.   Cooperate with treatment team recommendations including medications.   Will sleep 6-8 hrs nightly.  Attend at least 50% groups.   9/2/2020 5188 by Shamika Zurita RN  Outcome: No Change  Note:   Problem: Thought Process Alteration  Goal: Optimal Thought Clarity  9/2/2020 7018 by Shamika Zurita RN  Outcome: No Change     "

## 2020-09-02 NOTE — PLAN OF CARE
"Face to face shift report received from Marleny PRITCHETT RN. Rounding completed, pt observed.     Patient in lounge at the start of shift.   Patient denies SI, HI, anxiety, depression or pain. Patient stated, \"I will be good today.\" Patient then laughed. Patient reports, \"I am glad to have you as my nurse today.\"  12:20-Patient stated she was worried because she has not had a screening before she has a hearing. Patient also stated, \"I am worried about talking to Poornima. She will crack down the whip on me and she scares me.\" Patient was advised she should get a telephone hearing and that this nurse will speak to her  regarding the screening.   1400- Patient laying down and appears to be sleeping.     Problem: Adult Behavioral Health Plan of Care  Goal: Patient-Specific Goal (Individualization)  Description: Will have reality based conversations by discharge.   Cooperate with treatment team recommendations including medications.   Will sleep 6-8 hrs nightly.  Attend at least 50% groups.       9/2/2020 0743 by Tsering Johnson RN  Outcome: No Change  Note:        Problem: Thought Process Alteration  Goal: Optimal Thought Clarity  Outcome: Improving     "

## 2020-09-02 NOTE — PLAN OF CARE
Talked with pt to gather insight about how they have been doing. Pt has many questions about her court case Friday and states she does not want to go to a [facility] because of how long she has seen other peers wait for a bed open. Let pt know that the plan of care will be dependent on what occurs in her court hearing Friday and we will have to follow the recommendations given. Encouraged pt to call  if they had anymore questions about their upcoming hearing. Pt also states she has yet to talk to a second medical examiner. Will follow-up to see is this has been completed.

## 2020-09-03 PROCEDURE — 12400000 ZZH R&B MH

## 2020-09-03 PROCEDURE — 99232 SBSQ HOSP IP/OBS MODERATE 35: CPT | Performed by: NURSE PRACTITIONER

## 2020-09-03 PROCEDURE — 25000132 ZZH RX MED GY IP 250 OP 250 PS 637: Mod: GY | Performed by: NURSE PRACTITIONER

## 2020-09-03 RX ADMIN — LORAZEPAM 1 MG: 1 TABLET ORAL at 19:13

## 2020-09-03 RX ADMIN — ACETAMINOPHEN 650 MG: 325 TABLET, FILM COATED ORAL at 08:56

## 2020-09-03 RX ADMIN — HYDROXYZINE HYDROCHLORIDE 50 MG: 10 TABLET ORAL at 17:40

## 2020-09-03 ASSESSMENT — ACTIVITIES OF DAILY LIVING (ADL)
ORAL_HYGIENE: INDEPENDENT
HYGIENE/GROOMING: INDEPENDENT
DRESS: INDEPENDENT

## 2020-09-03 NOTE — PROGRESS NOTES
"[facility]  Psychiatric Progress Note      Impression:       Patient agrees to meet in her room where she goes over the paperwork she has from last examiner.  She points out different paragraphs and explains why certain events are construed the way they are and is not happy with the outcome of this examiner.  Patient makes a point of trying to explain her history with me, telling me she did very well taking ADHD medications and took risperidone for a long time before, however she had elevated prolactin levels and is listed under her allergies. She disagrees with a \"schizo\" diagnosis, and continues to have no insight into her delusions of being a psychic mediium upon her admission.  Patient is aware of having court tomorrow.  She has made some improvements in thought process, however she continues to not comprehend risk versus benefit of medications or the purpose of them.            Educated regarding medication indications, risks, benefits, side effects, contraindications and possible interactions. Verbally expressed understanding.        Diagnoses:     schizoaffective disorder, bipolar type    Attestation:  Patient has been seen and evaluated by me,  MADI Albarado CNP          Interim History:   The patient's care was discussed with the treatment team and chart notes were reviewed.            Medications:     Current Facility-Administered Medications   Medication     acetaminophen (TYLENOL) tablet 650 mg     albuterol (PROAIR HFA/PROVENTIL HFA/VENTOLIN HFA) 108 (90 Base) MCG/ACT inhaler 1-2 puff     cloNIDine (CATAPRES) tablet 0.1 mg     hydrOXYzine (ATARAX) tablet 30-50 mg     LORazepam (ATIVAN) tablet 1-2 mg     magnesium hydroxide (MILK OF MAGNESIA) suspension 30 mL     nicotine (NICORETTE) gum 2-4 mg     OLANZapine (zyPREXA) tablet 5-10 mg    Or     OLANZapine (zyPREXA) injection 10 mg     QUEtiapine (SEROquel) tablet 25-50 mg          10 point ROS denies uti symptoms though ua " positive       Allergies:     Allergies   Allergen Reactions     Shrimp Anaphylaxis     Risperdal [Risperidone] Other (See Comments)     Elevated prolactin            Psychiatric Examination:   /75   Pulse 81   Temp 98.1  F (36.7  C) (Tympanic)   Resp 16   Ht 1.524 m (5')   Wt 89.4 kg (197 lb)   SpO2 96%   BMI 38.47 kg/m    Weight is 197 lbs 0 oz  Body mass index is 38.47 kg/m .    MSE/PSYCH  PSYCHIATRIC EXAM  /75   Pulse 81   Temp 98.1  F (36.7  C) (Tympanic)   Resp 16   Ht 1.524 m (5')   Wt 89.4 kg (197 lb)   SpO2 96%   BMI 38.47 kg/m    -Appearance/Behavior:  No longer disheveled  -Motor: intact  -Gait: intact  -Abnormal involuntary movements: none  -Mood: somewhat cooperative  -Affect: restritcted  -Speech: improving. Normal rate and tone. Mildly pressurred at times.   -Thought process/associations:  improving  -Thought content:  Likely continued delusion and remains guarded   -Perceptual disturbances: denies si/hi,   -Suicidal/Homicidal Ideation: denies    -Judgment: poor  -Insight: poor  *Orientation: time, place and person.  *Memory: difficult to assess  *Attention: poor  *Language: fluent, no aphasias, able to repeat phrases and name objects. Vocab intact.  *Fund of information: difficult to assess, appears appropriate  *Cognitive functioning estimate: 0 - independent.           Labs:     Results for orders placed or performed during the hospital encounter of 08/10/20   XR Humerus Port Left G/E 2 Views     Status: None    Narrative    Exam: XR HUMERUS PORT LT     History:Female, age 50 years, left upper arm pain after fall.    Comparison:  None    Technique: Two views are submitted    Findings: Bones are normally mineralized. No evidence of acute or  subacute fracture.  No evidence of dislocation.  Calcific tendinosis  of the rotator cuff.           Impression    Impression:  No evidence of acute or subacute bony abnormality.     Calcifications of the shoulder suggest calcific  tendinosis of the  rotator cuff. MRI would better characterize.    LUPE SANCHEZ MD   CBC with platelets differential     Status: Abnormal   Result Value Ref Range    WBC 13.7 (H) 4.0 - 11.0 10e9/L    RBC Count 5.09 3.8 - 5.2 10e12/L    Hemoglobin 17.2 (H) 11.7 - 15.7 g/dL    Hematocrit 49.2 (H) 35.0 - 47.0 %    MCV 97 78 - 100 fl    MCH 33.8 (H) 26.5 - 33.0 pg    MCHC 35.0 31.5 - 36.5 g/dL    RDW 12.6 10.0 - 15.0 %    Platelet Count 185 150 - 450 10e9/L    Diff Method Automated Method     % Neutrophils 82.4 %    % Lymphocytes 10.4 %    % Monocytes 6.3 %    % Eosinophils 0.3 %    % Basophils 0.2 %    % Immature Granulocytes 0.4 %    Nucleated RBCs 0 0 /100    Absolute Neutrophil 11.3 (H) 1.6 - 8.3 10e9/L    Absolute Lymphocytes 1.4 0.8 - 5.3 10e9/L    Absolute Monocytes 0.9 0.0 - 1.3 10e9/L    Absolute Eosinophils 0.0 0.0 - 0.7 10e9/L    Absolute Basophils 0.0 0.0 - 0.2 10e9/L    Abs Immature Granulocytes 0.1 0 - 0.4 10e9/L    Absolute Nucleated RBC 0.0    Comprehensive metabolic panel     Status: Abnormal   Result Value Ref Range    Sodium 129 (L) 133 - 144 mmol/L    Potassium 4.1 3.4 - 5.3 mmol/L    Chloride 95 94 - 109 mmol/L    Carbon Dioxide 25 20 - 32 mmol/L    Anion Gap 9 3 - 14 mmol/L    Glucose 107 (H) 70 - 99 mg/dL    Urea Nitrogen 9 7 - 30 mg/dL    Creatinine 0.54 0.52 - 1.04 mg/dL    GFR Estimate >90 >60 mL/min/[1.73_m2]    GFR Estimate If Black >90 >60 mL/min/[1.73_m2]    Calcium 9.1 8.5 - 10.1 mg/dL    Bilirubin Total 0.7 0.2 - 1.3 mg/dL    Albumin 3.2 (L) 3.4 - 5.0 g/dL    Protein Total 7.3 6.8 - 8.8 g/dL    Alkaline Phosphatase 131 40 - 150 U/L    ALT 57 (H) 0 - 50 U/L    AST 32 0 - 45 U/L   Alcohol ethyl     Status: None   Result Value Ref Range    Ethanol g/dL <0.01 0.01 g/dL   UA reflex to Microscopic     Status: Abnormal   Result Value Ref Range    Color Urine Yellow     Appearance Urine Slightly Cloudy     Glucose Urine Negative NEG^Negative mg/dL    Bilirubin Urine Negative NEG^Negative     Ketones Urine Negative NEG^Negative mg/dL    Specific Gravity Urine 1.017 1.003 - 1.035    Blood Urine Negative NEG^Negative    pH Urine 6.0 4.7 - 8.0 pH    Protein Albumin Urine 10 (A) NEG^Negative mg/dL    Urobilinogen mg/dL Normal 0.0 - 2.0 mg/dL    Nitrite Urine Negative NEG^Negative    Leukocyte Esterase Urine Moderate (A) NEG^Negative    Source Midstream Urine     RBC Urine 4 (H) 0 - 2 /HPF    WBC Urine 18 (H) 0 - 5 /HPF    Bacteria Urine Few (A) NEG^Negative /HPF    Squamous Epithelial /HPF Urine 19 (H) 0 - 1 /HPF    Mucous Urine Present (A) NEG^Negative /LPF   Urine Drugs of Abuse Screen Panel 13     Status: None   Result Value Ref Range    Cannabinoids (43-xrx-9-carboxy-9-THC) Not Detected NDET^Not Detected ng/mL    Phencyclidine (Phencyclidine) Not Detected NDET^Not Detected ng/mL    Cocaine (Benzoylecgonine) Not Detected NDET^Not Detected ng/mL    Methamphetamine (d-Methamphetamine) Not Detected NDET^Not Detected ng/mL    Opiates (Morphine) Not Detected NDET^Not Detected ng/mL    Amphetamine (d-Amphetamine) Not Detected NDET^Not Detected ng/mL    Benzodiazepines (Nordiazepam) Not Detected NDET^Not Detected ng/mL    Tricyclic Antidepressants (Desipramine) Not Detected NDET^Not Detected ng/mL    Methadone (Methadone) Not Detected NDET^Not Detected ng/mL    Barbiturates (Butalbital) Not Detected NDET^Not Detected ng/mL    Oxycodone (Oxycodone) Not Detected NDET^Not Detected ng/mL    Propoxyphene (Norpropoxyphene) Not Detected NDET^Not Detected ng/mL    Buprenorphine (Buprenorphine) Not Detected NDET^Not Detected ng/mL   Asymptomatic COVID-19 Virus (Coronavirus) by PCR     Status: None    Specimen: Nasopharyngeal   Result Value Ref Range    COVID-19 Virus PCR to [facility] - Source Nasopharyngeal     COVID-19 Virus PCR to [facility] - Result       Test received-See reflex to [place] test SARS CoV2 (COVID-19) Virus RT-PCR   SARS-CoV-2 COVID-19 Virus (Coronavirus) RT-PCR Nasopharyngeal     Status: None     Specimen: Nasopharyngeal   Result Value Ref Range    SARS-CoV-2 Virus Specimen Source Nasopharyngeal     SARS-CoV-2 PCR Result NEGATIVE     SARS-CoV-2 PCR Comment       Testing was performed using the Xpert Xpress SARS-CoV-2 Assay on the Cepheid Gene-Xpert   Instrument Systems. Additional information about this Emergency Use Authorization (EUA)   assay can be found via the Lab Guide.     Basic metabolic panel     Status: Abnormal   Result Value Ref Range    Sodium 133 133 - 144 mmol/L    Potassium 3.1 (L) 3.4 - 5.3 mmol/L    Chloride 100 94 - 109 mmol/L    Carbon Dioxide 26 20 - 32 mmol/L    Anion Gap 7 3 - 14 mmol/L    Glucose 178 (H) 70 - 99 mg/dL    Urea Nitrogen 12 7 - 30 mg/dL    Creatinine 0.52 0.52 - 1.04 mg/dL    GFR Estimate >90 >60 mL/min/[1.73_m2]    GFR Estimate If Black >90 >60 mL/min/[1.73_m2]    Calcium 8.8 8.5 - 10.1 mg/dL   Potassium     Status: None   Result Value Ref Range    Potassium 4.5 3.4 - 5.3 mmol/L   UA reflex to Microscopic     Status: None   Result Value Ref Range    Color Urine Light Yellow     Appearance Urine Clear     Glucose Urine Negative NEG^Negative mg/dL    Bilirubin Urine Negative NEG^Negative    Ketones Urine Negative NEG^Negative mg/dL    Specific Gravity Urine 1.018 1.003 - 1.035    Blood Urine Negative NEG^Negative    pH Urine 6.5 4.7 - 8.0 pH    Protein Albumin Urine Negative NEG^Negative mg/dL    Urobilinogen mg/dL Normal 0.0 - 2.0 mg/dL    Nitrite Urine Negative NEG^Negative    Leukocyte Esterase Urine Negative NEG^Negative    Source Midstream Urine    CBC with platelets differential     Status: Abnormal   Result Value Ref Range    WBC 7.2 4.0 - 11.0 10e9/L    RBC Count 4.75 3.8 - 5.2 10e12/L    Hemoglobin 16.0 (H) 11.7 - 15.7 g/dL    Hematocrit 46.5 35.0 - 47.0 %    MCV 98 78 - 100 fl    MCH 33.7 (H) 26.5 - 33.0 pg    MCHC 34.4 31.5 - 36.5 g/dL    RDW 12.1 10.0 - 15.0 %    Platelet Count 322 150 - 450 10e9/L    Diff Method Automated Method     % Neutrophils 63.1 %    %  Lymphocytes 26.4 %    % Monocytes 7.1 %    % Eosinophils 2.2 %    % Basophils 0.6 %    % Immature Granulocytes 0.6 %    Nucleated RBCs 0 0 /100    Absolute Neutrophil 4.5 1.6 - 8.3 10e9/L    Absolute Lymphocytes 1.9 0.8 - 5.3 10e9/L    Absolute Monocytes 0.5 0.0 - 1.3 10e9/L    Absolute Eosinophils 0.2 0.0 - 0.7 10e9/L    Absolute Basophils 0.0 0.0 - 0.2 10e9/L    Abs Immature Granulocytes 0.0 0 - 0.4 10e9/L    Absolute Nucleated RBC 0.0    Comprehensive metabolic panel     Status: Abnormal   Result Value Ref Range    Sodium 135 133 - 144 mmol/L    Potassium 4.3 3.4 - 5.3 mmol/L    Chloride 104 94 - 109 mmol/L    Carbon Dioxide 27 20 - 32 mmol/L    Anion Gap 4 3 - 14 mmol/L    Glucose 111 (H) 70 - 99 mg/dL    Urea Nitrogen 13 7 - 30 mg/dL    Creatinine 0.59 0.52 - 1.04 mg/dL    GFR Estimate >90 >60 mL/min/[1.73_m2]    GFR Estimate If Black >90 >60 mL/min/[1.73_m2]    Calcium 9.0 8.5 - 10.1 mg/dL    Bilirubin Total 0.3 0.2 - 1.3 mg/dL    Albumin 3.0 (L) 3.4 - 5.0 g/dL    Protein Total 6.7 (L) 6.8 - 8.8 g/dL    Alkaline Phosphatase 103 40 - 150 U/L    ALT 61 (H) 0 - 50 U/L    AST 22 0 - 45 U/L              Plan/Treatment Team     BEHAVIORAL TEAM DISCUSSION    Progress: Moderate - her uti was likely contributing to worsening of psych symptoms. Still unstable and impulsive.      Appears to have had slight improvement likely after being treated for uti. UTI likely exacerbated mental illness.     Continued Stay Criteria/Rationale: not as delusional, continued monitoring needed     Medical/Physical: tendinosis of rotator cuff likely cause of pain.     Precautions: none    Falls precaution?: No  Behavioral Orders   Procedures     Code 1 - Restrict to Unit     Routine Programming     As clinically indicated     Status 15     Every 15 minutes.       Plan for this encounter/Med Changes/Labs:   Reviewed historical labs from [facility]. Should likely have immunologist appt in future.   Spoke with her daughter  Cindy who is  veyr concerned mother will not take medications unless there is a pop order.     Filled out pop/Commitment  Court friday  Abilify, invega, latuda, zyprexa    Participants: Poornima Tucker NP,  Nursing,

## 2020-09-03 NOTE — PLAN OF CARE
Face to face shift report received from ree houser Rounding completed, pt observed.    Problem: Adult Behavioral Health Plan of Care  Goal: Patient-Specific Goal (Individualization)  Description: Will have reality based conversations by discharge.   Cooperate with treatment team recommendations including medications.   Will sleep 6-8 hrs nightly.  Attend at least 50% groups.   9/2/2020 2346 by Jesse Terrell, RN  Outcome: No Change  Note: Patient in bed at start of shift got up a little after midnight ate some jello talked with peers and went back to bed. Patient up again around 5 for 30 minutes. Patient stayed up rest of shift.    Problem: Thought Process Alteration  Goal: Optimal Thought Clarity  9/2/2020 2346 by Jesse Terrell, RN  Outcome: No Change     Face to face report will be communicated to oncoming RN.    Jesse Terrell RN  9/3/2020

## 2020-09-03 NOTE — PLAN OF CARE
Talked 1:1 with pt this morning. Pt shares that the medical examiner called her last night. Pt received the report via fax while we were speaking. Pt was upset about the report and feels like her fate is already decided when it comes to her court hearing tomorrow based on the examiners statements/findings. Encouraged pt to read report, highlight any questions/statements and to discuss this with her . Pt will have commitment and pop court tomorrow at 8:15.

## 2020-09-03 NOTE — PLAN OF CARE
"Face to face shift report received from Jesse PRECIADO RN. Rounding completed, pt observed.  Jaye Pickard RN  9/3/2020  8:03 AM    Problem: Adult Behavioral Health Plan of Care  Goal: Patient-Specific Goal (Individualization)  Description: Will have reality based conversations by discharge.   Cooperate with treatment team recommendations including medications.   Will sleep 6-8 hrs nightly.  Attend at least 50% groups.       9/3/2020 0754 by Jaye Pickard RN  Outcome: No Change  Note: Shift Summery:  Patient was up on the unit at the start of this shift.  Patient states she did not sleep well and that she had her second opinion screening via telephone last evening.  She adds that she felt quite \"nervous\" during the conversation and that the examiner had made comments about her \"jumping from topic to topic\".  Patient feels that this screener will write a bad report and this will cause her to be committed.  Patient's  faxed  written report for patient.  SW gave patient the report.  Patient visible upset and stated she was going to rest in bed.         Problem: Thought Process Alteration  Goal: Optimal Thought Clarity  9/3/2020 0754 by Jaye Pickard RN  Outcome: No change     Face to face report will be communicated to oncoming RN.        "

## 2020-09-04 PROCEDURE — 25000132 ZZH RX MED GY IP 250 OP 250 PS 637: Mod: GY | Performed by: NURSE PRACTITIONER

## 2020-09-04 PROCEDURE — 12400011

## 2020-09-04 PROCEDURE — 12400000 ZZH R&B MH

## 2020-09-04 RX ORDER — ARIPIPRAZOLE 5 MG/1
5 TABLET ORAL AT BEDTIME
Status: DISCONTINUED | OUTPATIENT
Start: 2020-09-04 | End: 2020-09-08

## 2020-09-04 RX ADMIN — HYDROXYZINE HYDROCHLORIDE 50 MG: 10 TABLET ORAL at 16:23

## 2020-09-04 RX ADMIN — HYDROXYZINE HYDROCHLORIDE 50 MG: 10 TABLET ORAL at 02:08

## 2020-09-04 RX ADMIN — HYDROXYZINE HYDROCHLORIDE 50 MG: 10 TABLET ORAL at 07:41

## 2020-09-04 RX ADMIN — CLONIDINE HYDROCHLORIDE 0.1 MG: 0.1 TABLET ORAL at 12:47

## 2020-09-04 RX ADMIN — ARIPIPRAZOLE 5 MG: 5 TABLET ORAL at 20:27

## 2020-09-04 RX ADMIN — ACETAMINOPHEN 650 MG: 325 TABLET, FILM COATED ORAL at 16:23

## 2020-09-04 RX ADMIN — LORAZEPAM 2 MG: 1 TABLET ORAL at 00:32

## 2020-09-04 RX ADMIN — ACETAMINOPHEN 650 MG: 325 TABLET, FILM COATED ORAL at 20:27

## 2020-09-04 RX ADMIN — LORAZEPAM 1 MG: 1 TABLET ORAL at 09:15

## 2020-09-04 ASSESSMENT — ACTIVITIES OF DAILY LIVING (ADL)
HYGIENE/GROOMING: INDEPENDENT
ORAL_HYGIENE: INDEPENDENT
ORAL_HYGIENE: INDEPENDENT
DRESS: SCRUBS (BEHAVIORAL HEALTH)
HYGIENE/GROOMING: INDEPENDENT
LAUNDRY: UNABLE TO COMPLETE
DRESS: INDEPENDENT

## 2020-09-04 NOTE — PLAN OF CARE
Face to face shift report received from JULIA Cee.       Problem: Adult Behavioral Health Plan of Care  Goal: Patient-Specific Goal (Individualization)  Description: Will have reality based conversations by discharge.   Cooperate with treatment team recommendations including medications.   Will sleep 6-8 hrs nightly.  Attend at least 50% groups.       Outcome: No Change  Note: Cooperative. Reports anxiety due to court hearing tomorrow. Requested and received Hydroxyzine 50mg PO at 1740. Reported this was ineffective. Requested and received Ativan 1mg PO at 1913. Speech rapid and rambling. Spends time in lounge area. Social with peers and staff. No reports of pain.       Problem: Thought Process Alteration  Goal: Optimal Thought Clarity  Outcome: No Change            Face to face end of shift report to be communicated to oncoming RN.

## 2020-09-04 NOTE — PLAN OF CARE
Face to face shift report received from Jesse PRECIADO RN. Rounding completed, pt observed.    Jaye Pickard RN  9/4/2020  7:59 AM    Problem: Adult Behavioral Health Plan of Care  Goal: Patient-Specific Goal (Individualization)  Description:   Cooperate with treatment team recommendations including medications.   Will sleep 6-8 hrs nightly.  Attend at least 50% groups.       9/4/2020 0758 by Jaye Pickard RN  Outcome: Improving  Note: Shift Summery:  Patient was up on the unit at the start of this shift.  Patient visibly anxious at start of day, states that she did not sleep well and she was anxious about court.  Patient did request something for anxiety.  Offered and accepted Hydroxyzine 50 mg po at 0741.  Patient asking appropriate questions as to what will happen after court such as how long she will need to stay here and where she will go from here.  Nurse explained that the SW could answer her questions about the plan for her and patient did state an understanding.  Patient complained of continued anxiety after court.  Offered and accepted Ativan 1 mg po at 0918.  Patient has not complained of pain.  Appetite is good.  States she is sick of the same stuff in groups so chooses not to go most occasions.  1247:  Patient continues to complain of anxiety.  Clonidine 0.1 mg po administered.  Education sheets requested and given on all PRNs taken today and of Abilify which she will be starting tonight.  Problem: Thought Process Alteration  Goal: Optimal Thought Clarity  Will have reality based conversations by discharge.   9/4/2020 0758 by Jaye Pickard RN  Outcome: No Change     Face to face report will be communicated to oncoming RN.

## 2020-09-04 NOTE — PLAN OF CARE
"  Problem: Adult Behavioral Health Plan of Care  Goal: Patient-Specific Goal (Individualization)  Description:   Cooperate with treatment team recommendations including medications.   Will sleep 6-8 hrs nightly.  Attend at least 50% groups.     Patient out on open unit, sitting in the lounge working on a puzzle. Affect is full range, mood is anxious. Speech is rambling. Admits to pain 8/10 in her left shoulder, did accept Tylenol 650 mg at 1623, and admits to anxiety, accepted Atarax 50 mg at this time also. States she would like an MRI on her shoulder, states \"I fell through a deck about 6 weeks ago, they did an xray on my arm, but my shoulder feels like there is something going on, it really hurts\". She napped after dinner until snack time. Has been appropriate with staff and peers.   2027-requested and accepted Tylenol 650 mg for 7/10 left shoulder pain. She was given a patient education handout on Abilify and a hand written list of her available PRN medications, per her request.   Face to face end of shift report [facility] [place] [place] [facility].     Anastacia Gregg RN  9/4/2020  10:24 PM          9/4/2020 1733 by Anastacia Gregg RN  Outcome: Improving       Problem: Thought Process Alteration  Goal: Optimal Thought Clarity  Description: Will have reality based conversations by discharge.     Patient able to have reality based conversation.   9/4/2020 1733 by Anastacia Gregg, RN  Outcome: Improving     "

## 2020-09-04 NOTE — PLAN OF CARE
Pt appears to be sleeping  Mother sitting at the bedside  Will continue to monitor at this time          Kendrick Bruno RN  02/24/19 4550 Pt had court today. The  ordered a T[place]o and committment. Will change pt's status in the system.     Talked 1:1 with pt after court. Pt had many questions and is ready to get this process going so she can be discharged. Will let pt know when she has a  and coordinate with them on a plan of care.

## 2020-09-05 PROCEDURE — A9270 NON-COVERED ITEM OR SERVICE: HCPCS | Mod: GY | Performed by: NURSE PRACTITIONER

## 2020-09-05 PROCEDURE — 99232 SBSQ HOSP IP/OBS MODERATE 35: CPT | Performed by: NURSE PRACTITIONER

## 2020-09-05 PROCEDURE — 25000132 ZZH RX MED GY IP 250 OP 250 PS 637: Mod: GY | Performed by: NURSE PRACTITIONER

## 2020-09-05 PROCEDURE — 12400011

## 2020-09-05 PROCEDURE — 12400000 ZZH R&B MH

## 2020-09-05 RX ORDER — LORAZEPAM 1 MG/1
1 TABLET ORAL EVERY 4 HOURS PRN
Status: DISCONTINUED | OUTPATIENT
Start: 2020-09-05 | End: 2020-09-06

## 2020-09-05 RX ORDER — CLONIDINE HYDROCHLORIDE 0.1 MG/1
0.1 TABLET ORAL 2 TIMES DAILY
Status: DISCONTINUED | OUTPATIENT
Start: 2020-09-05 | End: 2020-09-07

## 2020-09-05 RX ADMIN — CLONIDINE HYDROCHLORIDE 0.1 MG: 0.1 TABLET ORAL at 20:10

## 2020-09-05 RX ADMIN — HYDROXYZINE HYDROCHLORIDE 50 MG: 10 TABLET ORAL at 11:50

## 2020-09-05 RX ADMIN — ACETAMINOPHEN 650 MG: 325 TABLET, FILM COATED ORAL at 11:49

## 2020-09-05 RX ADMIN — ACETAMINOPHEN 650 MG: 325 TABLET, FILM COATED ORAL at 16:38

## 2020-09-05 RX ADMIN — ACETAMINOPHEN 650 MG: 325 TABLET, FILM COATED ORAL at 03:17

## 2020-09-05 RX ADMIN — HYDROXYZINE HYDROCHLORIDE 50 MG: 10 TABLET ORAL at 23:10

## 2020-09-05 RX ADMIN — ACETAMINOPHEN 650 MG: 325 TABLET, FILM COATED ORAL at 23:10

## 2020-09-05 RX ADMIN — ALBUTEROL SULFATE 2 PUFF: 90 AEROSOL, METERED RESPIRATORY (INHALATION) at 22:53

## 2020-09-05 RX ADMIN — MAGNESIUM HYDROXIDE 30 ML: 400 SUSPENSION ORAL at 03:17

## 2020-09-05 RX ADMIN — ACETAMINOPHEN 650 MG: 325 TABLET, FILM COATED ORAL at 07:47

## 2020-09-05 RX ADMIN — ALBUTEROL SULFATE 2 PUFF: 90 AEROSOL, METERED RESPIRATORY (INHALATION) at 18:50

## 2020-09-05 RX ADMIN — CLONIDINE HYDROCHLORIDE 0.1 MG: 0.1 TABLET ORAL at 03:17

## 2020-09-05 RX ADMIN — ARIPIPRAZOLE 5 MG: 5 TABLET ORAL at 20:10

## 2020-09-05 RX ADMIN — LORAZEPAM 1 MG: 1 TABLET ORAL at 16:31

## 2020-09-05 RX ADMIN — MAGNESIUM HYDROXIDE 30 ML: 400 SUSPENSION ORAL at 18:53

## 2020-09-05 ASSESSMENT — ACTIVITIES OF DAILY LIVING (ADL)
LAUNDRY: UNABLE TO COMPLETE
ORAL_HYGIENE: INDEPENDENT
HYGIENE/GROOMING: INDEPENDENT
DRESS: INDEPENDENT;SCRUBS (BEHAVIORAL HEALTH)
HYGIENE/GROOMING: INDEPENDENT

## 2020-09-05 NOTE — PLAN OF CARE
"Face to face report received from Tsering CHRISTIANSEN RN. Pt. Observed.     Problem: Adult Behavioral Health Plan of Care  Goal: Patient-Specific Goal (Individualization)  Description:   Cooperate with treatment team recommendations including medications.   Will sleep 6-8 hrs nightly.  Attend at least 50% groups.     Pt. Denies HI, SI, Depression, and hallucinations. Pt. Continues to report mild left shoulder pain. \"I think it might be my rotator cuff.\" Pt. Administered PRN Tylenol 650 mg po @ 0747 and 1149 with somewhat effective results. Pt. To nurses station @ 1145 requesting and administered PRN Atarax 50 mg po for increasing anxiety @ 1150. Pt. In agreement to update staff to thoughts feelings of wanting to harm self or others. Pt. Cooperative with nursing assessment and medications. Pt. Eating WDL. Pt. Out to lounge this a.m. working on a puzzle. Pt. Irritable this a.m. drinking multiple cups of coffee.     9/5/2020 1412 by Marleny Singh, RN  Outcome: No Change   Face to face end of shift report to be communicated to oncoming RN.     Marleny Singh RN  9/5/2020  "

## 2020-09-05 NOTE — PLAN OF CARE
"Problem: Adult Behavioral Health Plan of Care  Goal: Patient-Specific Goal (Individualization)  Description:   Cooperate with treatment team recommendations including medications.   Will sleep 6-8 hrs nightly.  Attend at least 50% groups.     Pt appears to be sleeping at start of shift. Pt up in lounge around 1615. Pt rambling and stammering; asking questions about medications, having difficulty remembering correct name of medication. No stammering heard while asking other staff for a blanket. Pt did appear anxious and stated she was unable to focus. Pt did request prn for anxiety; ativan administered at 1631. Pt requested and received prn tylenol for 8/10 L shoulder pain at 1638. Pt then sitting out in lounge conversing with peers and working on puzzle. No complaints at this time. Will continue to monitor.    1850 Prn inhaler administered per pt request.  1853 Prn MOM administered per pt request.  1915 Pt states she is feeling anxious this evening. Stated while she was working on a puzzle with a peer she was okay, but afterwards her anxiety increased. Agreeable to wait until HS meds and states that she is going to lay down until then.   2253 Prn inhaler administered per pt request. Pt states she woke up from a \"night terror\".   2310 Prn tylenol and atarax administered by fellow RN for 8/10 pain and anxiety.  2330 Care continued by this writer for night shift.    Pt appears to be sleeping comfortably with regular respirations. Pt slept approx. 4 hours this shift. Will continue to monitor.    0211 Pt up to nurses station with request for anxiety prn. Prn ativan administered per pt request; states was effective earlier in the evening. Pt states that she feels restlessness while in bed in her hands and feet. Pt then out in lounge starting a new puzzle.  0304 C/o 8/10 L shoulder pain. Prn tylenol administered per pt request. Prn inhaler administered per pt request as well. Pt then returned to bed.  0400 Appears to be " sleeping.    Outcome: No Change    Problem: Thought Process Alteration  Goal: Optimal Thought Clarity  Description: Will have reality based conversations by discharge.   Outcome: No Change     Face to face end of shift report communicated to oncoming RN.     Becca Waldron RN  9/6/2020

## 2020-09-05 NOTE — PROGRESS NOTES
[facility]  Psychiatric Progress Note      Impression:   Patient had court 9/4, full commit and Tineo supported.    Patient out on open unit, we started Abilify 5mg last night.  She reports sleeping good last night, however continues to feel a bit sleepy today with some anxiety.  She agrees to scheduled clonidine as she has been taking prn with benefit.  Patient continues to struggle with thought process, however she is aware of court decision and is talking about wanting to go to [facility] or [facility] from here.  Remind patient this will take some time although will make note to treatment team/CM to consider.  Patient denies si/hi or other issues today.  She is not making any delusion statements today.        Educated regarding medication indications, risks, benefits, side effects, contraindications and possible interactions. Verbally expressed understanding.        Diagnoses:     Schizoaffective Disorder, bipolar type    Attestation:  Patient has been seen and evaluated by me,  Poornima Tucker, MADI ROMERO          Interim History:   The patient's care was discussed with the treatment team and chart notes were reviewed.           Plan/Treatment Team     BEHAVIORAL TEAM DISCUSSION    Progress: Moderate     Continued Stay Criteria/Rationale: not as delusional though continues with impaired thought process, committed and Tineo, discharge planning needed    Medical/Physical: tendinosis of rotator cuff likely cause of pain.     Precautions: none    Falls precaution?: No  Behavioral Orders   Procedures     Code 1 - Restrict to Unit     Routine Programming     As clinically indicated     Status 15     Every 15 minutes.       Plan for this encounter/Med Changes/Labs:   Started Abilify 5mg 9/4  Scheduled Clonidine    Full commit and Tineo    Tineo med list  abilify, invega, latuda, zyprexa    Participants: Poornima Tucker NP,  Nursing,             Medications:     Current  Facility-Administered Medications   Medication     acetaminophen (TYLENOL) tablet 650 mg     albuterol (PROAIR HFA/PROVENTIL HFA/VENTOLIN HFA) 108 (90 Base) MCG/ACT inhaler 1-2 puff     ARIPiprazole (ABILIFY) tablet 5 mg     cloNIDine (CATAPRES) tablet 0.1 mg     hydrOXYzine (ATARAX) tablet 30-50 mg     LORazepam (ATIVAN) tablet 1-2 mg     magnesium hydroxide (MILK OF MAGNESIA) suspension 30 mL     nicotine (NICORETTE) gum 2-4 mg     OLANZapine (zyPREXA) tablet 5-10 mg    Or     OLANZapine (zyPREXA) injection 10 mg     QUEtiapine (SEROquel) tablet 25-50 mg          10 point ROS denies uti symptoms though ua positive       Allergies:     Allergies   Allergen Reactions     Shrimp Anaphylaxis     Risperdal [Risperidone] Other (See Comments)     Elevated prolactin            Psychiatric Examination:   /82   Pulse 92   Temp 97.2  F (36.2  C) (Tympanic)   Resp 16   Ht 1.524 m (5')   Wt 89.4 kg (197 lb)   SpO2 92%   BMI 38.47 kg/m    Weight is 197 lbs 0 oz  Body mass index is 38.47 kg/m .    MSE/PSYCH  PSYCHIATRIC EXAM  /82   Pulse 92   Temp 97.2  F (36.2  C) (Tympanic)   Resp 16   Ht 1.524 m (5')   Wt 89.4 kg (197 lb)   SpO2 92%   BMI 38.47 kg/m    -Appearance/Behavior:  Appropriate  -Motor: intact  -Gait: intact  -Abnormal involuntary movements: none  -Mood: cooperative, flat  -Affect: restritcted  -Speech: improving. Normal rate and tone. Mildly pressurred at times.   -Thought process/associations:  improving  -Thought content:  Likely continued delusion and remains guarded   -Perceptual disturbances: denies si/hi,   -Suicidal/Homicidal Ideation: denies    -Judgment: poor  -Insight: poor  *Orientation: time, place and person.  *Memory: difficult to assess  *Attention: poor  *Language: fluent, no aphasias, able to repeat phrases and name objects. Vocab intact.  *Fund of information: difficult to assess, appears appropriate  *Cognitive functioning estimate: 0 - independent.           Labs:      Results for orders placed or performed during the hospital encounter of 08/10/20   XR Humerus Port Left G/E 2 Views     Status: None    Narrative    Exam: XR HUMERUS PORT LT     History:Female, age 50 years, left upper arm pain after fall.    Comparison:  None    Technique: Two views are submitted    Findings: Bones are normally mineralized. No evidence of acute or  subacute fracture.  No evidence of dislocation.  Calcific tendinosis  of the rotator cuff.           Impression    Impression:  No evidence of acute or subacute bony abnormality.     Calcifications of the shoulder suggest calcific tendinosis of the  rotator cuff. MRI would better characterize.    LUPE SANCHEZ MD   CBC with platelets differential     Status: Abnormal   Result Value Ref Range    WBC 13.7 (H) 4.0 - 11.0 10e9/L    RBC Count 5.09 3.8 - 5.2 10e12/L    Hemoglobin 17.2 (H) 11.7 - 15.7 g/dL    Hematocrit 49.2 (H) 35.0 - 47.0 %    MCV 97 78 - 100 fl    MCH 33.8 (H) 26.5 - 33.0 pg    MCHC 35.0 31.5 - 36.5 g/dL    RDW 12.6 10.0 - 15.0 %    Platelet Count 185 150 - 450 10e9/L    Diff Method Automated Method     % Neutrophils 82.4 %    % Lymphocytes 10.4 %    % Monocytes 6.3 %    % Eosinophils 0.3 %    % Basophils 0.2 %    % Immature Granulocytes 0.4 %    Nucleated RBCs 0 0 /100    Absolute Neutrophil 11.3 (H) 1.6 - 8.3 10e9/L    Absolute Lymphocytes 1.4 0.8 - 5.3 10e9/L    Absolute Monocytes 0.9 0.0 - 1.3 10e9/L    Absolute Eosinophils 0.0 0.0 - 0.7 10e9/L    Absolute Basophils 0.0 0.0 - 0.2 10e9/L    Abs Immature Granulocytes 0.1 0 - 0.4 10e9/L    Absolute Nucleated RBC 0.0    Comprehensive metabolic panel     Status: Abnormal   Result Value Ref Range    Sodium 129 (L) 133 - 144 mmol/L    Potassium 4.1 3.4 - 5.3 mmol/L    Chloride 95 94 - 109 mmol/L    Carbon Dioxide 25 20 - 32 mmol/L    Anion Gap 9 3 - 14 mmol/L    Glucose 107 (H) 70 - 99 mg/dL    Urea Nitrogen 9 7 - 30 mg/dL    Creatinine 0.54 0.52 - 1.04 mg/dL    GFR Estimate >90 >60  mL/min/[1.73_m2]    GFR Estimate If Black >90 >60 mL/min/[1.73_m2]    Calcium 9.1 8.5 - 10.1 mg/dL    Bilirubin Total 0.7 0.2 - 1.3 mg/dL    Albumin 3.2 (L) 3.4 - 5.0 g/dL    Protein Total 7.3 6.8 - 8.8 g/dL    Alkaline Phosphatase 131 40 - 150 U/L    ALT 57 (H) 0 - 50 U/L    AST 32 0 - 45 U/L   Alcohol ethyl     Status: None   Result Value Ref Range    Ethanol g/dL <0.01 0.01 g/dL   UA reflex to Microscopic     Status: Abnormal   Result Value Ref Range    Color Urine Yellow     Appearance Urine Slightly Cloudy     Glucose Urine Negative NEG^Negative mg/dL    Bilirubin Urine Negative NEG^Negative    Ketones Urine Negative NEG^Negative mg/dL    Specific Gravity Urine 1.017 1.003 - 1.035    Blood Urine Negative NEG^Negative    pH Urine 6.0 4.7 - 8.0 pH    Protein Albumin Urine 10 (A) NEG^Negative mg/dL    Urobilinogen mg/dL Normal 0.0 - 2.0 mg/dL    Nitrite Urine Negative NEG^Negative    Leukocyte Esterase Urine Moderate (A) NEG^Negative    Source Midstream Urine     RBC Urine 4 (H) 0 - 2 /HPF    WBC Urine 18 (H) 0 - 5 /HPF    Bacteria Urine Few (A) NEG^Negative /HPF    Squamous Epithelial /HPF Urine 19 (H) 0 - 1 /HPF    Mucous Urine Present (A) NEG^Negative /LPF   Urine Drugs of Abuse Screen Panel 13     Status: None   Result Value Ref Range    Cannabinoids (49-uyo-5-carboxy-9-THC) Not Detected NDET^Not Detected ng/mL    Phencyclidine (Phencyclidine) Not Detected NDET^Not Detected ng/mL    Cocaine (Benzoylecgonine) Not Detected NDET^Not Detected ng/mL    Methamphetamine (d-Methamphetamine) Not Detected NDET^Not Detected ng/mL    Opiates (Morphine) Not Detected NDET^Not Detected ng/mL    Amphetamine (d-Amphetamine) Not Detected NDET^Not Detected ng/mL    Benzodiazepines (Nordiazepam) Not Detected NDET^Not Detected ng/mL    Tricyclic Antidepressants (Desipramine) Not Detected NDET^Not Detected ng/mL    Methadone (Methadone) Not Detected NDET^Not Detected ng/mL    Barbiturates (Butalbital) Not Detected NDET^Not Detected  ng/mL    Oxycodone (Oxycodone) Not Detected NDET^Not Detected ng/mL    Propoxyphene (Norpropoxyphene) Not Detected NDET^Not Detected ng/mL    Buprenorphine (Buprenorphine) Not Detected NDET^Not Detected ng/mL   Asymptomatic COVID-19 Virus (Coronavirus) by PCR     Status: None    Specimen: Nasopharyngeal   Result Value Ref Range    COVID-19 Virus PCR to [facility] - Source Nasopharyngeal     COVID-19 Virus PCR to [facility] - Result       Test received-See reflex to [place] test SARS CoV2 (COVID-19) Virus RT-PCR   SARS-CoV-2 COVID-19 Virus (Coronavirus) RT-PCR Nasopharyngeal     Status: None    Specimen: Nasopharyngeal   Result Value Ref Range    SARS-CoV-2 Virus Specimen Source Nasopharyngeal     SARS-CoV-2 PCR Result NEGATIVE     SARS-CoV-2 PCR Comment       Testing was performed using the Xpert Xpress SARS-CoV-2 Assay on the Cepheid Gene-Xpert   Instrument Systems. Additional information about this Emergency Use Authorization (EUA)   assay can be found via the Lab Guide.     Basic metabolic panel     Status: Abnormal   Result Value Ref Range    Sodium 133 133 - 144 mmol/L    Potassium 3.1 (L) 3.4 - 5.3 mmol/L    Chloride 100 94 - 109 mmol/L    Carbon Dioxide 26 20 - 32 mmol/L    Anion Gap 7 3 - 14 mmol/L    Glucose 178 (H) 70 - 99 mg/dL    Urea Nitrogen 12 7 - 30 mg/dL    Creatinine 0.52 0.52 - 1.04 mg/dL    GFR Estimate >90 >60 mL/min/[1.73_m2]    GFR Estimate If Black >90 >60 mL/min/[1.73_m2]    Calcium 8.8 8.5 - 10.1 mg/dL   Potassium     Status: None   Result Value Ref Range    Potassium 4.5 3.4 - 5.3 mmol/L   UA reflex to Microscopic     Status: None   Result Value Ref Range    Color Urine Light Yellow     Appearance Urine Clear     Glucose Urine Negative NEG^Negative mg/dL    Bilirubin Urine Negative NEG^Negative    Ketones Urine Negative NEG^Negative mg/dL    Specific Gravity Urine 1.018 1.003 - 1.035    Blood Urine Negative NEG^Negative    pH Urine 6.5 4.7 - 8.0 pH    Protein Albumin Urine Negative  NEG^Negative mg/dL    Urobilinogen mg/dL Normal 0.0 - 2.0 mg/dL    Nitrite Urine Negative NEG^Negative    Leukocyte Esterase Urine Negative NEG^Negative    Source Midstream Urine    CBC with platelets differential     Status: Abnormal   Result Value Ref Range    WBC 7.2 4.0 - 11.0 10e9/L    RBC Count 4.75 3.8 - 5.2 10e12/L    Hemoglobin 16.0 (H) 11.7 - 15.7 g/dL    Hematocrit 46.5 35.0 - 47.0 %    MCV 98 78 - 100 fl    MCH 33.7 (H) 26.5 - 33.0 pg    MCHC 34.4 31.5 - 36.5 g/dL    RDW 12.1 10.0 - 15.0 %    Platelet Count 322 150 - 450 10e9/L    Diff Method Automated Method     % Neutrophils 63.1 %    % Lymphocytes 26.4 %    % Monocytes 7.1 %    % Eosinophils 2.2 %    % Basophils 0.6 %    % Immature Granulocytes 0.6 %    Nucleated RBCs 0 0 /100    Absolute Neutrophil 4.5 1.6 - 8.3 10e9/L    Absolute Lymphocytes 1.9 0.8 - 5.3 10e9/L    Absolute Monocytes 0.5 0.0 - 1.3 10e9/L    Absolute Eosinophils 0.2 0.0 - 0.7 10e9/L    Absolute Basophils 0.0 0.0 - 0.2 10e9/L    Abs Immature Granulocytes 0.0 0 - 0.4 10e9/L    Absolute Nucleated RBC 0.0    Comprehensive metabolic panel     Status: Abnormal   Result Value Ref Range    Sodium 135 133 - 144 mmol/L    Potassium 4.3 3.4 - 5.3 mmol/L    Chloride 104 94 - 109 mmol/L    Carbon Dioxide 27 20 - 32 mmol/L    Anion Gap 4 3 - 14 mmol/L    Glucose 111 (H) 70 - 99 mg/dL    Urea Nitrogen 13 7 - 30 mg/dL    Creatinine 0.59 0.52 - 1.04 mg/dL    GFR Estimate >90 >60 mL/min/[1.73_m2]    GFR Estimate If Black >90 >60 mL/min/[1.73_m2]    Calcium 9.0 8.5 - 10.1 mg/dL    Bilirubin Total 0.3 0.2 - 1.3 mg/dL    Albumin 3.0 (L) 3.4 - 5.0 g/dL    Protein Total 6.7 (L) 6.8 - 8.8 g/dL    Alkaline Phosphatase 103 40 - 150 U/L    ALT 61 (H) 0 - 50 U/L    AST 22 0 - 45 U/L

## 2020-09-05 NOTE — PLAN OF CARE
"Problem: Adult Behavioral Health Plan of Care  Goal: Patient-Specific Goal (Individualization)  Description:   Cooperate with treatment team recommendations including medications.   Will sleep 6-8 hrs nightly.  Attend at least 50% groups.     Pt appears to be sleeping comfortably with regular respirations. Pt slept approx. 7 hours this shift. No complaints at this time. Will continue to monitor.    0317 C/o \"up there\" L shoulder pain and anxiety. Prn tylenol administered per pt request. Prn clonidine administered. /79. Pulse 96. Pt did complain of dry mouth at this time; informed pt that this is why writer is not administering additional hydroxyzine as per request. MOM also provided per pt request stating, \"I have to keep up with it\". Pt then returned to bed.   0415 Appears to be sleeping.    Outcome: Improving    Problem: Thought Process Alteration  Goal: Optimal Thought Clarity  Description: Will have reality based conversations by discharge.     Outcome: No Change    Face to face end of shift report communicated to oncoming RN.     9/5/2020   "

## 2020-09-06 PROCEDURE — 99232 SBSQ HOSP IP/OBS MODERATE 35: CPT | Performed by: NURSE PRACTITIONER

## 2020-09-06 PROCEDURE — 12400000 ZZH R&B MH

## 2020-09-06 PROCEDURE — 25000132 ZZH RX MED GY IP 250 OP 250 PS 637: Mod: GY | Performed by: NURSE PRACTITIONER

## 2020-09-06 PROCEDURE — 12400011

## 2020-09-06 RX ORDER — NAPROXEN 500 MG/1
500 TABLET ORAL 2 TIMES DAILY WITH MEALS
Status: DISCONTINUED | OUTPATIENT
Start: 2020-09-06 | End: 2020-09-06

## 2020-09-06 RX ORDER — HYDROXYZINE HYDROCHLORIDE 25 MG/1
50 TABLET, FILM COATED ORAL 3 TIMES DAILY PRN
Status: DISCONTINUED | OUTPATIENT
Start: 2020-09-06 | End: 2020-09-07

## 2020-09-06 RX ORDER — LORAZEPAM 0.5 MG/1
0.5 TABLET ORAL 2 TIMES DAILY PRN
Status: DISCONTINUED | OUTPATIENT
Start: 2020-09-06 | End: 2020-09-16

## 2020-09-06 RX ORDER — NAPROXEN 500 MG/1
500 TABLET ORAL 2 TIMES DAILY PRN
Status: DISCONTINUED | OUTPATIENT
Start: 2020-09-06 | End: 2020-09-18 | Stop reason: HOSPADM

## 2020-09-06 RX ADMIN — MAGNESIUM HYDROXIDE 30 ML: 400 SUSPENSION ORAL at 21:47

## 2020-09-06 RX ADMIN — ALBUTEROL SULFATE 2 PUFF: 90 AEROSOL, METERED RESPIRATORY (INHALATION) at 03:05

## 2020-09-06 RX ADMIN — LORAZEPAM 1 MG: 1 TABLET ORAL at 02:11

## 2020-09-06 RX ADMIN — HYDROXYZINE HYDROCHLORIDE 50 MG: 10 TABLET ORAL at 08:39

## 2020-09-06 RX ADMIN — LORAZEPAM 1 MG: 1 TABLET ORAL at 07:49

## 2020-09-06 RX ADMIN — ARIPIPRAZOLE 5 MG: 5 TABLET ORAL at 21:22

## 2020-09-06 RX ADMIN — CLONIDINE HYDROCHLORIDE 0.1 MG: 0.1 TABLET ORAL at 07:48

## 2020-09-06 RX ADMIN — ACETAMINOPHEN 650 MG: 325 TABLET, FILM COATED ORAL at 03:04

## 2020-09-06 RX ADMIN — HYDROXYZINE HYDROCHLORIDE 50 MG: 25 TABLET, FILM COATED ORAL at 14:58

## 2020-09-06 RX ADMIN — NAPROXEN 500 MG: 500 TABLET ORAL at 14:53

## 2020-09-06 RX ADMIN — NAPROXEN 500 MG: 500 TABLET ORAL at 21:47

## 2020-09-06 RX ADMIN — ALBUTEROL SULFATE 2 PUFF: 90 AEROSOL, METERED RESPIRATORY (INHALATION) at 07:51

## 2020-09-06 RX ADMIN — QUETIAPINE FUMARATE 50 MG: 25 TABLET ORAL at 18:01

## 2020-09-06 RX ADMIN — ALBUTEROL SULFATE 2 PUFF: 90 AEROSOL, METERED RESPIRATORY (INHALATION) at 16:00

## 2020-09-06 RX ADMIN — ACETAMINOPHEN 650 MG: 325 TABLET, FILM COATED ORAL at 07:49

## 2020-09-06 RX ADMIN — CLONIDINE HYDROCHLORIDE 0.1 MG: 0.1 TABLET ORAL at 21:22

## 2020-09-06 ASSESSMENT — ACTIVITIES OF DAILY LIVING (ADL)
DRESS: INDEPENDENT;SCRUBS (BEHAVIORAL HEALTH)
LAUNDRY: UNABLE TO COMPLETE
ORAL_HYGIENE: INDEPENDENT
HYGIENE/GROOMING: INDEPENDENT

## 2020-09-06 ASSESSMENT — MIFFLIN-ST. JEOR: SCORE: 1448.23

## 2020-09-06 NOTE — PROGRESS NOTES
"[facility]  Psychiatric Progress Note      Impression:   Patient had court 9/4, full commit and Tineo supported.    Patient resting in her room, quickly sits up agreeable to talk.  Her presentation is quite different today, she has very poor eye contact and \"stutters\" when talking - although it appears she is trying to do this as she is very methodical about it, embellishing, and holding her hand up with movements with each word spoken in dramatic way.  Patient voices concern about Abilify now causing this.  Remind her that she has taken Abilify in the past with good result and no side effects, she states quite clearly \"I only took a couple doses back then\" - remind her she has only had 2 doses so far here.  She has also ramped up asking nursing for prn medications - she states Ativan helps her concentrate now and is asking for this more often, along with Tylenol and hydroxyzine - she has been on the unit almost a month and has not even asked for one prn until 3 days ago.  Inform patient that it is difficult to know where this is coming from, so will continue Abilify for now, reduce frequency of hydroxyzine and lower Ativan dose and frequency.  Will add Naproxen prn as she has complaints regarding her left shoulder.  Patient has also been observed drinking a lot of coffee.      Educated regarding medication indications, risks, benefits, side effects, contraindications and possible interactions. Verbally expressed understanding.        Diagnoses:     Schizoaffective Disorder, bipolar type    Attestation:  Patient has been seen and evaluated by me,  Poornima Tucker, APRN CNP          Interim History:   The patient's care was discussed with the treatment team and chart notes were reviewed.           Plan/Treatment Team     BEHAVIORAL TEAM DISCUSSION    Progress: Decline    Continued Stay Criteria/Rationale: Impaired thought process, committed and Tineo, discharge planning needed    Medical/Physical: " tendinosis of rotator cuff likely cause of pain.     Precautions: none    Falls precaution?: No  Behavioral Orders   Procedures     Code 1 - Restrict to Unit     Routine Programming     As clinically indicated     Status 15     Every 15 minutes.       Plan for this encounter/Med Changes/Labs:   Continue Abilify 5mg   Continue Clonidine  Decrease Ativan to 0.5mg bid prn  Decrease Hydroxyzine to 50mg tid prn  Adding Naproxen prn    Monitor changes in speech, attempts to embellish    Full commit and Tineo    Tineo med list  abilify, invega, latuda, zyprexa    Participants: Poornima Tucker NP,  Nursing,           Medications:     Current Facility-Administered Medications   Medication     acetaminophen (TYLENOL) tablet 650 mg     albuterol (PROAIR HFA/PROVENTIL HFA/VENTOLIN HFA) 108 (90 Base) MCG/ACT inhaler 1-2 puff     ARIPiprazole (ABILIFY) tablet 5 mg     cloNIDine (CATAPRES) tablet 0.1 mg     hydrOXYzine (ATARAX) tablet 50 mg     LORazepam (ATIVAN) tablet 0.5 mg     magnesium hydroxide (MILK OF MAGNESIA) suspension 30 mL     naproxen (NAPROSYN) tablet 500 mg     nicotine (NICORETTE) gum 2-4 mg     OLANZapine (zyPREXA) tablet 5-10 mg    Or     OLANZapine (zyPREXA) injection 10 mg     QUEtiapine (SEROquel) tablet 25-50 mg          10 point ROS denies uti symptoms though ua positive       Allergies:     Allergies   Allergen Reactions     Shrimp Anaphylaxis     Risperdal [Risperidone] Other (See Comments)     Elevated prolactin            Psychiatric Examination:   /74   Pulse 92   Temp 98.1  F (36.7  C) (Tympanic)   Resp 14   Ht 1.524 m (5')   Wt 89.4 kg (197 lb)   SpO2 94%   BMI 38.47 kg/m    Weight is 197 lbs 0 oz  Body mass index is 38.47 kg/m .    MSE/PSYCH  PSYCHIATRIC EXAM  /74   Pulse 92   Temp 98.1  F (36.7  C) (Tympanic)   Resp 14   Ht 1.524 m (5')   Wt 89.4 kg (197 lb)   SpO2 94%   BMI 38.47 kg/m    -Appearance/Behavior:  Appropriate  -Motor: intact  -Gait: intact  -Abnormal  involuntary movements: none  -Mood: cooperative, flat  -Affect: restritcted  -Speech: Stuttering today - appears to be trying to do this purposefully  -Thought process/associations: possibly declining  -Thought content:  Likely continued delusion and remains guarded   -Perceptual disturbances: denies si/hi,   -Suicidal/Homicidal Ideation: denies    -Judgment: poor  -Insight: poor  *Orientation: time, place and person.  *Memory: difficult to assess  *Attention: poor  *Language: fluent, no aphasias, able to repeat phrases and name objects. Vocab intact.  *Fund of information: difficult to assess, appears appropriate  *Cognitive functioning estimate: 0 - independent.           Labs:     Results for orders placed or performed during the hospital encounter of 08/10/20   XR Humerus Port Left G/E 2 Views     Status: None    Narrative    Exam: XR HUMERUS PORT LT     History:Female, age 50 years, left upper arm pain after fall.    Comparison:  None    Technique: Two views are submitted    Findings: Bones are normally mineralized. No evidence of acute or  subacute fracture.  No evidence of dislocation.  Calcific tendinosis  of the rotator cuff.           Impression    Impression:  No evidence of acute or subacute bony abnormality.     Calcifications of the shoulder suggest calcific tendinosis of the  rotator cuff. MRI would better characterize.    LUPE SANCHEZ MD   CBC with platelets differential     Status: Abnormal   Result Value Ref Range    WBC 13.7 (H) 4.0 - 11.0 10e9/L    RBC Count 5.09 3.8 - 5.2 10e12/L    Hemoglobin 17.2 (H) 11.7 - 15.7 g/dL    Hematocrit 49.2 (H) 35.0 - 47.0 %    MCV 97 78 - 100 fl    MCH 33.8 (H) 26.5 - 33.0 pg    MCHC 35.0 31.5 - 36.5 g/dL    RDW 12.6 10.0 - 15.0 %    Platelet Count 185 150 - 450 10e9/L    Diff Method Automated Method     % Neutrophils 82.4 %    % Lymphocytes 10.4 %    % Monocytes 6.3 %    % Eosinophils 0.3 %    % Basophils 0.2 %    % Immature Granulocytes 0.4 %    Nucleated  RBCs 0 0 /100    Absolute Neutrophil 11.3 (H) 1.6 - 8.3 10e9/L    Absolute Lymphocytes 1.4 0.8 - 5.3 10e9/L    Absolute Monocytes 0.9 0.0 - 1.3 10e9/L    Absolute Eosinophils 0.0 0.0 - 0.7 10e9/L    Absolute Basophils 0.0 0.0 - 0.2 10e9/L    Abs Immature Granulocytes 0.1 0 - 0.4 10e9/L    Absolute Nucleated RBC 0.0    Comprehensive metabolic panel     Status: Abnormal   Result Value Ref Range    Sodium 129 (L) 133 - 144 mmol/L    Potassium 4.1 3.4 - 5.3 mmol/L    Chloride 95 94 - 109 mmol/L    Carbon Dioxide 25 20 - 32 mmol/L    Anion Gap 9 3 - 14 mmol/L    Glucose 107 (H) 70 - 99 mg/dL    Urea Nitrogen 9 7 - 30 mg/dL    Creatinine 0.54 0.52 - 1.04 mg/dL    GFR Estimate >90 >60 mL/min/[1.73_m2]    GFR Estimate If Black >90 >60 mL/min/[1.73_m2]    Calcium 9.1 8.5 - 10.1 mg/dL    Bilirubin Total 0.7 0.2 - 1.3 mg/dL    Albumin 3.2 (L) 3.4 - 5.0 g/dL    Protein Total 7.3 6.8 - 8.8 g/dL    Alkaline Phosphatase 131 40 - 150 U/L    ALT 57 (H) 0 - 50 U/L    AST 32 0 - 45 U/L   Alcohol ethyl     Status: None   Result Value Ref Range    Ethanol g/dL <0.01 0.01 g/dL   UA reflex to Microscopic     Status: Abnormal   Result Value Ref Range    Color Urine Yellow     Appearance Urine Slightly Cloudy     Glucose Urine Negative NEG^Negative mg/dL    Bilirubin Urine Negative NEG^Negative    Ketones Urine Negative NEG^Negative mg/dL    Specific Gravity Urine 1.017 1.003 - 1.035    Blood Urine Negative NEG^Negative    pH Urine 6.0 4.7 - 8.0 pH    Protein Albumin Urine 10 (A) NEG^Negative mg/dL    Urobilinogen mg/dL Normal 0.0 - 2.0 mg/dL    Nitrite Urine Negative NEG^Negative    Leukocyte Esterase Urine Moderate (A) NEG^Negative    Source Midstream Urine     RBC Urine 4 (H) 0 - 2 /HPF    WBC Urine 18 (H) 0 - 5 /HPF    Bacteria Urine Few (A) NEG^Negative /HPF    Squamous Epithelial /HPF Urine 19 (H) 0 - 1 /HPF    Mucous Urine Present (A) NEG^Negative /LPF   Urine Drugs of Abuse Screen Panel 13     Status: None   Result Value Ref Range     Cannabinoids (82-ukc-8-carboxy-9-THC) Not Detected NDET^Not Detected ng/mL    Phencyclidine (Phencyclidine) Not Detected NDET^Not Detected ng/mL    Cocaine (Benzoylecgonine) Not Detected NDET^Not Detected ng/mL    Methamphetamine (d-Methamphetamine) Not Detected NDET^Not Detected ng/mL    Opiates (Morphine) Not Detected NDET^Not Detected ng/mL    Amphetamine (d-Amphetamine) Not Detected NDET^Not Detected ng/mL    Benzodiazepines (Nordiazepam) Not Detected NDET^Not Detected ng/mL    Tricyclic Antidepressants (Desipramine) Not Detected NDET^Not Detected ng/mL    Methadone (Methadone) Not Detected NDET^Not Detected ng/mL    Barbiturates (Butalbital) Not Detected NDET^Not Detected ng/mL    Oxycodone (Oxycodone) Not Detected NDET^Not Detected ng/mL    Propoxyphene (Norpropoxyphene) Not Detected NDET^Not Detected ng/mL    Buprenorphine (Buprenorphine) Not Detected NDET^Not Detected ng/mL   Asymptomatic COVID-19 Virus (Coronavirus) by PCR     Status: None    Specimen: Nasopharyngeal   Result Value Ref Range    COVID-19 Virus PCR to [facility] - Source Nasopharyngeal     COVID-19 Virus PCR to [facility] - Result       Test received-See reflex to [place] test SARS CoV2 (COVID-19) Virus RT-PCR   SARS-CoV-2 COVID-19 Virus (Coronavirus) RT-PCR Nasopharyngeal     Status: None    Specimen: Nasopharyngeal   Result Value Ref Range    SARS-CoV-2 Virus Specimen Source Nasopharyngeal     SARS-CoV-2 PCR Result NEGATIVE     SARS-CoV-2 PCR Comment       Testing was performed using the Xpert Xpress SARS-CoV-2 Assay on the Cepheid Gene-Xpert   Instrument Systems. Additional information about this Emergency Use Authorization (EUA)   assay can be found via the Lab Guide.     Basic metabolic panel     Status: Abnormal   Result Value Ref Range    Sodium 133 133 - 144 mmol/L    Potassium 3.1 (L) 3.4 - 5.3 mmol/L    Chloride 100 94 - 109 mmol/L    Carbon Dioxide 26 20 - 32 mmol/L    Anion Gap 7 3 - 14 mmol/L    Glucose 178 (H) 70 - 99 mg/dL     Urea Nitrogen 12 7 - 30 mg/dL    Creatinine 0.52 0.52 - 1.04 mg/dL    GFR Estimate >90 >60 mL/min/[1.73_m2]    GFR Estimate If Black >90 >60 mL/min/[1.73_m2]    Calcium 8.8 8.5 - 10.1 mg/dL   Potassium     Status: None   Result Value Ref Range    Potassium 4.5 3.4 - 5.3 mmol/L   UA reflex to Microscopic     Status: None   Result Value Ref Range    Color Urine Light Yellow     Appearance Urine Clear     Glucose Urine Negative NEG^Negative mg/dL    Bilirubin Urine Negative NEG^Negative    Ketones Urine Negative NEG^Negative mg/dL    Specific Gravity Urine 1.018 1.003 - 1.035    Blood Urine Negative NEG^Negative    pH Urine 6.5 4.7 - 8.0 pH    Protein Albumin Urine Negative NEG^Negative mg/dL    Urobilinogen mg/dL Normal 0.0 - 2.0 mg/dL    Nitrite Urine Negative NEG^Negative    Leukocyte Esterase Urine Negative NEG^Negative    Source Midstream Urine    CBC with platelets differential     Status: Abnormal   Result Value Ref Range    WBC 7.2 4.0 - 11.0 10e9/L    RBC Count 4.75 3.8 - 5.2 10e12/L    Hemoglobin 16.0 (H) 11.7 - 15.7 g/dL    Hematocrit 46.5 35.0 - 47.0 %    MCV 98 78 - 100 fl    MCH 33.7 (H) 26.5 - 33.0 pg    MCHC 34.4 31.5 - 36.5 g/dL    RDW 12.1 10.0 - 15.0 %    Platelet Count 322 150 - 450 10e9/L    Diff Method Automated Method     % Neutrophils 63.1 %    % Lymphocytes 26.4 %    % Monocytes 7.1 %    % Eosinophils 2.2 %    % Basophils 0.6 %    % Immature Granulocytes 0.6 %    Nucleated RBCs 0 0 /100    Absolute Neutrophil 4.5 1.6 - 8.3 10e9/L    Absolute Lymphocytes 1.9 0.8 - 5.3 10e9/L    Absolute Monocytes 0.5 0.0 - 1.3 10e9/L    Absolute Eosinophils 0.2 0.0 - 0.7 10e9/L    Absolute Basophils 0.0 0.0 - 0.2 10e9/L    Abs Immature Granulocytes 0.0 0 - 0.4 10e9/L    Absolute Nucleated RBC 0.0    Comprehensive metabolic panel     Status: Abnormal   Result Value Ref Range    Sodium 135 133 - 144 mmol/L    Potassium 4.3 3.4 - 5.3 mmol/L    Chloride 104 94 - 109 mmol/L    Carbon Dioxide 27 20 - 32 mmol/L     Anion Gap 4 3 - 14 mmol/L    Glucose 111 (H) 70 - 99 mg/dL    Urea Nitrogen 13 7 - 30 mg/dL    Creatinine 0.59 0.52 - 1.04 mg/dL    GFR Estimate >90 >60 mL/min/[1.73_m2]    GFR Estimate If Black >90 >60 mL/min/[1.73_m2]    Calcium 9.0 8.5 - 10.1 mg/dL    Bilirubin Total 0.3 0.2 - 1.3 mg/dL    Albumin 3.0 (L) 3.4 - 5.0 g/dL    Protein Total 6.7 (L) 6.8 - 8.8 g/dL    Alkaline Phosphatase 103 40 - 150 U/L    ALT 61 (H) 0 - 50 U/L    AST 22 0 - 45 U/L

## 2020-09-06 NOTE — PLAN OF CARE
"Face to face end of shift report communicated to oncoming shift. .     Shamika Zurita RN  9/6/2020  9:52 PM    Patient cooperative with treatment team recommendations and medications.  At nurses window frequently requesting something.  Patient reported to have slept minimally through the night.  Patient attends groups this shift.   Patient denies SI, HI, AH and VH    Patient complaints of seeing a type of \"visual disturbance, it's not an hallucination it's out of my eye when I turn it certain ways it's there, and then it's not there\"   Patient also reports to stuttering when talking with this writer.  When observed talking with peers no stuttering is heard.    Patient has a phone call with her daughter is upset after the phone call, tears down face. Patient requests a PRN \"to put me down for the night\"  PRN:  Seroquel 50 mg @ 1801 for agitation/anxiety r/t the phone call.   PRN:  Milk of magnesia @ 2150 for relief of constipation  PRN:  Naproxyn 500 mg @ 2150 8/10 pain    Face to face start of shift report received from Marleny PRITCHETT RN  Patient in [facility] at this time, will continue to monitor.     Problem: Adult Behavioral Health Plan of Care  Goal: Patient-Specific Goal (Individualization)  Description:   Cooperate with treatment team recommendations including medications.   Will sleep 6-8 hrs nightly.  Attend at least 50% groups.   9/6/2020 1653 by Shamika Zurita RN  Outcome: No Change  Note:   Problem: Thought Process Alteration  Goal: Optimal Thought Clarity  Description: Will have reality based conversations by discharge.   Outcome: No Change     "

## 2020-09-06 NOTE — PLAN OF CARE
"Face to face report received from Becca CHRISTIANSEN RN. Pt. Observed.     Problem: Adult Behavioral Health Plan of Care  Goal: Patient-Specific Goal (Individualization)  Description:   Cooperate with treatment team recommendations including medications.   Will sleep 6-8 hrs nightly.  Attend at least 50% groups.       Outcome: No Change  Pt. Denies HI, SI, Depression, and hallucinations. Pt. reporting unrestful sleep. Pt. Continues to report mild left shoulder pain.  Pt. Administered PRN Tylenol 650 mg po @ 0749 with somewhat effective results. Pt. tearful reporting  I feel different today. I feel angry and full of anxiety.  Pt. stuttering when speaking to this writer but speaking clearly when out in lounge speaking to peers. Pt. requesting all PRNs available. Pt administered PRN Ativan 1 mg po @ 0749 for increasing anxiety.Pt. Offered 30 mg of Atarax but refused, \"I need the 50 mg. I feel shaky.\"Pt. administered PRN Atarax 50 mg po for increasing anxiety @ 0839. Pt. In agreement to update staff to thoughts feelings of wanting to harm self or others. Pt. Cooperative with nursing assessment and medications. Pt. Eating WDL. Pt. Out to lounge this a.m. working on a puzzle. Pt. Irritable this a.m. drinking multiple cups of coffee. Pt. educated on effects of increased coffee intake.     1453 Pt. Administered PRN Naproxen 500 mg po for increasing left shoulder pain.     1458 Pt. Administered PRN Atarax 50 mg po per pt. Request for increasing anxiety.      Face to face end of shift report to be communicated to oncoming RN.     Marleny Singh RN  9/6/2020  "

## 2020-09-07 PROCEDURE — 99233 SBSQ HOSP IP/OBS HIGH 50: CPT | Performed by: NURSE PRACTITIONER

## 2020-09-07 PROCEDURE — 12400011

## 2020-09-07 PROCEDURE — 25000132 ZZH RX MED GY IP 250 OP 250 PS 637: Mod: GY | Performed by: NURSE PRACTITIONER

## 2020-09-07 PROCEDURE — 12400000 ZZH R&B MH

## 2020-09-07 RX ORDER — NICOTINE 21 MG/24HR
1 PATCH, TRANSDERMAL 24 HOURS TRANSDERMAL DAILY
Status: DISCONTINUED | OUTPATIENT
Start: 2020-09-07 | End: 2020-09-18 | Stop reason: HOSPADM

## 2020-09-07 RX ORDER — SALIVA STIMULANT COMB. NO.3
2 SPRAY, NON-AEROSOL (ML) MUCOUS MEMBRANE 4 TIMES DAILY PRN
Status: DISCONTINUED | OUTPATIENT
Start: 2020-09-07 | End: 2020-09-18 | Stop reason: HOSPADM

## 2020-09-07 RX ORDER — PROPRANOLOL HYDROCHLORIDE 10 MG/1
10 TABLET ORAL 3 TIMES DAILY
Status: DISCONTINUED | OUTPATIENT
Start: 2020-09-07 | End: 2020-09-12

## 2020-09-07 RX ADMIN — ARIPIPRAZOLE 5 MG: 5 TABLET ORAL at 20:35

## 2020-09-07 RX ADMIN — ALBUTEROL SULFATE 2 PUFF: 90 AEROSOL, METERED RESPIRATORY (INHALATION) at 10:53

## 2020-09-07 RX ADMIN — NICOTINE 1 PATCH: 14 PATCH, EXTENDED RELEASE TRANSDERMAL at 13:08

## 2020-09-07 RX ADMIN — NAPROXEN 500 MG: 500 TABLET ORAL at 20:34

## 2020-09-07 RX ADMIN — NICOTINE POLACRILEX 4 MG: 2 GUM, CHEWING ORAL at 11:38

## 2020-09-07 RX ADMIN — QUETIAPINE FUMARATE 50 MG: 25 TABLET ORAL at 13:03

## 2020-09-07 RX ADMIN — PROPRANOLOL HYDROCHLORIDE 10 MG: 10 TABLET ORAL at 20:35

## 2020-09-07 RX ADMIN — ACETAMINOPHEN 650 MG: 325 TABLET, FILM COATED ORAL at 16:34

## 2020-09-07 RX ADMIN — QUETIAPINE FUMARATE 50 MG: 25 TABLET ORAL at 20:35

## 2020-09-07 RX ADMIN — HYDROXYZINE HYDROCHLORIDE 50 MG: 25 TABLET, FILM COATED ORAL at 03:49

## 2020-09-07 RX ADMIN — NAPROXEN 500 MG: 500 TABLET ORAL at 07:53

## 2020-09-07 RX ADMIN — LORAZEPAM 0.5 MG: 0.5 TABLET ORAL at 10:53

## 2020-09-07 RX ADMIN — HYDROXYZINE HYDROCHLORIDE 50 MG: 25 TABLET, FILM COATED ORAL at 08:03

## 2020-09-07 RX ADMIN — CLONIDINE HYDROCHLORIDE 0.1 MG: 0.1 TABLET ORAL at 08:03

## 2020-09-07 RX ADMIN — PROPRANOLOL HYDROCHLORIDE 10 MG: 10 TABLET ORAL at 13:02

## 2020-09-07 RX ADMIN — QUETIAPINE FUMARATE 25 MG: 25 TABLET ORAL at 02:41

## 2020-09-07 RX ADMIN — ALBUTEROL SULFATE 2 PUFF: 90 AEROSOL, METERED RESPIRATORY (INHALATION) at 18:15

## 2020-09-07 RX ADMIN — LORAZEPAM 0.5 MG: 0.5 TABLET ORAL at 16:48

## 2020-09-07 NOTE — PLAN OF CARE
Face to face shift report received from JESSICA Carlson RN. Rounding completed, pt observed.   Problem: Adult Behavioral Health Plan of Care  Goal: Plan of Care Review  Outcome: No Change     Problem: Adult Behavioral Health Plan of Care  Goal: Patient-Specific Goal (Individualization)  Description:   Cooperate with treatment team recommendations including medications.   Will sleep 6-8 hrs nightly.  Attend at least 50% groups.       9/7/2020 0757 by Fozia Snowden RN  Outcome: No Change  Note: Shift Summery:    She denies suicidal or homicidal ideations.  Pt clonidine and Hydroxyzine was discontinued. Pt started on Propranolol 10 mg TID for movement discomfort. Pt was provided educational print out and adhered to medication teachings. No side/adverse effect noted or reported by pt.        0803, Pt utilized prn Atarax 50 mg for anxiety.  0753, Pt utilized prn Naproxen 500 mg for pain in left shoulder. Pt reported prn was effective. Pt reported feeling anxous  Pt was up early this morning, ate breakfast and has been pleasant. Nursing will continue to monitor pt for any changes in behavior.    Face to face report will be communicated to oncoming RN.    Fozia Snowden RN  9/7/2020  7:58 AM      Problem: Adult Behavioral Health Plan of Care  Goal: Adheres to Safety Considerations for Self and Others  Outcome: No Change     Problem: Adult Behavioral Health Plan of Care  Goal: Optimized Coping Skills in Response to Life Stressors  Outcome: No Change     Problem: Adult Behavioral Health Plan of Care  Goal: Develops/Participates in Therapeutic [place] to Support Successful Transition  Outcome: No Change     Problem: Thought Process Alteration  Goal: Optimal Thought Clarity  Description: Will have reality based conversations by discharge.   Outcome: No Change

## 2020-09-07 NOTE — PLAN OF CARE
"Face to face end of shift report communicated from Fozia LUCIANO RN.     Pippa Landon RN  9/7/2020  4:11 PM       Problem: Adult Behavioral Health Plan of Care  Goal: Patient-Specific Goal (Individualization)  Description:   Cooperate with treatment team recommendations including medications.   Will sleep 6-8 hrs nightly.  Attend at least 50% groups.     Pt napped at the start of the shift. Pt was at the nursing office several times during the shift asking for prns for various things.     Pt asked for her ativan at 1648, pt reported her anxiety at 8/10. Before giving her ativan, nursing assessed her symptoms, when asked about anxiety pt stated that she did not have anxiety stating \"no, I'm Nep[place], we don't have anxiety\". When asked why she is requesting ativan, pt states that she thinks she is having med side effects and that she had just felt light headed and that caused anxiety.   Pt asked for her naproxen early this shift, pt told that it was a bit early to have her other naproxen especially before dinner on an empty stomach. Pt agreed to take tylenol 650mg at 1634 and wait for bedtime to take the naproxen.    Pt asked for a med list and the \"ampules for each\". Pt requested albuterol inhaler at 1815. Pt asked about her shortness of breath, pt states she works in a lot of dust and always has trouble breathing.     Pt requested to cut her own hair. 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 Pt was told that we would leave a message with the provider and they can discuss it in treatment team.      Pt requested naproxen 500mg at 2034. Pt rated her pain at 9/10 at this time. Pt states that she chipped her tooth while eating popcorn and needs to see a dentist. Pt given seroquel 50mg at 2035 for sleep.     9/7/2020 1611 by Pippa Landon, RN  Outcome: Improving  Note:        Problem: Thought Process Alteration  Goal: Optimal Thought Clarity  Description: Will have reality " based conversations by discharge.   9/7/2020 1611 by Pippa Landon, RN  Outcome: Improving    Face to face end of shift report communicated to oncoming RN.     Pippa SOSA. JULIA Landon  9/7/2020  4:12 PM

## 2020-09-07 NOTE — PLAN OF CARE
"  Problem: Adult Behavioral Health Plan of Care  Goal: Patient-Specific Goal (Individualization)  Description:   Cooperate with treatment team recommendations including medications.   Will sleep 6-8 hrs nightly.  Attend at least 50% groups.       9/6/2020 2340 by Robyn Davis RN  Outcome: No Change   Face to face shift report received from Shamika PRECIADO RN. Rounding completed, pt observed in room appears to be sleeping, in no apparent distress, respirations are even and non labored.  15 min/prn safety checks continue.       0200-Pt came out of room stating she was having a hard time sleeping.  Requested Jello and something for sleep.  PRN medication given.  While sitting at the table she became teary and sad stating that she wanted to go home and she didn't understand why she was here.  She stated she does not believe she is diagnosed correctly stating she is not a schizophrenic and made complaints about the abilify side effects.  She stated it made her voice funny.  While speaking with her for a length of time she would speak very clear then start fading in her voice, her hand would be pointing upwards with every word at times.  She would repeat words initially, but once the conversation was more in depth she could speak clearly and without difficulty.  Writer sat with pt for approx. 1 hour talking about her feelings.  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  She also told this writer that the reason she was here is because the police brought her here and took her car, writer asked if she was brought here by one of her daughters, she stated \"no the police brought me\". Denies hearing voices or visual hallucinations.   0300-pt went back to bed to try to sleep.       0350-pt requested PRN Atarax, PRN medication given per MD order.    Pt slept approx. 2.5-3 hours, Face to face report " will be communicated to oncoming RN.    Robyn Davis RN  9/7/2020  5:48 AM

## 2020-09-07 NOTE — PROGRESS NOTES
"[facility]  Psychiatric Progress Note      Impression:   Patient had court 9/4, full commit and Tineo supported.    Patient is up at nursing window getting some medications.  She agrees to talk in her room.  She begins to read from a list she has written with \"side effects\" that she is now experiencing - including the stuttering, dry mouth, poor sleep, emotional, anxiety, inner shakiness, etc.  Interrupted patient to inform her that I have noticed a great change in her interest in medications since court and also that at times she is stuttering and at other times she is not and outright admitted my thoughts of her elaborating on all of this.  Patient begins to cry and states \"that is right and I admit I am scared because I have never been committed before\".  So we have a good conversation about what it entails and that it is not a punishment, rather we all work to get patients back on track to be able to live a safe and more independent life.  Patient is visibly relieved and is no longer stuttering, not one bit, for the rest of our discussion.  She believes that since she has been taking more hydroxyzine, this is causing dry mouth and might be increasing anxiety - states the same for clonidine - we agree to discontinue both of these.  Will continue Abilify 5mg rather than switch at this point.  We agree to add propanolol for movement disruptions and she finds low dose quetiapine effective for sleep.  She denies suicidal or homicidal thoughts.    Educated regarding medication indications, risks, benefits, side effects, contraindications and possible interactions. Verbally expressed understanding.        Diagnoses:     Schizoaffective Disorder, bipolar type    Attestation:  Patient has been seen and evaluated by me,  MADI Albarado CNP          Interim History:   The patient's care was discussed with the treatment team and chart notes were reviewed.           Plan/Treatment Team "     BEHAVIORAL TEAM DISCUSSION    Progress: very slow    Continued Stay Criteria/Rationale: Impaired thought process, committed and Tineo, discharge planning needed    Medical/Physical: tendinosis of rotator cuff likely cause of pain.     Precautions: none    Falls precaution?: No  Behavioral Orders   Procedures     Code 1 - Restrict to Unit     Routine Programming     As clinically indicated     Status 15     Every 15 minutes.       Plan for this encounter/Med Changes/Labs:   Continue Abilify 5mg   Discontinue Clonidine and hydroxyzine due to dry mouth and possibly adverse effects of both  Continue Ativan to 0.5mg bid prn for anxiety  Adding Naproxen prn  Start propanolol for movement discomforts  Utilize low dose Seroquel prn for anxiety/sleep    Monitor changes in speech, attempts to embellish side effects    Encourage decreased caffeine intake and increase other fluid intake    Full commit and Tineo    Tineo med list  abilify, invega, latuda, zyprexa    Participants: Poornima Tucker NP,  Nursing,           Medications:     Current Facility-Administered Medications   Medication     acetaminophen (TYLENOL) tablet 650 mg     albuterol (PROAIR HFA/PROVENTIL HFA/VENTOLIN HFA) 108 (90 Base) MCG/ACT inhaler 1-2 puff     ARIPiprazole (ABILIFY) tablet 5 mg     artificial saliva (BIOTENE MT) solution 2 spray     LORazepam (ATIVAN) tablet 0.5 mg     magnesium hydroxide (MILK OF MAGNESIA) suspension 30 mL     naproxen (NAPROSYN) tablet 500 mg     nicotine (NICODERM CQ) 14 MG/24HR 24 hr patch 1 patch     nicotine (NICORETTE) gum 2-4 mg     nicotine Patch in Place     OLANZapine (zyPREXA) tablet 5-10 mg    Or     OLANZapine (zyPREXA) injection 10 mg     propranolol (INDERAL) tablet 10 mg     QUEtiapine (SEROquel) tablet 25-50 mg          10 point ROS denies uti symptoms though ua positive       Allergies:     Allergies   Allergen Reactions     Shrimp Anaphylaxis     Risperdal [Risperidone] Other (See Comments)     Elevated  prolactin            Psychiatric Examination:   /79   Pulse 91   Temp 96.9  F (36.1  C) (Tympanic)   Resp 16   Ht 1.524 m (5')   Wt 91.2 kg (201 lb)   SpO2 93%   BMI 39.26 kg/m    Weight is 201 lbs 0 oz  Body mass index is 39.26 kg/m .    MSE/PSYCH  PSYCHIATRIC EXAM  /79   Pulse 91   Temp 96.9  F (36.1  C) (Tympanic)   Resp 16   Ht 1.524 m (5')   Wt 91.2 kg (201 lb)   SpO2 93%   BMI 39.26 kg/m    -Appearance/Behavior:  Appropriate  -Motor: intact  -Gait: intact  -Abnormal involuntary movements: none  -Mood: cooperative, flat, becomes emotional today  -Affect: restritcted  -Speech: Less stuttering today - appears to be doing this purposefully  -Thought process/associations: possibly declining  -Thought content:  Likely continued delusion and remains guarded   -Perceptual disturbances: denies si/hi,   -Suicidal/Homicidal Ideation: denies    -Judgment: poor  -Insight: poor  *Orientation: time, place and person.  *Memory: difficult to assess  *Attention: poor  *Language: fluent, no aphasias, able to repeat phrases and name objects. Vocab intact.  *Fund of information: difficult to assess, appears appropriate  *Cognitive functioning estimate: 0 - independent.           Labs:     Results for orders placed or performed during the hospital encounter of 08/10/20   XR Humerus Port Left G/E 2 Views     Status: None    Narrative    Exam: XR HUMERUS PORT LT     History:Female, age 50 years, left upper arm pain after fall.    Comparison:  None    Technique: Two views are submitted    Findings: Bones are normally mineralized. No evidence of acute or  subacute fracture.  No evidence of dislocation.  Calcific tendinosis  of the rotator cuff.           Impression    Impression:  No evidence of acute or subacute bony abnormality.     Calcifications of the shoulder suggest calcific tendinosis of the  rotator cuff. MRI would better characterize.    LUPE SANCHEZ MD   CBC with platelets differential      Status: Abnormal   Result Value Ref Range    WBC 13.7 (H) 4.0 - 11.0 10e9/L    RBC Count 5.09 3.8 - 5.2 10e12/L    Hemoglobin 17.2 (H) 11.7 - 15.7 g/dL    Hematocrit 49.2 (H) 35.0 - 47.0 %    MCV 97 78 - 100 fl    MCH 33.8 (H) 26.5 - 33.0 pg    MCHC 35.0 31.5 - 36.5 g/dL    RDW 12.6 10.0 - 15.0 %    Platelet Count 185 150 - 450 10e9/L    Diff Method Automated Method     % Neutrophils 82.4 %    % Lymphocytes 10.4 %    % Monocytes 6.3 %    % Eosinophils 0.3 %    % Basophils 0.2 %    % Immature Granulocytes 0.4 %    Nucleated RBCs 0 0 /100    Absolute Neutrophil 11.3 (H) 1.6 - 8.3 10e9/L    Absolute Lymphocytes 1.4 0.8 - 5.3 10e9/L    Absolute Monocytes 0.9 0.0 - 1.3 10e9/L    Absolute Eosinophils 0.0 0.0 - 0.7 10e9/L    Absolute Basophils 0.0 0.0 - 0.2 10e9/L    Abs Immature Granulocytes 0.1 0 - 0.4 10e9/L    Absolute Nucleated RBC 0.0    Comprehensive metabolic panel     Status: Abnormal   Result Value Ref Range    Sodium 129 (L) 133 - 144 mmol/L    Potassium 4.1 3.4 - 5.3 mmol/L    Chloride 95 94 - 109 mmol/L    Carbon Dioxide 25 20 - 32 mmol/L    Anion Gap 9 3 - 14 mmol/L    Glucose 107 (H) 70 - 99 mg/dL    Urea Nitrogen 9 7 - 30 mg/dL    Creatinine 0.54 0.52 - 1.04 mg/dL    GFR Estimate >90 >60 mL/min/[1.73_m2]    GFR Estimate If Black >90 >60 mL/min/[1.73_m2]    Calcium 9.1 8.5 - 10.1 mg/dL    Bilirubin Total 0.7 0.2 - 1.3 mg/dL    Albumin 3.2 (L) 3.4 - 5.0 g/dL    Protein Total 7.3 6.8 - 8.8 g/dL    Alkaline Phosphatase 131 40 - 150 U/L    ALT 57 (H) 0 - 50 U/L    AST 32 0 - 45 U/L   Alcohol ethyl     Status: None   Result Value Ref Range    Ethanol g/dL <0.01 0.01 g/dL   UA reflex to Microscopic     Status: Abnormal   Result Value Ref Range    Color Urine Yellow     Appearance Urine Slightly Cloudy     Glucose Urine Negative NEG^Negative mg/dL    Bilirubin Urine Negative NEG^Negative    Ketones Urine Negative NEG^Negative mg/dL    Specific Gravity Urine 1.017 1.003 - 1.035    Blood Urine Negative NEG^Negative     pH Urine 6.0 4.7 - 8.0 pH    Protein Albumin Urine 10 (A) NEG^Negative mg/dL    Urobilinogen mg/dL Normal 0.0 - 2.0 mg/dL    Nitrite Urine Negative NEG^Negative    Leukocyte Esterase Urine Moderate (A) NEG^Negative    Source Midstream Urine     RBC Urine 4 (H) 0 - 2 /HPF    WBC Urine 18 (H) 0 - 5 /HPF    Bacteria Urine Few (A) NEG^Negative /HPF    Squamous Epithelial /HPF Urine 19 (H) 0 - 1 /HPF    Mucous Urine Present (A) NEG^Negative /LPF   Urine Drugs of Abuse Screen Panel 13     Status: None   Result Value Ref Range    Cannabinoids (33-wnp-3-carboxy-9-THC) Not Detected NDET^Not Detected ng/mL    Phencyclidine (Phencyclidine) Not Detected NDET^Not Detected ng/mL    Cocaine (Benzoylecgonine) Not Detected NDET^Not Detected ng/mL    Methamphetamine (d-Methamphetamine) Not Detected NDET^Not Detected ng/mL    Opiates (Morphine) Not Detected NDET^Not Detected ng/mL    Amphetamine (d-Amphetamine) Not Detected NDET^Not Detected ng/mL    Benzodiazepines (Nordiazepam) Not Detected NDET^Not Detected ng/mL    Tricyclic Antidepressants (Desipramine) Not Detected NDET^Not Detected ng/mL    Methadone (Methadone) Not Detected NDET^Not Detected ng/mL    Barbiturates (Butalbital) Not Detected NDET^Not Detected ng/mL    Oxycodone (Oxycodone) Not Detected NDET^Not Detected ng/mL    Propoxyphene (Norpropoxyphene) Not Detected NDET^Not Detected ng/mL    Buprenorphine (Buprenorphine) Not Detected NDET^Not Detected ng/mL   Asymptomatic COVID-19 Virus (Coronavirus) by PCR     Status: None    Specimen: Nasopharyngeal   Result Value Ref Range    COVID-19 Virus PCR to [facility] - Source Nasopharyngeal     COVID-19 Virus PCR to [facility] - Result       Test received-See reflex to [place] test SARS CoV2 (COVID-19) Virus RT-PCR   SARS-CoV-2 COVID-19 Virus (Coronavirus) RT-PCR Nasopharyngeal     Status: None    Specimen: Nasopharyngeal   Result Value Ref Range    SARS-CoV-2 Virus Specimen Source Nasopharyngeal     SARS-CoV-2 PCR Result  NEGATIVE     SARS-CoV-2 PCR Comment       Testing was performed using the Xpert Xpress SARS-CoV-2 Assay on the Cepheid Gene-Xpert   Instrument Systems. Additional information about this Emergency Use Authorization (EUA)   assay can be found via the Lab Guide.     Basic metabolic panel     Status: Abnormal   Result Value Ref Range    Sodium 133 133 - 144 mmol/L    Potassium 3.1 (L) 3.4 - 5.3 mmol/L    Chloride 100 94 - 109 mmol/L    Carbon Dioxide 26 20 - 32 mmol/L    Anion Gap 7 3 - 14 mmol/L    Glucose 178 (H) 70 - 99 mg/dL    Urea Nitrogen 12 7 - 30 mg/dL    Creatinine 0.52 0.52 - 1.04 mg/dL    GFR Estimate >90 >60 mL/min/[1.73_m2]    GFR Estimate If Black >90 >60 mL/min/[1.73_m2]    Calcium 8.8 8.5 - 10.1 mg/dL   Potassium     Status: None   Result Value Ref Range    Potassium 4.5 3.4 - 5.3 mmol/L   UA reflex to Microscopic     Status: None   Result Value Ref Range    Color Urine Light Yellow     Appearance Urine Clear     Glucose Urine Negative NEG^Negative mg/dL    Bilirubin Urine Negative NEG^Negative    Ketones Urine Negative NEG^Negative mg/dL    Specific Gravity Urine 1.018 1.003 - 1.035    Blood Urine Negative NEG^Negative    pH Urine 6.5 4.7 - 8.0 pH    Protein Albumin Urine Negative NEG^Negative mg/dL    Urobilinogen mg/dL Normal 0.0 - 2.0 mg/dL    Nitrite Urine Negative NEG^Negative    Leukocyte Esterase Urine Negative NEG^Negative    Source Midstream Urine    CBC with platelets differential     Status: Abnormal   Result Value Ref Range    WBC 7.2 4.0 - 11.0 10e9/L    RBC Count 4.75 3.8 - 5.2 10e12/L    Hemoglobin 16.0 (H) 11.7 - 15.7 g/dL    Hematocrit 46.5 35.0 - 47.0 %    MCV 98 78 - 100 fl    MCH 33.7 (H) 26.5 - 33.0 pg    MCHC 34.4 31.5 - 36.5 g/dL    RDW 12.1 10.0 - 15.0 %    Platelet Count 322 150 - 450 10e9/L    Diff Method Automated Method     % Neutrophils 63.1 %    % Lymphocytes 26.4 %    % Monocytes 7.1 %    % Eosinophils 2.2 %    % Basophils 0.6 %    % Immature Granulocytes 0.6 %     Nucleated RBCs 0 0 /100    Absolute Neutrophil 4.5 1.6 - 8.3 10e9/L    Absolute Lymphocytes 1.9 0.8 - 5.3 10e9/L    Absolute Monocytes 0.5 0.0 - 1.3 10e9/L    Absolute Eosinophils 0.2 0.0 - 0.7 10e9/L    Absolute Basophils 0.0 0.0 - 0.2 10e9/L    Abs Immature Granulocytes 0.0 0 - 0.4 10e9/L    Absolute Nucleated RBC 0.0    Comprehensive metabolic panel     Status: Abnormal   Result Value Ref Range    Sodium 135 133 - 144 mmol/L    Potassium 4.3 3.4 - 5.3 mmol/L    Chloride 104 94 - 109 mmol/L    Carbon Dioxide 27 20 - 32 mmol/L    Anion Gap 4 3 - 14 mmol/L    Glucose 111 (H) 70 - 99 mg/dL    Urea Nitrogen 13 7 - 30 mg/dL    Creatinine 0.59 0.52 - 1.04 mg/dL    GFR Estimate >90 >60 mL/min/[1.73_m2]    GFR Estimate If Black >90 >60 mL/min/[1.73_m2]    Calcium 9.0 8.5 - 10.1 mg/dL    Bilirubin Total 0.3 0.2 - 1.3 mg/dL    Albumin 3.0 (L) 3.4 - 5.0 g/dL    Protein Total 6.7 (L) 6.8 - 8.8 g/dL    Alkaline Phosphatase 103 40 - 150 U/L    ALT 61 (H) 0 - 50 U/L    AST 22 0 - 45 U/L

## 2020-09-08 PROCEDURE — 99232 SBSQ HOSP IP/OBS MODERATE 35: CPT | Performed by: NURSE PRACTITIONER

## 2020-09-08 PROCEDURE — 25000132 ZZH RX MED GY IP 250 OP 250 PS 637: Mod: GY | Performed by: NURSE PRACTITIONER

## 2020-09-08 PROCEDURE — 12400000 ZZH R&B MH

## 2020-09-08 PROCEDURE — 12400011

## 2020-09-08 RX ORDER — QUETIAPINE FUMARATE 50 MG/1
50-100 TABLET, FILM COATED ORAL 3 TIMES DAILY PRN
Status: DISCONTINUED | OUTPATIENT
Start: 2020-09-08 | End: 2020-09-09

## 2020-09-08 RX ORDER — ARIPIPRAZOLE 10 MG/1
10 TABLET ORAL AT BEDTIME
Status: DISCONTINUED | OUTPATIENT
Start: 2020-09-08 | End: 2020-09-09

## 2020-09-08 RX ADMIN — PROPRANOLOL HYDROCHLORIDE 10 MG: 10 TABLET ORAL at 13:20

## 2020-09-08 RX ADMIN — PROPRANOLOL HYDROCHLORIDE 10 MG: 10 TABLET ORAL at 08:27

## 2020-09-08 RX ADMIN — ARIPIPRAZOLE 10 MG: 10 TABLET ORAL at 20:34

## 2020-09-08 RX ADMIN — NAPROXEN 500 MG: 500 TABLET ORAL at 17:34

## 2020-09-08 RX ADMIN — PROPRANOLOL HYDROCHLORIDE 10 MG: 10 TABLET ORAL at 20:34

## 2020-09-08 RX ADMIN — OLANZAPINE 10 MG: 5 TABLET, FILM COATED ORAL at 00:27

## 2020-09-08 RX ADMIN — ALBUTEROL SULFATE 2 PUFF: 90 AEROSOL, METERED RESPIRATORY (INHALATION) at 04:28

## 2020-09-08 RX ADMIN — NICOTINE POLACRILEX 4 MG: 2 GUM, CHEWING ORAL at 19:24

## 2020-09-08 RX ADMIN — DOCUSATE SODIUM 50 MG: 50 CAPSULE, LIQUID FILLED ORAL at 17:38

## 2020-09-08 RX ADMIN — NAPROXEN 500 MG: 500 TABLET ORAL at 10:59

## 2020-09-08 RX ADMIN — QUETIAPINE 100 MG: 50 TABLET, FILM COATED ORAL at 20:34

## 2020-09-08 RX ADMIN — QUETIAPINE FUMARATE 50 MG: 25 TABLET ORAL at 04:54

## 2020-09-08 RX ADMIN — LORAZEPAM 0.5 MG: 0.5 TABLET ORAL at 09:57

## 2020-09-08 RX ADMIN — NICOTINE 1 PATCH: 14 PATCH, EXTENDED RELEASE TRANSDERMAL at 09:58

## 2020-09-08 RX ADMIN — LORAZEPAM 0.5 MG: 0.5 TABLET ORAL at 17:38

## 2020-09-08 NOTE — PLAN OF CARE
Pt has been assigned a  Azul Huff. Contact info is located in AVS. Will call and talk to  at next available time.

## 2020-09-08 NOTE — PLAN OF CARE
Face to face shift report received from JESSICA Carlson RN. Rounding completed, pt observed.   Problem: Adult Behavioral Health Plan of Care  Goal: Plan of Care Review  Outcome: No Change     Problem: Adult Behavioral Health Plan of Care  Goal: Patient-Specific Goal (Individualization)  Description:   Cooperate with treatment team recommendations including medications.   Will sleep 6-8 hrs nightly.  Attend at least 50% groups.       9/8/2020 0959 by Fozia Snowden, RN  Outcome: No Change  Note: Shift Summery:   Pt has been pleasant in the unit milieu, Pt attended some and displayed safe behaviors. Edie is calm with joselin affect. Speech is clear without any stuttering. Pt ate meals, showered and appeared tidy with change of clothing. Nursing will continue to monitor pt for any in behavior.   Face to face report will be communicated to oncoming RN.    Fozia Snowden RN  9/8/2020  10:08 AM   Problem: Adult Behavioral Health Plan of Care  Goal: Adheres to Safety Considerations for Self and Others  Outcome: No Change     Problem: Adult Behavioral Health Plan of Care  Goal: Optimized Coping Skills in Response to Life Stressors  Outcome: No Change     Problem: Thought Process Alteration  Goal: Optimal Thought Clarity  Description: Will have reality based conversations by discharge.   Outcome: No Change

## 2020-09-08 NOTE — PLAN OF CARE
"    Problem: Adult Behavioral Health Plan of Care  Goal: Patient-Specific Goal (Individualization)  Description:   Cooperate with treatment team recommendations including medications.   Will sleep 6-8 hrs nightly.  Attend at least 50% groups.     Pt ocmplained of pain in her left shoulder and requested her naproxen 500mg at 1734. Pt reported pain at 8/10. Pt then asked if \"they\" ordered her something stronger for her constipation. Pt given colace 50mg at 1738, Pt asked if nursing could look to see if she has a , pt given the name of her  which she said made her anxious. Pt then said she may need her ativan now, she states that she feels this means that she is going to be let go soon and now she is nervous about leaving with out a place to stay. Pt was reassured that she would not be let go without a place to go. Pt stated that her anxiety was now very high and she needed her ativan. Pt given ativan 0.5mg at 1738.  Pt denied depression, SI/HI and hallucinations.     Pt requested seroquel 100mg at 2034 for sleep. Pt asked when she could get her next dose. Pt was told that she can get it 3 times a day, pt asked if nursing could tell the next shift that she can have it again at midnight. Pt was encouraged to try to sleep through the night and maybe she won't need another dose in the night, Pt did say that she is worried about her children, especially her 27 year old.   9/8/2020 1845 by Pippa Landon RN  Outcome: Improving     Problem: Thought Process Alteration  Goal: Optimal Thought Clarity  Description: Will have reality based conversations by discharge.     Pt able to have a reality based conversation with nursing.   9/8/2020 1845 by Pippa Landon RN  Outcome: Improving    Face to face end of shift report communicated to oncoming RN.     Pippa Landon RN  9/8/2020  6:49 PM          "

## 2020-09-08 NOTE — PROGRESS NOTES
[facility]  Psychiatric Progress Note      Impression:   Patient had court 9/4, full commit and Tineo supported.    Patient is sitting in commons area.  She presents a bit brighter and less anxious.  She does have her notebook and we review her medications once again.  We will increase Abilify and she also finds Seroquel is helpful for anxiety and sleep, although feels 50mg is not enough so we widen range to 50mg-100mg as needed.  Patient is no longer stuttering and wants to know how long it will be for discharge.  Inform patient we are just starting to work on this and will likely need further medication adjustments.  She brings up [facility] and inform patient is this is the option, she will need to start participating in group programming a bit more, encouraged to do so.  Patient denies suicidal or homicidal thoughts.    Educated regarding medication indications, risks, benefits, side effects, contraindications and possible interactions. Verbally expressed understanding.        Diagnoses:     Schizoaffective Disorder, bipolar type    Attestation:  Patient has been seen and evaluated by me,  Poornima Tucker, MADI CNP          Interim History:   The patient's care was discussed with the treatment team and chart notes were reviewed.           Plan/Treatment Team     BEHAVIORAL TEAM DISCUSSION    Progress: moderate    Continued Stay Criteria/Rationale: Impaired thought process, committed and Tineo, discharge planning needed    Medical/Physical: tendinosis of rotator cuff likely cause of pain.     Precautions: none    Falls precaution?: No  Behavioral Orders   Procedures     Code 1 - Restrict to Unit     Routine Programming     As clinically indicated     Status 15     Every 15 minutes.       Plan for this encounter/Med Changes/Labs:   Increase Abilify to 10mg   Discontinue Clonidine and hydroxyzine due to dry mouth and possibly adverse effects of both  Continue Ativan to 0.5mg bid prn for  anxiety for short term  Adding Naproxen prn  Continue propanolol   Utilize low dose Seroquel prn for anxiety/sleep    Monitor changes in speech, attempts to embellish side effects    Encourage decreased caffeine intake and increase other fluid intake    Full commit and Tineo    Tineo med list  abilify, invega, latuda, zyprexa    Participants: Poornima Tucker NP,  Nursing, SW          Medications:     Current Facility-Administered Medications   Medication     acetaminophen (TYLENOL) tablet 650 mg     albuterol (PROAIR HFA/PROVENTIL HFA/VENTOLIN HFA) 108 (90 Base) MCG/ACT inhaler 1-2 puff     ARIPiprazole (ABILIFY) tablet 5 mg     artificial saliva (BIOTENE MT) solution 2 spray     LORazepam (ATIVAN) tablet 0.5 mg     magnesium hydroxide (MILK OF MAGNESIA) suspension 30 mL     naproxen (NAPROSYN) tablet 500 mg     nicotine (NICODERM CQ) 14 MG/24HR 24 hr patch 1 patch     nicotine (NICORETTE) gum 2-4 mg     nicotine Patch in Place     OLANZapine (zyPREXA) tablet 5-10 mg    Or     OLANZapine (zyPREXA) injection 10 mg     propranolol (INDERAL) tablet 10 mg     QUEtiapine (SEROquel) tablet 25-50 mg          10 point ROS denies uti symptoms though ua positive       Allergies:     Allergies   Allergen Reactions     Shrimp Anaphylaxis     Risperdal [Risperidone] Other (See Comments)     Elevated prolactin            Psychiatric Examination:   /92   Pulse 80   Temp 97.2  F (36.2  C) (Tympanic)   Resp 16   Ht 1.524 m (5')   Wt 91.2 kg (201 lb)   SpO2 94%   BMI 39.26 kg/m    Weight is 201 lbs 0 oz  Body mass index is 39.26 kg/m .    MSE/PSYCH  PSYCHIATRIC EXAM  /92   Pulse 80   Temp 97.2  F (36.2  C) (Tympanic)   Resp 16   Ht 1.524 m (5')   Wt 91.2 kg (201 lb)   SpO2 94%   BMI 39.26 kg/m    -Appearance/Behavior:  Appropriate  -Motor: intact  -Gait: intact  -Abnormal involuntary movements: none  -Mood: cooperative, brighter  -Affect: brighter  -Speech: No stuttering today - appears to have been doing  this purposefully  -Thought process/associations: no delusional statement today  -Thought content: remains guarded   -Perceptual disturbances: denies si/hi,   -Suicidal/Homicidal Ideation: denies    -Judgment: poor  -Insight: poor  *Orientation: time, place and person.  *Memory: difficult to assess  *Attention: poor  *Language: fluent, no aphasias, able to repeat phrases and name objects. Vocab intact.  *Fund of information: difficult to assess, appears appropriate  *Cognitive functioning estimate: 0 - independent.           Labs:     Results for orders placed or performed during the hospital encounter of 08/10/20   XR Humerus Port Left G/E 2 Views     Status: None    Narrative    Exam: XR HUMERUS PORT LT     History:Female, age 50 years, left upper arm pain after fall.    Comparison:  None    Technique: Two views are submitted    Findings: Bones are normally mineralized. No evidence of acute or  subacute fracture.  No evidence of dislocation.  Calcific tendinosis  of the rotator cuff.           Impression    Impression:  No evidence of acute or subacute bony abnormality.     Calcifications of the shoulder suggest calcific tendinosis of the  rotator cuff. MRI would better characterize.    LUPE SANCHEZ MD   CBC with platelets differential     Status: Abnormal   Result Value Ref Range    WBC 13.7 (H) 4.0 - 11.0 10e9/L    RBC Count 5.09 3.8 - 5.2 10e12/L    Hemoglobin 17.2 (H) 11.7 - 15.7 g/dL    Hematocrit 49.2 (H) 35.0 - 47.0 %    MCV 97 78 - 100 fl    MCH 33.8 (H) 26.5 - 33.0 pg    MCHC 35.0 31.5 - 36.5 g/dL    RDW 12.6 10.0 - 15.0 %    Platelet Count 185 150 - 450 10e9/L    Diff Method Automated Method     % Neutrophils 82.4 %    % Lymphocytes 10.4 %    % Monocytes 6.3 %    % Eosinophils 0.3 %    % Basophils 0.2 %    % Immature Granulocytes 0.4 %    Nucleated RBCs 0 0 /100    Absolute Neutrophil 11.3 (H) 1.6 - 8.3 10e9/L    Absolute Lymphocytes 1.4 0.8 - 5.3 10e9/L    Absolute Monocytes 0.9 0.0 - 1.3 10e9/L     Absolute Eosinophils 0.0 0.0 - 0.7 10e9/L    Absolute Basophils 0.0 0.0 - 0.2 10e9/L    Abs Immature Granulocytes 0.1 0 - 0.4 10e9/L    Absolute Nucleated RBC 0.0    Comprehensive metabolic panel     Status: Abnormal   Result Value Ref Range    Sodium 129 (L) 133 - 144 mmol/L    Potassium 4.1 3.4 - 5.3 mmol/L    Chloride 95 94 - 109 mmol/L    Carbon Dioxide 25 20 - 32 mmol/L    Anion Gap 9 3 - 14 mmol/L    Glucose 107 (H) 70 - 99 mg/dL    Urea Nitrogen 9 7 - 30 mg/dL    Creatinine 0.54 0.52 - 1.04 mg/dL    GFR Estimate >90 >60 mL/min/[1.73_m2]    GFR Estimate If Black >90 >60 mL/min/[1.73_m2]    Calcium 9.1 8.5 - 10.1 mg/dL    Bilirubin Total 0.7 0.2 - 1.3 mg/dL    Albumin 3.2 (L) 3.4 - 5.0 g/dL    Protein Total 7.3 6.8 - 8.8 g/dL    Alkaline Phosphatase 131 40 - 150 U/L    ALT 57 (H) 0 - 50 U/L    AST 32 0 - 45 U/L   Alcohol ethyl     Status: None   Result Value Ref Range    Ethanol g/dL <0.01 0.01 g/dL   UA reflex to Microscopic     Status: Abnormal   Result Value Ref Range    Color Urine Yellow     Appearance Urine Slightly Cloudy     Glucose Urine Negative NEG^Negative mg/dL    Bilirubin Urine Negative NEG^Negative    Ketones Urine Negative NEG^Negative mg/dL    Specific Gravity Urine 1.017 1.003 - 1.035    Blood Urine Negative NEG^Negative    pH Urine 6.0 4.7 - 8.0 pH    Protein Albumin Urine 10 (A) NEG^Negative mg/dL    Urobilinogen mg/dL Normal 0.0 - 2.0 mg/dL    Nitrite Urine Negative NEG^Negative    Leukocyte Esterase Urine Moderate (A) NEG^Negative    Source Midstream Urine     RBC Urine 4 (H) 0 - 2 /HPF    WBC Urine 18 (H) 0 - 5 /HPF    Bacteria Urine Few (A) NEG^Negative /HPF    Squamous Epithelial /HPF Urine 19 (H) 0 - 1 /HPF    Mucous Urine Present (A) NEG^Negative /LPF   Urine Drugs of Abuse Screen Panel 13     Status: None   Result Value Ref Range    Cannabinoids (54-wlr-8-carboxy-9-THC) Not Detected NDET^Not Detected ng/mL    Phencyclidine (Phencyclidine) Not Detected NDET^Not Detected ng/mL     Cocaine (Benzoylecgonine) Not Detected NDET^Not Detected ng/mL    Methamphetamine (d-Methamphetamine) Not Detected NDET^Not Detected ng/mL    Opiates (Morphine) Not Detected NDET^Not Detected ng/mL    Amphetamine (d-Amphetamine) Not Detected NDET^Not Detected ng/mL    Benzodiazepines (Nordiazepam) Not Detected NDET^Not Detected ng/mL    Tricyclic Antidepressants (Desipramine) Not Detected NDET^Not Detected ng/mL    Methadone (Methadone) Not Detected NDET^Not Detected ng/mL    Barbiturates (Butalbital) Not Detected NDET^Not Detected ng/mL    Oxycodone (Oxycodone) Not Detected NDET^Not Detected ng/mL    Propoxyphene (Norpropoxyphene) Not Detected NDET^Not Detected ng/mL    Buprenorphine (Buprenorphine) Not Detected NDET^Not Detected ng/mL   Asymptomatic COVID-19 Virus (Coronavirus) by PCR     Status: None    Specimen: Nasopharyngeal   Result Value Ref Range    COVID-19 Virus PCR to [facility] - Source Nasopharyngeal     COVID-19 Virus PCR to [facility] - Result       Test received-See reflex to [place] test SARS CoV2 (COVID-19) Virus RT-PCR   SARS-CoV-2 COVID-19 Virus (Coronavirus) RT-PCR Nasopharyngeal     Status: None    Specimen: Nasopharyngeal   Result Value Ref Range    SARS-CoV-2 Virus Specimen Source Nasopharyngeal     SARS-CoV-2 PCR Result NEGATIVE     SARS-CoV-2 PCR Comment       Testing was performed using the Xpert Xpress SARS-CoV-2 Assay on the Cepheid Gene-Xpert   Instrument Systems. Additional information about this Emergency Use Authorization (EUA)   assay can be found via the Lab Guide.     Basic metabolic panel     Status: Abnormal   Result Value Ref Range    Sodium 133 133 - 144 mmol/L    Potassium 3.1 (L) 3.4 - 5.3 mmol/L    Chloride 100 94 - 109 mmol/L    Carbon Dioxide 26 20 - 32 mmol/L    Anion Gap 7 3 - 14 mmol/L    Glucose 178 (H) 70 - 99 mg/dL    Urea Nitrogen 12 7 - 30 mg/dL    Creatinine 0.52 0.52 - 1.04 mg/dL    GFR Estimate >90 >60 mL/min/[1.73_m2]    GFR Estimate If Black >90 >60  mL/min/[1.73_m2]    Calcium 8.8 8.5 - 10.1 mg/dL   Potassium     Status: None   Result Value Ref Range    Potassium 4.5 3.4 - 5.3 mmol/L   UA reflex to Microscopic     Status: None   Result Value Ref Range    Color Urine Light Yellow     Appearance Urine Clear     Glucose Urine Negative NEG^Negative mg/dL    Bilirubin Urine Negative NEG^Negative    Ketones Urine Negative NEG^Negative mg/dL    Specific Gravity Urine 1.018 1.003 - 1.035    Blood Urine Negative NEG^Negative    pH Urine 6.5 4.7 - 8.0 pH    Protein Albumin Urine Negative NEG^Negative mg/dL    Urobilinogen mg/dL Normal 0.0 - 2.0 mg/dL    Nitrite Urine Negative NEG^Negative    Leukocyte Esterase Urine Negative NEG^Negative    Source Midstream Urine    CBC with platelets differential     Status: Abnormal   Result Value Ref Range    WBC 7.2 4.0 - 11.0 10e9/L    RBC Count 4.75 3.8 - 5.2 10e12/L    Hemoglobin 16.0 (H) 11.7 - 15.7 g/dL    Hematocrit 46.5 35.0 - 47.0 %    MCV 98 78 - 100 fl    MCH 33.7 (H) 26.5 - 33.0 pg    MCHC 34.4 31.5 - 36.5 g/dL    RDW 12.1 10.0 - 15.0 %    Platelet Count 322 150 - 450 10e9/L    Diff Method Automated Method     % Neutrophils 63.1 %    % Lymphocytes 26.4 %    % Monocytes 7.1 %    % Eosinophils 2.2 %    % Basophils 0.6 %    % Immature Granulocytes 0.6 %    Nucleated RBCs 0 0 /100    Absolute Neutrophil 4.5 1.6 - 8.3 10e9/L    Absolute Lymphocytes 1.9 0.8 - 5.3 10e9/L    Absolute Monocytes 0.5 0.0 - 1.3 10e9/L    Absolute Eosinophils 0.2 0.0 - 0.7 10e9/L    Absolute Basophils 0.0 0.0 - 0.2 10e9/L    Abs Immature Granulocytes 0.0 0 - 0.4 10e9/L    Absolute Nucleated RBC 0.0    Comprehensive metabolic panel     Status: Abnormal   Result Value Ref Range    Sodium 135 133 - 144 mmol/L    Potassium 4.3 3.4 - 5.3 mmol/L    Chloride 104 94 - 109 mmol/L    Carbon Dioxide 27 20 - 32 mmol/L    Anion Gap 4 3 - 14 mmol/L    Glucose 111 (H) 70 - 99 mg/dL    Urea Nitrogen 13 7 - 30 mg/dL    Creatinine 0.59 0.52 - 1.04 mg/dL    GFR Estimate  >90 >60 mL/min/[1.73_m2]    GFR Estimate If Black >90 >60 mL/min/[1.73_m2]    Calcium 9.0 8.5 - 10.1 mg/dL    Bilirubin Total 0.3 0.2 - 1.3 mg/dL    Albumin 3.0 (L) 3.4 - 5.0 g/dL    Protein Total 6.7 (L) 6.8 - 8.8 g/dL    Alkaline Phosphatase 103 40 - 150 U/L    ALT 61 (H) 0 - 50 U/L    AST 22 0 - 45 U/L

## 2020-09-08 NOTE — PLAN OF CARE
"  Problem: Adult Behavioral Health Plan of Care  Goal: Patient-Specific Goal (Individualization)  Description:   Cooperate with treatment team recommendations including medications.   Will sleep 6-8 hrs nightly.  Attend at least 50% groups.       9/7/2020 6705 by Robyn Davis RN  Outcome: No Change   Face to face shift report received from Pippa OGDEN RN. Rounding completed, pt observed in bed appears to be sleeping.  In no apparent distress, respirations are even and non labored.  15 min/PRN safety checks continue.     0030-Pt was out in the day room stated that she could not sleep and requested something for sleep.  She requested Seroquel-writer advised that it was too early for the Seroquel.  Writer offered Zyprexa and discussed the effects and side effects of the medication.  She did accept the Zyprexa and went to lay down.   She did sleep soundly for approx. 4 hours, she then woke up and came to the nurses station and requested Seroquel.  Writer advised that it would be a little bit as writer was busy with another Pt.  Once writer was able to assist her she was agitated as she wanted to go back to bed right away and did not feel she should have to wait for \"1/2\" hour to get medications.  She did sit in the day room talking to peers and did not seem to be agitated until writer approached her.  She stated to this writer she only takes 50 mg of Seroquel which isnt much and she doesn't know why any of us are concerned because \"so and so down the road takes over 400 mg and she is fine\"  Writer reiterated that everyone here has different medications for different reasons and sometimes the doses are different for different people.  She did calm down after her Seroquel and she requested a print out on the zyprexa which was given to her as well.      She returned to bed.      Total hours of sleep-approx. 5    Face to face report will be communicated to oncdavid RN.    Robyn Davis RN  9/8/2020  6:10 AM  "

## 2020-09-09 PROCEDURE — 99232 SBSQ HOSP IP/OBS MODERATE 35: CPT | Performed by: NURSE PRACTITIONER

## 2020-09-09 PROCEDURE — 12400000 ZZH R&B MH

## 2020-09-09 PROCEDURE — 25000132 ZZH RX MED GY IP 250 OP 250 PS 637: Mod: GY | Performed by: NURSE PRACTITIONER

## 2020-09-09 PROCEDURE — 12400011

## 2020-09-09 RX ORDER — ARIPIPRAZOLE 15 MG/1
15 TABLET ORAL AT BEDTIME
Status: DISCONTINUED | OUTPATIENT
Start: 2020-09-09 | End: 2020-09-12

## 2020-09-09 RX ORDER — RAMELTEON 8 MG/1
8 TABLET ORAL ONCE
Status: DISCONTINUED | OUTPATIENT
Start: 2020-09-09 | End: 2020-09-11

## 2020-09-09 RX ADMIN — DOCUSATE SODIUM 50 MG: 50 CAPSULE, LIQUID FILLED ORAL at 02:41

## 2020-09-09 RX ADMIN — ARIPIPRAZOLE 15 MG: 15 TABLET ORAL at 21:50

## 2020-09-09 RX ADMIN — PROPRANOLOL HYDROCHLORIDE 10 MG: 10 TABLET ORAL at 13:10

## 2020-09-09 RX ADMIN — DOCUSATE SODIUM 50 MG: 50 CAPSULE, LIQUID FILLED ORAL at 14:33

## 2020-09-09 RX ADMIN — LORAZEPAM 0.5 MG: 0.5 TABLET ORAL at 07:54

## 2020-09-09 RX ADMIN — PROPRANOLOL HYDROCHLORIDE 10 MG: 10 TABLET ORAL at 21:50

## 2020-09-09 RX ADMIN — NAPROXEN 500 MG: 500 TABLET ORAL at 02:41

## 2020-09-09 RX ADMIN — NAPROXEN 500 MG: 500 TABLET ORAL at 16:18

## 2020-09-09 RX ADMIN — NICOTINE 1 PATCH: 14 PATCH, EXTENDED RELEASE TRANSDERMAL at 08:11

## 2020-09-09 RX ADMIN — QUETIAPINE 100 MG: 50 TABLET, FILM COATED ORAL at 02:41

## 2020-09-09 RX ADMIN — PROPRANOLOL HYDROCHLORIDE 10 MG: 10 TABLET ORAL at 08:08

## 2020-09-09 RX ADMIN — LORAZEPAM 0.5 MG: 0.5 TABLET ORAL at 16:18

## 2020-09-09 ASSESSMENT — ACTIVITIES OF DAILY LIVING (ADL)
DRESS: SCRUBS (BEHAVIORAL HEALTH);INDEPENDENT
LAUNDRY: UNABLE TO COMPLETE
HYGIENE/GROOMING: INDEPENDENT
LAUNDRY: UNABLE TO COMPLETE
ORAL_HYGIENE: INDEPENDENT
ORAL_HYGIENE: INDEPENDENT
DRESS: INDEPENDENT
HYGIENE/GROOMING: INDEPENDENT

## 2020-09-09 NOTE — PROGRESS NOTES
"[facility]  Psychiatric Progress Note      Impression:   Patient had court 9/4, full commit and Tineo supported.    Today when I meet with Glenda she is quite elated and a bit giddy.  She laughs almost hysterically about being under commitment, being on disability and \"having multiple insurances\" she laughs and states \"they just keep giving me money and I will take it\".  She smiles and laughs throughout most of her conversation.  She tries to explain what her panic and anxiety feels like and is asking for increased dose and Ativan which I denied.  When she was explaining her anxiety it appears to be similar or more congruent to symptoms of debbie and racing thoughts and I did tell her that increasing the Abilify dose would be beneficial for this and she had been on a higher dose of Abilify in the past and had done well on it with no side effects that were documented.  She does not appear to have any dystonia or dyskinesia.  She does not pace.  She is appearing to be comfortable.  She is hoping that she can go to [facility] in [place].  I did tell her that I felt that was a very appropriate place for her to go for continued care.  She states she is going to start going to more groups because \"I had to have to go to groups in order for [facility] to accept me\"    Educated regarding medication indications, risks, benefits, side effects, contraindications and possible interactions. Verbally expressed understanding.        Diagnoses:     Schizoaffective Disorder, bipolar type    Attestation:  Patient has been seen and evaluated by me,  April Paerl Kolb NP          Interim History:   The patient's care was discussed with the treatment team and chart notes were reviewed.           Plan/Treatment Team     BEHAVIORAL TEAM DISCUSSION    Progress: moderate    Continued Stay Criteria/Rationale: Impaired thought process, committed and Tineo, discharge planning needed    Medical/Physical: tendinosis " of rotator cuff likely cause of pain.     Precautions: none    Falls precaution?: No  Behavioral Orders   Procedures     Code 1 - Restrict to Unit     Routine Programming     As clinically indicated     Status 15             Medications:     Current Facility-Administered Medications   Medication     acetaminophen (TYLENOL) tablet 650 mg     albuterol (PROAIR HFA/PROVENTIL HFA/VENTOLIN HFA) 108 (90 Base) MCG/ACT inhaler 1-2 puff     ARIPiprazole (ABILIFY) tablet 15 mg     artificial saliva (BIOTENE MT) solution 2 spray     docusate sodium (COLACE) capsule 50 mg     LORazepam (ATIVAN) tablet 0.5 mg     magnesium hydroxide (MILK OF MAGNESIA) suspension 30 mL     naproxen (NAPROSYN) tablet 500 mg     nicotine (NICODERM CQ) 14 MG/24HR 24 hr patch 1 patch     nicotine (NICORETTE) gum 2-4 mg     nicotine Patch in Place     OLANZapine (zyPREXA) tablet 5-10 mg    Or     OLANZapine (zyPREXA) injection 10 mg     propranolol (INDERAL) tablet 10 mg     QUEtiapine (SEROquel) tablet  mg          10 point ROS denies uti symptoms though ua positive       Allergies:     Allergies   Allergen Reactions     Shrimp Anaphylaxis     Risperdal [Risperidone] Other (See Comments)     Elevated prolactin            Psychiatric Examination:   /66   Pulse 84   Temp 97.6  F (36.4  C) (Tympanic)   Resp 18   Ht 1.524 m (5')   Wt 91.2 kg (201 lb)   SpO2 95%   BMI 39.26 kg/m    Weight is 201 lbs 0 oz  Body mass index is 39.26 kg/m .    MSE/PSYCH  PSYCHIATRIC EXAM  /66   Pulse 84   Temp 97.6  F (36.4  C) (Tympanic)   Resp 18   Ht 1.524 m (5')   Wt 91.2 kg (201 lb)   SpO2 95%   BMI 39.26 kg/m    -Appearance/Behavior:  Appropriate  -Motor: intact  -Gait: intact  -Abnormal involuntary movements: none  -Mood: cooperative, brighter  -Affect: brighter  -Speech: No stuttering today - appears to have been doing this purposefully  -Thought process/associations: no delusional statement today  -Thought content: remains guarded    -Perceptual disturbances: denies si/hi,   -Suicidal/Homicidal Ideation: denies    -Judgment: poor  -Insight: poor  *Orientation: time, place and person.  *Memory: difficult to assess  *Attention: poor  *Language: fluent, no aphasias, able to repeat phrases and name objects. Vocab intact.  *Fund of information: difficult to assess, appears appropriate  *Cognitive functioning estimate: 0 - independent.           Labs:     Results for orders placed or performed during the hospital encounter of 08/10/20   XR Humerus Port Left G/E 2 Views     Status: None    Narrative    Exam: XR HUMERUS PORT LT     History:Female, age 50 years, left upper arm pain after fall.    Comparison:  None    Technique: Two views are submitted    Findings: Bones are normally mineralized. No evidence of acute or  subacute fracture.  No evidence of dislocation.  Calcific tendinosis  of the rotator cuff.           Impression    Impression:  No evidence of acute or subacute bony abnormality.     Calcifications of the shoulder suggest calcific tendinosis of the  rotator cuff. MRI would better characterize.    LUPE SANCHEZ MD   CBC with platelets differential     Status: Abnormal   Result Value Ref Range    WBC 13.7 (H) 4.0 - 11.0 10e9/L    RBC Count 5.09 3.8 - 5.2 10e12/L    Hemoglobin 17.2 (H) 11.7 - 15.7 g/dL    Hematocrit 49.2 (H) 35.0 - 47.0 %    MCV 97 78 - 100 fl    MCH 33.8 (H) 26.5 - 33.0 pg    MCHC 35.0 31.5 - 36.5 g/dL    RDW 12.6 10.0 - 15.0 %    Platelet Count 185 150 - 450 10e9/L    Diff Method Automated Method     % Neutrophils 82.4 %    % Lymphocytes 10.4 %    % Monocytes 6.3 %    % Eosinophils 0.3 %    % Basophils 0.2 %    % Immature Granulocytes 0.4 %    Nucleated RBCs 0 0 /100    Absolute Neutrophil 11.3 (H) 1.6 - 8.3 10e9/L    Absolute Lymphocytes 1.4 0.8 - 5.3 10e9/L    Absolute Monocytes 0.9 0.0 - 1.3 10e9/L    Absolute Eosinophils 0.0 0.0 - 0.7 10e9/L    Absolute Basophils 0.0 0.0 - 0.2 10e9/L    Abs Immature Granulocytes  0.1 0 - 0.4 10e9/L    Absolute Nucleated RBC 0.0    Comprehensive metabolic panel     Status: Abnormal   Result Value Ref Range    Sodium 129 (L) 133 - 144 mmol/L    Potassium 4.1 3.4 - 5.3 mmol/L    Chloride 95 94 - 109 mmol/L    Carbon Dioxide 25 20 - 32 mmol/L    Anion Gap 9 3 - 14 mmol/L    Glucose 107 (H) 70 - 99 mg/dL    Urea Nitrogen 9 7 - 30 mg/dL    Creatinine 0.54 0.52 - 1.04 mg/dL    GFR Estimate >90 >60 mL/min/[1.73_m2]    GFR Estimate If Black >90 >60 mL/min/[1.73_m2]    Calcium 9.1 8.5 - 10.1 mg/dL    Bilirubin Total 0.7 0.2 - 1.3 mg/dL    Albumin 3.2 (L) 3.4 - 5.0 g/dL    Protein Total 7.3 6.8 - 8.8 g/dL    Alkaline Phosphatase 131 40 - 150 U/L    ALT 57 (H) 0 - 50 U/L    AST 32 0 - 45 U/L   Alcohol ethyl     Status: None   Result Value Ref Range    Ethanol g/dL <0.01 0.01 g/dL   UA reflex to Microscopic     Status: Abnormal   Result Value Ref Range    Color Urine Yellow     Appearance Urine Slightly Cloudy     Glucose Urine Negative NEG^Negative mg/dL    Bilirubin Urine Negative NEG^Negative    Ketones Urine Negative NEG^Negative mg/dL    Specific Gravity Urine 1.017 1.003 - 1.035    Blood Urine Negative NEG^Negative    pH Urine 6.0 4.7 - 8.0 pH    Protein Albumin Urine 10 (A) NEG^Negative mg/dL    Urobilinogen mg/dL Normal 0.0 - 2.0 mg/dL    Nitrite Urine Negative NEG^Negative    Leukocyte Esterase Urine Moderate (A) NEG^Negative    Source Midstream Urine     RBC Urine 4 (H) 0 - 2 /HPF    WBC Urine 18 (H) 0 - 5 /HPF    Bacteria Urine Few (A) NEG^Negative /HPF    Squamous Epithelial /HPF Urine 19 (H) 0 - 1 /HPF    Mucous Urine Present (A) NEG^Negative /LPF   Urine Drugs of Abuse Screen Panel 13     Status: None   Result Value Ref Range    Cannabinoids (08-kqo-9-carboxy-9-THC) Not Detected NDET^Not Detected ng/mL    Phencyclidine (Phencyclidine) Not Detected NDET^Not Detected ng/mL    Cocaine (Benzoylecgonine) Not Detected NDET^Not Detected ng/mL    Methamphetamine (d-Methamphetamine) Not Detected  NDET^Not Detected ng/mL    Opiates (Morphine) Not Detected NDET^Not Detected ng/mL    Amphetamine (d-Amphetamine) Not Detected NDET^Not Detected ng/mL    Benzodiazepines (Nordiazepam) Not Detected NDET^Not Detected ng/mL    Tricyclic Antidepressants (Desipramine) Not Detected NDET^Not Detected ng/mL    Methadone (Methadone) Not Detected NDET^Not Detected ng/mL    Barbiturates (Butalbital) Not Detected NDET^Not Detected ng/mL    Oxycodone (Oxycodone) Not Detected NDET^Not Detected ng/mL    Propoxyphene (Norpropoxyphene) Not Detected NDET^Not Detected ng/mL    Buprenorphine (Buprenorphine) Not Detected NDET^Not Detected ng/mL   Asymptomatic COVID-19 Virus (Coronavirus) by PCR     Status: None    Specimen: Nasopharyngeal   Result Value Ref Range    COVID-19 Virus PCR to [facility] - Source Nasopharyngeal     COVID-19 Virus PCR to [facility] - Result       Test received-See reflex to [place] test SARS CoV2 (COVID-19) Virus RT-PCR   SARS-CoV-2 COVID-19 Virus (Coronavirus) RT-PCR Nasopharyngeal     Status: None    Specimen: Nasopharyngeal   Result Value Ref Range    SARS-CoV-2 Virus Specimen Source Nasopharyngeal     SARS-CoV-2 PCR Result NEGATIVE     SARS-CoV-2 PCR Comment       Testing was performed using the Xpert Xpress SARS-CoV-2 Assay on the Cepheid Gene-Xpert   Instrument Systems. Additional information about this Emergency Use Authorization (EUA)   assay can be found via the Lab Guide.     Basic metabolic panel     Status: Abnormal   Result Value Ref Range    Sodium 133 133 - 144 mmol/L    Potassium 3.1 (L) 3.4 - 5.3 mmol/L    Chloride 100 94 - 109 mmol/L    Carbon Dioxide 26 20 - 32 mmol/L    Anion Gap 7 3 - 14 mmol/L    Glucose 178 (H) 70 - 99 mg/dL    Urea Nitrogen 12 7 - 30 mg/dL    Creatinine 0.52 0.52 - 1.04 mg/dL    GFR Estimate >90 >60 mL/min/[1.73_m2]    GFR Estimate If Black >90 >60 mL/min/[1.73_m2]    Calcium 8.8 8.5 - 10.1 mg/dL   Potassium     Status: None   Result Value Ref Range    Potassium 4.5 3.4  - 5.3 mmol/L   UA reflex to Microscopic     Status: None   Result Value Ref Range    Color Urine Light Yellow     Appearance Urine Clear     Glucose Urine Negative NEG^Negative mg/dL    Bilirubin Urine Negative NEG^Negative    Ketones Urine Negative NEG^Negative mg/dL    Specific Gravity Urine 1.018 1.003 - 1.035    Blood Urine Negative NEG^Negative    pH Urine 6.5 4.7 - 8.0 pH    Protein Albumin Urine Negative NEG^Negative mg/dL    Urobilinogen mg/dL Normal 0.0 - 2.0 mg/dL    Nitrite Urine Negative NEG^Negative    Leukocyte Esterase Urine Negative NEG^Negative    Source Midstream Urine    CBC with platelets differential     Status: Abnormal   Result Value Ref Range    WBC 7.2 4.0 - 11.0 10e9/L    RBC Count 4.75 3.8 - 5.2 10e12/L    Hemoglobin 16.0 (H) 11.7 - 15.7 g/dL    Hematocrit 46.5 35.0 - 47.0 %    MCV 98 78 - 100 fl    MCH 33.7 (H) 26.5 - 33.0 pg    MCHC 34.4 31.5 - 36.5 g/dL    RDW 12.1 10.0 - 15.0 %    Platelet Count 322 150 - 450 10e9/L    Diff Method Automated Method     % Neutrophils 63.1 %    % Lymphocytes 26.4 %    % Monocytes 7.1 %    % Eosinophils 2.2 %    % Basophils 0.6 %    % Immature Granulocytes 0.6 %    Nucleated RBCs 0 0 /100    Absolute Neutrophil 4.5 1.6 - 8.3 10e9/L    Absolute Lymphocytes 1.9 0.8 - 5.3 10e9/L    Absolute Monocytes 0.5 0.0 - 1.3 10e9/L    Absolute Eosinophils 0.2 0.0 - 0.7 10e9/L    Absolute Basophils 0.0 0.0 - 0.2 10e9/L    Abs Immature Granulocytes 0.0 0 - 0.4 10e9/L    Absolute Nucleated RBC 0.0    Comprehensive metabolic panel     Status: Abnormal   Result Value Ref Range    Sodium 135 133 - 144 mmol/L    Potassium 4.3 3.4 - 5.3 mmol/L    Chloride 104 94 - 109 mmol/L    Carbon Dioxide 27 20 - 32 mmol/L    Anion Gap 4 3 - 14 mmol/L    Glucose 111 (H) 70 - 99 mg/dL    Urea Nitrogen 13 7 - 30 mg/dL    Creatinine 0.59 0.52 - 1.04 mg/dL    GFR Estimate >90 >60 mL/min/[1.73_m2]    GFR Estimate If Black >90 >60 mL/min/[1.73_m2]    Calcium 9.0 8.5 - 10.1 mg/dL    Bilirubin  Total 0.3 0.2 - 1.3 mg/dL    Albumin 3.0 (L) 3.4 - 5.0 g/dL    Protein Total 6.7 (L) 6.8 - 8.8 g/dL    Alkaline Phosphatase 103 40 - 150 U/L    ALT 61 (H) 0 - 50 U/L    AST 22 0 - 45 U/L       Every 15 minutes.       Plan for this encounter/Med Changes/Labs:   Increase Abilify to 15 mg  I did tell her that I would like to have her Abilify dose therapeutic so she does not feel the need to have to use  for sleep and anxiety.    Monitor changes in speech, attempts to embellish side effects    Encourage decreased caffeine intake and increase other fluid intake    Seroquel is not on her Tineo therefore I will have to discontinue this medication    Full commit and Tineo    Tineo med list  abilify, invega, latuda, zyprexa

## 2020-09-09 NOTE — PLAN OF CARE
Face to face end of shift report communicated from Tsering CHRISTIANSEN RN. Pt in the lounge on the phone at the beginning of the shift.     Pippa Landon RN  9/9/2020  3:49 PM       Problem: Adult Behavioral Health Plan of Care  Goal: Patient-Specific Goal (Individualization)  Description:   Cooperate with treatment team recommendations including medications.   Will sleep 6-8 hrs nightly.  Attend at least 50% groups.       9/9/2020 1548 by Pippa Landon RN  Outcome: Improving  Note:        Problem: Thought Process Alteration  Goal: Optimal Thought Clarity  Description: Will have reality based conversations by discharge.   9/9/2020 1548 by Pippa Landon RN  Outcome: Improving    Face to face end of shift report communicated to oncoming RN.     Pippa Landon RN  9/9/2020  3:50 PM

## 2020-09-09 NOTE — PLAN OF CARE
"Talked 1:1 with pt today. Pt states that she has many questions regarding her next level of care and wants to get the \"ball rolling\". Gave pt her case mangers number and let her know that patient financial will be talking with her today to see if she qualifies for Medicaid. Team has discussed possible IRTS treatment for pt, depending on insurance qualifications and when stabilized. Will try to check in with pt later today if given any updates.  "

## 2020-09-09 NOTE — PLAN OF CARE
"Face to face shift report received from Robyn ONEAL RN. Rounding completed, pt observed.     Patient in lounge at the start of shift. Patient stated she is having racing thoughts and anxiety. Patient was having trouble finding words and speaking. Patient requested Ativan. Patient was given 0.5 mg Ativan at 0750.   0830- Patient denies SI, HI, pain or depression. Patient did report that she is having some racing of thoughts which leads to anxiety and also then turns into irritability. Patient did report that the Ativan did help and that she is feeling tired.   08:50- Spoke with patient per patient's request. Patient had several concerns that she was hoping to get addressed. Patient reported that her  has been requested several times to see her but she has not come to see patient. Patient requested to have  contacted to come see her today. Patient also reports that she needs to get her medical insurance under way. Patient was advised by Elana that she would help her get paperwork to fill out. During treatment team it was advised patient will have Medicaid application started. Patient also requested to have her Ativan increased from 0.5 mg BID to TID. During treatment team this was not going to change but will increase Abilify to help with racing thoughts. Patient also requested to have Nicotine gum discontinued due to patient reporting the last 2 days she has taken it she has had reflux like symptoms as well as felt like \"her throat was closing.\" Patient reported she never mentioned this to any of the nurses during the shifts that this happened. Patient also requested to have a pumice board.     1040- Patient is speaking with .     1400- Patient requested a copy of her AVS from her last visit as well as a copy of her commitment paperwork. Patient is trying to get items in order for [employer] insurance.     1433-  Patient requested colace due to having issues with constipation. " Patient give Colace 50 mg at 1433.   Patient advised the commitment paperwork could be mailed from the [facility] so it may take a little bit for us to receive that paperwork. Patient advised nurse would contact  to see if this was received via email. Patient verbalized understanding.    Face to face report will be communicated to oncoming RN.    Tsering Johnson, JULIA  9/9/2020  2:44 PM     Problem: Adult Behavioral Health Plan of Care  Goal: Patient-Specific Goal (Individualization)  Description:   Cooperate with treatment team recommendations including medications.   Will sleep 6-8 hrs nightly.  Attend at least 50% groups.       9/9/2020 0801 by Tsering Johnson, RN  Outcome: Improving  Note:        Problem: Thought Process Alteration  Goal: Optimal Thought Clarity  Description: Will have reality based conversations by discharge.   9/9/2020 0801 by Tsering Johnson, RN  Outcome: Improving

## 2020-09-09 NOTE — PLAN OF CARE
Problem: Adult Behavioral Health Plan of Care  Goal: Patient-Specific Goal (Individualization)  Description:   Cooperate with treatment team recommendations including medications.   Will sleep 6-8 hrs nightly.  Attend at least 50% groups.       9/9/2020 0014 by Robyn Davis RN  Outcome: Improving   Face to face shift report received from Pippa PECK RN. Rounding completed, pt observed in room appears to be sleeping, in no apparent distress, respirations are even and non labored.  15 min/prn safety checks continue.      0241-Pt was awake and requested colace, Seroquel and Naproxen.  Pt stated she would like Naproxen again in the AM, writer advised that this medication was 2 times a day prn.  So if she has pain later in the day she may want to space the naproxen out during the day.  Pt returned to bed after a snack.    0540-Pt slept better this night, slept approx. 6 hours.  Eyes do not appear as bloodshot and tired looking.      Face to face report will be communicated to oncoming RN.    Robyn Davis RN  9/9/2020  5:49 AM

## 2020-09-10 PROCEDURE — 25000132 ZZH RX MED GY IP 250 OP 250 PS 637: Mod: GY | Performed by: NURSE PRACTITIONER

## 2020-09-10 PROCEDURE — 12400011

## 2020-09-10 RX ADMIN — NICOTINE 1 PATCH: 14 PATCH, EXTENDED RELEASE TRANSDERMAL at 08:13

## 2020-09-10 RX ADMIN — ARIPIPRAZOLE 15 MG: 15 TABLET ORAL at 20:21

## 2020-09-10 RX ADMIN — NAPROXEN 500 MG: 500 TABLET ORAL at 13:51

## 2020-09-10 RX ADMIN — DOCUSATE SODIUM 50 MG: 50 CAPSULE, LIQUID FILLED ORAL at 13:51

## 2020-09-10 RX ADMIN — PROPRANOLOL HYDROCHLORIDE 10 MG: 10 TABLET ORAL at 20:21

## 2020-09-10 RX ADMIN — DOCUSATE SODIUM 50 MG: 50 CAPSULE, LIQUID FILLED ORAL at 20:24

## 2020-09-10 RX ADMIN — ACETAMINOPHEN 650 MG: 325 TABLET, FILM COATED ORAL at 20:22

## 2020-09-10 RX ADMIN — ALBUTEROL SULFATE 2 PUFF: 90 AEROSOL, METERED RESPIRATORY (INHALATION) at 09:46

## 2020-09-10 RX ADMIN — PROPRANOLOL HYDROCHLORIDE 10 MG: 10 TABLET ORAL at 13:51

## 2020-09-10 RX ADMIN — PROPRANOLOL HYDROCHLORIDE 10 MG: 10 TABLET ORAL at 08:12

## 2020-09-10 RX ADMIN — NAPROXEN 500 MG: 500 TABLET ORAL at 08:17

## 2020-09-10 ASSESSMENT — ACTIVITIES OF DAILY LIVING (ADL)
LAUNDRY: UNABLE TO COMPLETE
HYGIENE/GROOMING: INDEPENDENT
DRESS: SCRUBS (BEHAVIORAL HEALTH)
LAUNDRY: UNABLE TO COMPLETE
HYGIENE/GROOMING: INDEPENDENT
ORAL_HYGIENE: INDEPENDENT
DRESS: SCRUBS (BEHAVIORAL HEALTH);INDEPENDENT
ORAL_HYGIENE: INDEPENDENT

## 2020-09-10 NOTE — PLAN OF CARE
Face to face end of shift report communicated from Tsering CHRISTIANSEN RN. Pt in the lounge on the phone at the beginning of the shift.     Pippa Landon RN  9/9/2020  3:49 PM       Problem: Adult Behavioral Health Plan of Care  Goal: Patient-Specific Goal (Individualization)  Description:   Cooperate with treatment team recommendations including medications.   Will sleep 6-8 hrs nightly.  Attend at least 50% groups.     Pt requested an ativan 0.5mg and naproxen 500mg at 1618. Pt complained that she has high anxiety due to being on hold all day while trying to call the Social Security office. Pt was on the pay phone for a very long time and when nursing checked, she was on hold. Pt then requested her seroquel prior to dinner. Pt told that her seroquel has been discontinued. Pt became extremely upset stating that she finally slept all night and wanted nursing to call the provider. Provider was called, NP ordered a one time Rozerem 8mg for sleep but stated that seroquel was not on her Tineo order.     Pt took a nap and did not wake up until snack time. Pt asked if the provider was called. It was explained that the NP had ordered rozerem for sleep. Pt became upset demanding that the NP drive here tonight to talk to her. Pt started stuttering and yelling. Pt refused all of her meds and shut her door. Nursing approached pt later with her meds and explained that due to her Tineo order, her NP discontinued the seroquel but that she should take her abilify and her propranolol to help with her anxiety. Pt agreed to take them but refused the rozerem but did ask for an informational printout on it which was given to the pt.   9/9/2020 1548 by Pippa Landon RN  Outcome: Improving  Note:        Problem: Thought Process Alteration  Goal: Optimal Thought Clarity  Description: Will have reality based conversations by discharge.   9/9/2020 1548 by Pippa Landon RN  Outcome: Improving    Face to face end of shift report  communicated to oncoming RN.     Pippa Landon RN  9/9/2020  3:50 PM

## 2020-09-10 NOTE — PLAN OF CARE
Face to face report received from Pippa PECK RN. Pt. Observed.     Problem: Adult Behavioral Health Plan of Care  Goal: Patient-Specific Goal (Individualization)  Description:   Cooperate with treatment team recommendations including medications.   Will sleep 6-8 hrs nightly.  Attend at least 50% groups.       9/10/2020 0245 by Marleny Singh RN  Outcome: No Change  Note: Pt has been in bed with eyes closed and regular respirations x 6.5 hours this noc shift. 15 minute and PRN checks all night. 0230 Pt. Standing outside nurses station staring at staff. When staff ask pt. If she needs anythig, pt. Does not reply. Staff out to lounge. Pt. Requesting snack. Pt. Administered snack and does not return spoon. Staff found spoon in garbage. Pt. To nurses station @ 0600 pounding on window. Pt. Redirected. Will continue to monitor.     Face to face end of shift report to be communicated to oncoming RN.     Marleny Singh RN  9/10/2020

## 2020-09-10 NOTE — PLAN OF CARE
Called [facility] to gather insight on their bed availability and insurance qualifications. Pt would need MA in place before able to get screened and bed availability for women could be 90 days out. Will continue to follow-up with patient financial on pt's MA status and will looks at other placement options as well.    Talked 1:1 with pt today to give update and gather insight on how they have been doing. Pt talked at length about their medications and how they were upset about the changes being made. Let pt know about [facility] waitlist and pt told me they would be open to any IRTS facilities in [place]. Will continue to follow-up with IRTS bed availability and MA insurance. Pt requested commitment paperwork. Will request this to be sent via email from court admin, as mail copy is still on it's way.

## 2020-09-10 NOTE — PLAN OF CARE
Face to face shift report received from Marleny PRITCHETT RN. Rounding completed, pt observed.     Patient out in lounge at the start of shift.   0800- Patient denies SI, HI, anxiety or depression. Patient does report some pain in her right shoulder. Patient requested Naproxen 500 mg which was given to her at 0817 for pain rated at 5/10. Patient reported the Naproxen did help the pain and rated about a 3/.  0940- Patient reported having shortness of breath. Patient's O2 was taken and showing at 95%. Patient requested her albuterol inhaler. Patient was given her inhaler and patient took 2 puffs of the albuterol inhaler at 0946. Patient at 1015 did report that the albuterol did help. Patient requested status of the commitment paperwork. Patient was advised that this is usually sent by mail and that it can take a while for it to come in.   Patient has been flushed in the face today. Patient reports being really warm. Patient's temperature has been in range. Maintenance was requested to turn heat down due to patient's request.     Problem: Adult Behavioral Health Plan of Care  Goal: Patient-Specific Goal (Individualization)  Description:   Cooperate with treatment team recommendations including medications.   Will sleep 6-8 hrs nightly.  Attend at least 50% groups.       9/10/2020 0743 by Tsering Johnson RN  Outcome: Improving     Problem: Thought Process Alteration  Goal: Optimal Thought Clarity  Description: Will have reality based conversations by discharge.   Outcome: Improving

## 2020-09-11 LAB — TROPONIN I SERPL-MCNC: <0.015 UG/L (ref 0–0.04)

## 2020-09-11 PROCEDURE — 93010 ELECTROCARDIOGRAM REPORT: CPT | Performed by: INTERNAL MEDICINE

## 2020-09-11 PROCEDURE — 84484 ASSAY OF TROPONIN QUANT: CPT | Performed by: NURSE PRACTITIONER

## 2020-09-11 PROCEDURE — 25000132 ZZH RX MED GY IP 250 OP 250 PS 637: Mod: GY | Performed by: NURSE PRACTITIONER

## 2020-09-11 PROCEDURE — 93005 ELECTROCARDIOGRAM TRACING: CPT

## 2020-09-11 PROCEDURE — 36415 COLL VENOUS BLD VENIPUNCTURE: CPT | Performed by: NURSE PRACTITIONER

## 2020-09-11 PROCEDURE — 99233 SBSQ HOSP IP/OBS HIGH 50: CPT | Performed by: NURSE PRACTITIONER

## 2020-09-11 PROCEDURE — 12400011

## 2020-09-11 RX ORDER — ASPIRIN 81 MG/1
81 TABLET ORAL DAILY
Status: DISCONTINUED | OUTPATIENT
Start: 2020-09-11 | End: 2020-09-18 | Stop reason: HOSPADM

## 2020-09-11 RX ORDER — RAMELTEON 8 MG/1
8 TABLET ORAL AT BEDTIME
Status: DISCONTINUED | OUTPATIENT
Start: 2020-09-11 | End: 2020-09-12

## 2020-09-11 RX ADMIN — DOCUSATE SODIUM 50 MG: 50 CAPSULE, LIQUID FILLED ORAL at 14:10

## 2020-09-11 RX ADMIN — PROPRANOLOL HYDROCHLORIDE 10 MG: 10 TABLET ORAL at 20:43

## 2020-09-11 RX ADMIN — ACETAMINOPHEN 650 MG: 325 TABLET, FILM COATED ORAL at 14:10

## 2020-09-11 RX ADMIN — NAPROXEN 500 MG: 500 TABLET ORAL at 06:10

## 2020-09-11 RX ADMIN — ARIPIPRAZOLE 15 MG: 15 TABLET ORAL at 20:43

## 2020-09-11 RX ADMIN — PROPRANOLOL HYDROCHLORIDE 10 MG: 10 TABLET ORAL at 14:06

## 2020-09-11 RX ADMIN — LORAZEPAM 0.5 MG: 0.5 TABLET ORAL at 11:39

## 2020-09-11 RX ADMIN — NICOTINE 1 PATCH: 14 PATCH, EXTENDED RELEASE TRANSDERMAL at 08:09

## 2020-09-11 RX ADMIN — DOCUSATE SODIUM 50 MG: 50 CAPSULE, LIQUID FILLED ORAL at 20:44

## 2020-09-11 RX ADMIN — ALBUTEROL SULFATE 2 PUFF: 90 AEROSOL, METERED RESPIRATORY (INHALATION) at 16:03

## 2020-09-11 RX ADMIN — NAPROXEN 500 MG: 500 TABLET ORAL at 20:44

## 2020-09-11 RX ADMIN — PROPRANOLOL HYDROCHLORIDE 10 MG: 10 TABLET ORAL at 08:09

## 2020-09-11 RX ADMIN — LORAZEPAM 0.5 MG: 0.5 TABLET ORAL at 16:49

## 2020-09-11 RX ADMIN — ASPIRIN 81 MG: 81 TABLET, COATED ORAL at 11:30

## 2020-09-11 ASSESSMENT — ACTIVITIES OF DAILY LIVING (ADL)
HYGIENE/GROOMING: INDEPENDENT
ORAL_HYGIENE: INDEPENDENT
DRESS: SCRUBS (BEHAVIORAL HEALTH);INDEPENDENT
HYGIENE/GROOMING: INDEPENDENT

## 2020-09-11 NOTE — PLAN OF CARE
Face to face report received from Pippa PECK RN. Pt. Observed.     Problem: Adult Behavioral Health Plan of Care  Goal: Patient-Specific Goal (Individualization)  Description:   Cooperate with treatment team recommendations including medications.   Will sleep 6-8 hrs nightly.  Attend at least 50% groups.       Outcome: No Change  Note: Pt has been in bed with eyes closed and regular respirations x 7 hours this noc shift. 15 minute and PRN checks all night. 0610 Pt. Administered PRN Naproxen 500 mg po Per pt. Request for left shoulder pain. Pt. Requesting a order for Asprin 81 mg. Provider sticky noted. Will continue to monitor.     Face to face end of shift report to be communicated to oncoming RN.     Marleny Singh RN  9/11/2020

## 2020-09-11 NOTE — PLAN OF CARE
"Met 1:1 with pt today. Pt signed financial paperwork needed for MA application. Emailed paperwork back to [facility] specialist \"Liseth\". Will continue to look into IRTS programs bed availability and MA application status.   "

## 2020-09-11 NOTE — PROGRESS NOTES
"[facility]  Psychiatric Progress Note      Impression:   Patient had court 9/4, full commit and Pop supported.    Is very anxious today. States jessica is having chest pain and \"my fingers are swelling up, so is my abdomen and I didn't sleep last night\". jessica is very upest that seruoquel was stopped I did tell her that I can not continue it wtihout it being on her pop.i did tell her that I would request to ammend the pop. jessica doesn't appear to have akathesia. She has a mild tremor though difficult to tell if it is fiegned. Does have many somatic complaints. Also reporting \"seeing things out of my peripheral vision again\". Which keep her awake at night. Is more pressurred and disjointed.   Educated regarding medication indications, risks, benefits, side effects, contraindications and possible interactions. Verbally expressed understanding.        Diagnoses:     Schizoaffective Disorder, bipolar type    Attestation:  Patient has been seen and evaluated by me,  Haleigh Kolb NP          Interim History:   The patient's care was discussed with the treatment team and chart notes were reviewed.           Plan/Treatment Team     BEHAVIORAL TEAM DISCUSSION    Progress: moderate    Continued Stay Criteria/Rationale: Impaired thought process, committed and Pop, discharge planning needed    Medical/Physical: tendinosis of rotator cuff likely cause of pain.     Precautions: none    Falls precaution?: No  Behavioral Orders   Procedures     Code 1 - Restrict to Unit     Routine Programming     As clinically indicated     Status 15             Medications:     Current Facility-Administered Medications   Medication     acetaminophen (TYLENOL) tablet 650 mg     albuterol (PROAIR HFA/PROVENTIL HFA/VENTOLIN HFA) 108 (90 Base) MCG/ACT inhaler 1-2 puff     ARIPiprazole (ABILIFY) tablet 15 mg     artificial saliva (BIOTENE MT) solution 2 spray     docusate sodium (COLACE) capsule 50 mg     LORazepam " (ATIVAN) tablet 0.5 mg     magnesium hydroxide (MILK OF MAGNESIA) suspension 30 mL     naproxen (NAPROSYN) tablet 500 mg     nicotine (NICODERM CQ) 14 MG/24HR 24 hr patch 1 patch     nicotine (NICORETTE) gum 2-4 mg     nicotine Patch in Place     OLANZapine (zyPREXA) tablet 5-10 mg    Or     OLANZapine (zyPREXA) injection 10 mg     propranolol (INDERAL) tablet 10 mg     ramelteon (ROZEREM) tablet 8 mg          10 point ROS denies uti symptoms though ua positive       Allergies:     Allergies   Allergen Reactions     Shrimp Anaphylaxis     Risperdal [Risperidone] Other (See Comments)     Elevated prolactin            Psychiatric Examination:   /84   Pulse 81   Temp 97.6  F (36.4  C) (Tympanic)   Resp 18   Ht 1.524 m (5')   Wt 91.2 kg (201 lb)   SpO2 93%   BMI 39.26 kg/m    Weight is 201 lbs 0 oz  Body mass index is 39.26 kg/m .    MSE/PSYCH  PSYCHIATRIC EXAM  /84   Pulse 81   Temp 97.6  F (36.4  C) (Tympanic)   Resp 18   Ht 1.524 m (5')   Wt 91.2 kg (201 lb)   SpO2 93%   BMI 39.26 kg/m    -Appearance/Behavior:  Appropriate  -Motor: intact  -Gait: intact  -Abnormal involuntary movements: none  -Mood: cooperative, brighter  -Affect: brighter  -Speech: No stuttering today - appears to have been doing this purposefully  -Thought process/associations: no delusional statement today  -Thought content: remains guarded   -Perceptual disturbances: denies si/hi,   -Suicidal/Homicidal Ideation: denies    -Judgment: poor  -Insight: poor  *Orientation: time, place and person.  *Memory: difficult to assess  *Attention: poor  *Language: fluent, no aphasias, able to repeat phrases and name objects. Vocab intact.  *Fund of information: difficult to assess, appears appropriate  *Cognitive functioning estimate: 0 - independent.           Labs:     Results for orders placed or performed during the hospital encounter of 08/10/20   XR Humerus Port Left G/E 2 Views     Status: None    Narrative    Exam: XR HUMERUS  PORT LT     History:Female, age 50 years, left upper arm pain after fall.    Comparison:  None    Technique: Two views are submitted    Findings: Bones are normally mineralized. No evidence of acute or  subacute fracture.  No evidence of dislocation.  Calcific tendinosis  of the rotator cuff.           Impression    Impression:  No evidence of acute or subacute bony abnormality.     Calcifications of the shoulder suggest calcific tendinosis of the  rotator cuff. MRI would better characterize.    LUPE SANCHEZ MD   CBC with platelets differential     Status: Abnormal   Result Value Ref Range    WBC 13.7 (H) 4.0 - 11.0 10e9/L    RBC Count 5.09 3.8 - 5.2 10e12/L    Hemoglobin 17.2 (H) 11.7 - 15.7 g/dL    Hematocrit 49.2 (H) 35.0 - 47.0 %    MCV 97 78 - 100 fl    MCH 33.8 (H) 26.5 - 33.0 pg    MCHC 35.0 31.5 - 36.5 g/dL    RDW 12.6 10.0 - 15.0 %    Platelet Count 185 150 - 450 10e9/L    Diff Method Automated Method     % Neutrophils 82.4 %    % Lymphocytes 10.4 %    % Monocytes 6.3 %    % Eosinophils 0.3 %    % Basophils 0.2 %    % Immature Granulocytes 0.4 %    Nucleated RBCs 0 0 /100    Absolute Neutrophil 11.3 (H) 1.6 - 8.3 10e9/L    Absolute Lymphocytes 1.4 0.8 - 5.3 10e9/L    Absolute Monocytes 0.9 0.0 - 1.3 10e9/L    Absolute Eosinophils 0.0 0.0 - 0.7 10e9/L    Absolute Basophils 0.0 0.0 - 0.2 10e9/L    Abs Immature Granulocytes 0.1 0 - 0.4 10e9/L    Absolute Nucleated RBC 0.0    Comprehensive metabolic panel     Status: Abnormal   Result Value Ref Range    Sodium 129 (L) 133 - 144 mmol/L    Potassium 4.1 3.4 - 5.3 mmol/L    Chloride 95 94 - 109 mmol/L    Carbon Dioxide 25 20 - 32 mmol/L    Anion Gap 9 3 - 14 mmol/L    Glucose 107 (H) 70 - 99 mg/dL    Urea Nitrogen 9 7 - 30 mg/dL    Creatinine 0.54 0.52 - 1.04 mg/dL    GFR Estimate >90 >60 mL/min/[1.73_m2]    GFR Estimate If Black >90 >60 mL/min/[1.73_m2]    Calcium 9.1 8.5 - 10.1 mg/dL    Bilirubin Total 0.7 0.2 - 1.3 mg/dL    Albumin 3.2 (L) 3.4 - 5.0 g/dL     Protein Total 7.3 6.8 - 8.8 g/dL    Alkaline Phosphatase 131 40 - 150 U/L    ALT 57 (H) 0 - 50 U/L    AST 32 0 - 45 U/L   Alcohol ethyl     Status: None   Result Value Ref Range    Ethanol g/dL <0.01 0.01 g/dL   UA reflex to Microscopic     Status: Abnormal   Result Value Ref Range    Color Urine Yellow     Appearance Urine Slightly Cloudy     Glucose Urine Negative NEG^Negative mg/dL    Bilirubin Urine Negative NEG^Negative    Ketones Urine Negative NEG^Negative mg/dL    Specific Gravity Urine 1.017 1.003 - 1.035    Blood Urine Negative NEG^Negative    pH Urine 6.0 4.7 - 8.0 pH    Protein Albumin Urine 10 (A) NEG^Negative mg/dL    Urobilinogen mg/dL Normal 0.0 - 2.0 mg/dL    Nitrite Urine Negative NEG^Negative    Leukocyte Esterase Urine Moderate (A) NEG^Negative    Source Midstream Urine     RBC Urine 4 (H) 0 - 2 /HPF    WBC Urine 18 (H) 0 - 5 /HPF    Bacteria Urine Few (A) NEG^Negative /HPF    Squamous Epithelial /HPF Urine 19 (H) 0 - 1 /HPF    Mucous Urine Present (A) NEG^Negative /LPF   Urine Drugs of Abuse Screen Panel 13     Status: None   Result Value Ref Range    Cannabinoids (39-vqw-5-carboxy-9-THC) Not Detected NDET^Not Detected ng/mL    Phencyclidine (Phencyclidine) Not Detected NDET^Not Detected ng/mL    Cocaine (Benzoylecgonine) Not Detected NDET^Not Detected ng/mL    Methamphetamine (d-Methamphetamine) Not Detected NDET^Not Detected ng/mL    Opiates (Morphine) Not Detected NDET^Not Detected ng/mL    Amphetamine (d-Amphetamine) Not Detected NDET^Not Detected ng/mL    Benzodiazepines (Nordiazepam) Not Detected NDET^Not Detected ng/mL    Tricyclic Antidepressants (Desipramine) Not Detected NDET^Not Detected ng/mL    Methadone (Methadone) Not Detected NDET^Not Detected ng/mL    Barbiturates (Butalbital) Not Detected NDET^Not Detected ng/mL    Oxycodone (Oxycodone) Not Detected NDET^Not Detected ng/mL    Propoxyphene (Norpropoxyphene) Not Detected NDET^Not Detected ng/mL    Buprenorphine (Buprenorphine)  Not Detected NDET^Not Detected ng/mL   Asymptomatic COVID-19 Virus (Coronavirus) by PCR     Status: None    Specimen: Nasopharyngeal   Result Value Ref Range    COVID-19 Virus PCR to [facility] - Source Nasopharyngeal     COVID-19 Virus PCR to [facility] - Result       Test received-See reflex to [facility] [place] test SARS CoV2 (COVID-19) Virus RT-PCR   SARS-CoV-2 COVID-19 Virus (Coronavirus) RT-PCR Nasopharyngeal     Status: None    Specimen: Nasopharyngeal   Result Value Ref Range    SARS-CoV-2 Virus Specimen Source Nasopharyngeal     SARS-CoV-2 PCR Result NEGATIVE     SARS-CoV-2 PCR Comment       Testing was performed using the Xpert Xpress SARS-CoV-2 Assay on the Cepheid Gene-Xpert   Instrument Systems. Additional information about this Emergency Use Authorization (EUA)   assay can be found via the Lab Guide.     Basic metabolic panel     Status: Abnormal   Result Value Ref Range    Sodium 133 133 - 144 mmol/L    Potassium 3.1 (L) 3.4 - 5.3 mmol/L    Chloride 100 94 - 109 mmol/L    Carbon Dioxide 26 20 - 32 mmol/L    Anion Gap 7 3 - 14 mmol/L    Glucose 178 (H) 70 - 99 mg/dL    Urea Nitrogen 12 7 - 30 mg/dL    Creatinine 0.52 0.52 - 1.04 mg/dL    GFR Estimate >90 >60 mL/min/[1.73_m2]    GFR Estimate If Black >90 >60 mL/min/[1.73_m2]    Calcium 8.8 8.5 - 10.1 mg/dL   Potassium     Status: None   Result Value Ref Range    Potassium 4.5 3.4 - 5.3 mmol/L   UA reflex to Microscopic     Status: None   Result Value Ref Range    Color Urine Light Yellow     Appearance Urine Clear     Glucose Urine Negative NEG^Negative mg/dL    Bilirubin Urine Negative NEG^Negative    Ketones Urine Negative NEG^Negative mg/dL    Specific Gravity Urine 1.018 1.003 - 1.035    Blood Urine Negative NEG^Negative    pH Urine 6.5 4.7 - 8.0 pH    Protein Albumin Urine Negative NEG^Negative mg/dL    Urobilinogen mg/dL Normal 0.0 - 2.0 mg/dL    Nitrite Urine Negative NEG^Negative    Leukocyte Esterase Urine Negative NEG^Negative    Source Midstream Urine     CBC with platelets differential     Status: Abnormal   Result Value Ref Range    WBC 7.2 4.0 - 11.0 10e9/L    RBC Count 4.75 3.8 - 5.2 10e12/L    Hemoglobin 16.0 (H) 11.7 - 15.7 g/dL    Hematocrit 46.5 35.0 - 47.0 %    MCV 98 78 - 100 fl    MCH 33.7 (H) 26.5 - 33.0 pg    MCHC 34.4 31.5 - 36.5 g/dL    RDW 12.1 10.0 - 15.0 %    Platelet Count 322 150 - 450 10e9/L    Diff Method Automated Method     % Neutrophils 63.1 %    % Lymphocytes 26.4 %    % Monocytes 7.1 %    % Eosinophils 2.2 %    % Basophils 0.6 %    % Immature Granulocytes 0.6 %    Nucleated RBCs 0 0 /100    Absolute Neutrophil 4.5 1.6 - 8.3 10e9/L    Absolute Lymphocytes 1.9 0.8 - 5.3 10e9/L    Absolute Monocytes 0.5 0.0 - 1.3 10e9/L    Absolute Eosinophils 0.2 0.0 - 0.7 10e9/L    Absolute Basophils 0.0 0.0 - 0.2 10e9/L    Abs Immature Granulocytes 0.0 0 - 0.4 10e9/L    Absolute Nucleated RBC 0.0    Comprehensive metabolic panel     Status: Abnormal   Result Value Ref Range    Sodium 135 133 - 144 mmol/L    Potassium 4.3 3.4 - 5.3 mmol/L    Chloride 104 94 - 109 mmol/L    Carbon Dioxide 27 20 - 32 mmol/L    Anion Gap 4 3 - 14 mmol/L    Glucose 111 (H) 70 - 99 mg/dL    Urea Nitrogen 13 7 - 30 mg/dL    Creatinine 0.59 0.52 - 1.04 mg/dL    GFR Estimate >90 >60 mL/min/[1.73_m2]    GFR Estimate If Black >90 >60 mL/min/[1.73_m2]    Calcium 9.0 8.5 - 10.1 mg/dL    Bilirubin Total 0.3 0.2 - 1.3 mg/dL    Albumin 3.0 (L) 3.4 - 5.0 g/dL    Protein Total 6.7 (L) 6.8 - 8.8 g/dL    Alkaline Phosphatase 103 40 - 150 U/L    ALT 61 (H) 0 - 50 U/L    AST 22 0 - 45 U/L       Every 15 minutes.       Plan for this encounter/Med Changes/Labs:     Will try to have pop addended to add seroquel.   Restart rozerem. She slept the night she got it.   ekg and troponin    Encourage decreased caffeine intake and increase other fluid intake    Seroquel is not on her Pop therefore I will have to discontinue this medication    Full commit and Pop    Pop med list  abishawnay,  invega, latuda, zyprexa

## 2020-09-11 NOTE — PLAN OF CARE
"Problem: Adult Behavioral Health Plan of Care  Goal: Patient-Specific Goal (Individualization)  Description:   Cooperate with treatment team recommendations including medications.   Will sleep 6-8 hrs nightly.  Attend at least 50% groups.    9/11/2020 1636 by Radha Loaiza, RN  Outcome: Improving  Note: Pt reported that she had chest pain and an emesis this morning - she reports that she is feeling \"better\", attributes improvement to receiving baby aspirin and ativan. Pt believes that Abilify is causing her to have \"extreme anxiety\", \"stuttering\". Pt gave this writer a list of concerns she would like addressed: Pt c/o water retention and high BP. BP at 1600 was 151/81. Pt's fingers are swollen. She also c/o pain in her left shoulder and right knee. She stated \"I feel through rotten deck boards.\" Pt reported that she had an X-RAY approximately one month ago; however, would like an MRI. Pt also c/o poor sleep although charting indicates that she slept 7 hours last night. She discussed how she would like to get Seroquel back for sleep. Reminded pt that provider is trying to amend her Tineo to include this as a medication. Pt refused her scheduled Rozerem. Pt concerned that she will have \"night terrors\" on medication.     1603 - Pt used her PRN Albuterol inhaler for c/o shortness of breath.     1649 - Pt requested and rec'd 0.5 mg of PRN Ativan for anxiety. Good effect reported.     2044 - Pt requested and rec'd 50 mg of PRN Colace and 500 mg of PRN Naproxen for \"7/10\" left shoulder and right knee pain.       Problem: Thought Process Alteration  Goal: Optimal Thought Clarity  Description: Will have reality based conversations by discharge.   9/11/2020 1636 by Radha Loaiza, RN  Outcome: Improving  Note: Pt was able to have a reality based conversation. Thinking was occasionally disorganized. Pt stated \"I think I have unmedicated attention deficit\" and some \"manic episodes.\" She denied hallucinations. No evidence of " delusional thinking noted.

## 2020-09-11 NOTE — PLAN OF CARE
"Problem: Adult Behavioral Health Plan of Care  Goal: Patient-Specific Goal (Individualization)  Description:   Cooperate with treatment team recommendations including medications.   Will sleep 6-8 hrs nightly.  Attend at least 50% groups.     Pt up in lounge at start of shift. According to charting, slept approx. 7 hours last night. Pt states did not sleep well as pt no longer has seroquel. Ate 100% of breakfast. Compliant with medications and assessment. Does endorse anxiety; states that she does not like the abilify and feels that she needs either a different medication or something else for anxiety. Pt has a list that she is wanting to speak with NP about; did see pt speaking with NP this am. One thing on her list is that she feels she is retaining water and has a slightly red face. States her face is improving from prior days. NP did place orders for troponin and EKG which were completed. Pressured speech. Did have a small emesis this morning. Pt states this \"feeling\" happened last time she was put on a neuroleptic. Bed resting after breakfast. Did not attend groups today. Pt is attempting to substitute some coffee with hot chocolate. Will continue to monitor.    1139 Pt states she continues to not feel well today. States the propanolol helps some but not much. States she has to keep her brain \"dull\" so her anxiety doesn't increase like it did this morning. Prn ativan administered at this time. Pt states the lounge is \"too much\" for her today.  1200 Ate 100% of lunch.  1410 C/o 7/10 L shoulder pain. Prn tylenol administered per pt request. Prn colace administered at this time as well. Sitting out in lounge with peer.    Outcome: No Change     Problem: Thought Process Alteration  Goal: Optimal Thought Clarity  Description: Will have reality based conversations by discharge.   Outcome: No Change     Face to face end of shift report communicated to [facility].     Becca Waldron RN  9/11/2020  "

## 2020-09-11 NOTE — PLAN OF CARE
Faxed letter from provider to court administration requesting the addition of a medication to the Tineo petition.  Placed the letter in the chart.

## 2020-09-12 PROCEDURE — 12400011

## 2020-09-12 PROCEDURE — 99232 SBSQ HOSP IP/OBS MODERATE 35: CPT | Performed by: NURSE PRACTITIONER

## 2020-09-12 PROCEDURE — 25000132 ZZH RX MED GY IP 250 OP 250 PS 637: Mod: GY | Performed by: NURSE PRACTITIONER

## 2020-09-12 RX ORDER — PROPRANOLOL HYDROCHLORIDE 20 MG/1
20 TABLET ORAL 3 TIMES DAILY
Status: DISCONTINUED | OUTPATIENT
Start: 2020-09-12 | End: 2020-09-14

## 2020-09-12 RX ORDER — ARIPIPRAZOLE 10 MG/1
10 TABLET ORAL AT BEDTIME
Status: DISCONTINUED | OUTPATIENT
Start: 2020-09-12 | End: 2020-09-18 | Stop reason: HOSPADM

## 2020-09-12 RX ADMIN — NAPROXEN 500 MG: 500 TABLET ORAL at 17:23

## 2020-09-12 RX ADMIN — PROPRANOLOL HYDROCHLORIDE 10 MG: 10 TABLET ORAL at 08:26

## 2020-09-12 RX ADMIN — ARIPIPRAZOLE 10 MG: 10 TABLET ORAL at 20:24

## 2020-09-12 RX ADMIN — ALBUTEROL SULFATE 2 PUFF: 90 AEROSOL, METERED RESPIRATORY (INHALATION) at 18:43

## 2020-09-12 RX ADMIN — NAPROXEN 500 MG: 500 TABLET ORAL at 05:45

## 2020-09-12 RX ADMIN — ASPIRIN 81 MG: 81 TABLET, COATED ORAL at 08:26

## 2020-09-12 RX ADMIN — PROPRANOLOL HYDROCHLORIDE 20 MG: 20 TABLET ORAL at 13:02

## 2020-09-12 RX ADMIN — NICOTINE 1 PATCH: 14 PATCH, EXTENDED RELEASE TRANSDERMAL at 08:25

## 2020-09-12 RX ADMIN — PROPRANOLOL HYDROCHLORIDE 20 MG: 20 TABLET ORAL at 20:24

## 2020-09-12 RX ADMIN — LORAZEPAM 0.5 MG: 0.5 TABLET ORAL at 18:43

## 2020-09-12 RX ADMIN — ACETAMINOPHEN 650 MG: 325 TABLET, FILM COATED ORAL at 13:02

## 2020-09-12 RX ADMIN — LORAZEPAM 0.5 MG: 0.5 TABLET ORAL at 12:04

## 2020-09-12 RX ADMIN — DOCUSATE SODIUM 50 MG: 50 CAPSULE, LIQUID FILLED ORAL at 17:23

## 2020-09-12 ASSESSMENT — ACTIVITIES OF DAILY LIVING (ADL)
LAUNDRY: UNABLE TO COMPLETE
DRESS: SCRUBS (BEHAVIORAL HEALTH);INDEPENDENT
HYGIENE/GROOMING: INDEPENDENT
ORAL_HYGIENE: INDEPENDENT
HYGIENE/GROOMING: INDEPENDENT
ORAL_HYGIENE: INDEPENDENT
LAUNDRY: UNABLE TO COMPLETE
DRESS: SCRUBS (BEHAVIORAL HEALTH)

## 2020-09-12 ASSESSMENT — MIFFLIN-ST. JEOR: SCORE: 1450.5

## 2020-09-12 NOTE — PLAN OF CARE
Problem: Thought Process Alteration  Goal: Optimal Thought Clarity  Description: Will have reality based conversations by discharge.   Outcome: Improving   Patient is able to have a linear, reality based conversation with this writer.       Problem: Adult Behavioral Health Plan of Care  Goal: Patient-Specific Goal (Individualization)  Description:   Cooperate with treatment team recommendations including medications.   Will sleep 6-8 hrs nightly.  Attend at least 50% groups.       Outcome: Improving  Note:   Patient has been calm, cooperative, and medication compliant this shift.  She reports feeling anxious but denies all other mental health criteria.  Patient feels that she does not need to be here and doesn't think she has mental health concerns.  She did request ativan ad received 0,5 mg at 1204 for reports of feeling restless and shaky.  Spent most of the day in the lounge and is social with peers.  Reviewed medication changes with patient.  She verbalizes understanding.  Reported shoulder pain and received tylenol 650 mg at 1302 with good relief of symptoms.  VS WNL.  Face to face end of shift report communicated to evening shift RN.     Nat Hampton RN  9/12/2020  2:26 PM

## 2020-09-12 NOTE — PROGRESS NOTES
"[facility]  Psychiatric Progress Note      Impression:   Patient had court 9/4, full commit and Pop supported.    She appears to have some improvement and slept on and off about 5 hours total last night.  She states that she does not feel better than she did yesterday.  Her eyes appear quite reddened likely from limited sleep.  She is still a bit pressured and a bit elated.  2 days ago she found out that her daughter is 5 weeks pregnant.  She is very excited about this.  This will be her first grandchild.  She states she is very thankful that I have sent a letter to the court requesting Seroquel be added to her pop and states that once she restarts that she will likely sleep very well.  She does not want to take the Rozerem.  She stated she never took the dose that I had prescribed her a few days ago she has no suicidal thoughts she is looking forward to discharge and is still hoping to go to [facility].  She is social with other patients.  She still has multiple complaints and shows me her fingers and states that they are very swollen.  Initially she stated it was from the Abilify though she then stated that her fingers have been like this for very long time.  They are not red and.  She states her knuckles hurt and states they always hurt \"because of my immune disorder\" she does not have a diagnosis of any documented immune disorder though she has had multiple tests and and it appears that there was some question if she had an immune disorder but I do not believe she followed up with rheumatology or an immunologist  Educated regarding medication indications, risks, benefits, side effects, contraindications and possible interactions. Verbally expressed understanding.        Diagnoses:     Schizoaffective Disorder, bipolar type    Attestation:  Patient has been seen and evaluated by me,  Haleigh Kolb NP          Interim History:   The patient's care was discussed with the treatment " team and chart notes were reviewed.           Plan/Treatment Team     BEHAVIORAL TEAM DISCUSSION    Progress: moderate    Continued Stay Criteria/Rationale: Impaired thought process, committed and Tineo, discharge planning needed    Medical/Physical: tendinosis of rotator cuff likely cause of pain.     Precautions: none    Falls precaution?: No  Behavioral Orders   Procedures     Code 1 - Restrict to Unit     Routine Programming     As clinically indicated     Status 15             Medications:     Current Facility-Administered Medications   Medication     acetaminophen (TYLENOL) tablet 650 mg     albuterol (PROAIR HFA/PROVENTIL HFA/VENTOLIN HFA) 108 (90 Base) MCG/ACT inhaler 1-2 puff     ARIPiprazole (ABILIFY) tablet 15 mg     artificial saliva (BIOTENE MT) solution 2 spray     aspirin EC tablet 81 mg     docusate sodium (COLACE) capsule 50 mg     LORazepam (ATIVAN) tablet 0.5 mg     magnesium hydroxide (MILK OF MAGNESIA) suspension 30 mL     naproxen (NAPROSYN) tablet 500 mg     nicotine (NICODERM CQ) 14 MG/24HR 24 hr patch 1 patch     nicotine (NICORETTE) gum 2-4 mg     nicotine Patch in Place     OLANZapine (zyPREXA) tablet 5-10 mg    Or     OLANZapine (zyPREXA) injection 10 mg     propranolol (INDERAL) tablet 20 mg     ramelteon (ROZEREM) tablet 8 mg          10 point ROS denies uti symptoms though ua positive       Allergies:     Allergies   Allergen Reactions     Shrimp Anaphylaxis     Risperdal [Risperidone] Other (See Comments)     Elevated prolactin            Psychiatric Examination:   /79   Pulse 83   Temp 97  F (36.1  C) (Tympanic)   Resp 18   Ht 1.524 m (5')   Wt 91.4 kg (201 lb 8 oz)   SpO2 95%   BMI 39.35 kg/m    Weight is 201 lbs 8 oz  Body mass index is 39.35 kg/m .    MSE/PSYCH  PSYCHIATRIC EXAM  /79   Pulse 83   Temp 97  F (36.1  C) (Tympanic)   Resp 18   Ht 1.524 m (5')   Wt 91.4 kg (201 lb 8 oz)   SpO2 95%   BMI 39.35 kg/m    -Appearance/Behavior:   Appropriate  -Motor: intact  -Gait: intact  -Abnormal involuntary movements: none  -Mood: cooperative, brighter  -Affect: brighter  -Speech: No stuttering today - appears to have been doing this purposefully  -Thought process/associations: no delusional statement today  -Thought content: remains guarded   -Perceptual disturbances: denies si/hi,   -Suicidal/Homicidal Ideation: denies    -Judgment: poor  -Insight: poor  *Orientation: time, place and person.  *Memory: difficult to assess  *Attention: poor  *Language: fluent, no aphasias, able to repeat phrases and name objects. Vocab intact.  *Fund of information: difficult to assess, appears appropriate  *Cognitive functioning estimate: 0 - independent.           Labs:     Results for orders placed or performed during the hospital encounter of 08/10/20   XR Humerus Port Left G/E 2 Views     Status: None    Narrative    Exam: XR HUMERUS PORT LT     History:Female, age 50 years, left upper arm pain after fall.    Comparison:  None    Technique: Two views are submitted    Findings: Bones are normally mineralized. No evidence of acute or  subacute fracture.  No evidence of dislocation.  Calcific tendinosis  of the rotator cuff.           Impression    Impression:  No evidence of acute or subacute bony abnormality.     Calcifications of the shoulder suggest calcific tendinosis of the  rotator cuff. MRI would better characterize.    LUPE SANCHEZ MD   CBC with platelets differential     Status: Abnormal   Result Value Ref Range    WBC 13.7 (H) 4.0 - 11.0 10e9/L    RBC Count 5.09 3.8 - 5.2 10e12/L    Hemoglobin 17.2 (H) 11.7 - 15.7 g/dL    Hematocrit 49.2 (H) 35.0 - 47.0 %    MCV 97 78 - 100 fl    MCH 33.8 (H) 26.5 - 33.0 pg    MCHC 35.0 31.5 - 36.5 g/dL    RDW 12.6 10.0 - 15.0 %    Platelet Count 185 150 - 450 10e9/L    Diff Method Automated Method     % Neutrophils 82.4 %    % Lymphocytes 10.4 %    % Monocytes 6.3 %    % Eosinophils 0.3 %    % Basophils 0.2 %    %  Immature Granulocytes 0.4 %    Nucleated RBCs 0 0 /100    Absolute Neutrophil 11.3 (H) 1.6 - 8.3 10e9/L    Absolute Lymphocytes 1.4 0.8 - 5.3 10e9/L    Absolute Monocytes 0.9 0.0 - 1.3 10e9/L    Absolute Eosinophils 0.0 0.0 - 0.7 10e9/L    Absolute Basophils 0.0 0.0 - 0.2 10e9/L    Abs Immature Granulocytes 0.1 0 - 0.4 10e9/L    Absolute Nucleated RBC 0.0    Comprehensive metabolic panel     Status: Abnormal   Result Value Ref Range    Sodium 129 (L) 133 - 144 mmol/L    Potassium 4.1 3.4 - 5.3 mmol/L    Chloride 95 94 - 109 mmol/L    Carbon Dioxide 25 20 - 32 mmol/L    Anion Gap 9 3 - 14 mmol/L    Glucose 107 (H) 70 - 99 mg/dL    Urea Nitrogen 9 7 - 30 mg/dL    Creatinine 0.54 0.52 - 1.04 mg/dL    GFR Estimate >90 >60 mL/min/[1.73_m2]    GFR Estimate If Black >90 >60 mL/min/[1.73_m2]    Calcium 9.1 8.5 - 10.1 mg/dL    Bilirubin Total 0.7 0.2 - 1.3 mg/dL    Albumin 3.2 (L) 3.4 - 5.0 g/dL    Protein Total 7.3 6.8 - 8.8 g/dL    Alkaline Phosphatase 131 40 - 150 U/L    ALT 57 (H) 0 - 50 U/L    AST 32 0 - 45 U/L   Alcohol ethyl     Status: None   Result Value Ref Range    Ethanol g/dL <0.01 0.01 g/dL   UA reflex to Microscopic     Status: Abnormal   Result Value Ref Range    Color Urine Yellow     Appearance Urine Slightly Cloudy     Glucose Urine Negative NEG^Negative mg/dL    Bilirubin Urine Negative NEG^Negative    Ketones Urine Negative NEG^Negative mg/dL    Specific Gravity Urine 1.017 1.003 - 1.035    Blood Urine Negative NEG^Negative    pH Urine 6.0 4.7 - 8.0 pH    Protein Albumin Urine 10 (A) NEG^Negative mg/dL    Urobilinogen mg/dL Normal 0.0 - 2.0 mg/dL    Nitrite Urine Negative NEG^Negative    Leukocyte Esterase Urine Moderate (A) NEG^Negative    Source Midstream Urine     RBC Urine 4 (H) 0 - 2 /HPF    WBC Urine 18 (H) 0 - 5 /HPF    Bacteria Urine Few (A) NEG^Negative /HPF    Squamous Epithelial /HPF Urine 19 (H) 0 - 1 /HPF    Mucous Urine Present (A) NEG^Negative /LPF   Urine Drugs of Abuse Screen Panel 13      Status: None   Result Value Ref Range    Cannabinoids (67-zaf-0-carboxy-9-THC) Not Detected NDET^Not Detected ng/mL    Phencyclidine (Phencyclidine) Not Detected NDET^Not Detected ng/mL    Cocaine (Benzoylecgonine) Not Detected NDET^Not Detected ng/mL    Methamphetamine (d-Methamphetamine) Not Detected NDET^Not Detected ng/mL    Opiates (Morphine) Not Detected NDET^Not Detected ng/mL    Amphetamine (d-Amphetamine) Not Detected NDET^Not Detected ng/mL    Benzodiazepines (Nordiazepam) Not Detected NDET^Not Detected ng/mL    Tricyclic Antidepressants (Desipramine) Not Detected NDET^Not Detected ng/mL    Methadone (Methadone) Not Detected NDET^Not Detected ng/mL    Barbiturates (Butalbital) Not Detected NDET^Not Detected ng/mL    Oxycodone (Oxycodone) Not Detected NDET^Not Detected ng/mL    Propoxyphene (Norpropoxyphene) Not Detected NDET^Not Detected ng/mL    Buprenorphine (Buprenorphine) Not Detected NDET^Not Detected ng/mL   Asymptomatic COVID-19 Virus (Coronavirus) by PCR     Status: None    Specimen: Nasopharyngeal   Result Value Ref Range    COVID-19 Virus PCR to [facility] - Source Nasopharyngeal     COVID-19 Virus PCR to [facility] - Result       Test received-See reflex to [place] test SARS CoV2 (COVID-19) Virus RT-PCR   SARS-CoV-2 COVID-19 Virus (Coronavirus) RT-PCR Nasopharyngeal     Status: None    Specimen: Nasopharyngeal   Result Value Ref Range    SARS-CoV-2 Virus Specimen Source Nasopharyngeal     SARS-CoV-2 PCR Result NEGATIVE     SARS-CoV-2 PCR Comment       Testing was performed using the Xpert Xpress SARS-CoV-2 Assay on the Cepheid Gene-Xpert   Instrument Systems. Additional information about this Emergency Use Authorization (EUA)   assay can be found via the Lab Guide.     Basic metabolic panel     Status: Abnormal   Result Value Ref Range    Sodium 133 133 - 144 mmol/L    Potassium 3.1 (L) 3.4 - 5.3 mmol/L    Chloride 100 94 - 109 mmol/L    Carbon Dioxide 26 20 - 32 mmol/L    Anion Gap 7 3 - 14  mmol/L    Glucose 178 (H) 70 - 99 mg/dL    Urea Nitrogen 12 7 - 30 mg/dL    Creatinine 0.52 0.52 - 1.04 mg/dL    GFR Estimate >90 >60 mL/min/[1.73_m2]    GFR Estimate If Black >90 >60 mL/min/[1.73_m2]    Calcium 8.8 8.5 - 10.1 mg/dL   Potassium     Status: None   Result Value Ref Range    Potassium 4.5 3.4 - 5.3 mmol/L   UA reflex to Microscopic     Status: None   Result Value Ref Range    Color Urine Light Yellow     Appearance Urine Clear     Glucose Urine Negative NEG^Negative mg/dL    Bilirubin Urine Negative NEG^Negative    Ketones Urine Negative NEG^Negative mg/dL    Specific Gravity Urine 1.018 1.003 - 1.035    Blood Urine Negative NEG^Negative    pH Urine 6.5 4.7 - 8.0 pH    Protein Albumin Urine Negative NEG^Negative mg/dL    Urobilinogen mg/dL Normal 0.0 - 2.0 mg/dL    Nitrite Urine Negative NEG^Negative    Leukocyte Esterase Urine Negative NEG^Negative    Source Midstream Urine    CBC with platelets differential     Status: Abnormal   Result Value Ref Range    WBC 7.2 4.0 - 11.0 10e9/L    RBC Count 4.75 3.8 - 5.2 10e12/L    Hemoglobin 16.0 (H) 11.7 - 15.7 g/dL    Hematocrit 46.5 35.0 - 47.0 %    MCV 98 78 - 100 fl    MCH 33.7 (H) 26.5 - 33.0 pg    MCHC 34.4 31.5 - 36.5 g/dL    RDW 12.1 10.0 - 15.0 %    Platelet Count 322 150 - 450 10e9/L    Diff Method Automated Method     % Neutrophils 63.1 %    % Lymphocytes 26.4 %    % Monocytes 7.1 %    % Eosinophils 2.2 %    % Basophils 0.6 %    % Immature Granulocytes 0.6 %    Nucleated RBCs 0 0 /100    Absolute Neutrophil 4.5 1.6 - 8.3 10e9/L    Absolute Lymphocytes 1.9 0.8 - 5.3 10e9/L    Absolute Monocytes 0.5 0.0 - 1.3 10e9/L    Absolute Eosinophils 0.2 0.0 - 0.7 10e9/L    Absolute Basophils 0.0 0.0 - 0.2 10e9/L    Abs Immature Granulocytes 0.0 0 - 0.4 10e9/L    Absolute Nucleated RBC 0.0    Comprehensive metabolic panel     Status: Abnormal   Result Value Ref Range    Sodium 135 133 - 144 mmol/L    Potassium 4.3 3.4 - 5.3 mmol/L    Chloride 104 94 - 109 mmol/L     Carbon Dioxide 27 20 - 32 mmol/L    Anion Gap 4 3 - 14 mmol/L    Glucose 111 (H) 70 - 99 mg/dL    Urea Nitrogen 13 7 - 30 mg/dL    Creatinine 0.59 0.52 - 1.04 mg/dL    GFR Estimate >90 >60 mL/min/[1.73_m2]    GFR Estimate If Black >90 >60 mL/min/[1.73_m2]    Calcium 9.0 8.5 - 10.1 mg/dL    Bilirubin Total 0.3 0.2 - 1.3 mg/dL    Albumin 3.0 (L) 3.4 - 5.0 g/dL    Protein Total 6.7 (L) 6.8 - 8.8 g/dL    Alkaline Phosphatase 103 40 - 150 U/L    ALT 61 (H) 0 - 50 U/L    AST 22 0 - 45 U/L   Troponin I     Status: None   Result Value Ref Range    Troponin I ES <0.015 0.000 - 0.045 ug/L       Every 15 minutes.       Plan for this encounter/Med Changes/Labs:     Discontinue Rozerem  Lower Abilify to 10 mg.  Hoping to start Seroquel on Monday if courts approve.  Did speak with her about gabapentin for anxiety and sleep and she is quite nervous to start any new medications.  Encourage decreased caffeine intake and increase other fluid intake        Full commit and Tineo    Tineo med list  abilify, invega, latuda, zyprexa

## 2020-09-12 NOTE — PLAN OF CARE
Patient slept 5 hours this shift.  She was up a few times to get snacks.  Received naproxen at 0550 for 7/10 generalized pain with good relief of symptoms.  Care continued to next shift.

## 2020-09-12 NOTE — PLAN OF CARE
"Problem: Thought Process Alteration  Goal: Optimal Thought Clarity  Description: Will have reality based conversations by discharge.   9/12/2020 1739 by Roman Braxton RN  Outcome: Improving    Pt talked completely reality based conversation with no delusional content     Problem: Adult Behavioral Health Plan of Care  Goal: Patient-Specific Goal (Individualization)  Description:   Cooperate with treatment team recommendations including medications.   Will sleep 6-8 hrs nightly.  Attend at least 50% groups.       9/12/2020 1739 by Roman Braxton, RN  Outcome: Improving  Note: Shift Summery:      Patient's Stated Goal for Shift:  \"get BP under control\"    Goal Status:  In Process    1530 Face to face rounding complete. Pt introduced to nursing for the shift.    Pt was up and active until HS medications.  Pt is concerned about her high BP's and is worried about the swelling in her hands.  Pt's BP was much better later in the evening though she was sleeping just before it was taken.  Pt talked with me about how she was not even worried about her health when she came in.  She is happy that the Abilify was reduced some. She is having significant right shoulder pain that she says is due to a torn rotator cuff muscle. Pt received Naproxen for pain and Colace for constipation at 1723.    Pt reported good pain relief.  Pt also received PRN Ativan for her anxiety at 1843 when had a good effect and also most likely reduced her blood sugar.  She is talkitive and helpful with her peers and polite and social with staff. She attended some of the groups in the evening.       2300 Face to face end of shift report communicated to Night shift RN's along with Pt's fall risk.      Roman Braxton RN  9/12/2020          "

## 2020-09-13 PROCEDURE — 25000132 ZZH RX MED GY IP 250 OP 250 PS 637: Mod: GY | Performed by: NURSE PRACTITIONER

## 2020-09-13 PROCEDURE — 12400011

## 2020-09-13 PROCEDURE — 25000125 ZZHC RX 250: Performed by: NURSE PRACTITIONER

## 2020-09-13 RX ADMIN — NAPROXEN 500 MG: 500 TABLET ORAL at 06:42

## 2020-09-13 RX ADMIN — MAGNESIUM HYDROXIDE 30 ML: 400 SUSPENSION ORAL at 13:23

## 2020-09-13 RX ADMIN — NICOTINE 1 PATCH: 14 PATCH, EXTENDED RELEASE TRANSDERMAL at 08:27

## 2020-09-13 RX ADMIN — Medication: at 20:45

## 2020-09-13 RX ADMIN — ARIPIPRAZOLE 10 MG: 10 TABLET ORAL at 20:45

## 2020-09-13 RX ADMIN — DOCUSATE SODIUM 50 MG: 50 CAPSULE, LIQUID FILLED ORAL at 08:29

## 2020-09-13 RX ADMIN — ASPIRIN 81 MG: 81 TABLET, COATED ORAL at 08:26

## 2020-09-13 RX ADMIN — PROPRANOLOL HYDROCHLORIDE 20 MG: 20 TABLET ORAL at 08:26

## 2020-09-13 RX ADMIN — LORAZEPAM 0.5 MG: 0.5 TABLET ORAL at 17:51

## 2020-09-13 RX ADMIN — LORAZEPAM 0.5 MG: 0.5 TABLET ORAL at 10:54

## 2020-09-13 RX ADMIN — PROPRANOLOL HYDROCHLORIDE 20 MG: 20 TABLET ORAL at 13:23

## 2020-09-13 RX ADMIN — PROPRANOLOL HYDROCHLORIDE 20 MG: 20 TABLET ORAL at 20:45

## 2020-09-13 ASSESSMENT — ACTIVITIES OF DAILY LIVING (ADL)
DRESS: SCRUBS (BEHAVIORAL HEALTH);INDEPENDENT
ORAL_HYGIENE: INDEPENDENT
HYGIENE/GROOMING: INDEPENDENT
DRESS: SCRUBS (BEHAVIORAL HEALTH)
HYGIENE/GROOMING: INDEPENDENT
LAUNDRY: UNABLE TO COMPLETE
ORAL_HYGIENE: INDEPENDENT
LAUNDRY: UNABLE TO COMPLETE

## 2020-09-13 NOTE — PLAN OF CARE
"  Problem: Thought Process Alteration  Goal: Optimal Thought Clarity  Description: Will have reality based conversations by discharge.   9/13/2020 1732 by Roman Braxton RN  Outcome: Improving    Pt has been able to follow conversation and respond organized     Problem: Adult Behavioral Health Plan of Care  Goal: Patient-Specific Goal (Individualization)  Description:   Cooperate with treatment team recommendations including medications.   Will sleep 6-8 hrs nightly.  Attend at least 50% groups.       9/13/2020 1732 by Roman Braxton RN  Outcome: Improving  Note: Shift Summery:      Patient's Stated Goal for Shift:  \"Pt hopes to have her   BP controlled\"    Goal Status:  In Process    1530 Face to Face rounding complete.  Pt introduced to nursing for the shift.      Pt was up during the beginning of the shift and did attend some of the groups. She has increased tooth pain in a molar that is broken and has a sharp edges.  She was ordered Oral Gel for the pain and also given some dental wax to put on the broken tooth.   She talked with me about her hopes for stabilization and eventually getting her own place.  She was given information on [facility] and thinks it would be a good program to help her transition to being on her own.  Pt's affect was fairly bright this evening. She did get PRN Ativan 0.5 at 1751 with good results.    Pt napped from 1800 until 2030 2300 Face to face end of shift report communicated to Night shift RN's along with Pt's fall risk.     Roman Braxton RN  9/13/2020       "

## 2020-09-13 NOTE — PLAN OF CARE
Problem: Thought Process Alteration  Goal: Optimal Thought Clarity  Description: Will have reality based conversations by discharge.   Outcome: Improving   Patient is able to have a linear, reality based conversation with this writer.  She denies hallucinations and delusional thinking at this time.       Problem: Adult Behavioral Health Plan of Care  Goal: Patient-Specific Goal (Individualization)  Description:   Cooperate with treatment team recommendations including medications.   Will sleep 6-8 hrs nightly.  Attend at least 50% groups.       Outcome: Improving  Note:      Patient has been calm, cooperative, and medication compliant this shift.  She was in the lounge area putting puzzles together most of the shift with peers. She is bright and cheerful and mood is calm.  Denies all mental health criteria.   Requested colace and received dose with AM medications.  Reported that her gum is swelling and she is concerned that she might be getting an infected tooth.  Message left for provider to address concern.  Complained of shoulder pain but no PRNs given.  VS WNL. Face to face end of shift report communicated to evening shift RN.     Nat Hampton RN  9/13/2020  10:48 AM

## 2020-09-13 NOTE — PLAN OF CARE
Patient out in lounge at start of the shift. She went to bed around midnight then was back up at 0530.  No complaints of pain.  Care continued to next shift.

## 2020-09-14 ENCOUNTER — APPOINTMENT (OUTPATIENT)
Dept: GENERAL RADIOLOGY | Facility: HOSPITAL | Age: 50
DRG: 885 | End: 2020-09-14
Attending: NURSE PRACTITIONER
Payer: MEDICARE

## 2020-09-14 PROCEDURE — 73562 X-RAY EXAM OF KNEE 3: CPT | Mod: TC,RT

## 2020-09-14 PROCEDURE — 25000132 ZZH RX MED GY IP 250 OP 250 PS 637: Mod: GY | Performed by: NURSE PRACTITIONER

## 2020-09-14 PROCEDURE — 99232 SBSQ HOSP IP/OBS MODERATE 35: CPT | Performed by: NURSE PRACTITIONER

## 2020-09-14 PROCEDURE — 12400011

## 2020-09-14 PROCEDURE — 99231 SBSQ HOSP IP/OBS SF/LOW 25: CPT | Performed by: NURSE PRACTITIONER

## 2020-09-14 RX ORDER — LACTOBACILLUS RHAMNOSUS GG 10B CELL
1 CAPSULE ORAL 2 TIMES DAILY
Status: DISCONTINUED | OUTPATIENT
Start: 2020-09-14 | End: 2020-09-18 | Stop reason: HOSPADM

## 2020-09-14 RX ORDER — PROPRANOLOL HCL 60 MG
60 CAPSULE, EXTENDED RELEASE 24HR ORAL DAILY
Status: DISCONTINUED | OUTPATIENT
Start: 2020-09-15 | End: 2020-09-15

## 2020-09-14 RX ORDER — PROPRANOLOL HYDROCHLORIDE 20 MG/1
20 TABLET ORAL 3 TIMES DAILY
Status: COMPLETED | OUTPATIENT
Start: 2020-09-14 | End: 2020-09-14

## 2020-09-14 RX ORDER — DIPHENHYDRAMINE HYDROCHLORIDE AND LIDOCAINE HYDROCHLORIDE AND ALUMINUM HYDROXIDE AND MAGNESIUM HYDRO
10 KIT EVERY 6 HOURS PRN
Status: DISCONTINUED | OUTPATIENT
Start: 2020-09-14 | End: 2020-09-18 | Stop reason: HOSPADM

## 2020-09-14 RX ORDER — MINERAL OIL/HYDROPHIL PETROLAT
OINTMENT (GRAM) TOPICAL 2 TIMES DAILY PRN
Status: DISCONTINUED | OUTPATIENT
Start: 2020-09-14 | End: 2020-09-18 | Stop reason: HOSPADM

## 2020-09-14 RX ADMIN — PROPRANOLOL HYDROCHLORIDE 20 MG: 20 TABLET ORAL at 20:26

## 2020-09-14 RX ADMIN — PROPRANOLOL HYDROCHLORIDE 20 MG: 20 TABLET ORAL at 08:18

## 2020-09-14 RX ADMIN — LORAZEPAM 0.5 MG: 0.5 TABLET ORAL at 18:35

## 2020-09-14 RX ADMIN — PROPRANOLOL HYDROCHLORIDE 20 MG: 20 TABLET ORAL at 13:47

## 2020-09-14 RX ADMIN — NICOTINE 1 PATCH: 14 PATCH, EXTENDED RELEASE TRANSDERMAL at 08:18

## 2020-09-14 RX ADMIN — NAPROXEN 500 MG: 500 TABLET ORAL at 18:41

## 2020-09-14 RX ADMIN — LORAZEPAM 0.5 MG: 0.5 TABLET ORAL at 11:32

## 2020-09-14 RX ADMIN — Medication 1 CAPSULE: at 12:23

## 2020-09-14 RX ADMIN — MAGNESIUM HYDROXIDE 30 ML: 400 SUSPENSION ORAL at 18:36

## 2020-09-14 RX ADMIN — ASPIRIN 81 MG: 81 TABLET, COATED ORAL at 08:18

## 2020-09-14 RX ADMIN — ARIPIPRAZOLE 10 MG: 10 TABLET ORAL at 20:26

## 2020-09-14 RX ADMIN — Medication 1 CAPSULE: at 20:26

## 2020-09-14 RX ADMIN — AMOXICILLIN AND CLAVULANATE POTASSIUM 1 TABLET: 875; 125 TABLET, FILM COATED ORAL at 12:23

## 2020-09-14 RX ADMIN — NAPROXEN 500 MG: 500 TABLET ORAL at 08:18

## 2020-09-14 RX ADMIN — DOCUSATE SODIUM 50 MG: 50 CAPSULE, LIQUID FILLED ORAL at 11:33

## 2020-09-14 RX ADMIN — AMOXICILLIN AND CLAVULANATE POTASSIUM 1 TABLET: 875; 125 TABLET, FILM COATED ORAL at 20:26

## 2020-09-14 ASSESSMENT — ACTIVITIES OF DAILY LIVING (ADL)
ORAL_HYGIENE: INDEPENDENT
DRESS: INDEPENDENT
HYGIENE/GROOMING: INDEPENDENT

## 2020-09-14 NOTE — PLAN OF CARE
Face to face end of shift report received from Jaye BOB RN. Rounding completed and patient observed in the lounge. She had some questions about filling out papers for [employer]. Helped as needed.     20:00 Update: Patient denied depression and SI/HI and hallucinations. Stated she has never had a problem with depression. She endorsed a high amount of anxiety. She requested 0.5mg Ativan for this and received at 18:35. She is pleasant and cooperative. She is clean and neatly dressed. Patient attends groups and interacts with peers using appropriate boundaries. She endorsed pain in her right knee and rated it at 8 of 10. She received 500mg Naprosyn at 18:35. Nicotine patch in place. Patient was awake all shift. She did not complain of tooth pain. She completed an application for Libr[employer] and this was placed on  Jillian's desk. She took meds without complaint.      Face to face end of shift report communicated to oncoming RN.     Problem: Adult Behavioral Health Plan of Care  Goal: Patient-Specific Goal (Individualization)  Description:   Cooperate with treatment team recommendations including medications.   Will sleep 6-8 hrs nightly.  Attend at least 50% groups.       9/14/2020 1626 by Augustus Chow, RN  Outcome: No Change     Problem: Thought Process Alteration  Goal: Optimal Thought Clarity  Description: Will have reality based conversations by discharge.   9/14/2020 1626 by Augustus Chow, RN  Outcome: No Change

## 2020-09-14 NOTE — PROGRESS NOTES
"[facility]    Medical Services Progress Note    Date of Service (when I saw the patient): 09/14/2020    Assessment & Plan     Principal Problem:    Disorganized behavior- pt sitting in lounge alone, appears to be talking to someone. When I introduced myself pt states \"I don't wanna talk to you, Alice the hospital  is letting me go.\" When asked if we could step in her room pt states \"no, I am not going anywhere with you\". Asked if we could talk there and if I could listen to her heart and lungs pt stated \"No I am not talking to you and No you can't listen to anything\". Pt then turned and returned to her conversation stating \"yes, I don't know. I am getting rid of everyone, I am not sure how they got in here\"       Active Medical Problem:  Asthma- pt has albuterol on med list indications listed is for asthma. Due to pt mental status unable to confirm. Checked care everywhere and can't find an asthma diagnosis. Ordered prn albuterol for pt safety. Pt refused ROS and Physical exam. Pt talking in complete sentences, no distress, symmetric rise and fall of chest, no audible wheeze.   9/14/20- Denies chest pain , sob.     UTI- UA collected this morning revealed bacteria, moderate leukocyte esterase. Ordered nitrofurantoin and probiotic. Not sure if pt is symptomatic due to mental status.    9/14/20- resolved- denies any urinary symptoms.    Dental abscess- consulted by pmhnp. Pt complaining of back lower molar pain. Pt has broken tooth. Pt reports drainage and \"bad\" taste in mouth. She reports she is able to eat but it is painful to chew on that side. Pt instructed that she needs to have tooth abstracted outpatient. Pt verbalized understanding. Augmentin, probiotic, and magic mouth wash ordered.     Knee pain- pt reports she fell through a deck back in July and hurt her left shoulder and right knee. She had a shoulder xray on 8/28/20 and it showed Impression:  No evidence of acute or subacute bony " abnormality. Calcifications of the shoulder suggest calcific tendinosis of the rotator cuff. MRI would better characterize. She did not have a knee xray. Bruising is noted to right knee and she reports pain with movement. Pt has naproxen and tylenol ordered prn for pain. Knee xray ordered.     Dry skin on face- pt reports that the soap is drying her skin out on her face. She report she has this issue frequently out patient and uses a lotion or ointment on it. Aquaphor ordered prn.     covid- negative     Code Status: Full Code.    Ana Wiggins CNP        -Data reviewed today: I reviewed all new labs and imaging results over the last 24 hours.     Physical Exam   Temp: 98.1  F (36.7  C) Temp src: Tympanic BP: 137/82 Pulse: 74   Resp: 16 SpO2: 95 %      Vitals:    08/30/20 0700 09/06/20 1300 09/12/20 0730   Weight: 89.4 kg (197 lb) 91.2 kg (201 lb) 91.4 kg (201 lb 8 oz)     Vital Signs with Ranges  Temp:  [97.2  F (36.2  C)-98.1  F (36.7  C)] 98.1  F (36.7  C)  Pulse:  [74-78] 74  Resp:  [14-16] 16  BP: (103-137)/(62-82) 137/82  SpO2:  [93 %-95 %] 95 %  No intake/output data recorded.    Constitutional: awake, alert, cooperative, no apparent distress, and appears stated age  ENT: Normocephalic, without obvious abnormality, atraumatic, sinuses nontender on palpation, external ears without lesions, oral pharynx with moist mucous membranes, tonsils without erythema or exudates, gums normal. 2 broken teeth noted left upper and lower molar, mild erythema noted to lower molar, no drainage.   Hematologic / Lymphatic: no cervical lymphadenopathy  Respiratory: No increased work of breathing, good air exchange, clear to auscultation bilaterally, no crackles or wheezing  Cardiovascular: Normal apical impulse, regular rate and rhythm, normal S1 and S2, no S3 or S4, and no murmur noted  GI: No scars, normal bowel sounds, soft, non-distended, non-tender, no masses palpated, no hepatosplenomegally  Skin: normal skin color, texture,  turgor, no redness, warmth, or swelling and no rashes  Musculoskeletal: Limited range of motion noted to left shoulder, no bruising, edema, erythema note, Right knee has 2 bruises, no erythema, no edema, full range of motion noted.  Tone is normal.  Neurologic: Awake, alert, oriented to name, place and time.     Neuropsychiatric: General: normal, calm and normal eye contact    Medications     amoxicillin-clavulanate  1 tablet Oral Q12H AB     ARIPiprazole  10 mg Oral At Bedtime     aspirin  81 mg Oral Daily     lactobacillus rhamnosus (GG)  1 capsule Oral BID     nicotine  1 patch Transdermal Daily     nicotine   Transdermal Q8H     propranolol  20 mg Oral TID       Data   Recent Labs   Lab 09/11/20  0922   TROPI <0.015       No results found for this or any previous visit (from the past 24 hour(s)).

## 2020-09-14 NOTE — PLAN OF CARE
Liseth from the [facility] called and stated pt's MA was accepted. Will start sending IRTS referrals. Will notify pt next time I speak with her and discuss placement options and paperwork.

## 2020-09-14 NOTE — PROGRESS NOTES
[facility]  Psychiatric Progress Note      Impression:   Patient had court 9/4, full commit and Tineo supported.    She appears less anxious than when I last met with her. Since then her ablify has been decreased. Possibly was having akathesia. We still have not yet heard back from the court as to adding seroquel to her medications. She is asking to have it back and states she slept better than she ever has when on that medications. She has multiple medication complaints which she spoke with our hospitalist about.   Educated regarding medication indications, risks, benefits, side effects, contraindications and possible interactions. Verbally expressed understanding.        Diagnoses:     Schizoaffective Disorder, bipolar type    Attestation:  Patient has been seen and evaluated by me,  Haleigh Kolb NP          Interim History:   The patient's care was discussed with the treatment team and chart notes were reviewed.           Plan/Treatment Team     BEHAVIORAL TEAM DISCUSSION    Progress: moderate    Continued Stay Criteria/Rationale: Impaired thought process, committed and Tineo, discharge planning needed    Medical/Physical: tendinosis of rotator cuff likely cause of pain.     Precautions: none    Falls precaution?: No  Behavioral Orders   Procedures     Code 1 - Restrict to Unit     Routine Programming     As clinically indicated     Status 15             Medications:     Current Facility-Administered Medications   Medication     acetaminophen (TYLENOL) tablet 650 mg     albuterol (PROAIR HFA/PROVENTIL HFA/VENTOLIN HFA) 108 (90 Base) MCG/ACT inhaler 1-2 puff     amoxicillin-clavulanate (AUGMENTIN) 875-125 MG per tablet 1 tablet     ARIPiprazole (ABILIFY) tablet 10 mg     artificial saliva (BIOTENE MT) solution 2 spray     aspirin EC tablet 81 mg     benzocaine (ORAJEL MAXIMUM STRENGTH) 20 % gel     docusate sodium (COLACE) capsule 50 mg     lactobacillus rhamnosus (GG) (CULTURELL)  capsule 1 capsule     LORazepam (ATIVAN) tablet 0.5 mg     magic mouthwash suspension (diphenhydramine, lidocaine, aluminum-magnesium & simethicone)     magnesium hydroxide (MILK OF MAGNESIA) suspension 30 mL     mineral oil-hydrophilic petrolatum (AQUAPHOR)     naproxen (NAPROSYN) tablet 500 mg     nicotine (NICODERM CQ) 14 MG/24HR 24 hr patch 1 patch     nicotine (NICORETTE) gum 2-4 mg     nicotine Patch in Place     OLANZapine (zyPREXA) tablet 5-10 mg    Or     OLANZapine (zyPREXA) injection 10 mg     propranolol (INDERAL) tablet 20 mg          10 point ROS denies uti symptoms though ua positive       Allergies:     Allergies   Allergen Reactions     Shrimp Anaphylaxis     Risperdal [Risperidone] Other (See Comments)     Elevated prolactin            Psychiatric Examination:   /82   Pulse 74   Temp 98.1  F (36.7  C) (Tympanic)   Resp 16   Ht 1.524 m (5')   Wt 91.4 kg (201 lb 8 oz)   SpO2 95%   BMI 39.35 kg/m    Weight is 201 lbs 8 oz  Body mass index is 39.35 kg/m .    MSE/PSYCH  PSYCHIATRIC EXAM  /82   Pulse 74   Temp 98.1  F (36.7  C) (Tympanic)   Resp 16   Ht 1.524 m (5')   Wt 91.4 kg (201 lb 8 oz)   SpO2 95%   BMI 39.35 kg/m    -Appearance/Behavior:  Appropriate  -Motor: intact  -Gait: intact  -Abnormal involuntary movements: none  -Mood: cooperative, brighter  -Affect: brighter  -Speech: No stuttering today - appears to have been doing this purposefully  -Thought process/associations: no delusional statement today  -Thought content: remains guarded   -Perceptual disturbances: denies si/hi,   -Suicidal/Homicidal Ideation: denies    -Judgment: poor  -Insight: poor  *Orientation: time, place and person.  *Memory: difficult to assess  *Attention: poor  *Language: fluent, no aphasias, able to repeat phrases and name objects. Vocab intact.  *Fund of information: difficult to assess, appears appropriate  *Cognitive functioning estimate: 0 - independent.           Labs:     Results for  orders placed or performed during the hospital encounter of 08/10/20   XR Humerus Port Left G/E 2 Views     Status: None    Narrative    Exam: XR HUMERUS PORT LT     History:Female, age 50 years, left upper arm pain after fall.    Comparison:  None    Technique: Two views are submitted    Findings: Bones are normally mineralized. No evidence of acute or  subacute fracture.  No evidence of dislocation.  Calcific tendinosis  of the rotator cuff.           Impression    Impression:  No evidence of acute or subacute bony abnormality.     Calcifications of the shoulder suggest calcific tendinosis of the  rotator cuff. MRI would better characterize.    LUPE SANCHEZ MD   XR Knee Right 3 Views     Status: None    Narrative    Exam: XR KNEE RT 3 VW     History:Female, age 50 years, fell in July, pain and bruising in right  knee    Comparison:  None    Technique: Three views are submitted.    Findings: Bones are normally mineralized. No evidence of acute or  subacute fracture.  No evidence of dislocation.  Contour of the  proximal tibia suggests an old healed proximal tibial fracture. Joint  spaces are congruent. Small joint effusion.           Impression    Impression:  No evidence of acute or subacute bony abnormality.     Findings suggesting healed fracture proximally in the right tibia.    LUPE SANCHEZ MD   CBC with platelets differential     Status: Abnormal   Result Value Ref Range    WBC 13.7 (H) 4.0 - 11.0 10e9/L    RBC Count 5.09 3.8 - 5.2 10e12/L    Hemoglobin 17.2 (H) 11.7 - 15.7 g/dL    Hematocrit 49.2 (H) 35.0 - 47.0 %    MCV 97 78 - 100 fl    MCH 33.8 (H) 26.5 - 33.0 pg    MCHC 35.0 31.5 - 36.5 g/dL    RDW 12.6 10.0 - 15.0 %    Platelet Count 185 150 - 450 10e9/L    Diff Method Automated Method     % Neutrophils 82.4 %    % Lymphocytes 10.4 %    % Monocytes 6.3 %    % Eosinophils 0.3 %    % Basophils 0.2 %    % Immature Granulocytes 0.4 %    Nucleated RBCs 0 0 /100    Absolute Neutrophil 11.3 (H) 1.6 -  8.3 10e9/L    Absolute Lymphocytes 1.4 0.8 - 5.3 10e9/L    Absolute Monocytes 0.9 0.0 - 1.3 10e9/L    Absolute Eosinophils 0.0 0.0 - 0.7 10e9/L    Absolute Basophils 0.0 0.0 - 0.2 10e9/L    Abs Immature Granulocytes 0.1 0 - 0.4 10e9/L    Absolute Nucleated RBC 0.0    Comprehensive metabolic panel     Status: Abnormal   Result Value Ref Range    Sodium 129 (L) 133 - 144 mmol/L    Potassium 4.1 3.4 - 5.3 mmol/L    Chloride 95 94 - 109 mmol/L    Carbon Dioxide 25 20 - 32 mmol/L    Anion Gap 9 3 - 14 mmol/L    Glucose 107 (H) 70 - 99 mg/dL    Urea Nitrogen 9 7 - 30 mg/dL    Creatinine 0.54 0.52 - 1.04 mg/dL    GFR Estimate >90 >60 mL/min/[1.73_m2]    GFR Estimate If Black >90 >60 mL/min/[1.73_m2]    Calcium 9.1 8.5 - 10.1 mg/dL    Bilirubin Total 0.7 0.2 - 1.3 mg/dL    Albumin 3.2 (L) 3.4 - 5.0 g/dL    Protein Total 7.3 6.8 - 8.8 g/dL    Alkaline Phosphatase 131 40 - 150 U/L    ALT 57 (H) 0 - 50 U/L    AST 32 0 - 45 U/L   Alcohol ethyl     Status: None   Result Value Ref Range    Ethanol g/dL <0.01 0.01 g/dL   UA reflex to Microscopic     Status: Abnormal   Result Value Ref Range    Color Urine Yellow     Appearance Urine Slightly Cloudy     Glucose Urine Negative NEG^Negative mg/dL    Bilirubin Urine Negative NEG^Negative    Ketones Urine Negative NEG^Negative mg/dL    Specific Gravity Urine 1.017 1.003 - 1.035    Blood Urine Negative NEG^Negative    pH Urine 6.0 4.7 - 8.0 pH    Protein Albumin Urine 10 (A) NEG^Negative mg/dL    Urobilinogen mg/dL Normal 0.0 - 2.0 mg/dL    Nitrite Urine Negative NEG^Negative    Leukocyte Esterase Urine Moderate (A) NEG^Negative    Source Midstream Urine     RBC Urine 4 (H) 0 - 2 /HPF    WBC Urine 18 (H) 0 - 5 /HPF    Bacteria Urine Few (A) NEG^Negative /HPF    Squamous Epithelial /HPF Urine 19 (H) 0 - 1 /HPF    Mucous Urine Present (A) NEG^Negative /LPF   Urine Drugs of Abuse Screen Panel 13     Status: None   Result Value Ref Range    Cannabinoids (37-vfk-6-carboxy-9-THC) Not Detected  NDET^Not Detected ng/mL    Phencyclidine (Phencyclidine) Not Detected NDET^Not Detected ng/mL    Cocaine (Benzoylecgonine) Not Detected NDET^Not Detected ng/mL    Methamphetamine (d-Methamphetamine) Not Detected NDET^Not Detected ng/mL    Opiates (Morphine) Not Detected NDET^Not Detected ng/mL    Amphetamine (d-Amphetamine) Not Detected NDET^Not Detected ng/mL    Benzodiazepines (Nordiazepam) Not Detected NDET^Not Detected ng/mL    Tricyclic Antidepressants (Desipramine) Not Detected NDET^Not Detected ng/mL    Methadone (Methadone) Not Detected NDET^Not Detected ng/mL    Barbiturates (Butalbital) Not Detected NDET^Not Detected ng/mL    Oxycodone (Oxycodone) Not Detected NDET^Not Detected ng/mL    Propoxyphene (Norpropoxyphene) Not Detected NDET^Not Detected ng/mL    Buprenorphine (Buprenorphine) Not Detected NDET^Not Detected ng/mL   Asymptomatic COVID-19 Virus (Coronavirus) by PCR     Status: None    Specimen: Nasopharyngeal   Result Value Ref Range    COVID-19 Virus PCR to [facility] - Source Nasopharyngeal     COVID-19 Virus PCR to [facility] - Result       Test received-See reflex to [place] test SARS CoV2 (COVID-19) Virus RT-PCR   SARS-CoV-2 COVID-19 Virus (Coronavirus) RT-PCR Nasopharyngeal     Status: None    Specimen: Nasopharyngeal   Result Value Ref Range    SARS-CoV-2 Virus Specimen Source Nasopharyngeal     SARS-CoV-2 PCR Result NEGATIVE     SARS-CoV-2 PCR Comment       Testing was performed using the Xpert Xpress SARS-CoV-2 Assay on the Cepheid Gene-Xpert   Instrument Systems. Additional information about this Emergency Use Authorization (EUA)   assay can be found via the Lab Guide.     Basic metabolic panel     Status: Abnormal   Result Value Ref Range    Sodium 133 133 - 144 mmol/L    Potassium 3.1 (L) 3.4 - 5.3 mmol/L    Chloride 100 94 - 109 mmol/L    Carbon Dioxide 26 20 - 32 mmol/L    Anion Gap 7 3 - 14 mmol/L    Glucose 178 (H) 70 - 99 mg/dL    Urea Nitrogen 12 7 - 30 mg/dL    Creatinine 0.52 0.52  - 1.04 mg/dL    GFR Estimate >90 >60 mL/min/[1.73_m2]    GFR Estimate If Black >90 >60 mL/min/[1.73_m2]    Calcium 8.8 8.5 - 10.1 mg/dL   Potassium     Status: None   Result Value Ref Range    Potassium 4.5 3.4 - 5.3 mmol/L   UA reflex to Microscopic     Status: None   Result Value Ref Range    Color Urine Light Yellow     Appearance Urine Clear     Glucose Urine Negative NEG^Negative mg/dL    Bilirubin Urine Negative NEG^Negative    Ketones Urine Negative NEG^Negative mg/dL    Specific Gravity Urine 1.018 1.003 - 1.035    Blood Urine Negative NEG^Negative    pH Urine 6.5 4.7 - 8.0 pH    Protein Albumin Urine Negative NEG^Negative mg/dL    Urobilinogen mg/dL Normal 0.0 - 2.0 mg/dL    Nitrite Urine Negative NEG^Negative    Leukocyte Esterase Urine Negative NEG^Negative    Source Midstream Urine    CBC with platelets differential     Status: Abnormal   Result Value Ref Range    WBC 7.2 4.0 - 11.0 10e9/L    RBC Count 4.75 3.8 - 5.2 10e12/L    Hemoglobin 16.0 (H) 11.7 - 15.7 g/dL    Hematocrit 46.5 35.0 - 47.0 %    MCV 98 78 - 100 fl    MCH 33.7 (H) 26.5 - 33.0 pg    MCHC 34.4 31.5 - 36.5 g/dL    RDW 12.1 10.0 - 15.0 %    Platelet Count 322 150 - 450 10e9/L    Diff Method Automated Method     % Neutrophils 63.1 %    % Lymphocytes 26.4 %    % Monocytes 7.1 %    % Eosinophils 2.2 %    % Basophils 0.6 %    % Immature Granulocytes 0.6 %    Nucleated RBCs 0 0 /100    Absolute Neutrophil 4.5 1.6 - 8.3 10e9/L    Absolute Lymphocytes 1.9 0.8 - 5.3 10e9/L    Absolute Monocytes 0.5 0.0 - 1.3 10e9/L    Absolute Eosinophils 0.2 0.0 - 0.7 10e9/L    Absolute Basophils 0.0 0.0 - 0.2 10e9/L    Abs Immature Granulocytes 0.0 0 - 0.4 10e9/L    Absolute Nucleated RBC 0.0    Comprehensive metabolic panel     Status: Abnormal   Result Value Ref Range    Sodium 135 133 - 144 mmol/L    Potassium 4.3 3.4 - 5.3 mmol/L    Chloride 104 94 - 109 mmol/L    Carbon Dioxide 27 20 - 32 mmol/L    Anion Gap 4 3 - 14 mmol/L    Glucose 111 (H) 70 - 99 mg/dL     Urea Nitrogen 13 7 - 30 mg/dL    Creatinine 0.59 0.52 - 1.04 mg/dL    GFR Estimate >90 >60 mL/min/[1.73_m2]    GFR Estimate If Black >90 >60 mL/min/[1.73_m2]    Calcium 9.0 8.5 - 10.1 mg/dL    Bilirubin Total 0.3 0.2 - 1.3 mg/dL    Albumin 3.0 (L) 3.4 - 5.0 g/dL    Protein Total 6.7 (L) 6.8 - 8.8 g/dL    Alkaline Phosphatase 103 40 - 150 U/L    ALT 61 (H) 0 - 50 U/L    AST 22 0 - 45 U/L   Troponin I     Status: None   Result Value Ref Range    Troponin I ES <0.015 0.000 - 0.045 ug/L       Every 15 minutes.       Plan for this encounter/Med Changes/Labs:     No changes in medication  Likely until we get seroquel on pop order.     Full commit and Pop    Pop med list  abilify, invega, latuda, zyprexa

## 2020-09-14 NOTE — PLAN OF CARE
Face to face shift report received from Jessy TRIANA RN. Rounding completed, pt observed.  Jaye Pickard RN  9/14/2020  7:45 AM    Problem: Adult Behavioral Health Plan of Care  Goal: Patient-Specific Goal (Individualization)  Description:   Cooperate with treatment team recommendations including medications.   Will sleep 6-8 hrs nightly.  Attend at least 50% groups.       Outcome: Improving  Note: Shift Summery:  Patient was up on the unit at the start of this shift.  Patient states she slept better last night but is hoping to get Seroquel reordered when it is added to [place].  Patient has exhibited no stuttering and denies any other unwanted side effects.  Patient is attending some groups.  Patient did request Ativan 0.5 mg po at 1132 for complaints of increased anxiety.  Also requested and given Colace at 1133 for b/o hard stool.     1324:  Patient to XR via W/C and accompanied by unit UA and hospital security.  1340:  Patient back to unit from DI.  Teachung sheetes given for Culturelle and Augmentin    Problem: Thought Process Alteration  Goal: Optimal Thought Clarity  Description: Will have reality based conversations by discharge.   Outcome: Improving  Conversations are reality based and relevant.     Face to face report will be communicated to oncoming RN.

## 2020-09-14 NOTE — PLAN OF CARE
PT in bed throughout most of shift. PT woke up at 0415 to check the time. 15 minute checks throughout the night. No complaints

## 2020-09-14 NOTE — PHARMACY-MEDICATION REGIMEN REVIEW
[facility]    Pharmacy      Antimicrobial Stewardship Note     Current antimicrobial therapy:  Anti-infectives (From now, onward)    Start     Dose/Rate Route Frequency Ordered Stop    09/14/20 1200  amoxicillin-clavulanate (AUGMENTIN) 875-125 MG per tablet 1 tablet      1 tablet Oral EVERY 12 HOURS SCHEDULED 09/14/20 1157 09/24/20 0759          Indication: Dental Abscess    Days of Therapy: 1/10     Pertinent labs:  Creatinine   Creatinine   Date Value Ref Range Status   08/22/2020 0.59 0.52 - 1.04 mg/dL Final   08/16/2020 0.52 0.52 - 1.04 mg/dL Final   08/10/2020 0.54 0.52 - 1.04 mg/dL Final     WBC   WBC   Date Value Ref Range Status   08/22/2020 7.2 4.0 - 11.0 10e9/L Final   08/10/2020 13.7 (H) 4.0 - 11.0 10e9/L Final   04/02/2018 6.9 4.0 - 11.0 10e9/L Final     Procalcitonin No results found for: PCAL  CRP No results found for: CRP    Culture Results: None     Recommendations/Interventions:  1. None    Mike Bone, [facility]  September 14, 2020

## 2020-09-15 PROCEDURE — 25000132 ZZH RX MED GY IP 250 OP 250 PS 637: Mod: GY | Performed by: NURSE PRACTITIONER

## 2020-09-15 PROCEDURE — 12400011

## 2020-09-15 PROCEDURE — 99231 SBSQ HOSP IP/OBS SF/LOW 25: CPT | Performed by: NURSE PRACTITIONER

## 2020-09-15 PROCEDURE — 99232 SBSQ HOSP IP/OBS MODERATE 35: CPT | Performed by: NURSE PRACTITIONER

## 2020-09-15 RX ORDER — PROPRANOLOL HYDROCHLORIDE 10 MG/1
10 TABLET ORAL 3 TIMES DAILY
Status: DISCONTINUED | OUTPATIENT
Start: 2020-09-15 | End: 2020-09-18 | Stop reason: HOSPADM

## 2020-09-15 RX ORDER — LORAZEPAM 0.5 MG/1
0.5 TABLET ORAL ONCE
Status: COMPLETED | OUTPATIENT
Start: 2020-09-15 | End: 2020-09-15

## 2020-09-15 RX ORDER — PROPRANOLOL HYDROCHLORIDE 20 MG/1
20 TABLET ORAL 3 TIMES DAILY
Status: DISCONTINUED | OUTPATIENT
Start: 2020-09-15 | End: 2020-09-15

## 2020-09-15 RX ADMIN — LORAZEPAM 0.5 MG: 0.5 TABLET ORAL at 09:57

## 2020-09-15 RX ADMIN — DIPHENHYDRAMINE HYDROCHLORIDE AND LIDOCAINE HYDROCHLORIDE AND ALUMINUM HYDROXIDE AND MAGNESIUM HYDRO 10 ML: KIT at 15:13

## 2020-09-15 RX ADMIN — ASPIRIN 81 MG: 81 TABLET, COATED ORAL at 08:16

## 2020-09-15 RX ADMIN — WHITE PETROLATUM: 1.75 OINTMENT TOPICAL at 06:31

## 2020-09-15 RX ADMIN — NICOTINE 1 PATCH: 14 PATCH, EXTENDED RELEASE TRANSDERMAL at 08:16

## 2020-09-15 RX ADMIN — LORAZEPAM 0.5 MG: 0.5 TABLET ORAL at 16:36

## 2020-09-15 RX ADMIN — Medication 1 CAPSULE: at 20:21

## 2020-09-15 RX ADMIN — NAPROXEN 500 MG: 500 TABLET ORAL at 08:16

## 2020-09-15 RX ADMIN — PROPRANOLOL HYDROCHLORIDE 60 MG: 60 CAPSULE, EXTENDED RELEASE ORAL at 08:16

## 2020-09-15 RX ADMIN — AMOXICILLIN AND CLAVULANATE POTASSIUM 1 TABLET: 875; 125 TABLET, FILM COATED ORAL at 08:16

## 2020-09-15 RX ADMIN — ARIPIPRAZOLE 10 MG: 10 TABLET ORAL at 20:21

## 2020-09-15 RX ADMIN — ACETAMINOPHEN 650 MG: 325 TABLET, FILM COATED ORAL at 16:36

## 2020-09-15 RX ADMIN — PROPRANOLOL HYDROCHLORIDE 10 MG: 10 TABLET ORAL at 20:21

## 2020-09-15 RX ADMIN — Medication 1 CAPSULE: at 08:16

## 2020-09-15 RX ADMIN — ACETAMINOPHEN 650 MG: 325 TABLET, FILM COATED ORAL at 01:18

## 2020-09-15 RX ADMIN — LORAZEPAM 0.5 MG: 0.5 TABLET ORAL at 13:27

## 2020-09-15 RX ADMIN — AMOXICILLIN AND CLAVULANATE POTASSIUM 1 TABLET: 875; 125 TABLET, FILM COATED ORAL at 20:21

## 2020-09-15 ASSESSMENT — ACTIVITIES OF DAILY LIVING (ADL)
ORAL_HYGIENE: INDEPENDENT
HYGIENE/GROOMING: INDEPENDENT
DRESS: INDEPENDENT

## 2020-09-15 NOTE — PLAN OF CARE
Face to face shift report received from Radha SANCHEZ RN. Rounding completed, pt observed.  Jaye Pickard RN  9/15/2020  7:50 AM    Problem: Adult Behavioral Health Plan of Care  Goal: Patient-Specific Goal (Individualization)  Description:   Cooperate with treatment team recommendations including medications.   Will sleep 6-8 hrs nightly.  Attend at least 50% groups.       9/15/2020 0750 by Jaye Pickard RN  Outcome: Improving  Note: Shift Summery:  Patient was up on the unit at the start of this shift.  Patient stated she had interrupted sleep last noc.  Patient took all medications this AM but did state concerns about whether or not the change in Inderal to LA will be effective.  Patient did complain to nursing that she felt like she was having a panic attack mid morning.  Informed NP Lilli; see MAR for new orders.  Relayed changes to patient.  Patient expressed relief.  Patient requested Naproxen with AM meds for continued  c/o pain in R knee and L shoulder.    Problem: Thought Process Alteration  Goal: Optimal Thought Clarity  Description: Will have reality based conversations by discharge.   9/15/2020 0750 by Jaye Pickard RN  Outcome: Improving  Conversations are reality based and relevant.     Face to face report will be communicated to oncoming RN.

## 2020-09-15 NOTE — PLAN OF CARE
Called and left a message to pt's  to give an update.    Talked 1:1 with pt and received completed paperwork. Referral sent to [facility] on 9/15. Judit from the [facility] called today and let writer know that bed availability is full at this time. Will continue to check on waitlist and send out referrals to other IRTS facilities.     Sent referral to [facility] IRTS facility in [place]. Called organization prior to sending referral and they do have a female bed available at this time. Will follow-up with referral tomorrow.

## 2020-09-15 NOTE — PLAN OF CARE
"  Problem: Adult Behavioral Health Plan of Care  Goal: Patient-Specific Goal (Individualization)  Description:   Cooperate with treatment team recommendations including medications.   Will sleep 6-8 hrs nightly.  Attend at least 50% groups.       9/15/2020 1701 by Rosa Gonzales, RN  Note: Patient up in lounge socializing in beginning of shift. Speech is a bit pressured and rambling during assessment. Reports anxiety rated 8/10 and generalized pain rated 8/10 before dinner.   1636- Received PRN Tylenol 650 mg and PRN Ativan 0.5 mg per request. Patient reports relief within the hour.   Coloring and doing paperwork in lounge for most of afternoon, attending some groups. Polite with all interactions and behaviors are appropriate. Laying down to nap around 1900 until being woken up for medications. Blood pressure was 131/69 and pulse 79, so scheduled Propranolol was given. Appears to be sleeping shortly after for remainder of shift. Continue to monitor at this time.   SHELL faxed to [facility] per patient request for \"insurance reasons\".      Problem: Thought Process Alteration  Goal: Optimal Thought Clarity  Description: Will have reality based conversations by discharge.   9/15/2020 1701 by Rosa Gonzales, RN  Note: Continue to monitor at this time.      Face to face end of shift report communicated to oncoming RN.     Rosa Gonzales, RN  9/15/2020  10:49 PM        "

## 2020-09-15 NOTE — PROGRESS NOTES
"[facility]  Psychiatric Progress Note      Impression:   Patient had court 9/4, full commit and Tineo supported. Still waiting to hear back from the court on the Seroquel    I found Glenda in the lounge socializing with peers this afternoon. She reports a poor response to switching the formulation of her propranolol (she has been taking the SA variety 20 mg TID for a while and was tried on 60 mg LA this morning) and desires to return to the previous dose and formulation. She reports worsening anxiety with tachycardia and generally feeling unwell. She has medication sensitivities and \"things tend to go bad when we make med changes.\" I also provided her with a one time dose of 0.5 mg lorazepam to help with the propranolol-induced anxiety. She is working on filling out IRTS applications and is hopeful for placement soon. She otherwise denies any medication side effects, mood issues, suicidal/homicidal ideation, hallucinations or delusions, and indicated she is sleeping and eating well.       Educated regarding medication indications, risks, benefits, side effects, contraindications and possible interactions. Verbally expressed understanding.        Diagnoses:     Schizoaffective Disorder, bipolar type    Attestation:  Patient has been seen and evaluated by me,  MADI Jimenes CNP          Interim History:     The patient's care was discussed with the treatment team and chart notes were reviewed.           Plan/Treatment Team     BEHAVIORAL TEAM DISCUSSION    Progress: moderate    Continued Stay Criteria/Rationale: Impaired thought process, committed and Tineo, discharge planning needed    Medical/Physical: tendinosis of rotator cuff likely cause of pain.     Precautions: none    Falls precaution?: No  Behavioral Orders   Procedures     Code 1 - Restrict to Unit     Routine Programming     As clinically indicated     Status 15             Medications:     Current Facility-Administered Medications "   Medication     acetaminophen (TYLENOL) tablet 650 mg     albuterol (PROAIR HFA/PROVENTIL HFA/VENTOLIN HFA) 108 (90 Base) MCG/ACT inhaler 1-2 puff     amoxicillin-clavulanate (AUGMENTIN) 875-125 MG per tablet 1 tablet     ARIPiprazole (ABILIFY) tablet 10 mg     artificial saliva (BIOTENE MT) solution 2 spray     aspirin EC tablet 81 mg     benzocaine (ORAJEL MAXIMUM STRENGTH) 20 % gel     docusate sodium (COLACE) capsule 50 mg     lactobacillus rhamnosus (GG) (CULTURELL) capsule 1 capsule     LORazepam (ATIVAN) tablet 0.5 mg     magic mouthwash suspension (diphenhydramine, lidocaine, aluminum-magnesium & simethicone)     magnesium hydroxide (MILK OF MAGNESIA) suspension 30 mL     mineral oil-hydrophilic petrolatum (AQUAPHOR)     naproxen (NAPROSYN) tablet 500 mg     nicotine (NICODERM CQ) 14 MG/24HR 24 hr patch 1 patch     nicotine (NICORETTE) gum 2-4 mg     nicotine Patch in Place     OLANZapine (zyPREXA) tablet 5-10 mg    Or     OLANZapine (zyPREXA) injection 10 mg     propranolol ER (INDERAL LA) 24 hr capsule 60 mg          10 point ROS denies uti symptoms though ua positive       Allergies:     Allergies   Allergen Reactions     Shrimp Anaphylaxis     Risperdal [Risperidone] Other (See Comments)     Elevated prolactin            Psychiatric Examination:   /62   Pulse 80   Temp 98.2  F (36.8  C) (Tympanic)   Resp 16   Ht 1.524 m (5')   Wt 91.4 kg (201 lb 8 oz)   SpO2 96%   BMI 39.35 kg/m    Weight is 201 lbs 8 oz  Body mass index is 39.35 kg/m .    MSE/PSYCH  PSYCHIATRIC EXAM  /62   Pulse 80   Temp 98.2  F (36.8  C) (Tympanic)   Resp 16   Ht 1.524 m (5')   Wt 91.4 kg (201 lb 8 oz)   SpO2 96%   BMI 39.35 kg/m    -Appearance/Behavior:  Appropriate  -Motor: intact  -Gait: intact  -Abnormal involuntary movements: none  -Mood: cooperative, brighter  -Affect: brighter  -Speech: No stuttering today - appears to have been doing this purposefully  -Thought process/associations: no delusional  statement today  -Thought content: remains guarded   -Perceptual disturbances: denies si/hi,   -Suicidal/Homicidal Ideation: denies    -Judgment: poor  -Insight: poor  *Orientation: time, place and person.  *Memory: difficult to assess  *Attention: poor  *Language: fluent, no aphasias, able to repeat phrases and name objects. Vocab intact.  *Fund of information: difficult to assess, appears appropriate  *Cognitive functioning estimate: 0 - independent.           Labs:     Results for orders placed or performed during the hospital encounter of 08/10/20   XR Humerus Port Left G/E 2 Views     Status: None    Narrative    Exam: XR HUMERUS PORT LT     History:Female, age 50 years, left upper arm pain after fall.    Comparison:  None    Technique: Two views are submitted    Findings: Bones are normally mineralized. No evidence of acute or  subacute fracture.  No evidence of dislocation.  Calcific tendinosis  of the rotator cuff.           Impression    Impression:  No evidence of acute or subacute bony abnormality.     Calcifications of the shoulder suggest calcific tendinosis of the  rotator cuff. MRI would better characterize.    LUPE SANCHEZ MD   XR Knee Right 3 Views     Status: None    Narrative    Exam: XR KNEE RT 3 VW     History:Female, age 50 years, fell in July, pain and bruising in right  knee    Comparison:  None    Technique: Three views are submitted.    Findings: Bones are normally mineralized. No evidence of acute or  subacute fracture.  No evidence of dislocation.  Contour of the  proximal tibia suggests an old healed proximal tibial fracture. Joint  spaces are congruent. Small joint effusion.           Impression    Impression:  No evidence of acute or subacute bony abnormality.     Findings suggesting healed fracture proximally in the right tibia.    LUPE SANCHEZ MD   CBC with platelets differential     Status: Abnormal   Result Value Ref Range    WBC 13.7 (H) 4.0 - 11.0 10e9/L    RBC Count 5.09  3.8 - 5.2 10e12/L    Hemoglobin 17.2 (H) 11.7 - 15.7 g/dL    Hematocrit 49.2 (H) 35.0 - 47.0 %    MCV 97 78 - 100 fl    MCH 33.8 (H) 26.5 - 33.0 pg    MCHC 35.0 31.5 - 36.5 g/dL    RDW 12.6 10.0 - 15.0 %    Platelet Count 185 150 - 450 10e9/L    Diff Method Automated Method     % Neutrophils 82.4 %    % Lymphocytes 10.4 %    % Monocytes 6.3 %    % Eosinophils 0.3 %    % Basophils 0.2 %    % Immature Granulocytes 0.4 %    Nucleated RBCs 0 0 /100    Absolute Neutrophil 11.3 (H) 1.6 - 8.3 10e9/L    Absolute Lymphocytes 1.4 0.8 - 5.3 10e9/L    Absolute Monocytes 0.9 0.0 - 1.3 10e9/L    Absolute Eosinophils 0.0 0.0 - 0.7 10e9/L    Absolute Basophils 0.0 0.0 - 0.2 10e9/L    Abs Immature Granulocytes 0.1 0 - 0.4 10e9/L    Absolute Nucleated RBC 0.0    Comprehensive metabolic panel     Status: Abnormal   Result Value Ref Range    Sodium 129 (L) 133 - 144 mmol/L    Potassium 4.1 3.4 - 5.3 mmol/L    Chloride 95 94 - 109 mmol/L    Carbon Dioxide 25 20 - 32 mmol/L    Anion Gap 9 3 - 14 mmol/L    Glucose 107 (H) 70 - 99 mg/dL    Urea Nitrogen 9 7 - 30 mg/dL    Creatinine 0.54 0.52 - 1.04 mg/dL    GFR Estimate >90 >60 mL/min/[1.73_m2]    GFR Estimate If Black >90 >60 mL/min/[1.73_m2]    Calcium 9.1 8.5 - 10.1 mg/dL    Bilirubin Total 0.7 0.2 - 1.3 mg/dL    Albumin 3.2 (L) 3.4 - 5.0 g/dL    Protein Total 7.3 6.8 - 8.8 g/dL    Alkaline Phosphatase 131 40 - 150 U/L    ALT 57 (H) 0 - 50 U/L    AST 32 0 - 45 U/L   Alcohol ethyl     Status: None   Result Value Ref Range    Ethanol g/dL <0.01 0.01 g/dL   UA reflex to Microscopic     Status: Abnormal   Result Value Ref Range    Color Urine Yellow     Appearance Urine Slightly Cloudy     Glucose Urine Negative NEG^Negative mg/dL    Bilirubin Urine Negative NEG^Negative    Ketones Urine Negative NEG^Negative mg/dL    Specific Gravity Urine 1.017 1.003 - 1.035    Blood Urine Negative NEG^Negative    pH Urine 6.0 4.7 - 8.0 pH    Protein Albumin Urine 10 (A) NEG^Negative mg/dL    Urobilinogen  mg/dL Normal 0.0 - 2.0 mg/dL    Nitrite Urine Negative NEG^Negative    Leukocyte Esterase Urine Moderate (A) NEG^Negative    Source Midstream Urine     RBC Urine 4 (H) 0 - 2 /HPF    WBC Urine 18 (H) 0 - 5 /HPF    Bacteria Urine Few (A) NEG^Negative /HPF    Squamous Epithelial /HPF Urine 19 (H) 0 - 1 /HPF    Mucous Urine Present (A) NEG^Negative /LPF   Urine Drugs of Abuse Screen Panel 13     Status: None   Result Value Ref Range    Cannabinoids (56-lwy-9-carboxy-9-THC) Not Detected NDET^Not Detected ng/mL    Phencyclidine (Phencyclidine) Not Detected NDET^Not Detected ng/mL    Cocaine (Benzoylecgonine) Not Detected NDET^Not Detected ng/mL    Methamphetamine (d-Methamphetamine) Not Detected NDET^Not Detected ng/mL    Opiates (Morphine) Not Detected NDET^Not Detected ng/mL    Amphetamine (d-Amphetamine) Not Detected NDET^Not Detected ng/mL    Benzodiazepines (Nordiazepam) Not Detected NDET^Not Detected ng/mL    Tricyclic Antidepressants (Desipramine) Not Detected NDET^Not Detected ng/mL    Methadone (Methadone) Not Detected NDET^Not Detected ng/mL    Barbiturates (Butalbital) Not Detected NDET^Not Detected ng/mL    Oxycodone (Oxycodone) Not Detected NDET^Not Detected ng/mL    Propoxyphene (Norpropoxyphene) Not Detected NDET^Not Detected ng/mL    Buprenorphine (Buprenorphine) Not Detected NDET^Not Detected ng/mL   Asymptomatic COVID-19 Virus (Coronavirus) by PCR     Status: None    Specimen: Nasopharyngeal   Result Value Ref Range    COVID-19 Virus PCR to [facility] - Source Nasopharyngeal     COVID-19 Virus PCR to [facility] - Result       Test received-See reflex to [place] test SARS CoV2 (COVID-19) Virus RT-PCR   SARS-CoV-2 COVID-19 Virus (Coronavirus) RT-PCR Nasopharyngeal     Status: None    Specimen: Nasopharyngeal   Result Value Ref Range    SARS-CoV-2 Virus Specimen Source Nasopharyngeal     SARS-CoV-2 PCR Result NEGATIVE     SARS-CoV-2 PCR Comment       Testing was performed using the exoro systemert Xpress SARS-CoV-2  Assay on the Cepheid Gene-Xpert   Instrument Systems. Additional information about this Emergency Use Authorization (EUA)   assay can be found via the Lab Guide.     Basic metabolic panel     Status: Abnormal   Result Value Ref Range    Sodium 133 133 - 144 mmol/L    Potassium 3.1 (L) 3.4 - 5.3 mmol/L    Chloride 100 94 - 109 mmol/L    Carbon Dioxide 26 20 - 32 mmol/L    Anion Gap 7 3 - 14 mmol/L    Glucose 178 (H) 70 - 99 mg/dL    Urea Nitrogen 12 7 - 30 mg/dL    Creatinine 0.52 0.52 - 1.04 mg/dL    GFR Estimate >90 >60 mL/min/[1.73_m2]    GFR Estimate If Black >90 >60 mL/min/[1.73_m2]    Calcium 8.8 8.5 - 10.1 mg/dL   Potassium     Status: None   Result Value Ref Range    Potassium 4.5 3.4 - 5.3 mmol/L   UA reflex to Microscopic     Status: None   Result Value Ref Range    Color Urine Light Yellow     Appearance Urine Clear     Glucose Urine Negative NEG^Negative mg/dL    Bilirubin Urine Negative NEG^Negative    Ketones Urine Negative NEG^Negative mg/dL    Specific Gravity Urine 1.018 1.003 - 1.035    Blood Urine Negative NEG^Negative    pH Urine 6.5 4.7 - 8.0 pH    Protein Albumin Urine Negative NEG^Negative mg/dL    Urobilinogen mg/dL Normal 0.0 - 2.0 mg/dL    Nitrite Urine Negative NEG^Negative    Leukocyte Esterase Urine Negative NEG^Negative    Source Midstream Urine    CBC with platelets differential     Status: Abnormal   Result Value Ref Range    WBC 7.2 4.0 - 11.0 10e9/L    RBC Count 4.75 3.8 - 5.2 10e12/L    Hemoglobin 16.0 (H) 11.7 - 15.7 g/dL    Hematocrit 46.5 35.0 - 47.0 %    MCV 98 78 - 100 fl    MCH 33.7 (H) 26.5 - 33.0 pg    MCHC 34.4 31.5 - 36.5 g/dL    RDW 12.1 10.0 - 15.0 %    Platelet Count 322 150 - 450 10e9/L    Diff Method Automated Method     % Neutrophils 63.1 %    % Lymphocytes 26.4 %    % Monocytes 7.1 %    % Eosinophils 2.2 %    % Basophils 0.6 %    % Immature Granulocytes 0.6 %    Nucleated RBCs 0 0 /100    Absolute Neutrophil 4.5 1.6 - 8.3 10e9/L    Absolute Lymphocytes 1.9 0.8 - 5.3  10e9/L    Absolute Monocytes 0.5 0.0 - 1.3 10e9/L    Absolute Eosinophils 0.2 0.0 - 0.7 10e9/L    Absolute Basophils 0.0 0.0 - 0.2 10e9/L    Abs Immature Granulocytes 0.0 0 - 0.4 10e9/L    Absolute Nucleated RBC 0.0    Comprehensive metabolic panel     Status: Abnormal   Result Value Ref Range    Sodium 135 133 - 144 mmol/L    Potassium 4.3 3.4 - 5.3 mmol/L    Chloride 104 94 - 109 mmol/L    Carbon Dioxide 27 20 - 32 mmol/L    Anion Gap 4 3 - 14 mmol/L    Glucose 111 (H) 70 - 99 mg/dL    Urea Nitrogen 13 7 - 30 mg/dL    Creatinine 0.59 0.52 - 1.04 mg/dL    GFR Estimate >90 >60 mL/min/[1.73_m2]    GFR Estimate If Black >90 >60 mL/min/[1.73_m2]    Calcium 9.0 8.5 - 10.1 mg/dL    Bilirubin Total 0.3 0.2 - 1.3 mg/dL    Albumin 3.0 (L) 3.4 - 5.0 g/dL    Protein Total 6.7 (L) 6.8 - 8.8 g/dL    Alkaline Phosphatase 103 40 - 150 U/L    ALT 61 (H) 0 - 50 U/L    AST 22 0 - 45 U/L   Troponin I     Status: None   Result Value Ref Range    Troponin I ES <0.015 0.000 - 0.045 ug/L       Every 15 minutes.       Plan for this encounter/Med Changes/Labs:     Switched her back to short acting propranolol 20 mg TID. Was unable to tolerate long acting variety.     Full commit and Tineo    Tineo med list  abilify, invega, latuda, zyprexa

## 2020-09-15 NOTE — PLAN OF CARE
"  Problem: Adult Behavioral Health Plan of Care  Goal: Patient-Specific Goal (Individualization)  Description:   Cooperate with treatment team recommendations including medications.   Will sleep 6-8 hrs nightly.  Attend at least 50% groups.       9/15/2020 0126 by Radha Ayoub RN  Outcome: Improving  Note: Shift Summery:      Patient awake and up for a snack about 0030. Requested and given Tylenol 650 mg po for shoulder and right knee pain at 0120 and patient returned to bed.    0600 Patient awake and up on the unit. Patient slept a total of 2.75 hours this shift.    0630 Requested and given Aquaphor for complaints of dry skin.    Face to face end of shift report communicated to 7-3 shift RN.     Radha Ayoub RN  9/15/2020  4:34 AM        Patient's Stated Goal for Shift:  \"no stated goal\"    Goal Status:  In Process       Problem: Thought Process Alteration  Goal: Optimal Thought Clarity  Description: Will have reality based conversations by discharge.   9/15/2020 0126 by Radha Ayoub RN  Outcome: Improving     "

## 2020-09-15 NOTE — PROGRESS NOTES
"[facility]    Medical Services Progress Note    Date of Service (when I saw the patient): 09/15/2020    Assessment & Plan     Principal Problem:    Disorganized behavior- pt sitting in lounge alone, appears to be talking to someone. When I introduced myself pt states \"I don't wanna talk to you, [facility] the hospital  is letting me go.\" When asked if we could step in her room pt states \"no, I am not going anywhere with you\". Asked if we could talk there and if I could listen to her heart and lungs pt stated \"No I am not talking to you and No you can't listen to anything\". Pt then turned and returned to her conversation stating \"yes, I don't know. I am getting rid of everyone, I am not sure how they got in here\"       Active Medical Problem:  Asthma- pt has albuterol on med list indications listed is for asthma. Due to pt mental status unable to confirm. Checked care everywhere and can't find an asthma diagnosis. Ordered prn albuterol for pt safety. Pt refused ROS and Physical exam. Pt talking in complete sentences, no distress, symmetric rise and fall of chest, no audible wheeze.   9/14/20- Denies chest pain , sob.   9/15/20- Denies chest pain, sob, wheezing.     UTI- UA collected this morning revealed bacteria, moderate leukocyte esterase. Ordered nitrofurantoin and probiotic. Not sure if pt is symptomatic due to mental status.    9/14/20- resolved- denies any urinary symptoms.    Dental abscess- consulted by pmhnp. Pt complaining of back lower molar pain. Pt has broken tooth. Pt reports drainage and \"bad\" taste in mouth. She reports she is able to eat but it is painful to chew on that side. Pt instructed that she needs to have tooth abstracted outpatient. Pt verbalized understanding. Augmentin, probiotic, and magic mouth wash ordered.   9/15/20- pt tolerating Augmentin well. Denies any side effects. Reports her mouth pain is much better and is able to eat without pain.     Knee pain- pt " reports she fell through a deck back in July and hurt her left shoulder and right knee. She had a shoulder xray on 8/28/20 and it showed Impression:  No evidence of acute or subacute bony abnormality. Calcifications of the shoulder suggest calcific tendinosis of the rotator cuff. MRI would better characterize. She did not have a knee xray. Bruising is noted to right knee and she reports pain with movement. Pt has naproxen and tylenol ordered prn for pain. Knee xray ordered.   9/15/20- knee xray revealed Findings: Bones are normally mineralized. No evidence of acute or subacute fracture.  No evidence of dislocation.  Contour of the proximal tibia suggests an old healed proximal tibial fracture. Joint spaces are congruent. Small joint effusion. Impression: No evidence of acute or subacute bony abnormality. Findings suggesting healed fracture proximally in the right tibia. Dry skin on face- pt reports that the soap is drying her skin out on her face. She report she has this issue frequently out patient and uses a lotion or ointment on it. Aquaphor ordered prn.     covid- negative       Pt medically stable, no acute medical concerns. Chronic medical problems stable. Will sign off. Please consult for any new medical issues or concerns.     Code Status: Full Code.    Ana Wiggins CNP        -Data reviewed today: I reviewed all new labs and imaging results over the last 24 hours.     Physical Exam   Temp: 98.2  F (36.8  C) Temp src: Tympanic BP: 124/62 Pulse: 80   Resp: 16 SpO2: 96 % O2 Device: None (Room air)    Vitals:    08/30/20 0700 09/06/20 1300 09/12/20 0730   Weight: 89.4 kg (197 lb) 91.2 kg (201 lb) 91.4 kg (201 lb 8 oz)     Vital Signs with Ranges  Temp:  [97.4  F (36.3  C)-98.6  F (37  C)] 98.2  F (36.8  C)  Pulse:  [70-80] 80  Resp:  [14-16] 16  BP: (116-126)/(60-75) 124/62  SpO2:  [95 %-96 %] 96 %  No intake/output data recorded.    Constitutional: awake, alert, cooperative, no apparent distress, and appears  stated age  ENT: Normocephalic, without obvious abnormality, atraumatic, sinuses nontender on palpation, external ears without lesions, oral pharynx with moist mucous membranes, tonsils without erythema or exudates, gums normal. 2 broken teeth noted left upper and lower molar, mild erythema noted to lower molar, no drainage.   Hematologic / Lymphatic: no cervical lymphadenopathy  Respiratory: No increased work of breathing, good air exchange, clear to auscultation bilaterally, no crackles or wheezing  Cardiovascular: Normal apical impulse, regular rate and rhythm, normal S1 and S2, no S3 or S4, and no murmur noted  GI: No scars, normal bowel sounds, soft, non-distended, non-tender, no masses palpated, no hepatosplenomegally  Skin: normal skin color, texture, turgor, no redness, warmth, or swelling and no rashes  Musculoskeletal: Limited range of motion noted to left shoulder, no bruising, edema, erythema note, Right knee has 2 bruises, no erythema, no edema, full range of motion noted.  Tone is normal.  Neurologic: Awake, alert, oriented to name, place and time.     Neuropsychiatric: General: normal, calm and normal eye contact      Medications     amoxicillin-clavulanate  1 tablet Oral BID     ARIPiprazole  10 mg Oral At Bedtime     aspirin  81 mg Oral Daily     lactobacillus rhamnosus (GG)  1 capsule Oral BID     nicotine  1 patch Transdermal Daily     nicotine   Transdermal Q8H     propranolol ER  60 mg Oral Daily       Data   Recent Labs   Lab 09/11/20  0922   TROPI <0.015       Recent Results (from the past 24 hour(s))   XR Knee Right 3 Views    Narrative    Exam: XR KNEE RT 3 VW     History:Female, age 50 years, fell in July, pain and bruising in right  knee    Comparison:  None    Technique: Three views are submitted.    Findings: Bones are normally mineralized. No evidence of acute or  subacute fracture.  No evidence of dislocation.  Contour of the  proximal tibia suggests an old healed proximal tibial  fracture. Joint  spaces are congruent. Small joint effusion.           Impression    Impression:  No evidence of acute or subacute bony abnormality.     Findings suggesting healed fracture proximally in the right tibia.    LUPE SANCHEZ MD

## 2020-09-15 NOTE — PLAN OF CARE
"0957-  Pt. Stated \"I feel like a panic attack is coming on, can I have my ativan?\", Pt. Requested/received ativan 0.5 mg po, resting in bed.  "

## 2020-09-16 PROCEDURE — 25000132 ZZH RX MED GY IP 250 OP 250 PS 637: Mod: GY | Performed by: NURSE PRACTITIONER

## 2020-09-16 PROCEDURE — 99231 SBSQ HOSP IP/OBS SF/LOW 25: CPT | Performed by: NURSE PRACTITIONER

## 2020-09-16 PROCEDURE — 99232 SBSQ HOSP IP/OBS MODERATE 35: CPT | Performed by: NURSE PRACTITIONER

## 2020-09-16 PROCEDURE — 12400011

## 2020-09-16 RX ORDER — LORAZEPAM 1 MG/1
1 TABLET ORAL 3 TIMES DAILY PRN
Status: DISCONTINUED | OUTPATIENT
Start: 2020-09-16 | End: 2020-09-18 | Stop reason: HOSPADM

## 2020-09-16 RX ADMIN — NAPROXEN 500 MG: 500 TABLET ORAL at 21:31

## 2020-09-16 RX ADMIN — ACETAMINOPHEN 650 MG: 325 TABLET, FILM COATED ORAL at 11:47

## 2020-09-16 RX ADMIN — ALBUTEROL SULFATE 2 PUFF: 90 AEROSOL, METERED RESPIRATORY (INHALATION) at 00:51

## 2020-09-16 RX ADMIN — PROPRANOLOL HYDROCHLORIDE 10 MG: 10 TABLET ORAL at 13:55

## 2020-09-16 RX ADMIN — ACETAMINOPHEN 650 MG: 325 TABLET, FILM COATED ORAL at 00:51

## 2020-09-16 RX ADMIN — Medication 1 CAPSULE: at 21:19

## 2020-09-16 RX ADMIN — LORAZEPAM 1 MG: 1 TABLET ORAL at 21:31

## 2020-09-16 RX ADMIN — Medication 1 CAPSULE: at 08:13

## 2020-09-16 RX ADMIN — PROPRANOLOL HYDROCHLORIDE 10 MG: 10 TABLET ORAL at 21:19

## 2020-09-16 RX ADMIN — ARIPIPRAZOLE 10 MG: 10 TABLET ORAL at 21:19

## 2020-09-16 RX ADMIN — NICOTINE 1 PATCH: 14 PATCH, EXTENDED RELEASE TRANSDERMAL at 08:12

## 2020-09-16 RX ADMIN — ASPIRIN 81 MG: 81 TABLET, COATED ORAL at 08:13

## 2020-09-16 RX ADMIN — PROPRANOLOL HYDROCHLORIDE 10 MG: 10 TABLET ORAL at 08:13

## 2020-09-16 RX ADMIN — AMOXICILLIN AND CLAVULANATE POTASSIUM 1 TABLET: 875; 125 TABLET, FILM COATED ORAL at 08:13

## 2020-09-16 RX ADMIN — LORAZEPAM 0.5 MG: 0.5 TABLET ORAL at 08:19

## 2020-09-16 RX ADMIN — LORAZEPAM 1 MG: 1 TABLET ORAL at 11:47

## 2020-09-16 RX ADMIN — NAPROXEN 500 MG: 500 TABLET ORAL at 08:14

## 2020-09-16 RX ADMIN — AMOXICILLIN AND CLAVULANATE POTASSIUM 1 TABLET: 875; 125 TABLET, FILM COATED ORAL at 21:19

## 2020-09-16 ASSESSMENT — ACTIVITIES OF DAILY LIVING (ADL)
ORAL_HYGIENE: INDEPENDENT
DRESS: INDEPENDENT
DRESS: SCRUBS (BEHAVIORAL HEALTH);INDEPENDENT
HYGIENE/GROOMING: INDEPENDENT
ORAL_HYGIENE: INDEPENDENT
HYGIENE/GROOMING: INDEPENDENT

## 2020-09-16 NOTE — PROGRESS NOTES
[facility]  Psychiatric Progress Note      Impression:   Patient had court 9/4, full commit and Tineo supported. Still waiting to hear back from the court on the Seroquel as this helps her sleep.    Patient reports feeling more anxious lately, excited to be leaving soon and looking forward to going to [place].  She reports not sleeping well last night and was experiencing some side effect with the propanolol changes - which is now back to 10mg tid with vitals WNL.  She continues to complain about some pressure behind one eye, reports this is not constant and not as bad as before, may have been due to change in propanolol, will continue to monitor and have FNP take a look and advise.  Otherwise, patient is feeling overall more stable mood, she does not make delusional statements and denies suicidal or homicidal thoughts.  Patient is finding Ativan helpful and we agree to add dose for now.    Educated regarding medication indications, risks, benefits, side effects, contraindications and possible interactions. Verbally expressed understanding.        Diagnoses:     Schizoaffective Disorder, bipolar type    Attestation:  Patient has been seen and evaluated by me,  MADI Albarado CNP          Interim History:     The patient's care was discussed with the treatment team and chart notes were reviewed.           Plan/Treatment Team     BEHAVIORAL TEAM DISCUSSION    Progress:  moderate    Continued Stay Criteria/Rationale: Impaired thought process, committed and Tineo, discharge planning needed    Medical/Physical: tendinosis of rotator cuff likely cause of pain.  Naproxen helps    Precautions: none    Falls precaution?: No  Behavioral Orders   Procedures     Code 1 - Restrict to Unit     Routine Programming     As clinically indicated     Status 15        Plan for this encounter/Med Changes/Labs:     Propanolol 10mg tid - Was unable to tolerate long acting variety.   Ativan prn for  anxiety  Continue Abilify 10mg    Full commit and Tineo    Tineo med list  abilify, invega, latuda, zyprexa             Medications:     Current Facility-Administered Medications   Medication     acetaminophen (TYLENOL) tablet 650 mg     albuterol (PROAIR HFA/PROVENTIL HFA/VENTOLIN HFA) 108 (90 Base) MCG/ACT inhaler 1-2 puff     amoxicillin-clavulanate (AUGMENTIN) 875-125 MG per tablet 1 tablet     ARIPiprazole (ABILIFY) tablet 10 mg     artificial saliva (BIOTENE MT) solution 2 spray     aspirin EC tablet 81 mg     benzocaine (ORAJEL MAXIMUM STRENGTH) 20 % gel     docusate sodium (COLACE) capsule 50 mg     lactobacillus rhamnosus (GG) (CULTURELL) capsule 1 capsule     LORazepam (ATIVAN) tablet 1 mg     magic mouthwash suspension (diphenhydramine, lidocaine, aluminum-magnesium & simethicone)     magnesium hydroxide (MILK OF MAGNESIA) suspension 30 mL     mineral oil-hydrophilic petrolatum (AQUAPHOR)     naproxen (NAPROSYN) tablet 500 mg     nicotine (NICODERM CQ) 14 MG/24HR 24 hr patch 1 patch     nicotine (NICORETTE) gum 2-4 mg     nicotine Patch in Place     OLANZapine (zyPREXA) tablet 5-10 mg    Or     OLANZapine (zyPREXA) injection 10 mg     propranolol (INDERAL) tablet 10 mg        10 point ROS denies uti symptoms though ua positive       Allergies:     Allergies   Allergen Reactions     Shrimp Anaphylaxis     Risperdal [Risperidone] Other (See Comments)     Elevated prolactin            Psychiatric Examination:   /77   Pulse 67   Temp 97  F (36.1  C) (Tympanic)   Resp 18   Ht 1.524 m (5')   Wt 91.4 kg (201 lb 8 oz)   SpO2 93%   BMI 39.35 kg/m    Weight is 201 lbs 8 oz  Body mass index is 39.35 kg/m .    MSE/PSYCH  PSYCHIATRIC EXAM  /77   Pulse 67   Temp 97  F (36.1  C) (Tympanic)   Resp 18   Ht 1.524 m (5')   Wt 91.4 kg (201 lb 8 oz)   SpO2 93%   BMI 39.35 kg/m    -Appearance/Behavior:  Appropriate  -Motor: intact  -Gait: intact  -Abnormal involuntary movements: none  -Mood:  cooperative, brighter  -Affect: brighter  -Speech: appropriate  -Thought process/associations: no delusional statement today  -Thought content: No evidence of hallucination   -Perceptual disturbances: denies si/hi,   -Suicidal/Homicidal Ideation: denies    -Judgment: poor  -Insight: poor  *Orientation: time, place and person.  *Memory: difficult to assess  *Attention: poor  *Language: fluent, no aphasias, able to repeat phrases and name objects. Vocab intact.  *Fund of information: difficult to assess, appears appropriate  *Cognitive functioning estimate: 0 - independent.           Labs:     Results for orders placed or performed during the hospital encounter of 08/10/20   XR Humerus Port Left G/E 2 Views     Status: None    Narrative    Exam: XR HUMERUS PORT LT     History:Female, age 50 years, left upper arm pain after fall.    Comparison:  None    Technique: Two views are submitted    Findings: Bones are normally mineralized. No evidence of acute or  subacute fracture.  No evidence of dislocation.  Calcific tendinosis  of the rotator cuff.           Impression    Impression:  No evidence of acute or subacute bony abnormality.     Calcifications of the shoulder suggest calcific tendinosis of the  rotator cuff. MRI would better characterize.    LUPE SANCHEZ MD   XR Knee Right 3 Views     Status: None    Narrative    Exam: XR KNEE RT 3 VW     History:Female, age 50 years, fell in July, pain and bruising in right  knee    Comparison:  None    Technique: Three views are submitted.    Findings: Bones are normally mineralized. No evidence of acute or  subacute fracture.  No evidence of dislocation.  Contour of the  proximal tibia suggests an old healed proximal tibial fracture. Joint  spaces are congruent. Small joint effusion.           Impression    Impression:  No evidence of acute or subacute bony abnormality.     Findings suggesting healed fracture proximally in the right tibia.    LUPE SANCHEZ MD   CBC  with platelets differential     Status: Abnormal   Result Value Ref Range    WBC 13.7 (H) 4.0 - 11.0 10e9/L    RBC Count 5.09 3.8 - 5.2 10e12/L    Hemoglobin 17.2 (H) 11.7 - 15.7 g/dL    Hematocrit 49.2 (H) 35.0 - 47.0 %    MCV 97 78 - 100 fl    MCH 33.8 (H) 26.5 - 33.0 pg    MCHC 35.0 31.5 - 36.5 g/dL    RDW 12.6 10.0 - 15.0 %    Platelet Count 185 150 - 450 10e9/L    Diff Method Automated Method     % Neutrophils 82.4 %    % Lymphocytes 10.4 %    % Monocytes 6.3 %    % Eosinophils 0.3 %    % Basophils 0.2 %    % Immature Granulocytes 0.4 %    Nucleated RBCs 0 0 /100    Absolute Neutrophil 11.3 (H) 1.6 - 8.3 10e9/L    Absolute Lymphocytes 1.4 0.8 - 5.3 10e9/L    Absolute Monocytes 0.9 0.0 - 1.3 10e9/L    Absolute Eosinophils 0.0 0.0 - 0.7 10e9/L    Absolute Basophils 0.0 0.0 - 0.2 10e9/L    Abs Immature Granulocytes 0.1 0 - 0.4 10e9/L    Absolute Nucleated RBC 0.0    Comprehensive metabolic panel     Status: Abnormal   Result Value Ref Range    Sodium 129 (L) 133 - 144 mmol/L    Potassium 4.1 3.4 - 5.3 mmol/L    Chloride 95 94 - 109 mmol/L    Carbon Dioxide 25 20 - 32 mmol/L    Anion Gap 9 3 - 14 mmol/L    Glucose 107 (H) 70 - 99 mg/dL    Urea Nitrogen 9 7 - 30 mg/dL    Creatinine 0.54 0.52 - 1.04 mg/dL    GFR Estimate >90 >60 mL/min/[1.73_m2]    GFR Estimate If Black >90 >60 mL/min/[1.73_m2]    Calcium 9.1 8.5 - 10.1 mg/dL    Bilirubin Total 0.7 0.2 - 1.3 mg/dL    Albumin 3.2 (L) 3.4 - 5.0 g/dL    Protein Total 7.3 6.8 - 8.8 g/dL    Alkaline Phosphatase 131 40 - 150 U/L    ALT 57 (H) 0 - 50 U/L    AST 32 0 - 45 U/L   Alcohol ethyl     Status: None   Result Value Ref Range    Ethanol g/dL <0.01 0.01 g/dL   UA reflex to Microscopic     Status: Abnormal   Result Value Ref Range    Color Urine Yellow     Appearance Urine Slightly Cloudy     Glucose Urine Negative NEG^Negative mg/dL    Bilirubin Urine Negative NEG^Negative    Ketones Urine Negative NEG^Negative mg/dL    Specific Gravity Urine 1.017 1.003 - 1.035     Blood Urine Negative NEG^Negative    pH Urine 6.0 4.7 - 8.0 pH    Protein Albumin Urine 10 (A) NEG^Negative mg/dL    Urobilinogen mg/dL Normal 0.0 - 2.0 mg/dL    Nitrite Urine Negative NEG^Negative    Leukocyte Esterase Urine Moderate (A) NEG^Negative    Source Midstream Urine     RBC Urine 4 (H) 0 - 2 /HPF    WBC Urine 18 (H) 0 - 5 /HPF    Bacteria Urine Few (A) NEG^Negative /HPF    Squamous Epithelial /HPF Urine 19 (H) 0 - 1 /HPF    Mucous Urine Present (A) NEG^Negative /LPF   Urine Drugs of Abuse Screen Panel 13     Status: None   Result Value Ref Range    Cannabinoids (84-lwi-9-carboxy-9-THC) Not Detected NDET^Not Detected ng/mL    Phencyclidine (Phencyclidine) Not Detected NDET^Not Detected ng/mL    Cocaine (Benzoylecgonine) Not Detected NDET^Not Detected ng/mL    Methamphetamine (d-Methamphetamine) Not Detected NDET^Not Detected ng/mL    Opiates (Morphine) Not Detected NDET^Not Detected ng/mL    Amphetamine (d-Amphetamine) Not Detected NDET^Not Detected ng/mL    Benzodiazepines (Nordiazepam) Not Detected NDET^Not Detected ng/mL    Tricyclic Antidepressants (Desipramine) Not Detected NDET^Not Detected ng/mL    Methadone (Methadone) Not Detected NDET^Not Detected ng/mL    Barbiturates (Butalbital) Not Detected NDET^Not Detected ng/mL    Oxycodone (Oxycodone) Not Detected NDET^Not Detected ng/mL    Propoxyphene (Norpropoxyphene) Not Detected NDET^Not Detected ng/mL    Buprenorphine (Buprenorphine) Not Detected NDET^Not Detected ng/mL   Asymptomatic COVID-19 Virus (Coronavirus) by PCR     Status: None    Specimen: Nasopharyngeal   Result Value Ref Range    COVID-19 Virus PCR to [facility] - Source Nasopharyngeal     COVID-19 Virus PCR to [facility] - Result       Test received-See reflex to [place] test SARS CoV2 (COVID-19) Virus RT-PCR   SARS-CoV-2 COVID-19 Virus (Coronavirus) RT-PCR Nasopharyngeal     Status: None    Specimen: Nasopharyngeal   Result Value Ref Range    SARS-CoV-2 Virus Specimen Source  Nasopharyngeal     SARS-CoV-2 PCR Result NEGATIVE     SARS-CoV-2 PCR Comment       Testing was performed using the Xpert Xpress SARS-CoV-2 Assay on the Cepheid Gene-Xpert   Instrument Systems. Additional information about this Emergency Use Authorization (EUA)   assay can be found via the Lab Guide.     Basic metabolic panel     Status: Abnormal   Result Value Ref Range    Sodium 133 133 - 144 mmol/L    Potassium 3.1 (L) 3.4 - 5.3 mmol/L    Chloride 100 94 - 109 mmol/L    Carbon Dioxide 26 20 - 32 mmol/L    Anion Gap 7 3 - 14 mmol/L    Glucose 178 (H) 70 - 99 mg/dL    Urea Nitrogen 12 7 - 30 mg/dL    Creatinine 0.52 0.52 - 1.04 mg/dL    GFR Estimate >90 >60 mL/min/[1.73_m2]    GFR Estimate If Black >90 >60 mL/min/[1.73_m2]    Calcium 8.8 8.5 - 10.1 mg/dL   Potassium     Status: None   Result Value Ref Range    Potassium 4.5 3.4 - 5.3 mmol/L   UA reflex to Microscopic     Status: None   Result Value Ref Range    Color Urine Light Yellow     Appearance Urine Clear     Glucose Urine Negative NEG^Negative mg/dL    Bilirubin Urine Negative NEG^Negative    Ketones Urine Negative NEG^Negative mg/dL    Specific Gravity Urine 1.018 1.003 - 1.035    Blood Urine Negative NEG^Negative    pH Urine 6.5 4.7 - 8.0 pH    Protein Albumin Urine Negative NEG^Negative mg/dL    Urobilinogen mg/dL Normal 0.0 - 2.0 mg/dL    Nitrite Urine Negative NEG^Negative    Leukocyte Esterase Urine Negative NEG^Negative    Source Midstream Urine    CBC with platelets differential     Status: Abnormal   Result Value Ref Range    WBC 7.2 4.0 - 11.0 10e9/L    RBC Count 4.75 3.8 - 5.2 10e12/L    Hemoglobin 16.0 (H) 11.7 - 15.7 g/dL    Hematocrit 46.5 35.0 - 47.0 %    MCV 98 78 - 100 fl    MCH 33.7 (H) 26.5 - 33.0 pg    MCHC 34.4 31.5 - 36.5 g/dL    RDW 12.1 10.0 - 15.0 %    Platelet Count 322 150 - 450 10e9/L    Diff Method Automated Method     % Neutrophils 63.1 %    % Lymphocytes 26.4 %    % Monocytes 7.1 %    % Eosinophils 2.2 %    % Basophils 0.6 %     % Immature Granulocytes 0.6 %    Nucleated RBCs 0 0 /100    Absolute Neutrophil 4.5 1.6 - 8.3 10e9/L    Absolute Lymphocytes 1.9 0.8 - 5.3 10e9/L    Absolute Monocytes 0.5 0.0 - 1.3 10e9/L    Absolute Eosinophils 0.2 0.0 - 0.7 10e9/L    Absolute Basophils 0.0 0.0 - 0.2 10e9/L    Abs Immature Granulocytes 0.0 0 - 0.4 10e9/L    Absolute Nucleated RBC 0.0    Comprehensive metabolic panel     Status: Abnormal   Result Value Ref Range    Sodium 135 133 - 144 mmol/L    Potassium 4.3 3.4 - 5.3 mmol/L    Chloride 104 94 - 109 mmol/L    Carbon Dioxide 27 20 - 32 mmol/L    Anion Gap 4 3 - 14 mmol/L    Glucose 111 (H) 70 - 99 mg/dL    Urea Nitrogen 13 7 - 30 mg/dL    Creatinine 0.59 0.52 - 1.04 mg/dL    GFR Estimate >90 >60 mL/min/[1.73_m2]    GFR Estimate If Black >90 >60 mL/min/[1.73_m2]    Calcium 9.0 8.5 - 10.1 mg/dL    Bilirubin Total 0.3 0.2 - 1.3 mg/dL    Albumin 3.0 (L) 3.4 - 5.0 g/dL    Protein Total 6.7 (L) 6.8 - 8.8 g/dL    Alkaline Phosphatase 103 40 - 150 U/L    ALT 61 (H) 0 - 50 U/L    AST 22 0 - 45 U/L   Troponin I     Status: None   Result Value Ref Range    Troponin I ES <0.015 0.000 - 0.045 ug/L       Every 15 minutes.

## 2020-09-16 NOTE — PLAN OF CARE
"Face to face shift report received from Rosa DUEÑAS RN. Rounding completed, pt observed.  Jaye Pickard RN  9/16/2020  8:55 AM    Problem: Adult Behavioral Health Plan of Care  Goal: Patient-Specific Goal (Individualization)  Description:   Cooperate with treatment team recommendations including medications.   Will sleep 6-8 hrs nightly.  Attend at least 50% groups.       9/16/2020 0850 by Jaye Pickard RN  Outcome: Improving  Note: Shift Summery:   Patient initially in lounge at the start of this shift.  Patient asking to see nurse immediately and stating it \"was emergent\".  Patient complaining of dizziness and nausea.  Patient was assisted back to to her room.  Patient's hands were tremulous.  Patient began to stutter once in her room and remained tremulous.  VS obtained.  Nurse contacted Poornima RAMSEY NP to update on change in condition.  Patient did take scheduled AM meds along with Naproxen and Ativan 0.5 mg po.  VS obtained again and BP improved.  Patient able to eat a small amount of breakfast and currently resting in bed.  1300:  [facility] from treatment team meeting approximately 1030.  Patient states she is feeling less dizzy and more steady on feet.  Pressure behind right eye was improved.  Patient did complain of H/A; requested and given Tylenol 650 mg po and Ativan 1 mg po at 1147.  Papers were faxed to patient's insurance company at her request with success.  Patient also signed a HSELL for [facility] requesting verification of her being in-patient in 2/2018.  This was faxed with success.  Patient currently resting in bed.    1445:  Received patient's requested information arrived via fax and is a discharge summary from a mental health presentation where patient thought she had bulging eyes and something wrong with her head.  Summary put into patient paper chart.     Problem: Thought Process Alteration  Goal: Optimal Thought Clarity  Description: Will have " reality based conversations by discharge.   9/16/2020 0850 by Jaye Pickard, RN  Outcome: Improving     Face to face report will be communicated to oncoming RN.

## 2020-09-16 NOTE — PROGRESS NOTES
"[facility]    Medical Services Progress Note    Date of Service (when I saw the patient): 09/16/2020    Assessment & Plan     Principal Problem:    Disorganized behavior- pt sitting in lounge alone, appears to be talking to someone. When I introduced myself pt states \"I don't wanna talk to you, [facility] the hospital  is letting me go.\" When asked if we could step in her room pt states \"no, I am not going anywhere with you\". Asked if we could talk there and if I could listen to her heart and lungs pt stated \"No I am not talking to you and No you can't listen to anything\". Pt then turned and returned to her conversation stating \"yes, I don't know. I am getting rid of everyone, I am not sure how they got in here\"   9/16/20- pmhnp has been managing. See Pmhnp notes. Pt is speaking in more organized thoughts now.       Active Medical Problem:  Asthma- pt has albuterol on med list indications listed is for asthma. Due to pt mental status unable to confirm. Checked care everywhere and can't find an asthma diagnosis. Ordered prn albuterol for pt safety. Pt refused ROS and Physical exam. Pt talking in complete sentences, no distress, symmetric rise and fall of chest, no audible wheeze.   9/14/20- Denies chest pain , sob.   9/15/20- Denies chest pain, sob, wheezing.   9/16/20- Denies chest pain, sob, wheezing.     UTI- UA collected this morning revealed bacteria, moderate leukocyte esterase. Ordered nitrofurantoin and probiotic. Not sure if pt is symptomatic due to mental status.    9/14/20- resolved- denies any urinary symptoms.    Dental abscess- consulted by pmhnp. Pt complaining of back lower molar pain. Pt has broken tooth. Pt reports drainage and \"bad\" taste in mouth. She reports she is able to eat but it is painful to chew on that side. Pt instructed that she needs to have tooth abstracted outpatient. Pt verbalized understanding. Augmentin, probiotic, and magic mouth wash ordered.   9/15/20- pt " tolerating Augmentin well. Denies any side effects. Reports her mouth pain is much better and is able to eat without pain.   9/16/20- pt tolerating Augmentin well. Denies any side effects. Denies any mouth pain.     Knee pain- pt reports she fell through a deck back in July and hurt her left shoulder and right knee. She had a shoulder xray on 8/28/20 and it showed Impression:  No evidence of acute or subacute bony abnormality. Calcifications of the shoulder suggest calcific tendinosis of the rotator cuff. MRI would better characterize. She did not have a knee xray. Bruising is noted to right knee and she reports pain with movement. Pt has naproxen and tylenol ordered prn for pain. Knee xray ordered.   9/15/20- knee xray revealed Findings: Bones are normally mineralized. No evidence of acute or subacute fracture.  No evidence of dislocation.  Contour of the proximal tibia suggests an old healed proximal tibial fracture. Joint spaces are congruent. Small joint effusion. Impression: No evidence of acute or subacute bony abnormality. Findings suggesting healed fracture proximally in the right tibia.     Dry skin on face- pt reports that the soap is drying her skin out on her face. She report she has this issue frequently out patient and uses a lotion or ointment on it. Aquaphor ordered prn.   9/16/20- pt reports Aquaphor has helped with dry skin on face. Will continue Aquaphor prn.     Pt is potentially discharging to IRTS program and requesting physical exam 9/16/20. Updated PE.     covid- negative       Pt medically stable, no acute medical concerns. Chronic medical problems stable. Will sign off. Please consult for any new medical issues or concerns.     Code Status: Full Code.    Ana Wiggins CNP        -Data reviewed today: I reviewed all new labs and imaging results over the last 24 hours.     Physical Exam   Temp: 97  F (36.1  C) Temp src: Tympanic BP: 122/77 Pulse: 67   Resp: 18 SpO2: 93 % O2 Device: None (Room  air)    Vitals:    08/30/20 0700 09/06/20 1300 09/12/20 0730   Weight: 89.4 kg (197 lb) 91.2 kg (201 lb) 91.4 kg (201 lb 8 oz)     Vital Signs with Ranges  Temp:  [97  F (36.1  C)-98.5  F (36.9  C)] 97  F (36.1  C)  Pulse:  [67-79] 67  Resp:  [14-18] 18  BP: (122-150)/(62-80) 122/77  SpO2:  [93 %-97 %] 93 %  No intake/output data recorded.    Constitutional: awake, alert, cooperative, no apparent distress, and appears stated age  ENT: Normocephalic, without obvious abnormality, atraumatic, sinuses nontender on palpation, external ears without lesions, oral pharynx with moist mucous membranes, tonsils without erythema or exudates, gums normal. 2 broken teeth noted left upper and lower molar, mild erythema noted to lower molar, no drainage.   Hematologic / Lymphatic: no cervical lymphadenopathy  Respiratory: No increased work of breathing, good air exchange, clear to auscultation bilaterally, no crackles or wheezing  Cardiovascular: Normal apical impulse, regular rate and rhythm, normal S1 and S2, no S3 or S4, and no murmur noted  GI: No scars, normal bowel sounds, soft, non-distended, non-tender, no masses palpated, no hepatosplenomegally  Skin: normal skin color, texture, turgor, no redness, warmth, or swelling and no rashes  Musculoskeletal: Limited range of motion noted to left shoulder, no bruising, edema, erythema note, Right knee has 2 bruises, no erythema, no edema, full range of motion noted.  Tone is normal.  Neurologic: Awake, alert, oriented to name, place and time.     Neuropsychiatric: General: normal, calm and normal eye contact      Medications     amoxicillin-clavulanate  1 tablet Oral BID     ARIPiprazole  10 mg Oral At Bedtime     aspirin  81 mg Oral Daily     lactobacillus rhamnosus (GG)  1 capsule Oral BID     nicotine  1 patch Transdermal Daily     nicotine   Transdermal Q8H     propranolol  10 mg Oral TID       Data   Recent Labs   Lab 09/11/20  0922   TROPI <0.015       No results found for  this or any previous visit (from the past 24 hour(s)).

## 2020-09-16 NOTE — PLAN OF CARE
"\"Abeba\" from [facility] called to do an interview with pt for their IRTS facility. Abeba stated they would accept pt to their program pending a physical being completed, as well as a medication management appointment in their area scheduled. Gave NP a copy of physical paperwork in intake packet. Also called and received an intake packet for [facility] that pt needs to complete prior to making medication management appointment. Will follow-up with pt today on this information and paperwork.  "

## 2020-09-16 NOTE — PLAN OF CARE
Face to face report received from Rosa BOB RN.   Rounding completed, pt in bed appears to be sleeping.       Pt up a nurse's station requesting tylenol, and albuterol inhaler.   Pt received 650 mg tylenol at 00:51 for 8 out 10 overall pain.     Pt up in lounge area this morning, asking to have papers faxed.   Pt slept 4 hours this shift.     Problem: Adult Behavioral Health Plan of Care  Goal: Patient-Specific Goal (Individualization)  Description:   Cooperate with treatment team recommendations including medications.   Will sleep 6-8 hrs nightly.  Attend at least 50% groups.       9/16/2020 0118 by Rosa Franco, RN  Outcome: No Change  Note:        Problem: Thought Process Alteration  Goal: Optimal Thought Clarity  Description: Will have reality based conversations by discharge.   9/16/2020 0118 by Rosa Franco, RN  Outcome: No Change     Face to face end of shift report  to be communicated to oncoming RN.     Rosa Franco RN  9/16/2020  1:26 AM

## 2020-09-17 PROCEDURE — 25000132 ZZH RX MED GY IP 250 OP 250 PS 637: Mod: GY | Performed by: NURSE PRACTITIONER

## 2020-09-17 PROCEDURE — 12400011

## 2020-09-17 PROCEDURE — 99239 HOSP IP/OBS DSCHRG MGMT >30: CPT | Performed by: NURSE PRACTITIONER

## 2020-09-17 RX ORDER — ARIPIPRAZOLE 10 MG/1
10 TABLET ORAL AT BEDTIME
Qty: 30 TABLET | Refills: 0 | Status: ON HOLD | OUTPATIENT
Start: 2020-09-17 | End: 2023-12-04

## 2020-09-17 RX ORDER — LACTOBACILLUS RHAMNOSUS GG 10B CELL
1 CAPSULE ORAL 2 TIMES DAILY
Qty: 12 CAPSULE | Refills: 0 | Status: ON HOLD | OUTPATIENT
Start: 2020-09-17 | End: 2023-12-04

## 2020-09-17 RX ORDER — ALBUTEROL SULFATE 90 UG/1
1-2 AEROSOL, METERED RESPIRATORY (INHALATION) EVERY 4 HOURS PRN
Qty: 1 INHALER | Refills: 0 | Status: SHIPPED | OUTPATIENT
Start: 2020-09-17 | End: 2023-12-18

## 2020-09-17 RX ORDER — NAPROXEN 500 MG/1
500 TABLET ORAL 2 TIMES DAILY PRN
Qty: 30 TABLET | Refills: 0 | Status: SHIPPED | OUTPATIENT
Start: 2020-09-17 | End: 2024-03-04

## 2020-09-17 RX ORDER — PROPRANOLOL HYDROCHLORIDE 10 MG/1
10 TABLET ORAL 3 TIMES DAILY
Qty: 90 TABLET | Refills: 0 | Status: ON HOLD | OUTPATIENT
Start: 2020-09-17 | End: 2023-12-04

## 2020-09-17 RX ORDER — LORAZEPAM 1 MG/1
1 TABLET ORAL 3 TIMES DAILY PRN
Qty: 90 TABLET | Refills: 0 | Status: ON HOLD | OUTPATIENT
Start: 2020-09-17 | End: 2023-12-05 | Stop reason: DRUGHIGH

## 2020-09-17 RX ADMIN — LORAZEPAM 1 MG: 1 TABLET ORAL at 11:46

## 2020-09-17 RX ADMIN — Medication 1 CAPSULE: at 21:54

## 2020-09-17 RX ADMIN — AMOXICILLIN AND CLAVULANATE POTASSIUM 1 TABLET: 875; 125 TABLET, FILM COATED ORAL at 21:54

## 2020-09-17 RX ADMIN — Medication 1 CAPSULE: at 08:29

## 2020-09-17 RX ADMIN — NAPROXEN 500 MG: 500 TABLET ORAL at 08:29

## 2020-09-17 RX ADMIN — ARIPIPRAZOLE 10 MG: 10 TABLET ORAL at 21:54

## 2020-09-17 RX ADMIN — ACETAMINOPHEN 650 MG: 325 TABLET, FILM COATED ORAL at 15:10

## 2020-09-17 RX ADMIN — PROPRANOLOL HYDROCHLORIDE 10 MG: 10 TABLET ORAL at 14:02

## 2020-09-17 RX ADMIN — AMOXICILLIN AND CLAVULANATE POTASSIUM 1 TABLET: 875; 125 TABLET, FILM COATED ORAL at 08:29

## 2020-09-17 RX ADMIN — LORAZEPAM 1 MG: 1 TABLET ORAL at 04:34

## 2020-09-17 RX ADMIN — ACETAMINOPHEN 650 MG: 325 TABLET, FILM COATED ORAL at 04:34

## 2020-09-17 RX ADMIN — PROPRANOLOL HYDROCHLORIDE 10 MG: 10 TABLET ORAL at 08:28

## 2020-09-17 RX ADMIN — PROPRANOLOL HYDROCHLORIDE 10 MG: 10 TABLET ORAL at 21:59

## 2020-09-17 RX ADMIN — PROPRANOLOL HYDROCHLORIDE 10 MG: 10 TABLET ORAL at 21:54

## 2020-09-17 RX ADMIN — NICOTINE 1 PATCH: 14 PATCH, EXTENDED RELEASE TRANSDERMAL at 08:27

## 2020-09-17 RX ADMIN — ASPIRIN 81 MG: 81 TABLET, COATED ORAL at 08:29

## 2020-09-17 ASSESSMENT — ACTIVITIES OF DAILY LIVING (ADL)
DRESS: SCRUBS (BEHAVIORAL HEALTH)
HYGIENE/GROOMING: INDEPENDENT
HYGIENE/GROOMING: INDEPENDENT
DRESS: INDEPENDENT
LAUNDRY: UNABLE TO COMPLETE
ORAL_HYGIENE: INDEPENDENT
ORAL_HYGIENE: INDEPENDENT

## 2020-09-17 NOTE — PROGRESS NOTES
"[facility]  Psychiatric Progress Note      Impression:   Patient had court 9/4, full commit and Tineo supported. Still waiting to hear back from the court on the Seroquel as this helps her sleep.    Patient sitting at table in commons area.  She presents with much brighter affect than yesterday, logical and linear, states \"I feel super norm\".  She reports feeling better, got some rest and not as anxious.  We discuss [facility] accepting and getting all of the paperwork in, and depending on their intake dates, she could possible discharge tomorrow or early next week.  Patient reports feeling excited about leaving and plans to call her daughter to bring her some needed items before she leaves - encourage patient to check with staff to see what will be acceptable.        Educated regarding medication indications, risks, benefits, side effects, contraindications and possible interactions. Verbally expressed understanding.        Diagnoses:     Schizoaffective Disorder, bipolar type    Attestation:  Patient has been seen and evaluated by me,  Poornima Tucker, APRN CNP          Interim History:         The patient's care was discussed with the treatment team and chart notes were reviewed.           Plan/Treatment Team     BEHAVIORAL TEAM DISCUSSION    Progress:  moderate    Continued Stay Criteria/Rationale: Impaired thought process, committed and Tineo, discharge planning needed    Medical/Physical: tendinosis of rotator cuff likely cause of pain.  Naproxen helps    Precautions: none    Falls precaution?: No  Behavioral Orders   Procedures     Code 1 - Restrict to Unit     Routine Programming     As clinically indicated     Status 15        Plan for this encounter/Med Changes/Labs:     Propanolol 10mg tid - Was unable to tolerate long acting variety.   Ativan prn for anxiety  Continue Abilify 10mg    Full commit and Tineo    Tineo med list  abilify, invega, latuda, zyprexa           Medications: "     Current Facility-Administered Medications   Medication     acetaminophen (TYLENOL) tablet 650 mg     albuterol (PROAIR HFA/PROVENTIL HFA/VENTOLIN HFA) 108 (90 Base) MCG/ACT inhaler 1-2 puff     amoxicillin-clavulanate (AUGMENTIN) 875-125 MG per tablet 1 tablet     ARIPiprazole (ABILIFY) tablet 10 mg     artificial saliva (BIOTENE MT) solution 2 spray     aspirin EC tablet 81 mg     benzocaine (ORAJEL MAXIMUM STRENGTH) 20 % gel     docusate sodium (COLACE) capsule 50 mg     lactobacillus rhamnosus (GG) (CULTURELL) capsule 1 capsule     LORazepam (ATIVAN) tablet 1 mg     magic mouthwash suspension (diphenhydramine, lidocaine, aluminum-magnesium & simethicone)     magnesium hydroxide (MILK OF MAGNESIA) suspension 30 mL     mineral oil-hydrophilic petrolatum (AQUAPHOR)     naproxen (NAPROSYN) tablet 500 mg     nicotine (NICODERM CQ) 14 MG/24HR 24 hr patch 1 patch     nicotine (NICORETTE) gum 2-4 mg     nicotine Patch in Place     OLANZapine (zyPREXA) tablet 5-10 mg     propranolol (INDERAL) tablet 10 mg        10 point ROS denies uti symptoms though ua positive       Allergies:     Allergies   Allergen Reactions     Shrimp Anaphylaxis     Risperdal [Risperidone] Other (See Comments)     Elevated prolactin            Psychiatric Examination:   /79   Pulse 79   Temp 97.9  F (36.6  C)   Resp 14   Ht 1.524 m (5')   Wt 91.4 kg (201 lb 8 oz)   SpO2 94%   BMI 39.35 kg/m    Weight is 201 lbs 8 oz  Body mass index is 39.35 kg/m .    MSE/PSYCH  PSYCHIATRIC EXAM  /79   Pulse 79   Temp 97.9  F (36.6  C)   Resp 14   Ht 1.524 m (5')   Wt 91.4 kg (201 lb 8 oz)   SpO2 94%   BMI 39.35 kg/m    -Appearance/Behavior:  Appropriate  -Motor: intact  -Gait: intact  -Abnormal involuntary movements: none  -Mood: cooperative, brighter  -Affect: brighter  -Speech: appropriate  -Thought process/associations: no delusional statement today  -Thought content: No evidence of hallucination   -Perceptual disturbances: denies  si/hi,   -Suicidal/Homicidal Ideation: denies    -Judgment: fair  -Insight: fair  *Orientation: time, place and person.  *Memory: intact  *Attention: fair  *Language: fluent, no aphasias, able to repeat phrases and name objects. Vocab intact.  *Fund of information: difficult to assess, appears appropriate  *Cognitive functioning estimate: 0 - independent.           Labs:     Results for orders placed or performed during the hospital encounter of 08/10/20   XR Humerus Port Left G/E 2 Views     Status: None    Narrative    Exam: XR HUMERUS PORT LT     History:Female, age 50 years, left upper arm pain after fall.    Comparison:  None    Technique: Two views are submitted    Findings: Bones are normally mineralized. No evidence of acute or  subacute fracture.  No evidence of dislocation.  Calcific tendinosis  of the rotator cuff.           Impression    Impression:  No evidence of acute or subacute bony abnormality.     Calcifications of the shoulder suggest calcific tendinosis of the  rotator cuff. MRI would better characterize.    LUPE SANCHEZ MD   XR Knee Right 3 Views     Status: None    Narrative    Exam: XR KNEE RT 3 VW     History:Female, age 50 years, fell in July, pain and bruising in right  knee    Comparison:  None    Technique: Three views are submitted.    Findings: Bones are normally mineralized. No evidence of acute or  subacute fracture.  No evidence of dislocation.  Contour of the  proximal tibia suggests an old healed proximal tibial fracture. Joint  spaces are congruent. Small joint effusion.           Impression    Impression:  No evidence of acute or subacute bony abnormality.     Findings suggesting healed fracture proximally in the right tibia.    LUPE SANCHEZ MD   CBC with platelets differential     Status: Abnormal   Result Value Ref Range    WBC 13.7 (H) 4.0 - 11.0 10e9/L    RBC Count 5.09 3.8 - 5.2 10e12/L    Hemoglobin 17.2 (H) 11.7 - 15.7 g/dL    Hematocrit 49.2 (H) 35.0 - 47.0 %     MCV 97 78 - 100 fl    MCH 33.8 (H) 26.5 - 33.0 pg    MCHC 35.0 31.5 - 36.5 g/dL    RDW 12.6 10.0 - 15.0 %    Platelet Count 185 150 - 450 10e9/L    Diff Method Automated Method     % Neutrophils 82.4 %    % Lymphocytes 10.4 %    % Monocytes 6.3 %    % Eosinophils 0.3 %    % Basophils 0.2 %    % Immature Granulocytes 0.4 %    Nucleated RBCs 0 0 /100    Absolute Neutrophil 11.3 (H) 1.6 - 8.3 10e9/L    Absolute Lymphocytes 1.4 0.8 - 5.3 10e9/L    Absolute Monocytes 0.9 0.0 - 1.3 10e9/L    Absolute Eosinophils 0.0 0.0 - 0.7 10e9/L    Absolute Basophils 0.0 0.0 - 0.2 10e9/L    Abs Immature Granulocytes 0.1 0 - 0.4 10e9/L    Absolute Nucleated RBC 0.0    Comprehensive metabolic panel     Status: Abnormal   Result Value Ref Range    Sodium 129 (L) 133 - 144 mmol/L    Potassium 4.1 3.4 - 5.3 mmol/L    Chloride 95 94 - 109 mmol/L    Carbon Dioxide 25 20 - 32 mmol/L    Anion Gap 9 3 - 14 mmol/L    Glucose 107 (H) 70 - 99 mg/dL    Urea Nitrogen 9 7 - 30 mg/dL    Creatinine 0.54 0.52 - 1.04 mg/dL    GFR Estimate >90 >60 mL/min/[1.73_m2]    GFR Estimate If Black >90 >60 mL/min/[1.73_m2]    Calcium 9.1 8.5 - 10.1 mg/dL    Bilirubin Total 0.7 0.2 - 1.3 mg/dL    Albumin 3.2 (L) 3.4 - 5.0 g/dL    Protein Total 7.3 6.8 - 8.8 g/dL    Alkaline Phosphatase 131 40 - 150 U/L    ALT 57 (H) 0 - 50 U/L    AST 32 0 - 45 U/L   Alcohol ethyl     Status: None   Result Value Ref Range    Ethanol g/dL <0.01 0.01 g/dL   UA reflex to Microscopic     Status: Abnormal   Result Value Ref Range    Color Urine Yellow     Appearance Urine Slightly Cloudy     Glucose Urine Negative NEG^Negative mg/dL    Bilirubin Urine Negative NEG^Negative    Ketones Urine Negative NEG^Negative mg/dL    Specific Gravity Urine 1.017 1.003 - 1.035    Blood Urine Negative NEG^Negative    pH Urine 6.0 4.7 - 8.0 pH    Protein Albumin Urine 10 (A) NEG^Negative mg/dL    Urobilinogen mg/dL Normal 0.0 - 2.0 mg/dL    Nitrite Urine Negative NEG^Negative    Leukocyte Esterase Urine  Moderate (A) NEG^Negative    Source Midstream Urine     RBC Urine 4 (H) 0 - 2 /HPF    WBC Urine 18 (H) 0 - 5 /HPF    Bacteria Urine Few (A) NEG^Negative /HPF    Squamous Epithelial /HPF Urine 19 (H) 0 - 1 /HPF    Mucous Urine Present (A) NEG^Negative /LPF   Urine Drugs of Abuse Screen Panel 13     Status: None   Result Value Ref Range    Cannabinoids (90-ucd-3-carboxy-9-THC) Not Detected NDET^Not Detected ng/mL    Phencyclidine (Phencyclidine) Not Detected NDET^Not Detected ng/mL    Cocaine (Benzoylecgonine) Not Detected NDET^Not Detected ng/mL    Methamphetamine (d-Methamphetamine) Not Detected NDET^Not Detected ng/mL    Opiates (Morphine) Not Detected NDET^Not Detected ng/mL    Amphetamine (d-Amphetamine) Not Detected NDET^Not Detected ng/mL    Benzodiazepines (Nordiazepam) Not Detected NDET^Not Detected ng/mL    Tricyclic Antidepressants (Desipramine) Not Detected NDET^Not Detected ng/mL    Methadone (Methadone) Not Detected NDET^Not Detected ng/mL    Barbiturates (Butalbital) Not Detected NDET^Not Detected ng/mL    Oxycodone (Oxycodone) Not Detected NDET^Not Detected ng/mL    Propoxyphene (Norpropoxyphene) Not Detected NDET^Not Detected ng/mL    Buprenorphine (Buprenorphine) Not Detected NDET^Not Detected ng/mL   Asymptomatic COVID-19 Virus (Coronavirus) by PCR     Status: None    Specimen: Nasopharyngeal   Result Value Ref Range    COVID-19 Virus PCR to [facility] - Source Nasopharyngeal     COVID-19 Virus PCR to [facility] - Result       Test received-See reflex to [facility] [place] test SARS CoV2 (COVID-19) Virus RT-PCR   SARS-CoV-2 COVID-19 Virus (Coronavirus) RT-PCR Nasopharyngeal     Status: None    Specimen: Nasopharyngeal   Result Value Ref Range    SARS-CoV-2 Virus Specimen Source Nasopharyngeal     SARS-CoV-2 PCR Result NEGATIVE     SARS-CoV-2 PCR Comment       Testing was performed using the Xpert Xpress SARS-CoV-2 Assay on the Cepheid Gene-Xpert   Instrument Systems. Additional information about this Emergency  Use Authorization (EUA)   assay can be found via the Lab Guide.     Basic metabolic panel     Status: Abnormal   Result Value Ref Range    Sodium 133 133 - 144 mmol/L    Potassium 3.1 (L) 3.4 - 5.3 mmol/L    Chloride 100 94 - 109 mmol/L    Carbon Dioxide 26 20 - 32 mmol/L    Anion Gap 7 3 - 14 mmol/L    Glucose 178 (H) 70 - 99 mg/dL    Urea Nitrogen 12 7 - 30 mg/dL    Creatinine 0.52 0.52 - 1.04 mg/dL    GFR Estimate >90 >60 mL/min/[1.73_m2]    GFR Estimate If Black >90 >60 mL/min/[1.73_m2]    Calcium 8.8 8.5 - 10.1 mg/dL   Potassium     Status: None   Result Value Ref Range    Potassium 4.5 3.4 - 5.3 mmol/L   UA reflex to Microscopic     Status: None   Result Value Ref Range    Color Urine Light Yellow     Appearance Urine Clear     Glucose Urine Negative NEG^Negative mg/dL    Bilirubin Urine Negative NEG^Negative    Ketones Urine Negative NEG^Negative mg/dL    Specific Gravity Urine 1.018 1.003 - 1.035    Blood Urine Negative NEG^Negative    pH Urine 6.5 4.7 - 8.0 pH    Protein Albumin Urine Negative NEG^Negative mg/dL    Urobilinogen mg/dL Normal 0.0 - 2.0 mg/dL    Nitrite Urine Negative NEG^Negative    Leukocyte Esterase Urine Negative NEG^Negative    Source Midstream Urine    CBC with platelets differential     Status: Abnormal   Result Value Ref Range    WBC 7.2 4.0 - 11.0 10e9/L    RBC Count 4.75 3.8 - 5.2 10e12/L    Hemoglobin 16.0 (H) 11.7 - 15.7 g/dL    Hematocrit 46.5 35.0 - 47.0 %    MCV 98 78 - 100 fl    MCH 33.7 (H) 26.5 - 33.0 pg    MCHC 34.4 31.5 - 36.5 g/dL    RDW 12.1 10.0 - 15.0 %    Platelet Count 322 150 - 450 10e9/L    Diff Method Automated Method     % Neutrophils 63.1 %    % Lymphocytes 26.4 %    % Monocytes 7.1 %    % Eosinophils 2.2 %    % Basophils 0.6 %    % Immature Granulocytes 0.6 %    Nucleated RBCs 0 0 /100    Absolute Neutrophil 4.5 1.6 - 8.3 10e9/L    Absolute Lymphocytes 1.9 0.8 - 5.3 10e9/L    Absolute Monocytes 0.5 0.0 - 1.3 10e9/L    Absolute Eosinophils 0.2 0.0 - 0.7 10e9/L     Absolute Basophils 0.0 0.0 - 0.2 10e9/L    Abs Immature Granulocytes 0.0 0 - 0.4 10e9/L    Absolute Nucleated RBC 0.0    Comprehensive metabolic panel     Status: Abnormal   Result Value Ref Range    Sodium 135 133 - 144 mmol/L    Potassium 4.3 3.4 - 5.3 mmol/L    Chloride 104 94 - 109 mmol/L    Carbon Dioxide 27 20 - 32 mmol/L    Anion Gap 4 3 - 14 mmol/L    Glucose 111 (H) 70 - 99 mg/dL    Urea Nitrogen 13 7 - 30 mg/dL    Creatinine 0.59 0.52 - 1.04 mg/dL    GFR Estimate >90 >60 mL/min/[1.73_m2]    GFR Estimate If Black >90 >60 mL/min/[1.73_m2]    Calcium 9.0 8.5 - 10.1 mg/dL    Bilirubin Total 0.3 0.2 - 1.3 mg/dL    Albumin 3.0 (L) 3.4 - 5.0 g/dL    Protein Total 6.7 (L) 6.8 - 8.8 g/dL    Alkaline Phosphatase 103 40 - 150 U/L    ALT 61 (H) 0 - 50 U/L    AST 22 0 - 45 U/L   Troponin I     Status: None   Result Value Ref Range    Troponin I ES <0.015 0.000 - 0.045 ug/L       Every 15 minutes.

## 2020-09-17 NOTE — PLAN OF CARE
"Face to face shift report received from Tsering CHRISTIANSEN RN. Rounding completed, pt observed.  Jaye Pickard RN  9/17/2020  7:56 AM    Problem: Adult Behavioral Health Plan of Care  Goal: Patient-Specific Goal (Individualization)  Description:   Cooperate with treatment team recommendations including medications.   Will sleep 6-8 hrs nightly.  Attend at least 50% groups.       9/17/2020 0755 by Jaye Pickard RN  Outcome: Improving  Note: Shift Summery:  Patient up on the unit at the start of this shift.  Patient presents as anxious and with increased requests.  Patient on the phone a lot and trying to get some type of information on her banking.  Repeatedly asking if anything has been faxed here from the bank in [place].  No papers received on unit fax at this time.  Patient requested and given Naproxen with AM scheduled meds.  Patient requested and given Ativan 1 mg po at 1146.  Patient made a comment that the pill looked different when nurse giving it to her but nurse assured patient that she was getting what she had requested.  Patient then stated \"Oh I trust you\".   Patient states she was up early.  Hopeful to be able to discharge soon.    1511:  Patient complained of left shoulder pain rated 8/10.  Offered and accepted Tylenol 650 mg po.  Treatment Team okayed for daughter to drop off cash and a carton of cigarettes as patient will discharge early in AM.      Problem: Thought Process Alteration  Goal: Optimal Thought Clarity  Description: Will have reality based conversations by discharge.   9/17/2020 0755 by Jaye Pickard RN  Outcome: Improving  Patient focused on getting \"my finances together before I leave here\".     Face to face report will be communicated to oncoming RN.        "

## 2020-09-17 NOTE — PLAN OF CARE
Faxed completed intake packet to [facility] for medication management. Emailed and called Abeba from [facility] regarding intake date.

## 2020-09-17 NOTE — PLAN OF CARE
"  Problem: Adult Behavioral Health Plan of Care  Goal: Patient-Specific Goal (Individualization)  Description:   Cooperate with treatment team recommendations including medications.   Will sleep 6-8 hrs nightly.  Attend at least 50% groups.   9/16/2020 2236 by Radha Loaiza, RN  Outcome: Improving  Note: Pt had a bright affect. She described her mood as \"good.\" She participated in milieu activities and group programming. She was compliant with her scheduled medications. Pt verbalized that she does not believe that her medical issues are being addressed here. 2131 - Pt requested and rec'd 500 mg of PRN Naproxen for \"7/10\" left shoulder and right knee pain along with 1 mg of PRN Ativan for anxiety/sleep.     Face to face end of shift report communicated to oncoming RN.      Problem: Thought Process Alteration  Goal: Optimal Thought Clarity  Description: Will have reality based conversations by discharge.   9/16/2020 2236 by Radha Loaiza, RN  Outcome: Improving  Note: Pt was able to have a reality based conversation.     "

## 2020-09-17 NOTE — PLAN OF CARE
Problem: Thought Process Alteration  Goal: Optimal Thought Clarity  Description: Will have reality based conversations by discharge.   9/17/2020 1853 by Nat Hampton RN  Outcome: Improving   Patient is able to have a linear, reality based conversation with this writer.  No delusional or paranoid statements noted.        Problem: Adult Behavioral Health Plan of Care  Goal: Patient-Specific Goal (Individualization)  Description:   Cooperate with treatment team recommendations including medications.   Will sleep 6-8 hrs nightly.  Attend at least 50% groups.       9/17/2020 1853 by Nat Hampton RN  Outcome: Improving  Note:   Patient has been cooperative and medication compliant.  She reports feeling anxious and excited to discharge tomorrow.  Has been in lounge all shift and is social with peers and staff.  Denies all mental health criteria. Patient daughter dropped off a carton of cigarettes and $100 cash.  Cash counted by patient and placed in envelope and placed on front of chart for discharge.  Cigarettes are in belongings bin.  No complaints of pain.  VS WNL.  Face to face end of shift report communicated to night shift RN.     Nat Hampton RN  9/17/2020  6:55 PM

## 2020-09-17 NOTE — PLAN OF CARE
Face to face shift report received from Radha POP RN. Rounding completed, pt observed in room and appears to be sleeping at the start of shift.  Pt has been in bed with eyes closed and regular respirations. 15 minute and PRN checks all night. No complaints offered. Will continue to monitor.  0400- Patient up for the day.   0434- Patient requested Tylenol for joint pain reported 7/10 and for Lorazepam 1 mg due to anxiety rated 5/7 anxiety.   Patient reports Lorazepam helped anxiety at 2/7  Face to face report will be communicated to oncoming RN.    Tseirng Johnson RN  9/17/2020  6:55 AM    Problem: Adult Behavioral Health Plan of Care  Goal: Patient-Specific Goal (Individualization)  Description:   Cooperate with treatment team recommendations including medications.   Will sleep 6-8 hrs nightly.  Attend at least 50% groups.       9/17/2020 0003 by Tsering Johnson, RN  Outcome: Improving  Note:        Problem: Thought Process Alteration  Goal: Optimal Thought Clarity  Description: Will have reality based conversations by discharge.   9/17/2020 0003 by Tsering Johnson, RN  Outcome: Improving

## 2020-09-17 NOTE — PLAN OF CARE
"Pt will be discharging per treatment team tomorrow at 7:00am via taxi to [facility] in [place], [place]. Pharmacy used is [address] [place] [address] [phone]. Pt is aware and signed after-care plan. Called and left message for  \"Azul\".  "

## 2020-09-18 VITALS
SYSTOLIC BLOOD PRESSURE: 130 MMHG | TEMPERATURE: 97.9 F | HEART RATE: 88 BPM | BODY MASS INDEX: 39.56 KG/M2 | WEIGHT: 201.5 LBS | DIASTOLIC BLOOD PRESSURE: 76 MMHG | HEIGHT: 60 IN | RESPIRATION RATE: 16 BRPM | OXYGEN SATURATION: 94 %

## 2020-09-18 PROCEDURE — 25000132 ZZH RX MED GY IP 250 OP 250 PS 637: Mod: GY | Performed by: NURSE PRACTITIONER

## 2020-09-18 RX ADMIN — NAPROXEN 500 MG: 500 TABLET ORAL at 05:40

## 2020-09-18 RX ADMIN — LORAZEPAM 1 MG: 1 TABLET ORAL at 05:40

## 2020-09-18 RX ADMIN — ALBUTEROL SULFATE 2 PUFF: 90 AEROSOL, METERED RESPIRATORY (INHALATION) at 05:40

## 2020-09-18 RX ADMIN — LORAZEPAM 1 MG: 1 TABLET ORAL at 00:18

## 2020-09-18 NOTE — DISCHARGE SUMMARY
"[facility]    Discharge Summary  Adult Psychiatry    Date of Admission:  8/10/2020  Date of Discharge:  9/18/2020  Discharging Provider: Poornima Tucker    Discharge Diagnoses   Principal Discharge Diagnosis: Schizoaffective Disorder, Bipolar Type  Secondary Discharge Diagnosis: Generalized Anxiety, without use disorder  Medical Diagnoses addressed this admission: Moderate Pain, secondary to tendinosis of rotator cuff    History of Present Illness   (per ED 8/10/20) Glenda Robins is a 50 year old female who presented to the emergency department along with daughter for evaluation of possible psychosis.  The daughter tells me that the patient carries a history of schizophrenia with a recent hospitalization in 2018.  She was admitted to our facility on March 14 of 2018 and discharged on April 5.  She is supposed to take Abilify.  Daughter reports that she has episodes of virtually 4 months at a time.  Returns disheveled and delusional.  On her presentation here the patient had internal stimuli, delusional thoughts, disheveled appearance.  She denied any thoughts of harming herself or others.  Daughter however endorse the fact that her mother talked about killing the patient's father and other man on the way to the emergency room.      (per H&P on admission 8/11/20) Patient is lying in bed.  She states that she is able to go home now, remind her she was placed on 72 hour hold.  She then states \"oh no, your orders have changed now, go back and look at your computer screen\".   Patient denies any mood changes, denies suicidal or homicidal thoughts and denies hearing voices - she states \"I am refusing all medications\". Attempt to ask more questions, she then states \"I know who you are\" and I ask if we have met before, she again states \"I know who you are\" and then tells me she is currently on a teleconference with people downstairs and proceeds to conduct herself as if she is in a phone conversation - " "with her head facing opposite me, she states \"yes, she is back so I need security to come up right away and remove this woman from my room ... yes ...no .. I'm not sure how she got back here just send security please, thank you\".  And then she turns back to me and said \"you just need to go look at your computer screen now\".  She dismisses me.    Hospital Course   Patient continued to make delusional statements and inform staff she was a medium for the majority of her stay initially, declining any medications.  She did show minimal improvement after treatment of UTI, however continued to decline.  We filed a petition for civil commitment and Tineo through [facility], patient had court on 8/25/2020 and was decided full commit and Tineo granted.  We initiated Abilify with titration to 10mg, propanolol 10mg tid, and Ativan for anxiety.  Patient had days of periodic \"side effects\" for which she would act like she had a stuttering problem and saying it was the medication.  Discussed this with patient as staff/provider notice this seems purposeful and may prolong treatment if it is not truly a side effect - consequently this problem \"side effect\" seem to resolve within hours.  She also has tendency for psychosomatic complaints, some of pain - which is primarily legit, and some stating she has an autoimmune disorder, which has not been verified through record.      Today, patient presents with much brighter affect, has not been making delusional statements, and seems to be quite organized with her paperwork overall.  She denies suicidal or homicidal thoughts, denies auditory or visual hallucination and reports stable mood and is eager to discharge to next place.  At the time of discharge, there is no evidence this patient is in imminent danger of self harm or harming others and will discharge by transport to [facility].      Poornima Tucker, MADI CNP           Interim History:            The " patient's care was discussed with the treatment team and chart notes were reviewed.             Plan/Treatment Team      BEHAVIORAL TEAM DISCUSSION     Patient is discharging to [facility] in [place] at recommendation of treatment team     Full commit and Tineo     Tineo med list  Abilify, Invega, Latuda, Zyprexa       Psychiatry Follow-up:      [facility]- Committed and Tineo on 9/4/2020  - Azul Huff- [phone]  [address] [place] [address]  Phone:  [phone]   Fax - [phone]     [facility]  IRTS facility- accepted 9/16, discharging 9/18.  [address] [place] [address]  Phone: [phone]  Fax: [phone]     [employer]  Medication Management-Verito Hinojosadell-9/23 @ 4:00 via office visit.  [address] [place] [address]   Phone: [phone]   Fax: [phone]     Significant Results and Procedures       Pending Results   None  Unresulted Labs Ordered in the Past 30 Days of this Admission     No orders found from 7/11/2020 to 8/11/2020.        Code Status   Full Code    Primary Care Physician   Physician No Ref-Primary    Physical Exam   Appearance:  awake, alert  Attitude:  cooperative  Eye Contact:  good  Mood:  better  Affect:  mood congruent  Speech:  clear, coherent  Psychomotor Behavior:  no evidence of tardive dyskinesia, dystonia, or tics  Thought Process:  logical  Associations:  no loose associations  Thought Content:  no evidence of suicidal ideation or homicidal ideation and no evidence of psychotic thought  Insight:  fair  Judgment:  fair  Oriented to:  time, person, and place  Attention Span and Concentration:  fair  Recent and Remote Memory:  fair  Language: Able to name objects, Able to repeat phrases and Able to read and write  Fund of Knowledge: appropriate  Muscle Strength and Tone: normal  Gait and Station: Normal    Time Spent on this Encounter   Poornima TORRES  MADI Sheffield CNP, personally saw the patient today and spent greater than 30 minutes discharging this patient.    Discharge Disposition   Discharged to [facility]  Condition at discharge: Stable    Consultations This Hospital Stay   OCCUPATIONAL THERAPY ADULT IP CONSULT    Discharge Orders   No discharge procedures on file.  Discharge Medications   Discharge Medication List as of 9/18/2020  8:57 AM      START taking these medications    Details   amoxicillin-clavulanate (AUGMENTIN) 875-125 MG tablet Take 1 tablet by mouth 2 times daily, Disp-11 tablet,R-0, E-Prescribe      ARIPiprazole (ABILIFY) 10 MG tablet Take 1 tablet (10 mg) by mouth At Bedtime, Disp-30 tablet,R-0, E-Prescribe      lactobacillus rhamnosus, GG, (CULTURELL) capsule Take 1 capsule by mouth 2 times daily, Disp-12 capsule,R-0, Local Print      LORazepam (ATIVAN) 1 MG tablet Take 1 tablet (1 mg) by mouth 3 times daily as needed for anxiety, agitation or sleep, Disp-90 tablet,R-0, E-Prescribe      naproxen (NAPROSYN) 500 MG tablet Take 1 tablet (500 mg) by mouth 2 times daily as needed for moderate pain, Disp-30 tablet,R-0, E-Prescribe      propranolol (INDERAL) 10 MG tablet Take 1 tablet (10 mg) by mouth 3 times daily, Disp-90 tablet,R-0, E-Prescribe         CONTINUE these medications which have CHANGED    Details   albuterol (PROAIR HFA/PROVENTIL HFA/VENTOLIN HFA) 108 (90 Base) MCG/ACT inhaler Inhale 1-2 puffs into the lungs every 4 hours as needed for shortness of breath / dyspnea or wheezing, Disp-1 Inhaler,R-0, E-PrescribePharmacy may dispense brand covered by insurance (Proair, or proventil or ventolin or generic albuterol inhaler)           Allergies   Allergies   Allergen Reactions     Shrimp Anaphylaxis     Risperdal [Risperidone] Other (See Comments)     Elevated prolactin

## 2020-09-18 NOTE — DISCHARGE INSTRUCTIONS
Behavioral Discharge Planning and Instructions    Summary: Pt admitted with possible psychosis     Main Diagnosis: Schizophrenia, paranoid type     Major Treatments, Procedures and Findings: Stabilize with medications, connect with community programs.    Symptoms to Report: feeling more aggressive, increased confusion, losing more sleep, mood getting worse or thoughts of suicide    Lifestyle Adjustment: Take all medications as prescribed, meet with doctor/ medication provider, out patient therapist, , and [facility] worker as scheduled. Abstain from alcohol or any unprescribed drugs.    FOR ANY ADDITIONAL QUESTIONS OR CONCERNS CONTACT BEHAVIORAL HEALTH UNIT [phone].    Psychiatry Follow-up:     [facility]- Committed and Tineo on 9/4/2020  - Azul Huff- [phone]  [facility] [address] [address]  Phone:  [phone]   Fax - [phone]    [facility]  IRTS facility- accepted 9/16, discharging 9/18.  [address] [place] [address]  Phone: [phone]  Fax: [phone]    [employer]  Medication Management-Verito Francisco-9/23 @ 4:00 via office visit.  [address] [place] [address]   Phone: [phone]   Fax: [phone]       Resources:   Crisis Intervention: 601.403.1491 or 951-728-9585 (TTY: 404.974.1274).  Call anytime for help.  National [place] on Mental Illness (www.[place].neo.org): 114.947.3870 or 504-411-4310.  Alcoholics Anonymous (www.alcoholics-anonymous.org): Check your phone book for your local chapter.  Suicide Awareness Voices of Education (SAVE) (www.save.org): 645-100-FEIK (4363)  National Suicide Prevention Line (www.mentalhealthmn.org): 394-468-KTKH (2242)  Mental Health Consumer/Survivor Network of [place] (www.mhcsn.net): 146.390.3987 or 594-934-2346  Mental Health Association of [place] (www.mentalhealth.org): 875.977.2937 or 205-854-6882    General Medication  "Instructions:   See your medication sheet(s) for instructions.   Take all medicines as directed.  Make no changes unless your doctor suggests them.   Go to all your doctor visits.  Be sure to have all your required lab tests. This way, your medicines can be refilled on time.  Do not use any drugs not prescribed by your doctor.  Avoid alcohol.    Local Crisis Resources:  [place]:  [facility], Crisis stabilization \A Chronology of [place] Hospitals\""- 342.196.4474  [facility] Crisis Line: 1-980.396.4710  Advocates For Family Peace: 526-4604  Sexual Assault Program [facility]: 603.576.7310 or 1-953.134.9451  [place] Battered Women's Program: 1-240.473.7473 Ext: 279       Calls answered Mon-Fri-8:00 am--4:30 pm    [place]:  Advocates for Family Peace: 1-181.624.2467  [facility] - 2-975-145-3745  [facility] first call for help: 3-611-121-8977  [facility]:  (279) 824-5477    [place]:  Warm Line: 1-663.407.2578       Calls answered Tuesday--Saturday 4:00 pm--10:00 pm  [place] Crisis Line - 446.616.5579  [place] [facility] 963-259-4666    [place] Statewide:  [place] Crisis and Referral Services: 3-123-700-8545  National Suicide Prevention Lifeline: 0-430-570-TALK (6794)   - cgf1gkgb- Text \"Life\" to [address]20  First Call for Help: 2-1-1  [facility] Helpline- 8-452-FTKT-HELP   Crisis Text Line: Text  [place]  to 892563    "

## 2020-09-18 NOTE — PLAN OF CARE
Medications were retrieved from pharmacy. Klonopin bottle with 2 full tablets, 1/2 tablet and 1/4 tablet and Bottle of senna tablets. Patient's medications from medication room retrieved as well and placed in patient's belongings.     Discharge Note    Discharge Note    Patient Discharged to rehabilitation facility on 9/18/2020 6:59 AM via [employer] accompanied by  staff.     Patient informed of discharge instructions in AVS. patient verbalizes understanding and denies having any questions pertaining to AVS. Patient stable at time of discharge. Patient denies SI, HI, and thoughts of self harm at time of discharge. All personal belongings returned to patient. Discharge prescriptions sent to [facility] via electronic communication.    Tsering Johnson RN  9/18/2020  6:59 AM

## 2020-09-18 NOTE — PLAN OF CARE
Face to face shift report received from Nat BOB RN. Rounding completed, pt observed in [facility] at the start of shift.  0001- Patient reports she is looking forward to leaving, however, she is anxious at this time. Patient requesting a Lorazepam. Patient given 1 mg Lorazepam at 0018 for 4/7 anxiety.   Pt has been in bed with eyes closed and regular respirations. 15 minute and PRN checks all night. No complaints offered. Will continue to monitor.      Problem: Adult Behavioral Health Plan of Care  Goal: Patient-Specific Goal (Individualization)  Description:   Cooperate with treatment team recommendations including medications.   Will sleep 6-8 hrs nightly.  Attend at least 50% groups.       9/18/2020 0013 by Tsering Johnson, RN  Outcome: Improving  Note:        Problem: Thought Process Alteration  Goal: Optimal Thought Clarity  Description: Will have reality based conversations by discharge.   9/18/2020 0013 by Tsering Johnson, RN  Outcome: Improving

## 2020-10-15 ENCOUNTER — TRANSFERRED RECORDS (OUTPATIENT)
Dept: HEALTH INFORMATION MANAGEMENT | Facility: HOSPITAL | Age: 50
End: 2020-10-15

## 2020-10-15 LAB — HIV 1&2 EXT: NORMAL

## 2020-12-17 ENCOUNTER — TELEPHONE (OUTPATIENT)
Dept: FAMILY MEDICINE | Facility: OTHER | Age: 50
End: 2020-12-17

## 2020-12-17 NOTE — TELEPHONE ENCOUNTER
Pt called, requesting appt for sinus infection. States that she was tested for covid and was negative. Does not have a PCP. Pt was advised to be seen in UC. Pt verbalizes understanding.

## 2021-03-04 ENCOUNTER — MEDICAL CORRESPONDENCE (OUTPATIENT)
Dept: HEALTH INFORMATION MANAGEMENT | Facility: CLINIC | Age: 51
End: 2021-03-04

## 2023-12-04 ENCOUNTER — ANESTHESIA (OUTPATIENT)
Dept: MEDSURG UNIT | Facility: HOSPITAL | Age: 53
DRG: 189 | End: 2023-12-04
Payer: MEDICARE

## 2023-12-04 ENCOUNTER — ANESTHESIA EVENT (OUTPATIENT)
Dept: MEDSURG UNIT | Facility: HOSPITAL | Age: 53
DRG: 189 | End: 2023-12-04
Payer: MEDICARE

## 2023-12-04 ENCOUNTER — HOSPITAL ENCOUNTER (INPATIENT)
Facility: HOSPITAL | Age: 53
LOS: 5 days | Discharge: HOME | DRG: 189 | End: 2023-12-09
Attending: NURSE PRACTITIONER | Admitting: INTERNAL MEDICINE
Payer: MEDICARE

## 2023-12-04 ENCOUNTER — APPOINTMENT (OUTPATIENT)
Dept: GENERAL RADIOLOGY | Facility: HOSPITAL | Age: 53
DRG: 189 | End: 2023-12-04
Attending: NURSE PRACTITIONER
Payer: MEDICARE

## 2023-12-04 ENCOUNTER — APPOINTMENT (OUTPATIENT)
Dept: CT IMAGING | Facility: HOSPITAL | Age: 53
DRG: 189 | End: 2023-12-04
Attending: NURSE PRACTITIONER
Payer: MEDICARE

## 2023-12-04 DIAGNOSIS — J18.9 MULTIFOCAL PNEUMONIA: ICD-10-CM

## 2023-12-04 DIAGNOSIS — J96.01 ACUTE RESPIRATORY FAILURE WITH HYPOXIA (H): ICD-10-CM

## 2023-12-04 DIAGNOSIS — J18.1 MULTIFOCAL LUNG CONSOLIDATION (H): Primary | ICD-10-CM

## 2023-12-04 PROBLEM — F17.210 CIGARETTE NICOTINE DEPENDENCE, UNCOMPLICATED: Status: ACTIVE | Noted: 2020-10-15

## 2023-12-04 LAB
ALBUMIN SERPL BCG-MCNC: 4.4 G/DL (ref 3.5–5.2)
ALP SERPL-CCNC: 90 U/L (ref 40–150)
ALT SERPL W P-5'-P-CCNC: 23 U/L (ref 0–50)
ANION GAP SERPL CALCULATED.3IONS-SCNC: 14 MMOL/L (ref 7–15)
AST SERPL W P-5'-P-CCNC: 25 U/L (ref 0–45)
BASE EXCESS BLDV CALC-SCNC: 2.1 MMOL/L (ref -7.7–1.9)
BASE EXCESS BLDV CALC-SCNC: 3.4 MMOL/L (ref -7.7–1.9)
BILIRUB DIRECT SERPL-MCNC: <0.2 MG/DL (ref 0–0.3)
BILIRUB SERPL-MCNC: 0.5 MG/DL
BUN SERPL-MCNC: 20.8 MG/DL (ref 6–20)
CALCIUM SERPL-MCNC: 10 MG/DL (ref 8.6–10)
CHLORIDE SERPL-SCNC: 103 MMOL/L (ref 98–107)
COHGB MFR BLD: 1.6 % (ref 0–2)
CREAT SERPL-MCNC: 0.68 MG/DL (ref 0.51–0.95)
CRP SERPL-MCNC: 10.09 MG/L
DEPRECATED HCO3 PLAS-SCNC: 24 MMOL/L (ref 22–29)
EGFRCR SERPLBLD CKD-EPI 2021: >90 ML/MIN/1.73M2
ERYTHROCYTE [DISTWIDTH] IN BLOOD BY AUTOMATED COUNT: 14 % (ref 10–15)
FLUAV RNA SPEC QL NAA+PROBE: NEGATIVE
FLUBV RNA RESP QL NAA+PROBE: NEGATIVE
GLUCOSE SERPL-MCNC: 122 MG/DL (ref 70–99)
HCO3 BLDV-SCNC: 27 MMOL/L (ref 21–28)
HCO3 BLDV-SCNC: 29 MMOL/L (ref 21–28)
HCT VFR BLD AUTO: 50.1 % (ref 35–47)
HGB BLD-MCNC: 17.1 G/DL (ref 11.7–15.7)
HOLD SPECIMEN: NORMAL
HOLD SPECIMEN: NORMAL
MAGNESIUM SERPL-MCNC: 2.3 MG/DL (ref 1.7–2.3)
MCH RBC QN AUTO: 33.2 PG (ref 26.5–33)
MCHC RBC AUTO-ENTMCNC: 34.1 G/DL (ref 31.5–36.5)
MCV RBC AUTO: 97 FL (ref 78–100)
O2/TOTAL GAS SETTING VFR VENT: 21 %
O2/TOTAL GAS SETTING VFR VENT: 40 %
OXYHGB MFR BLDV: 92 % (ref 70–75)
PCO2 BLDV: 42 MM HG (ref 40–50)
PCO2 BLDV: 45 MM HG (ref 40–50)
PH BLDV: 7.42 [PH] (ref 7.32–7.43)
PH BLDV: 7.42 [PH] (ref 7.32–7.43)
PLATELET # BLD AUTO: 375 10E3/UL (ref 150–450)
PO2 BLDV: 71 MM HG (ref 25–47)
PO2 BLDV: 88 MM HG (ref 25–47)
POTASSIUM SERPL-SCNC: 4 MMOL/L (ref 3.4–5.3)
PROCALCITONIN SERPL IA-MCNC: 0.06 NG/ML
PROT SERPL-MCNC: 7.7 G/DL (ref 6.4–8.3)
RBC # BLD AUTO: 5.15 10E6/UL (ref 3.8–5.2)
RSV RNA SPEC NAA+PROBE: NEGATIVE
SARS-COV-2 RNA RESP QL NAA+PROBE: NEGATIVE
SODIUM SERPL-SCNC: 141 MMOL/L (ref 135–145)
TROPONIN T SERPL HS-MCNC: 7 NG/L
WBC # BLD AUTO: 10.9 10E3/UL (ref 4–11)

## 2023-12-04 PROCEDURE — 84484 ASSAY OF TROPONIN QUANT: CPT | Performed by: NURSE PRACTITIONER

## 2023-12-04 PROCEDURE — 99285 EMERGENCY DEPT VISIT HI MDM: CPT | Mod: 25

## 2023-12-04 PROCEDURE — 99285 EMERGENCY DEPT VISIT HI MDM: CPT | Mod: FS | Performed by: STUDENT IN AN ORGANIZED HEALTH CARE EDUCATION/TRAINING PROGRAM

## 2023-12-04 PROCEDURE — 250N000011 HC RX IP 250 OP 636: Performed by: RADIOLOGY

## 2023-12-04 PROCEDURE — C9803 HOPD COVID-19 SPEC COLLECT: HCPCS

## 2023-12-04 PROCEDURE — 86738 MYCOPLASMA ANTIBODY: CPT | Performed by: INTERNAL MEDICINE

## 2023-12-04 PROCEDURE — 120N000001 HC R&B MED SURG/OB

## 2023-12-04 PROCEDURE — 94640 AIRWAY INHALATION TREATMENT: CPT | Mod: 76

## 2023-12-04 PROCEDURE — 71275 CT ANGIOGRAPHY CHEST: CPT | Mod: MG

## 2023-12-04 PROCEDURE — 36415 COLL VENOUS BLD VENIPUNCTURE: CPT | Performed by: STUDENT IN AN ORGANIZED HEALTH CARE EDUCATION/TRAINING PROGRAM

## 2023-12-04 PROCEDURE — 87581 M.PNEUMON DNA AMP PROBE: CPT | Performed by: INTERNAL MEDICINE

## 2023-12-04 PROCEDURE — 99222 1ST HOSP IP/OBS MODERATE 55: CPT | Mod: AI | Performed by: INTERNAL MEDICINE

## 2023-12-04 PROCEDURE — 82248 BILIRUBIN DIRECT: CPT | Performed by: NURSE PRACTITIONER

## 2023-12-04 PROCEDURE — 80048 BASIC METABOLIC PNL TOTAL CA: CPT | Performed by: STUDENT IN AN ORGANIZED HEALTH CARE EDUCATION/TRAINING PROGRAM

## 2023-12-04 PROCEDURE — 250N000013 HC RX MED GY IP 250 OP 250 PS 637: Performed by: INTERNAL MEDICINE

## 2023-12-04 PROCEDURE — 71046 X-RAY EXAM CHEST 2 VIEWS: CPT

## 2023-12-04 PROCEDURE — 86140 C-REACTIVE PROTEIN: CPT | Performed by: INTERNAL MEDICINE

## 2023-12-04 PROCEDURE — 36415 COLL VENOUS BLD VENIPUNCTURE: CPT | Performed by: INTERNAL MEDICINE

## 2023-12-04 PROCEDURE — 250N000011 HC RX IP 250 OP 636: Performed by: INTERNAL MEDICINE

## 2023-12-04 PROCEDURE — 82375 ASSAY CARBOXYHB QUANT: CPT | Performed by: INTERNAL MEDICINE

## 2023-12-04 PROCEDURE — 250N000011 HC RX IP 250 OP 636: Performed by: NURSE PRACTITIONER

## 2023-12-04 PROCEDURE — 94640 AIRWAY INHALATION TREATMENT: CPT

## 2023-12-04 PROCEDURE — 36410 VNPNXR 3YR/> PHY/QHP DX/THER: CPT | Performed by: NURSE ANESTHETIST, CERTIFIED REGISTERED

## 2023-12-04 PROCEDURE — 84145 PROCALCITONIN (PCT): CPT | Performed by: INTERNAL MEDICINE

## 2023-12-04 PROCEDURE — 87633 RESP VIRUS 12-25 TARGETS: CPT | Performed by: INTERNAL MEDICINE

## 2023-12-04 PROCEDURE — 87637 SARSCOV2&INF A&B&RSV AMP PRB: CPT | Performed by: NURSE PRACTITIONER

## 2023-12-04 PROCEDURE — 82805 BLOOD GASES W/O2 SATURATION: CPT | Performed by: STUDENT IN AN ORGANIZED HEALTH CARE EDUCATION/TRAINING PROGRAM

## 2023-12-04 PROCEDURE — 93010 ELECTROCARDIOGRAM REPORT: CPT | Performed by: INTERNAL MEDICINE

## 2023-12-04 PROCEDURE — 250N000009 HC RX 250: Performed by: NURSE PRACTITIONER

## 2023-12-04 PROCEDURE — 83735 ASSAY OF MAGNESIUM: CPT | Performed by: NURSE PRACTITIONER

## 2023-12-04 PROCEDURE — 93005 ELECTROCARDIOGRAM TRACING: CPT

## 2023-12-04 PROCEDURE — 250N000009 HC RX 250: Performed by: INTERNAL MEDICINE

## 2023-12-04 PROCEDURE — 999N000157 HC STATISTIC RCP TIME EA 10 MIN

## 2023-12-04 PROCEDURE — 85027 COMPLETE CBC AUTOMATED: CPT | Performed by: STUDENT IN AN ORGANIZED HEALTH CARE EDUCATION/TRAINING PROGRAM

## 2023-12-04 PROCEDURE — 82803 BLOOD GASES ANY COMBINATION: CPT | Performed by: INTERNAL MEDICINE

## 2023-12-04 PROCEDURE — 258N000003 HC RX IP 258 OP 636: Performed by: INTERNAL MEDICINE

## 2023-12-04 RX ORDER — NICOTINE 21 MG/24HR
1 PATCH, TRANSDERMAL 24 HOURS TRANSDERMAL DAILY
Status: DISCONTINUED | OUTPATIENT
Start: 2023-12-04 | End: 2023-12-09 | Stop reason: HOSPADM

## 2023-12-04 RX ORDER — AZITHROMYCIN 500 MG/5ML
500 INJECTION, POWDER, LYOPHILIZED, FOR SOLUTION INTRAVENOUS EVERY 24 HOURS
Status: DISCONTINUED | OUTPATIENT
Start: 2023-12-04 | End: 2023-12-04

## 2023-12-04 RX ORDER — QUETIAPINE FUMARATE 100 MG/1
100 TABLET, FILM COATED ORAL AT BEDTIME
COMMUNITY

## 2023-12-04 RX ORDER — CEFTRIAXONE SODIUM 2 G/50ML
2 INJECTION, SOLUTION INTRAVENOUS ONCE
Status: COMPLETED | OUTPATIENT
Start: 2023-12-04 | End: 2023-12-04

## 2023-12-04 RX ORDER — ALBUTEROL SULFATE 0.83 MG/ML
2.5 SOLUTION RESPIRATORY (INHALATION) ONCE
Status: COMPLETED | OUTPATIENT
Start: 2023-12-04 | End: 2023-12-04

## 2023-12-04 RX ORDER — BUDESONIDE 0.5 MG/2ML
0.5 INHALANT ORAL ONCE
Status: COMPLETED | OUTPATIENT
Start: 2023-12-04 | End: 2023-12-04

## 2023-12-04 RX ORDER — ONDANSETRON 4 MG/1
4 TABLET, ORALLY DISINTEGRATING ORAL EVERY 6 HOURS PRN
Status: DISCONTINUED | OUTPATIENT
Start: 2023-12-04 | End: 2023-12-09 | Stop reason: HOSPADM

## 2023-12-04 RX ORDER — ARIPIPRAZOLE 5 MG/1
5 TABLET ORAL
Status: DISCONTINUED | OUTPATIENT
Start: 2023-12-04 | End: 2023-12-05

## 2023-12-04 RX ORDER — ONDANSETRON 2 MG/ML
4 INJECTION INTRAMUSCULAR; INTRAVENOUS EVERY 6 HOURS PRN
Status: DISCONTINUED | OUTPATIENT
Start: 2023-12-04 | End: 2023-12-09 | Stop reason: HOSPADM

## 2023-12-04 RX ORDER — ENOXAPARIN SODIUM 100 MG/ML
40 INJECTION SUBCUTANEOUS AT BEDTIME
Status: DISCONTINUED | OUTPATIENT
Start: 2023-12-04 | End: 2023-12-09 | Stop reason: HOSPADM

## 2023-12-04 RX ORDER — ACETAMINOPHEN 325 MG/1
650 TABLET ORAL EVERY 4 HOURS PRN
Status: DISCONTINUED | OUTPATIENT
Start: 2023-12-04 | End: 2023-12-09 | Stop reason: HOSPADM

## 2023-12-04 RX ORDER — NAPROXEN 500 MG/1
500 TABLET ORAL 2 TIMES DAILY PRN
Status: DISCONTINUED | OUTPATIENT
Start: 2023-12-04 | End: 2023-12-09 | Stop reason: HOSPADM

## 2023-12-04 RX ORDER — AMOXICILLIN 250 MG
1 CAPSULE ORAL 2 TIMES DAILY PRN
Status: DISCONTINUED | OUTPATIENT
Start: 2023-12-04 | End: 2023-12-09 | Stop reason: HOSPADM

## 2023-12-04 RX ORDER — QUETIAPINE FUMARATE 50 MG/1
50 TABLET, FILM COATED ORAL AT BEDTIME
COMMUNITY

## 2023-12-04 RX ORDER — ALBUTEROL SULFATE 0.83 MG/ML
2.5 SOLUTION RESPIRATORY (INHALATION) EVERY 4 HOURS PRN
Status: DISCONTINUED | OUTPATIENT
Start: 2023-12-04 | End: 2023-12-09 | Stop reason: HOSPADM

## 2023-12-04 RX ORDER — LORAZEPAM 1 MG/1
1 TABLET ORAL 3 TIMES DAILY PRN
Status: DISCONTINUED | OUTPATIENT
Start: 2023-12-04 | End: 2023-12-05

## 2023-12-04 RX ORDER — CALCIUM CARBONATE 500 MG/1
1000 TABLET, CHEWABLE ORAL 4 TIMES DAILY PRN
Status: DISCONTINUED | OUTPATIENT
Start: 2023-12-04 | End: 2023-12-09 | Stop reason: HOSPADM

## 2023-12-04 RX ORDER — QUETIAPINE FUMARATE 50 MG/1
150 TABLET, FILM COATED ORAL AT BEDTIME
Status: DISCONTINUED | OUTPATIENT
Start: 2023-12-04 | End: 2023-12-09 | Stop reason: HOSPADM

## 2023-12-04 RX ORDER — CEFTRIAXONE SODIUM 1 G/50ML
1 INJECTION, SOLUTION INTRAVENOUS EVERY 24 HOURS
Status: DISCONTINUED | OUTPATIENT
Start: 2023-12-05 | End: 2023-12-08

## 2023-12-04 RX ORDER — ARIPIPRAZOLE 5 MG/1
5 TABLET ORAL AT BEDTIME
COMMUNITY

## 2023-12-04 RX ORDER — AMOXICILLIN 250 MG
2 CAPSULE ORAL 2 TIMES DAILY PRN
Status: DISCONTINUED | OUTPATIENT
Start: 2023-12-04 | End: 2023-12-09 | Stop reason: HOSPADM

## 2023-12-04 RX ORDER — IOPAMIDOL 755 MG/ML
67 INJECTION, SOLUTION INTRAVASCULAR ONCE
Status: COMPLETED | OUTPATIENT
Start: 2023-12-04 | End: 2023-12-04

## 2023-12-04 RX ORDER — LIDOCAINE 40 MG/G
CREAM TOPICAL
Status: DISCONTINUED | OUTPATIENT
Start: 2023-12-04 | End: 2023-12-09 | Stop reason: HOSPADM

## 2023-12-04 RX ORDER — ACETAMINOPHEN 650 MG/1
650 SUPPOSITORY RECTAL EVERY 4 HOURS PRN
Status: DISCONTINUED | OUTPATIENT
Start: 2023-12-04 | End: 2023-12-09 | Stop reason: HOSPADM

## 2023-12-04 RX ORDER — QUETIAPINE FUMARATE 50 MG/1
50 TABLET, FILM COATED ORAL AT BEDTIME
Status: DISCONTINUED | OUTPATIENT
Start: 2023-12-04 | End: 2023-12-04

## 2023-12-04 RX ADMIN — CEFTRIAXONE SODIUM 2 G: 2 INJECTION, SOLUTION INTRAVENOUS at 16:40

## 2023-12-04 RX ADMIN — AZITHROMYCIN 500 MG: 500 INJECTION, POWDER, LYOPHILIZED, FOR SOLUTION INTRAVENOUS at 19:53

## 2023-12-04 RX ADMIN — ALBUTEROL SULFATE 2.5 MG: 2.5 SOLUTION RESPIRATORY (INHALATION) at 12:36

## 2023-12-04 RX ADMIN — ALBUTEROL SULFATE 2.5 MG: 2.5 SOLUTION RESPIRATORY (INHALATION) at 20:03

## 2023-12-04 RX ADMIN — BUDESONIDE INHALATION 0.5 MG: 0.5 SUSPENSION RESPIRATORY (INHALATION) at 12:36

## 2023-12-04 RX ADMIN — QUETIAPINE FUMARATE 150 MG: 50 TABLET ORAL at 21:45

## 2023-12-04 RX ADMIN — SODIUM CHLORIDE 500 ML: 9 INJECTION, SOLUTION INTRAVENOUS at 20:23

## 2023-12-04 RX ADMIN — ARIPIPRAZOLE 5 MG: 5 TABLET ORAL at 20:17

## 2023-12-04 RX ADMIN — ENOXAPARIN SODIUM 40 MG: 40 INJECTION SUBCUTANEOUS at 21:46

## 2023-12-04 RX ADMIN — IOPAMIDOL 67 ML: 755 INJECTION, SOLUTION INTRAVENOUS at 15:04

## 2023-12-04 ASSESSMENT — ACTIVITIES OF DAILY LIVING (ADL)
ADLS_ACUITY_SCORE: 20
ADLS_ACUITY_SCORE: 35

## 2023-12-04 ASSESSMENT — ENCOUNTER SYMPTOMS
ALLERGIC/IMMUNOLOGIC NEGATIVE: 1
PSYCHIATRIC NEGATIVE: 1
CARDIOVASCULAR NEGATIVE: 1
ENDOCRINE NEGATIVE: 1
HEMATOLOGIC/LYMPHATIC NEGATIVE: 1
MUSCULOSKELETAL NEGATIVE: 1
CONSTITUTIONAL NEGATIVE: 1
SHORTNESS OF BREATH: 1
COUGH: 1
NEUROLOGICAL NEGATIVE: 1
GASTROINTESTINAL NEGATIVE: 1
EYES NEGATIVE: 1

## 2023-12-04 NOTE — ED NOTES
Patient being admitted to [facility]-surg via stretcher accompanied by unit assistants and daughter at bedside.

## 2023-12-04 NOTE — ED NOTES
House Supervisor Tiffanie calling anesthesia for new IV site.   Has been stuck many times by RNs for IV starts earlier in ER.

## 2023-12-04 NOTE — ANESTHESIA PREPROCEDURE EVALUATION
"Anesthesia Pre-Procedure Evaluation    Patient: Glenda Robins   MRN: [id number] : 1970        Procedure :           No past medical history on file.   No past surgical history on file.   Allergies   Allergen Reactions    Shrimp Anaphylaxis    Risperdal [Risperidone] Other (See Comments)     Elevated prolactin      Social History     Tobacco Use    Smoking status: Not on file    Smokeless tobacco: Not on file   Substance Use Topics    Alcohol use: Not on file      Wt Readings from Last 1 Encounters:   No data found for Wt              OUTSIDE LABS:  CBC:   Lab Results   Component Value Date    WBC 10.9 2023    WBC 7.2 2020    HGB 17.1 (H) 2023    HGB 16.0 (H) 2020    HCT 50.1 (H) 2023    HCT 46.5 2020     2023     2020     BMP:   Lab Results   Component Value Date     2023     2020    POTASSIUM 4.0 2023    POTASSIUM 4.3 2020    CHLORIDE 103 2023    CHLORIDE 104 2020    CO2 24 2023    CO2 27 2020    BUN 20.8 (H) 2023    BUN 13 2020    CR 0.68 2023    CR 0.59 2020     (H) 2023     (H) 2020     COAGS: No results found for: \"PTT\", \"INR\", \"FIBR\"  POC:   Lab Results   Component Value Date    HCGS Negative 2018     HEPATIC:   Lab Results   Component Value Date    ALBUMIN 4.4 2023    PROTTOTAL 7.7 2023    ALT 23 2023    AST 25 2023    ALKPHOS 90 2023    BILITOTAL 0.5 2023     OTHER:   Lab Results   Component Value Date    LACT 1.0 2018    BEST 10.0 2023    MAG 2.3 2023    TSH 1.21 2018       Anesthesia Plan    ASA Status:  3    - Procedure: Procedure only, no anesthetic delivered                    Consents            Postoperative Care            Comments:               Lanette Chappell, APRN CRNA    I have reviewed the pertinent notes and labs in the chart from the past 30 days " and (re)examined the patient.  Any updates or changes from those notes are reflected in this note.

## 2023-12-04 NOTE — ED TRIAGE NOTES
Reports 3 weeks of cough and increasing SOB. Reports chest congestion. Intermittent fever. Denies Hx of COPD, asthma. Negative COVID 1 week ago. Coughing up yellow sputum. Placed on O2 via [place] in triage for RA O2 of 88%.

## 2023-12-04 NOTE — DISCHARGE INSTRUCTIONS
Appointments:Hospital follow up/ establish care appointment scheduled for Monday, December 18th, at 11:00 am, with Radha Andres NP at [facility]  Please call [phone] if you need to reschedule.     VIR Pre-Procedure H&P      SUBJECTIVE:     Chief Complaint: Cholestasis    History of Present Illness:  Joshua Slade is a 4 wk.o. female who presents for random liver biopsy and cholangiogram  Past Medical History:   Diagnosis Date    Single liveborn infant 2023     History reviewed. No pertinent surgical history.    Home Meds:   Prior to Admission medications    Not on File     Anticoagulants/Antiplatelets: no anticoagulation    Allergies: Review of patient's allergies indicates:  No Known Allergies  Sedation History:  no adverse reactions    Review of Systems:   Hematological: previous history of hematemesis and possible coagulapathy, thought to be resolved.  Respiratory: no shortness of breath  Gastrointestinal: no change in bowel habits, or black or bloody stools  no abdominal pain  Musculoskeletal: negative  Neurological: negative         OBJECTIVE:     Vital Signs (Most Recent)  Temp: 97.9 °F (36.6 °C) (07/25/23 0628)    Physical Exam:  ASA: Per anesthesia  Mallampati: Per anesthesia    General: no acute distress  Mental Status: alert and oriented to person, place and time  HEENT: normocephalic, atraumatic  Chest: unlabored breathing  Heart: regular heart rate  Abdomen: nondistended  Extremity: moves all extremities    Laboratory  Lab Results   Component Value Date    INR 1.3 (H) 2023       Lab Results   Component Value Date    WBC 12.04 2023    HGB 16.3 2023    HCT 47.9 2023     2023     2023      Lab Results   Component Value Date    GLU 77 2023     2023    K 4.7 2023     2023    CO2 19 (L) 2023    BUN 4 (L) 2023    CREATININE 0.5 2023    CALCIUM 10.2 2023    ALT 38 2023    AST 90 (H) 2023    ALBUMIN 2.9 2023    BILITOT 5.2 2023    BILIDIR 2.2 (H) 2023       ASSESSMENT/PLAN:     Sedation Plan: Per aensthesia  Patient will undergo Random liver biopsy and  cholangiogram.      Jd Barrett MD  Vascular/Interventional Radiology Fellow

## 2023-12-04 NOTE — ED PROVIDER NOTES
History     Chief Complaint   Patient presents with    Shortness of Breath     HPI  Glenda Robins is a 53 year old individual with history of schizophrenia, disorganized behavior, psychosis, comes in with complaints of shortness of breath.  Patient states that she has had a cough with chest congestion for the past 3 weeks that has been worsening.  Now is coughing up thick yellow sputum and states has increased shortness of breath.  For this reason patient comes in.  Currently denies fever or chills.  No chest pain.  States that she does have sick contacts at home.  Patient was hypoxic in triage and placed on oxygen.    Allergies:  Allergies   Allergen Reactions    Shrimp Anaphylaxis    Risperdal [Risperidone] Other (See Comments)     Elevated prolactin       Problem List:    Patient Active Problem List    Diagnosis Date Noted    Acute respiratory failure with hypoxia (H) 12/04/2023     Priority: Medium    Multifocal pneumonia 12/04/2023     Priority: Medium    Cigarette nicotine dependence, uncomplicated 10/15/2020     Priority: Medium    Disorganized behavior 08/10/2020     Priority: Medium    Schizophrenia (H) 03/21/2018     Priority: Medium    Psychosis (H) 03/14/2018     Priority: Medium        Past Medical History:    No past medical history on file.    Past Surgical History:    No past surgical history on file.    Family History:    No family history on file.    Social History:  Marital Status:  Single [1]        Medications:    No current outpatient medications on file.        Review of Systems   Constitutional: Negative.    HENT: Negative.     Eyes: Negative.    Respiratory:  Positive for cough and shortness of breath.    Cardiovascular: Negative.    Gastrointestinal: Negative.    Endocrine: Negative.    Genitourinary: Negative.    Musculoskeletal: Negative.    Skin: Negative.    Allergic/Immunologic: Negative.    Neurological: Negative.    Hematological: Negative.    Psychiatric/Behavioral: Negative.          Physical Exam   BP: 116/84  Pulse: 114  Temp: 97  F (36.1  C)  Resp: 20  Height: 152.4 cm (5')  SpO2: (!) 88 %      GENERAL APPEARANCE:  The patient is a 53 year old well-developed, well-nourished individual in no acute distress that appears as stated age.  NECK:  Supple.  Trachea is midline.  CHEST:  Symmetric.  Non-tender to palpation.  No crepitus or deformity.  LUNGS: Breathing is mildly labored.  Breath sounds are dim in the bases.  Does have expiratory wheezes and scattered rhonchi.  Congested cough present.  HEART:  Regular rate and rhythm with normal S1 and S2.  No murmurs, gallops, or rubs.  ABDOMEN: No abdominal bruits or thrills present upon auscultation/palpation.  EXTREMITIES:  No cyanosis, clubbing, or edema.    NEUROLOGIC:  No focal sensory or motor deficits are noted.    PSYCHIATRIC:  The patient is awake, alert, and oriented x4.  Recent and remote memory is intact.  Appropriate mood and affect.  Calm and cooperative with history and physical exam.  SKIN:  Warm, dry, and well perfused.  Good turgor.  No lesions, nodules, or rashes are noted.  No bruising noted.      Comment: Discrepancies between my note and notes on behalf of the nursing team or other care providers are secondary to my findings reflecting my physical examination and questioning of the patient.  Any conflicting information provided is not in line with my examination of the patient.       ED Course              ED Course as of 12/04/23 1729   Mon Dec 04, 2023   1150 Labs, ECG, chest x-ray ordered while patient in lobby.   1216 In to see patient and history/physical completed.    1219 Albuterol and Pulmicort nebulizers ordered.   1220 EKG 12-lead, tracing only  No STEMI noted.   1412 Chest x-ray negative.  CT angio of the chest ordered for possible PE.  Does show patchy airspace consolidation in bilateral lobes consistent with multifocal pneumonia.  Ceftriaxone 2 g and azithromycin 500 mg IV ordered.   1628 This case with  hospitalist Dr. Jerome who graciously accepted patient for admission to [facility].     Procedures       ECG:    ECG competed at 1220 and personally reviewed at 1220 showing sinus tachycardia with ventricular rate of 102 and QTc of 401.  Normal axis.  Possible inferior infarct age-indeterminate changes noted.  Abnormal ECG.  When compared to ECG from 8/10/2020, possible inferior infarct age-indeterminate is now noted.  No other significant changes present.         Results for orders placed or performed during the hospital encounter of 12/04/23 (from the past 24 hour(s))   [place] Draw *Canceled*    Narrative    The following orders were created for panel order [place] Draw.  Procedure                               Abnormality         Status                     ---------                               -----------         ------                       Please view results for these tests on the individual orders.   EKG 12-lead, tracing only   Result Value Ref Range    Systolic Blood Pressure  mmHg    Diastolic Blood Pressure  mmHg    Ventricular Rate 102 BPM    Atrial Rate 102 BPM    AR Interval 134 ms    QRS Duration 78 ms     ms    QTc 401 ms    P Axis 74 degrees    R AXIS -23 degrees    T Axis 36 degrees    Interpretation ECG       Sinus tachycardia  Low voltage QRS  Possible Inferior infarct , age undetermined  Abnormal ECG  No previous ECGs available     Blood gas venous and oxyhgb   Result Value Ref Range    pH Venous 7.42 7.32 - 7.43    pCO2 Venous 42 40 - 50 mm Hg    pO2 Venous 71 (H) 25 - 47 mm Hg    Bicarbonate Venous 27 21 - 28 mmol/L    FIO2 40     Oxyhemoglobin Venous 92 (H) 70 - 75 %    Base Excess/Deficit 2.1 (H) -7.7 - 1.9 mmol/L   CBC with platelets   Result Value Ref Range    WBC Count 10.9 4.0 - 11.0 10e3/uL    RBC Count 5.15 3.80 - 5.20 10e6/uL    Hemoglobin 17.1 (H) 11.7 - 15.7 g/dL    Hematocrit 50.1 (H) 35.0 - 47.0 %    MCV 97 78 - 100 fL    MCH 33.2 (H) 26.5 - 33.0 pg    MCHC 34.1 31.5 -  36.5 g/dL    RDW 14.0 10.0 - 15.0 %    Platelet Count 375 150 - 450 10e3/uL   Basic metabolic panel   Result Value Ref Range    Sodium 141 135 - 145 mmol/L    Potassium 4.0 3.4 - 5.3 mmol/L    Chloride 103 98 - 107 mmol/L    Carbon Dioxide (CO2) 24 22 - 29 mmol/L    Anion Gap 14 7 - 15 mmol/L    Urea Nitrogen 20.8 (H) 6.0 - 20.0 mg/dL    Creatinine 0.68 0.51 - 0.95 mg/dL    GFR Estimate >90 >60 mL/min/1.73m2    Calcium 10.0 8.6 - 10.0 mg/dL    Glucose 122 (H) 70 - 99 mg/dL   Troponin T, High Sensitivity   Result Value Ref Range    Troponin T, High Sensitivity 7 <=14 ng/L   Magnesium   Result Value Ref Range    Magnesium 2.3 1.7 - 2.3 mg/dL   Hepatic function panel   Result Value Ref Range    Protein Total 7.7 6.4 - 8.3 g/dL    Albumin 4.4 3.5 - 5.2 g/dL    Bilirubin Total 0.5 <=1.2 mg/dL    Alkaline Phosphatase 90 40 - 150 U/L    AST 25 0 - 45 U/L    ALT 23 0 - 50 U/L    Bilirubin Direct <0.20 0.00 - 0.30 mg/dL   Extra Tube    Narrative    The following orders were created for panel order Extra Tube.  Procedure                               Abnormality         Status                     ---------                               -----------         ------                     Extra Blue Top Tube[[id number]]                              Final result               Extra Red Top Tube[[id number]]                               Final result                 Please view results for these tests on the individual orders.   Extra Blue Top Tube   Result Value Ref Range    Hold Specimen JIC    Extra Red Top Tube   Result Value Ref Range    Hold Specimen JIC    Symptomatic Influenza A/B, RSV, & SARS-CoV2 PCR (COVID-19) Nasopharyngeal    Specimen: Nasopharyngeal; Swab   Result Value Ref Range    Influenza A PCR Negative Negative    Influenza B PCR Negative Negative    RSV PCR Negative Negative    SARS CoV2 PCR Negative Negative    Narrative    Testing was performed using the Xpert Xpress CoV2/Flu/RSV Assay on the [employer]  Instrument. This test should be ordered for the detection of SARS-CoV-2, influenza, and RSV viruses in individuals who meet clinical and/or epidemiological criteria. Test performance is unknown in asymptomatic patients. This test is for in vitro diagnostic use under the FDA EUA for laboratories certified under CLIA to perform high or moderate complexity testing. This test has not been FDA cleared or approved. A negative result does not rule out the presence of PCR inhibitors in the specimen or target RNA in concentration below the limit of detection for the assay. If only one viral target is positive but coinfection with multiple targets is suspected, the sample should be re-tested with another FDA cleared, approved, or authorized test, if coinfection would change clinical management. This test was validated by the [facility] and [facility]. These laboratories are certified under the Clinical Laboratory Improvement Amendments of 1988 (CLIA-88) as qualified to perform high complexity laboratory testing.   XR Chest 2 Views    Narrative    PROCEDURE:  XR CHEST 2 VIEWS    HISTORY:  Shortness of breath.     COMPARISON:  None.    FINDINGS:   The cardiac silhouette is normal in size. The pulmonary vasculature is  normal.  The lungs are clear. There is an old healed left rib fracture  with some associated pleural thickening.      Impression    IMPRESSION:  No acute cardiopulmonary disease.      ARAM MCMANUS MD         SYSTEM ID:  [id number]   CTA Chest with Contrast    Narrative    PROCEDURE:  CTA CHEST WITH CONTRAST.    HISTORY:  Evaluate for pulmonary embolism.  Hypoxia, shortness of  breath    TECHNIQUE:  Initial pre-contrast  and localizer images were  obtained.  Contrast enhanced helical CT pulmonary angiography was then  performed.  Routine transaxial and post-processed (multiplanar and/or  MIP) reformations were obtained. This CT exam was performed using one  or more the following dose reduction  techniques: automated exposure  control, adjustment of the mA and/or kV according to patient size,  and/or iterative reconstruction technique.    COMPARISON:  None.    MEDS/CONTRAST: isovue 370 67mL    PULMONARY CTA FINDINGS:  This is a diagnostic quality helical CT  pulmonary angiogram.  There is no acute pulmonary embolism to the  first subsegmental pulmonary artery level.    OTHER FINDINGS:      The neck base is grossly symmetric. Cardiac size is normal. There is  no pericardial or pleural effusion. The thoracic aorta and main  pulmonary artery are within normal limits in size. A few upper normal  caliber mediastinal nodes are nonspecific.    Limited views of the upper abdomen reveal numerous hepatic cysts.    The pleura is without thickening or effusions. The central airways are  unremarkable. Posterior dependent central lung patchy airspace  consolidation is seen, worse on the right than the left. Mild  centrilobular emphysematous changes are present    No suspicious osseous lesion is identified.      Impression    IMPRESSION:    No acute pulmonary emboli to the subsegmental level.    Patchy airspace consolidation in the dependent, mid right more so than  left lungs. Differential considerations include multifocal pneumonia  or aspiration pneumonitis. Recommend follow-up to resolution.    Polycystic disease of the liver.     AR BLAIR MD         SYSTEM ID:  [id number]       Medications   azithromycin 500 mg (ZITHROMAX) in 0.9% NaCl 250 mL intermittent infusion 500 mg (has no administration in time range)   albuterol (PROVENTIL) neb solution 2.5 mg (2.5 mg Nebulization $Given 12/4/23 1236)   budesonide (PULMICORT) neb solution 0.5 mg (0.5 mg Nebulization $Given 12/4/23 1236)   iopamidol (ISOVUE-370) solution 67 mL (67 mLs Intravenous $Given 12/4/23 1504)   sodium chloride (PF) 0.9% PF flush 100 mL (100 mLs Intravenous $Given 12/4/23 1504)   cefTRIAXone IN D5W (ROCEPHIN) intermittent infusion 2 g (0 g  Intravenous Stopped 12/4/23 1705)       Assessments & Plan (with Medical Decision Making)     I have reviewed the nursing notes.    I have reviewed the findings, diagnosis, plan and need for follow up with the patient.      Summary:  Patient presents to the ER today for shortness of breath.  Potential diagnosis which have been considered and evaluated include pneumonia, pneumothorax, MI/NSTEMI, arrhythmia, CHF, as well as others. Many of these have been excluded using the various modalities and assessment as noted on the chart. At the present time, the diagnosis given seems to be the most likely multifocal pneumonia causing respiratory failure with hypoxia.  Upon arrival, vitals signs show blood pressure 116/84 with a pulse of 114.  Temperature 36.1  C.  Respirations 24 with oxygenation of 88% on room air.  The patient is alert but does have labored breathing.  Lung sounds are dim in bilateral bases with scattered expiratory wheezes and rhonchi.  Patient does have congested cough.  Patient did have low oxygenation so placed on O2 via NC with improvement of oxygenation.  ECG obtained showing no acute abnormalities.  Lab work obtained showing WBC of 10.9 with hemoglobin of 7-8.1.  Electrolytes, renal, hepatic functions benign.  Venous pH 7.42 with a CO2 of 42.  Influenza, COVID, RSV is negative.  High-sensitivity troponin 7 making ACS unlikely as symptoms have been going on for multiple days.  Chest x-ray personally reviewed showing no acute cardiopulmonary abnormalities.  For this reason CT angio of the chest was conducted which showed no acute PE but does have patchy airspace consolidation in the dependent mid right and left lungs consistent with multifocal pneumonia.  Patient given albuterol nebulizer and Pulmicort nebulizer with improvement.  Continued to need O2 via NC and had to increase it to 4 L.  Patient was given azithromycin 500 mg and ceftriaxone 2 g IV for multifocal pneumonia.  Discussed case with  Hospitalist Dr. Jerome who accepted patient for [facility] admission.        Critical Care Time: None    Impression and plan discussed with patient. Questions answered, concerns addressed, indications for urgent re-evaluation reviewed, and  given. Patient/Parent/Caregiver agree with treatment plan and have no further questions at this time.      This note was created by the Dragon Voice Dictation System. Inadvertent typographical errors, due to software recognition problems, may still exist.             Current Discharge Medication List          Final diagnoses:   Multifocal pneumonia   Acute respiratory failure with hypoxia (H)       12/4/2023   [facility]       Sandeep Cowan, MADI CNP  12/04/23 [id number]

## 2023-12-05 ENCOUNTER — APPOINTMENT (OUTPATIENT)
Dept: SPEECH THERAPY | Facility: HOSPITAL | Age: 53
DRG: 189 | End: 2023-12-05
Attending: INTERNAL MEDICINE
Payer: MEDICARE

## 2023-12-05 LAB
ANION GAP SERPL CALCULATED.3IONS-SCNC: 7 MMOL/L (ref 7–15)
BACTERIA SPT CULT: NORMAL
BASOPHILS # BLD AUTO: 0.1 10E3/UL (ref 0–0.2)
BASOPHILS NFR BLD AUTO: 1 %
BUN SERPL-MCNC: 18.2 MG/DL (ref 6–20)
C PNEUM DNA SPEC QL NAA+PROBE: NOT DETECTED
CALCIUM SERPL-MCNC: 9 MG/DL (ref 8.6–10)
CHLORIDE SERPL-SCNC: 104 MMOL/L (ref 98–107)
CREAT SERPL-MCNC: 0.67 MG/DL (ref 0.51–0.95)
DEPRECATED HCO3 PLAS-SCNC: 30 MMOL/L (ref 22–29)
EGFRCR SERPLBLD CKD-EPI 2021: >90 ML/MIN/1.73M2
EOSINOPHIL # BLD AUTO: 0.2 10E3/UL (ref 0–0.7)
EOSINOPHIL NFR BLD AUTO: 3 %
ERYTHROCYTE [DISTWIDTH] IN BLOOD BY AUTOMATED COUNT: 14.1 % (ref 10–15)
FLUAV H1 2009 PAND RNA SPEC QL NAA+PROBE: NOT DETECTED
FLUAV H1 RNA SPEC QL NAA+PROBE: NOT DETECTED
FLUAV H3 RNA SPEC QL NAA+PROBE: NOT DETECTED
FLUAV RNA SPEC QL NAA+PROBE: NOT DETECTED
FLUBV RNA SPEC QL NAA+PROBE: NOT DETECTED
GLUCOSE SERPL-MCNC: 118 MG/DL (ref 70–99)
GRAM STAIN RESULT: NORMAL
GRAM STAIN RESULT: NORMAL
HADV DNA SPEC QL NAA+PROBE: NOT DETECTED
HCOV PNL SPEC NAA+PROBE: NOT DETECTED
HCT VFR BLD AUTO: 41.8 % (ref 35–47)
HGB BLD-MCNC: 14.1 G/DL (ref 11.7–15.7)
HMPV RNA SPEC QL NAA+PROBE: NOT DETECTED
HPIV1 RNA SPEC QL NAA+PROBE: NOT DETECTED
HPIV2 RNA SPEC QL NAA+PROBE: NOT DETECTED
HPIV3 RNA SPEC QL NAA+PROBE: NOT DETECTED
HPIV4 RNA SPEC QL NAA+PROBE: NOT DETECTED
IMM GRANULOCYTES # BLD: 0 10E3/UL
IMM GRANULOCYTES NFR BLD: 0 %
L PNEUMO1 AG UR QL IA: NEGATIVE
LYMPHOCYTES # BLD AUTO: 2.9 10E3/UL (ref 0.8–5.3)
LYMPHOCYTES NFR BLD AUTO: 37 %
M PNEUMO DNA SPEC QL NAA+PROBE: NOT DETECTED
MCH RBC QN AUTO: 33.9 PG (ref 26.5–33)
MCHC RBC AUTO-ENTMCNC: 33.7 G/DL (ref 31.5–36.5)
MCV RBC AUTO: 101 FL (ref 78–100)
MONOCYTES # BLD AUTO: 0.6 10E3/UL (ref 0–1.3)
MONOCYTES NFR BLD AUTO: 7 %
NEUTROPHILS # BLD AUTO: 4.1 10E3/UL (ref 1.6–8.3)
NEUTROPHILS NFR BLD AUTO: 52 %
NRBC # BLD AUTO: 0 10E3/UL
NRBC BLD AUTO-RTO: 0 /100
PLATELET # BLD AUTO: 292 10E3/UL (ref 150–450)
POTASSIUM SERPL-SCNC: 3.9 MMOL/L (ref 3.4–5.3)
RBC # BLD AUTO: 4.16 10E6/UL (ref 3.8–5.2)
RSV RNA SPEC QL NAA+PROBE: NOT DETECTED
RSV RNA SPEC QL NAA+PROBE: NOT DETECTED
RV+EV RNA SPEC QL NAA+PROBE: NOT DETECTED
SODIUM SERPL-SCNC: 141 MMOL/L (ref 135–145)
WBC # BLD AUTO: 7.9 10E3/UL (ref 4–11)

## 2023-12-05 PROCEDURE — 36415 COLL VENOUS BLD VENIPUNCTURE: CPT | Performed by: INTERNAL MEDICINE

## 2023-12-05 PROCEDURE — 87070 CULTURE OTHR SPECIMN AEROBIC: CPT | Performed by: INTERNAL MEDICINE

## 2023-12-05 PROCEDURE — 92610 EVALUATE SWALLOWING FUNCTION: CPT | Mod: GN

## 2023-12-05 PROCEDURE — 94640 AIRWAY INHALATION TREATMENT: CPT | Mod: 76

## 2023-12-05 PROCEDURE — 250N000013 HC RX MED GY IP 250 OP 250 PS 637: Performed by: INTERNAL MEDICINE

## 2023-12-05 PROCEDURE — 258N000003 HC RX IP 258 OP 636: Performed by: INTERNAL MEDICINE

## 2023-12-05 PROCEDURE — 250N000013 HC RX MED GY IP 250 OP 250 PS 637: Performed by: HOSPITALIST

## 2023-12-05 PROCEDURE — 80048 BASIC METABOLIC PNL TOTAL CA: CPT | Performed by: INTERNAL MEDICINE

## 2023-12-05 PROCEDURE — 120N000001 HC R&B MED SURG/OB

## 2023-12-05 PROCEDURE — 94640 AIRWAY INHALATION TREATMENT: CPT

## 2023-12-05 PROCEDURE — 87899 AGENT NOS ASSAY W/OPTIC: CPT | Performed by: INTERNAL MEDICINE

## 2023-12-05 PROCEDURE — 999N000157 HC STATISTIC RCP TIME EA 10 MIN

## 2023-12-05 PROCEDURE — 87205 SMEAR GRAM STAIN: CPT | Performed by: HOSPITALIST

## 2023-12-05 PROCEDURE — 85025 COMPLETE CBC W/AUTO DIFF WBC: CPT | Performed by: INTERNAL MEDICINE

## 2023-12-05 PROCEDURE — 250N000009 HC RX 250: Performed by: INTERNAL MEDICINE

## 2023-12-05 PROCEDURE — 250N000009 HC RX 250: Performed by: HOSPITALIST

## 2023-12-05 PROCEDURE — 99232 SBSQ HOSP IP/OBS MODERATE 35: CPT | Performed by: HOSPITALIST

## 2023-12-05 PROCEDURE — 250N000011 HC RX IP 250 OP 636: Performed by: INTERNAL MEDICINE

## 2023-12-05 RX ORDER — ARIPIPRAZOLE 5 MG/1
5 TABLET ORAL AT BEDTIME
Status: DISCONTINUED | OUTPATIENT
Start: 2023-12-05 | End: 2023-12-09 | Stop reason: HOSPADM

## 2023-12-05 RX ORDER — IPRATROPIUM BROMIDE AND ALBUTEROL SULFATE 2.5; .5 MG/3ML; MG/3ML
3 SOLUTION RESPIRATORY (INHALATION)
Status: DISCONTINUED | OUTPATIENT
Start: 2023-12-05 | End: 2023-12-09 | Stop reason: HOSPADM

## 2023-12-05 RX ORDER — LORAZEPAM 0.5 MG/1
0.5 TABLET ORAL 3 TIMES DAILY PRN
Status: DISCONTINUED | OUTPATIENT
Start: 2023-12-05 | End: 2023-12-09 | Stop reason: HOSPADM

## 2023-12-05 RX ORDER — LORAZEPAM 0.5 MG/1
0.5 TABLET ORAL 3 TIMES DAILY PRN
COMMUNITY
Start: 2023-11-20

## 2023-12-05 RX ADMIN — ARIPIPRAZOLE 5 MG: 5 TABLET ORAL at 19:46

## 2023-12-05 RX ADMIN — ENOXAPARIN SODIUM 40 MG: 40 INJECTION SUBCUTANEOUS at 19:47

## 2023-12-05 RX ADMIN — LORAZEPAM 0.5 MG: 0.5 TABLET ORAL at 17:38

## 2023-12-05 RX ADMIN — IPRATROPIUM BROMIDE AND ALBUTEROL SULFATE 3 ML: .5; 3 SOLUTION RESPIRATORY (INHALATION) at 10:15

## 2023-12-05 RX ADMIN — ALBUTEROL SULFATE 2.5 MG: 2.5 SOLUTION RESPIRATORY (INHALATION) at 06:57

## 2023-12-05 RX ADMIN — QUETIAPINE FUMARATE 150 MG: 50 TABLET ORAL at 19:46

## 2023-12-05 RX ADMIN — CEFTRIAXONE SODIUM 1 G: 1 INJECTION, SOLUTION INTRAVENOUS at 14:26

## 2023-12-05 RX ADMIN — IPRATROPIUM BROMIDE AND ALBUTEROL SULFATE 3 ML: .5; 3 SOLUTION RESPIRATORY (INHALATION) at 15:21

## 2023-12-05 RX ADMIN — IPRATROPIUM BROMIDE AND ALBUTEROL SULFATE 3 ML: .5; 3 SOLUTION RESPIRATORY (INHALATION) at 19:31

## 2023-12-05 RX ADMIN — Medication 1 LOZENGE: at 21:38

## 2023-12-05 RX ADMIN — AZITHROMYCIN 500 MG: 500 INJECTION, POWDER, LYOPHILIZED, FOR SOLUTION INTRAVENOUS at 23:22

## 2023-12-05 RX ADMIN — LORAZEPAM 0.5 MG: 0.5 TABLET ORAL at 10:48

## 2023-12-05 ASSESSMENT — ACTIVITIES OF DAILY LIVING (ADL)
ADLS_ACUITY_SCORE: 22
ADLS_ACUITY_SCORE: 25
ADLS_ACUITY_SCORE: 22
DEPENDENT_IADLS:: INDEPENDENT
ADLS_ACUITY_SCORE: 22
ADLS_ACUITY_SCORE: 25
ADLS_ACUITY_SCORE: 25
ADLS_ACUITY_SCORE: 22
ADLS_ACUITY_SCORE: 24

## 2023-12-05 NOTE — PLAN OF CARE
Goal Outcome Evaluation: Face to face report given with opportunity to observe patient.    Report given to   Roseline Pyle RN   12/5/2023  3:15 PM

## 2023-12-05 NOTE — PHARMACY-MEDICATION REGIMEN REVIEW
Pharmacy Antimicrobial Stewardship Assessment     Current Antimicrobial Therapy:  Anti-infectives (From now, onward)      Start     Dose/Rate Route Frequency Ordered Stop    23 1500  cefTRIAXone in d5w (ROCEPHIN) intermittent infusion 1 g         1 g  over 30 Minutes Intravenous EVERY 24 HOURS 23 1930  azithromycin (ZITHROMAX) 500 mg in sodium chloride 0.9 % 250 mL intermittent infusion         500 mg  over 1 Hours Intravenous AT BEDTIME 23              Indication: CAP    Days of Therapy: 2     Pertinent Labs:    Recent labs: (last 7 days)     23  1237 23  18223  0518   WBC 10.9  --  7.9   PCAL  --  0.06  --        Temperature:  Temp (24hrs), Av.4  F (36.9  C), Min:97  F (36.1  C), Max:99  F (37.2  C)      Culture Results:   7-Day Micro Results       Collected Updated Procedure Result Status      2023 1026 2023 1030 Respiratory Aerobic Bacterial Culture with Gram Stain [[id number]]   Sputum from Expectorate    In process Component Value   No component results               2023 0918 2023 0956 Respiratory Aerobic Bacterial Culture with Gram Stain [[id number]]   Sputum from Expectorate    Final result Component Value   Culture >10 Squamous epithelial cells/low power field indicates oral contamination. Please recollect.   Gram Stain Result >10 Squamous epithelial cells/low power field    >25 PMNs/low power field               2023 0742 2023 0748 Legionella pneumophila antigen urine [[id number]]   Urine, Midstream    In process Component Value   No component results            2023 2004 2023 2007 Respiratory Panel PCR [[id number]]   Swab from Nasopharyngeal    In process Component Value   No component results            2023 1922 2023 1926 Mycoplasma pneumonia abys IgG and IgM [[id number]]   Peripheral Blood    In process Component Value   No component results            2023 1238  12/04/2023 1319 Symptomatic Influenza A/B, RSV, & SARS-CoV2 PCR (COVID-19) Nasopharyngeal [[id number]]    Swab from Nasopharyngeal    Final result Component Value   Influenza A PCR Negative   Influenza B PCR Negative   RSV PCR Negative   SARS CoV2 PCR Negative   NEGATIVE: SARS-CoV-2 (COVID-19) RNA not detected, presumed negative.                  Recommendations/Interventions:  1. None at this time.    Malgorzata Otoole, [facility]  December 5, 2023

## 2023-12-05 NOTE — PROGRESS NOTES
12/05/23 0800   Appointment Info   Signing Clinician's Name / Credentials (SLP) Liza Knight MS CCC-SLP   General Information   Onset of Illness/Injury or Date of Surgery 12/04/23   Referring Physician Crystal Matias MD   Patient/Family Therapy Goal Statement (SLP) None stated.   Pertinent History of Current Problem Pt is a 53 year old female who was admitted on 12/4/23 with worsening cough and dyspnea; multifocal lung consolidation: pneumonia vs pneumonitis vs viral pneumonia. Orders received for clinical swallow evaluation. She is currently on Advance Diet as Tolerated orders as well as Full Liquid Diet. Per hospitalist, ok to trial solid textures during evaluation. Pt denies history of dysphagia, reporting no difficulty or changes with swallowing solids or liquids. Pt reported that she eats a variety of textures and does not avoid any foods due to them being too difficult to swallow. Pt did report that last night she had a [employer] sandwich and fries with no difficulty.   General Observations Unit assistance assisting pt in transferring to chair in order for upright posture during evaluation. Pt pleasant throughout evaluation. Pt was coughing at baseline prior to SLP entering room and beginning PO trials.       Present no   Type of Evaluation   Type of Evaluation Swallow Evaluation   Oral Motor   Oral Musculature generally intact   Structural Abnormalities none present   Mucosal Quality good   Dentition (Oral Motor)   Dentition (Oral Motor) natural dentition   Facial Symmetry (Oral Motor)   Facial Symmetry (Oral Motor) WNL   Lip Function (Oral Motor)   Lip Range of Motion (Oral Motor) WNL   Lip Strength (Oral Motor) WNL   Tongue Function (Oral Motor)   Tongue Strength (Oral Motor) WNL   Tongue Coordination/Speed (Oral Motor) WNL   Tongue ROM (Oral Motor) WNL   Jaw Function (Oral Motor)   Jaw Function (Oral Motor) WNL   Cough/Swallow/Gag Reflex (Oral Motor)   Soft Palate/Velum (Oral  Motor) WNL   Gag Reflex (Oral Motor) not tested   Volitional Throat Clear/Cough (Oral Motor) reduced strength   Volitional Swallow (Oral Motor) WNL   Vocal Quality/Secretion Management (Oral Motor)   Vocal Quality (Oral Motor) WNL   Secretion Management (Oral Motor) WNL   General Swallowing Observations   Past History of Dysphagia None reported.   Respiratory Support nasal cannula   Current Diet/Method of Nutritional Intake (General Swallowing Observations, NIS) full liquid diet   Swallowing Evaluation Clinical swallow evaluation   Clinical Swallow Evaluation   Feeding Assistance no assistance needed   Adaptive Eating Utensils N/A   Additional evaluation(s) completed today No   Rationale for completing additional evaluation A clinical swallow evaluation cannot rule out silent aspiration. If provider or other suspects that pt is silently aspirating, then a video swallow evaluation may be warranted.   Clinical Swallow Evaluation Textures Trialed thin liquids;pureed;solid foods   Clinical Swallow Eval: Thin Liquid Texture Trial   Mode of Presentation, Thin Liquids cup;self-fed   Volume of Liquid or Food Presented 4 oz.   Oral Phase of Swallow WFL   Pharyngeal Phase of Swallow intact   Diagnostic Statement Pt tolerating 4 oz. of thin liquids via cup without demonstrating any overt signs/symptoms of aspiration.   Clinical Swallow Evaluation: Puree Solid Texture Trial   Mode of Presentation, Puree straw;self-fed   Volume of Puree Presented 2 oz.   Oral Phase, Puree WFL   Pharyngeal Phase, Puree intact   Diagnostic Statement Pt tolerating 2 oz. of pureed textures without demonstrating any overt signs/symptoms of aspiration. Pt demonstrating adequate clearance of bolus from oral cavity during 100% of trials.   Clinical Swallow Evaluation: Solid Food Texture Trial   Mode of Presentation self-fed   Volume Presented 1 cracker   Oral Phase WFL   Pharyngeal Phase intact   Diagnostic Statement Pt tolerating a cracker for regular  texture trials without demonstrating any overt signs/symptoms of aspiration. Pt demonstrating adequate clearance of bolus from oral cavity during 100% of trials.   Esophageal Phase of Swallow   Patient reports or presents with symptoms of esophageal dysphagia No   Swallowing Recommendations   Diet Consistency Recommendations thin liquids (level 0);regular diet   Supervision Level for Intake patient independent   Mode of Delivery Recommendations bolus size, small;slow rate of intake   Postural Recommendations other (see comments)  (sitting upright in chair)   Monitoring/Assistance Required (Eating/Swallowing) monitor for cough or change in vocal quality with intake   Recommended Feeding/Eating Techniques (Swallow Eval) maintain upright sitting position for eating;maintain upright posture during/after eating for 30 minutes   Medication Administration Recommendations, Swallowing (SLP) No specific recommendations at this time.   Instrumental Assessment Recommendations instrumental evaluation not recommended at this time   Comment, Swallowing Recommendations Recommend IDDSI 7 (regular textures) and IDDSI 0 (thin liquids) with use of mealtime compensatory strategies; maintaining upright position, taking small bites/sips, and monitoring for changes with coughing during mealtimes.   Clinical Impression   Criteria for Skilled Therapeutic Interventions Met (SLP Eval) Evaluation only;No problems identified which require skilled intervention   SLP Diagnosis WNL Swallow function   Risks & Benefits of therapy have been explained evaluation/treatment results reviewed;risks/benefits reviewed;care plan/treatment goals reviewed;participants voiced agreement with care plan;participants included;patient   Clinical Impression Comments Pt seen this AM for clinical swallow evaluation. Pt reporting that she does not have a history of difficulty swallowing and denies any changes with swallowing. At baseline when SLP entered pt's room, she  "did have a cough without any PO intake. Pt trialed IDDSI 0 (thin liquids), IDDSI 4 (pureed textures), and IDDSI 7 (regular textures), during today's evaluation. No overt signs/symptoms of aspiration were observed during today's evaluation. No oral residue was observed after the swallow and pt denied experiencing \"sticking\" sensation in her throat. Recommend IDDSI 7 (regular textures) and IDDSI 0 (thin liquids) with use of mealtime compensatory strategies; maintaining upright position, taking small bites/sips, and monitoring for changes with coughing during mealtimes. Continued skilled services are not recommended at this time as pt is able to tolerate a regular diet and thin liquids independently. If pt does begin to have increased difficulty with swallowing, new orders should be placed for SLP to re-assess.   SLP Total Evaluation Time   Eval: oral/pharyngeal swallow function, clinical swallow Minutes (36635) 15   SLP Discharge Planning   SLP Plan Complete orders. Continued skilled services are not recommended at this time as pt is able to tolerate a regular diet and thin liquids independently. If pt does begin to have increased difficulty with swallowing, new orders should be placed for SLP to re-assess.   SLP Discharge Recommendation home   SLP Rationale for DC Rec Pt is tolerating a regular diet and thin liquids independently at this time.   SLP Brief overview of current status  Pt seen this AM for clinical swallow evaluation. Pt reporting that she does not have a history of difficulty swallowing and denies any changes with swallowing. At baseline when SLP entered pt's room, she did have a cough without any PO intake. Pt trialed IDDSI 0 (thin liquids), IDDSI 4 (pureed textures), and IDDSI 7 (regular textures), during today's evaluation. No overt signs/symptoms of aspiration were observed during today's evaluation. No oral residue was observed after the swallow and pt denied experiencing \"sticking\" sensation in " her throat. Recommend IDDSI 7 (regular textures) and IDDSI 0 (thin liquids) with use of mealtime compensatory strategies; maintaining upright position, taking small bites/sips, and monitoring for changes with coughing during mealtimes. Continued skilled services are not recommended at this time as pt is able to tolerate a regular diet and thin liquids independently. If pt does begin to have increased difficulty with swallowing, new orders should be placed for SLP to re-assess.   Total Session Time   Total Session Time (sum of timed and untimed services) 15     Recommendations for Feeding:  - Patient independent with eating and drinking  - Patient must sit in the chair at 90 degrees (may require pillows behind back)  - Eliminate all distractions; turn off the TV  - Patient should only eat/drink when they are fully alert  - Encourage small bites and small sips  - Alternate between a bite of food and a sip of liquid  - Check for oral pocketing  - Swallow bite of food/liquid before offering another bite/sip   - Patient must remain upright in chair at least 30-45 minutes after oral intake    NOTE: If patient becomes too tired, there are noted changes in their vocal quality/breathing (wet and gurgly), or they begin coughing frequently, stop feeding immediately and complete oral cares. SLP to re-assess swallowing

## 2023-12-05 NOTE — MEDICATION SCRIBE - ADMISSION MEDICATION HISTORY
Medication Scribe Admission Medication History    Admission medication history is complete. The information provided in this note is only as accurate as the sources available at the time of the update.    Information Source(s): Patient and CareEverywhere/SureScripts via in-person    [facility]  Last Seen: 12/4/23   Active Meds:  Abilify 5 mg at bedtime   Lorazepam 0.5 mg tid prn   Seroquel 100 mg + 50 mg 150 mg TDD    Pertinent Information:   Patient manages her own medications and is a good historian.   Pt reports the last few days she has needed to take OTC dayquil every 4 hours PRN but it is not a medication she usually takes.    Changes made to PTA medication list:  Added: None  Deleted: None  Changed: lorazepam from 1 mg to 0.5 mg, abilify from dinner time to at bedtime     Medication Affordability:  Not including over the counter (OTC) medications, was there a time in the past 3 months when you did not take your medications as prescribed because of cost?: No    Allergies reviewed with patient and updates made in EHR: yes    Medication History Completed By: Zayda Qiu 12/5/2023 8:28 AM    PTA Med List   Medication Sig Last Dose    albuterol (PROAIR HFA/PROVENTIL HFA/VENTOLIN HFA) 108 (90 Base) MCG/ACT inhaler Inhale 1-2 puffs into the lungs every 4 hours as needed for shortness of breath / dyspnea or wheezing More than a month    ARIPiprazole (ABILIFY) 5 MG tablet Take 5 mg by mouth at bedtime 12/3/2023 at HS    LORazepam (ATIVAN) 0.5 MG tablet Take 0.5 mg by mouth 3 times daily as needed for anxiety 12/4/2023    naproxen (NAPROSYN) 500 MG tablet Take 1 tablet (500 mg) by mouth 2 times daily as needed for moderate pain 12/4/2023 at 0900    QUEtiapine (SEROQUEL) 100 MG tablet Take 100 mg by mouth at bedtime -take along with a 50 mg tablet for a total nightly dose of 150 mg. 12/3/2023 at HS    QUEtiapine (SEROQUEL) 50 MG tablet Take 50 mg by mouth at bedtime -take along with a 100 mg  tablet for a total nightly dose of 150 mg. 12/3/2023 at 2100

## 2023-12-05 NOTE — PLAN OF CARE
VS as charted. Denies pain.   O2: Oxy mask 4 LPM. Sats in the mid 90's. Dyspnea w/ exertion.   LOC x 4.   Telemetry.   IV SL & flushes well.   Lungs: Diminished.   Assist, stand by.   Use of call light appropriately & makes needs known.   Antx: IV Rocephin & IV Azithromycin.   No nicotine patch.   Face to face report given with opportunity to observe patient.    Report given to JULIA Terry RN   12/5/2023  7:22 AM

## 2023-12-05 NOTE — PROGRESS NOTES
Care Transitions focused note:           12/05/23 1300   General Information   Assessment completed with: Patient   Person spoke with Glenda   Type of CM/SW Visit CM Role Introduction   Primary Care Provider verified and updated as needed? Yes  (States Linda Mendoza NP at [facility] will be providing primary care)   Readmission Within the Last 30 Days no previous admission in last 30 days   Reason for Consult discharge planning   Advance Care Planning Reviewed no concerns identified   General Information Comments Pharmacy- [facility]   Living Environment   People in Home alone   Current Living Arrangements apartment   Care Provided by self   Provides Care For no one   Able to Return to Prior Arrangements yes   Assessment of Family/Social Support   Marital Status Single   Who is your support system? Children;Neighbor   Description of Support System Involved;Supportive   Support Assessment Adequate family and caregiver support   Employment/   Employment Status disabled   Current or Previous Occupation not applicable   Financial/Legal   Source of Income social security   Financial/Environmental Concerns none   Referral to Financial Worker No   Current Resources   Patient receiving home care services: No   Community Resources None   Supplies Currently Used at Home None   Assessment of Functional Status   Dependent ADLs: Independent   Dependent IADLs: Independent   Assesssment of Functional Status At functional baseline   Mental Status   Mental Health Status No Current Concerns   Mental Health Management Medication;Psychiatrist   Chemical Dependency Status No Current Concerns   Discharge Needs Assessment   Equipment Currently Used at Home none   Concerns to be Addressed denies needs/concerns at this time   Patient/Family Anticipates Transition to home   Patient/Family Anticipated Services at Transition none   Transportation Concerns none   Transportation Anticipated family or friend will  provide;public transportation;health plan transportation   Plan   Plan return home via family.

## 2023-12-05 NOTE — PLAN OF CARE
Goal Outcome Evaluation:upon general assessment, patient was working hard to breathe, respiratory rate was 22, patient was wheezy and stated she was feeling awful, a sputum was sent, but this proved to be an inadequate sample, and subsequently another sputum was sent, we are awaiting the results, patient has reported no pain or discomfort, patient has been able to ambulate in her room independently, appetite fair, this writer did administer ativan, and as a result, the ativan has helped with the dyspnea and anxiety, patient appears calmer and is able to communicate better with less dyspnea, the lungs continue to be very coarse, prolonged expiratory wheezing, but they are slightly more improved from this mornings assessment, MD was made aware, vitals as charted, however patient remains on the 4 liters of supplemental oxygen with sats 95%

## 2023-12-05 NOTE — PLAN OF CARE
Goal Outcome Evaluation:sputum sample sent to the lab but the sample was not adequate. New order placed.

## 2023-12-05 NOTE — PROGRESS NOTES
[facility]    Medicine Progress Note - Hospitalist Service    Date of Admission:  12/4/2023    Assessment & Plan      Glenda Robins is a 53 year old female admitted on 12/4/2023. She presents to ED with worsening cough and dyspnea.     Hospital problems  Multifocal lung consolidation: pneumonia vs pneumonitis vs viral pneumonia.   Acute hypoxic respiratory Failure   CTA chest done showing No acute pulmonary emboli to the subsegmental level.  Patchy airspace consolidation in the dependent, mid right more so than  left lungs. Differential considerations include multifocal pneumonia  or aspiration pneumonitis. Recommend follow-up to resolution.   Polycystic disease of the liver.   -Rocephin and azithromycin  -Cultures  -Wean off oxygen    History of tobacco use  Suspect COPD component  Duonebs  Prednisone  Needs PFT out patient    Schizoaffective disorder  - continue home meds     Hemoglobinemia  - CO is normal 1.6           Diet: Advance Diet as Tolerated: Full Liquid Diet    DVT Prophylaxis: Enoxaparin (Lovenox) SQ  Neumann Catheter: Not present  Lines: None     Cardiac Monitoring: ACTIVE order. Indication: pneumonia  Code Status: Full Code      Clinically Significant Risk Factors Present on Admission                    # Acute Respiratory Failure: Documented O2 saturation < 91%.  Continue supplemental oxygen as needed     # Obesity: Estimated body mass index is 35.35 kg/m  as calculated from the following:    Height as of this encounter: 1.524 m (5').    Weight as of this encounter: 82.1 kg (181 lb).              Disposition Plan    pending clinical improvement          Cindy Slaughter DO  Hospitalist Service  [facility]  Securely message with [employer] (more info)  Text page via [email or web address] Paging/Directory   ______________________________________________________________________    Interval History   Patient was seen this morning for medical rounds. No chest pain . Has some shortness of  breath. Smoking history. Has not had pft's.     Physical Exam   Vital Signs: Temp: 98.8  F (37.1  C) Temp src: Tympanic BP: 103/63 Pulse: 90   Resp: 20 SpO2: 92 % O2 Device: Nasal cannula with humidification Oxygen Delivery: 4 LPM  Weight: 180 lbs 15.96 oz    Physical Exam  Constitutional:       Appearance: She is obese.   HENT:      Right Ear: External ear normal.      Left Ear: External ear normal.      Mouth/Throat:      Pharynx: Oropharynx is clear.   Eyes:      Conjunctiva/sclera: Conjunctivae normal.   Cardiovascular:      Rate and Rhythm: Normal rate.   Pulmonary:      Effort: Pulmonary effort is normal.      Breath sounds: Wheezing present.   Abdominal:      General: Abdomen is flat.   Musculoskeletal:         General: No swelling.   Skin:     Coloration: Skin is not jaundiced.   Neurological:      Mental Status: She is alert. Mental status is at baseline.         Medical Decision Making       50 MINUTES SPENT BY ME on the date of service doing chart review, history, exam, documentation & further activities per the note.      Data     I have personally reviewed the following data over the past 24 hrs:    7.9  \   14.1   / 292     141 104 18.2 /  118 (H)   3.9 30 (H) 0.67 \     ALT: 23 AST: 25 AP: 90 TBILI: 0.5   ALB: 4.4 TOT PROTEIN: 7.7 LIPASE: N/A     Trop: 7 BNP: N/A     Procal: 0.06 CRP: 10.09 (H) Lactic Acid: N/A         Imaging results reviewed over the past 24 hrs:   Recent Results (from the past 24 hour(s))   XR Chest 2 Views    Narrative    PROCEDURE:  XR CHEST 2 VIEWS    HISTORY:  Shortness of breath.     COMPARISON:  None.    FINDINGS:   The cardiac silhouette is normal in size. The pulmonary vasculature is  normal.  The lungs are clear. There is an old healed left rib fracture  with some associated pleural thickening.      Impression    IMPRESSION:  No acute cardiopulmonary disease.      ARAM MCMANUS MD         SYSTEM ID:  [id number]   CTA Chest with Contrast    Narrative    PROCEDURE:  CTA  CHEST WITH CONTRAST.    HISTORY:  Evaluate for pulmonary embolism.  Hypoxia, shortness of  breath    TECHNIQUE:  Initial pre-contrast  and localizer images were  obtained.  Contrast enhanced helical CT pulmonary angiography was then  performed.  Routine transaxial and post-processed (multiplanar and/or  MIP) reformations were obtained. This CT exam was performed using one  or more the following dose reduction techniques: automated exposure  control, adjustment of the mA and/or kV according to patient size,  and/or iterative reconstruction technique.    COMPARISON:  None.    MEDS/CONTRAST: isovue 370 67mL    PULMONARY CTA FINDINGS:  This is a diagnostic quality helical CT  pulmonary angiogram.  There is no acute pulmonary embolism to the  first subsegmental pulmonary artery level.    OTHER FINDINGS:      The neck base is grossly symmetric. Cardiac size is normal. There is  no pericardial or pleural effusion. The thoracic aorta and main  pulmonary artery are within normal limits in size. A few upper normal  caliber mediastinal nodes are nonspecific.    Limited views of the upper abdomen reveal numerous hepatic cysts.    The pleura is without thickening or effusions. The central airways are  unremarkable. Posterior dependent central lung patchy airspace  consolidation is seen, worse on the right than the left. Mild  centrilobular emphysematous changes are present    No suspicious osseous lesion is identified.      Impression    IMPRESSION:    No acute pulmonary emboli to the subsegmental level.    Patchy airspace consolidation in the dependent, mid right more so than  left lungs. Differential considerations include multifocal pneumonia  or aspiration pneumonitis. Recommend follow-up to resolution.    Polycystic disease of the liver.     AR BLAIR MD         SYSTEM ID:  [id number]

## 2023-12-05 NOTE — H&P
[facility]    History and Physical - Hospitalist Service       Date of Admission:  12/4/2023    Assessment & Plan      Glenda Robins is a 53 year old female admitted on 12/4/2023. She presents to ED with worsening cough and dyspnea.     Hospital problems  Multifocal lung consolidation: pneumonia vs pneumonitis vs viral pneumonia.   - will get CRP, procalcitonin  - in the mean time will cover for CAP (rocephin and doxy)    2. Schizoaffective disorder  - continue home meds    3 Acute hypoxic respiratory failure  - O2 per NC to keep saturation >= 92%    4. Hemoglobinemia  - will check CO  - echo    5. Tobacco  use  - nicotine patch        Diet: Advance Diet as Tolerated: Full Liquid Diet    DVT Prophylaxis: Enoxaparin (Lovenox) SQ  Neumann Catheter: Not present  Lines: None     Cardiac Monitoring: ACTIVE order. Indication: pneumonia, hypoxia  Code Status: Full Code      Clinically Significant Risk Factors Present on Admission                       # Obesity: Estimated body mass index is 34.92 kg/m  as calculated from the following:    Height as of this encounter: 1.524 m (5').    Weight as of this encounter: 81.1 kg (178 lb 12.8 oz).              Disposition Plan home when stable.     Expected Discharge Date: 12/06/2023,  3:00 PM    Destination: home with family            Crystal Matias MD  Hospitalist Service  [facility]  Securely message with Jan (more info)  Text page via [facility] Paging/Directory     ______________________________________________________________________    Chief Complaint   Cough, dyspnea for a 2-3 weeks.      History is obtained from the patient, electronic health record, emergency department physician, and patient's daughter    History of Present Illness   Glenda Robins is a 53 year old female who has PMH of schizoaffective disorder, paranoia, who smokes tobacco and moved to [place] from [place] and not established yet with PCP, presents to ED with worsening dyspnea  and cough. Symptoms began 2-3 weeks ago. Started as viral disease with congestion and sinus pressure. Couple of family members including children sick. Admits fever but afebrile now, productive cough, but no hemoptysis, no chest pain. Occasionally coughing spell trigger vomiting.   ED course; no acute events. Presents afebrile, tachycardic and found desaturating to 88% on RA. No h/o COPD, asthma. Smoker.   Blood tests: VB.42/45/88 CO 1.6 CRP 10.09 Troponin 7 Procalcitonin 0.06   CT chest: no PE, but multifocal alveolar infiltrates. Started on Rocephin and doxycycline    Past Medical History    Schizoaffective disorder  High hemoglobin      Past Surgical History   No past surgical history on file.    Prior to Admission Medications   Prior to Admission Medications   Prescriptions Last Dose Informant Patient Reported? Taking?   ARIPiprazole (ABILIFY) 5 MG tablet 12/3/2023 at 1700  Yes Yes   Sig: Take 5 mg by mouth daily Take 1 tablet with supper.   LORazepam (ATIVAN) 1 MG tablet 2023 at 0800  No Yes   Sig: Take 1 tablet (1 mg) by mouth 3 times daily as needed for anxiety, agitation or sleep   QUEtiapine (SEROQUEL) 100 MG tablet   Yes Yes   Sig: Take 100 mg by mouth at bedtime Take one tablet with 50 mg at bedtime.   QUEtiapine (SEROQUEL) 50 MG tablet 12/3/2023 at 2100  Yes Yes   Sig: Take 50 mg by mouth at bedtime   albuterol (PROAIR HFA/PROVENTIL HFA/VENTOLIN HFA) 108 (90 Base) MCG/ACT inhaler More than a month  No Yes   Sig: Inhale 1-2 puffs into the lungs every 4 hours as needed for shortness of breath / dyspnea or wheezing   naproxen (NAPROSYN) 500 MG tablet 2023 at 0900  No Yes   Sig: Take 1 tablet (500 mg) by mouth 2 times daily as needed for moderate pain      Facility-Administered Medications: None        Review of Systems    10 points of ROS obtained. Pertinent positives and negatives listed in HPI. The rest is negative.     Social History   I have reviewed this patient's social history and  updated it with pertinent information if needed. She is a tobacco smoker, moved to this area recently. Has no PCP         Family History           Allergies   Allergies   Allergen Reactions    Shrimp Anaphylaxis    Risperdal [Risperidone] Other (See Comments)     Elevated prolactin        Physical Exam   Vital Signs: Temp: 99  F (37.2  C) Temp src: Tympanic BP: 115/73 Pulse: 87   Resp: 20 SpO2: 95 % O2 Device: Nasal cannula Oxygen Delivery: 4 LPM  Weight: 178 lbs 12.8 oz    General Appearance: Not in distress. Lips cyanotic (not much)  Respiratory: wheezing  Cardiovascular: regular, distant, no S3  GI: soft, not tender, no HSM  Skin: warm, well perfused.   MS: no acrocyanosis  Other: Neurological: no focal findings  Psychiatric: flat affect, awake, alert, oriented x 3     Medical Decision Making       60 MINUTES SPENT BY ME on the date of service doing chart review, history, exam, documentation & further activities per the note.      Data     I have personally reviewed the following data over the past 24 hrs:    10.9  \   17.1 (H)   / 375     141 103 20.8 (H) /  122 (H)   4.0 24 0.68 \     ALT: 23 AST: 25 AP: 90 TBILI: 0.5   ALB: 4.4 TOT PROTEIN: 7.7 LIPASE: N/A     Trop: 7 BNP: N/A     Procal: 0.06 CRP: 10.09 (H) Lactic Acid: N/A         Imaging results reviewed over the past 24 hrs:   Recent Results (from the past 24 hour(s))   XR Chest 2 Views    Narrative    PROCEDURE:  XR CHEST 2 VIEWS    HISTORY:  Shortness of breath.     COMPARISON:  None.    FINDINGS:   The cardiac silhouette is normal in size. The pulmonary vasculature is  normal.  The lungs are clear. There is an old healed left rib fracture  with some associated pleural thickening.      Impression    IMPRESSION:  No acute cardiopulmonary disease.      ARAM MCMANUS MD         SYSTEM ID:  [id number]   CTA Chest with Contrast    Narrative    PROCEDURE:  CTA CHEST WITH CONTRAST.    HISTORY:  Evaluate for pulmonary embolism.  Hypoxia, shortness  of  breath    TECHNIQUE:  Initial pre-contrast  and localizer images were  obtained.  Contrast enhanced helical CT pulmonary angiography was then  performed.  Routine transaxial and post-processed (multiplanar and/or  MIP) reformations were obtained. This CT exam was performed using one  or more the following dose reduction techniques: automated exposure  control, adjustment of the mA and/or kV according to patient size,  and/or iterative reconstruction technique.    COMPARISON:  None.    MEDS/CONTRAST: isovue 370 67mL    PULMONARY CTA FINDINGS:  This is a diagnostic quality helical CT  pulmonary angiogram.  There is no acute pulmonary embolism to the  first subsegmental pulmonary artery level.    OTHER FINDINGS:      The neck base is grossly symmetric. Cardiac size is normal. There is  no pericardial or pleural effusion. The thoracic aorta and main  pulmonary artery are within normal limits in size. A few upper normal  caliber mediastinal nodes are nonspecific.    Limited views of the upper abdomen reveal numerous hepatic cysts.    The pleura is without thickening or effusions. The central airways are  unremarkable. Posterior dependent central lung patchy airspace  consolidation is seen, worse on the right than the left. Mild  centrilobular emphysematous changes are present    No suspicious osseous lesion is identified.      Impression    IMPRESSION:    No acute pulmonary emboli to the subsegmental level.    Patchy airspace consolidation in the dependent, mid right more so than  left lungs. Differential considerations include multifocal pneumonia  or aspiration pneumonitis. Recommend follow-up to resolution.    Polycystic disease of the liver.     AR BLAIR MD         SYSTEM ID:  [id number]

## 2023-12-05 NOTE — PROGRESS NOTES
Incentive Spirometry education completed.  Pt goal 2000 mls.  Pt achieved 1000 mls.  Pt instructed to perform 10/hr while awake with at least one deep breath and cough per hour until able to perform baseline activity.  How to use an Incentive Spirometer written instructions provided to patient. RT will follow and re-assess as need.      Sarah Vogel, RT on 12/4/2023 at 8:11 PM

## 2023-12-06 LAB
ATRIAL RATE - MUSE: 102 BPM
DIASTOLIC BLOOD PRESSURE - MUSE: NORMAL MMHG
INTERPRETATION ECG - MUSE: NORMAL
MAGNESIUM SERPL-MCNC: 2.1 MG/DL (ref 1.7–2.3)
P AXIS - MUSE: 74 DEGREES
POTASSIUM SERPL-SCNC: 4.4 MMOL/L (ref 3.4–5.3)
PR INTERVAL - MUSE: 134 MS
QRS DURATION - MUSE: 78 MS
QT - MUSE: 308 MS
QTC - MUSE: 401 MS
R AXIS - MUSE: -23 DEGREES
SYSTOLIC BLOOD PRESSURE - MUSE: NORMAL MMHG
T AXIS - MUSE: 36 DEGREES
VENTRICULAR RATE- MUSE: 102 BPM

## 2023-12-06 PROCEDURE — 94640 AIRWAY INHALATION TREATMENT: CPT | Mod: 76

## 2023-12-06 PROCEDURE — 999N000157 HC STATISTIC RCP TIME EA 10 MIN

## 2023-12-06 PROCEDURE — 84132 ASSAY OF SERUM POTASSIUM: CPT | Performed by: HOSPITALIST

## 2023-12-06 PROCEDURE — 94640 AIRWAY INHALATION TREATMENT: CPT

## 2023-12-06 PROCEDURE — 36415 COLL VENOUS BLD VENIPUNCTURE: CPT | Performed by: HOSPITALIST

## 2023-12-06 PROCEDURE — 99232 SBSQ HOSP IP/OBS MODERATE 35: CPT | Performed by: INTERNAL MEDICINE

## 2023-12-06 PROCEDURE — 250N000013 HC RX MED GY IP 250 OP 250 PS 637: Performed by: HOSPITALIST

## 2023-12-06 PROCEDURE — 250N000013 HC RX MED GY IP 250 OP 250 PS 637: Performed by: INTERNAL MEDICINE

## 2023-12-06 PROCEDURE — 250N000009 HC RX 250: Performed by: HOSPITALIST

## 2023-12-06 PROCEDURE — 83735 ASSAY OF MAGNESIUM: CPT | Performed by: HOSPITALIST

## 2023-12-06 PROCEDURE — 250N000011 HC RX IP 250 OP 636: Mod: JZ | Performed by: INTERNAL MEDICINE

## 2023-12-06 PROCEDURE — 120N000001 HC R&B MED SURG/OB

## 2023-12-06 PROCEDURE — 258N000003 HC RX IP 258 OP 636: Performed by: INTERNAL MEDICINE

## 2023-12-06 PROCEDURE — 250N000011 HC RX IP 250 OP 636: Performed by: INTERNAL MEDICINE

## 2023-12-06 RX ORDER — PREDNISONE 20 MG/1
40 TABLET ORAL DAILY
Status: DISCONTINUED | OUTPATIENT
Start: 2023-12-07 | End: 2023-12-09 | Stop reason: HOSPADM

## 2023-12-06 RX ORDER — METHYLPREDNISOLONE SODIUM SUCCINATE 125 MG/2ML
60 INJECTION, POWDER, LYOPHILIZED, FOR SOLUTION INTRAMUSCULAR; INTRAVENOUS ONCE
Status: COMPLETED | OUTPATIENT
Start: 2023-12-06 | End: 2023-12-06

## 2023-12-06 RX ADMIN — AZITHROMYCIN 500 MG: 500 INJECTION, POWDER, LYOPHILIZED, FOR SOLUTION INTRAVENOUS at 20:00

## 2023-12-06 RX ADMIN — METHYLPREDNISOLONE SODIUM SUCCINATE 62.5 MG: 125 INJECTION, POWDER, FOR SOLUTION INTRAMUSCULAR; INTRAVENOUS at 11:06

## 2023-12-06 RX ADMIN — IPRATROPIUM BROMIDE AND ALBUTEROL SULFATE 3 ML: .5; 3 SOLUTION RESPIRATORY (INHALATION) at 15:18

## 2023-12-06 RX ADMIN — QUETIAPINE FUMARATE 150 MG: 50 TABLET ORAL at 19:59

## 2023-12-06 RX ADMIN — IPRATROPIUM BROMIDE AND ALBUTEROL SULFATE 3 ML: .5; 3 SOLUTION RESPIRATORY (INHALATION) at 07:25

## 2023-12-06 RX ADMIN — LORAZEPAM 0.5 MG: 0.5 TABLET ORAL at 08:48

## 2023-12-06 RX ADMIN — IPRATROPIUM BROMIDE AND ALBUTEROL SULFATE 3 ML: .5; 3 SOLUTION RESPIRATORY (INHALATION) at 19:41

## 2023-12-06 RX ADMIN — IPRATROPIUM BROMIDE AND ALBUTEROL SULFATE 3 ML: .5; 3 SOLUTION RESPIRATORY (INHALATION) at 11:45

## 2023-12-06 RX ADMIN — ARIPIPRAZOLE 5 MG: 5 TABLET ORAL at 19:59

## 2023-12-06 RX ADMIN — ENOXAPARIN SODIUM 40 MG: 40 INJECTION SUBCUTANEOUS at 19:59

## 2023-12-06 RX ADMIN — CEFTRIAXONE SODIUM 1 G: 1 INJECTION, SOLUTION INTRAVENOUS at 15:02

## 2023-12-06 ASSESSMENT — ACTIVITIES OF DAILY LIVING (ADL)
ADLS_ACUITY_SCORE: 23
ADLS_ACUITY_SCORE: 22
ADLS_ACUITY_SCORE: 21
ADLS_ACUITY_SCORE: 22
ADLS_ACUITY_SCORE: 21
ADLS_ACUITY_SCORE: 24
ADLS_ACUITY_SCORE: 22
ADLS_ACUITY_SCORE: 24
ADLS_ACUITY_SCORE: 21
ADLS_ACUITY_SCORE: 21

## 2023-12-06 NOTE — PHARMACY-MEDICATION REGIMEN REVIEW
Pharmacy Antimicrobial Stewardship Assessment     Current Antimicrobial Therapy:  Anti-infectives (From now, onward)      Start     Dose/Rate Route Frequency Ordered Stop    23 1500  cefTRIAXone in d5w (ROCEPHIN) intermittent infusion 1 g         1 g  over 30 Minutes Intravenous EVERY 24 HOURS 23 1930  azithromycin (ZITHROMAX) 500 mg in sodium chloride 0.9 % 250 mL intermittent infusion         500 mg  over 1 Hours Intravenous AT BEDTIME 23              Indication: CAP    Days of Therapy: 3     Pertinent Labs:    Recent labs: (last 7 days)     23  1237 23  18223  05   WBC 10.9  --  7.9   PCAL  --  0.06  --        Temperature:  Temp (24hrs), Av.9  F (37.2  C), Min:98.1  F (36.7  C), Max:99.7  F (37.6  C)      Culture Results:   7-Day Micro Results       Collected Updated Procedure Result Status      2023 1026 2023 0812 Respiratory Aerobic Bacterial Culture with Gram Stain [[id number]]   (Abnormal)   Sputum from Expectorate    Preliminary result Component Value   Culture Culture in progress  [P]    Gram Stain Result <10 Squamous epithelial cells/low power field  [P]     >25 PMNs/low power field  [P]     1+ Gram positive cocci  [P]                2023 0918 2023 0956 Respiratory Aerobic Bacterial Culture with Gram Stain [[id number]]   Sputum from Expectorate    Final result Component Value   Culture >10 Squamous epithelial cells/low power field indicates oral contamination. Please recollect.   Gram Stain Result >10 Squamous epithelial cells/low power field    >25 PMNs/low power field               2023 0742 2023 195 Legionella pneumophila antigen urine [[id number]]   Urine, Midstream    Final result Component Value   Legionella pneumophila serogroup 1 urinary antigen Negative   Suggests no recent or current infection. Infection due to Legionella cannot be ruled out, since other serogroups and species may cause  disease, antigen may not be present in urine in early infection, and the level of antigen present in the urine may be below detectable limits of the test.            12/04/2023 2004 12/05/2023 1822 Respiratory Panel PCR [[id number]]    Swab from Nasopharyngeal    Final result Component Value   Adenovirus Not Detected   Coronavirus Not Detected   This test detects Coronavirus 229E, HKU1, NL63 and OC43 but does not distinguish between them. It does not detect MERS ( Respiratory Syndrome), SARS (Severe Acute Respiratory Syndrome) or 2019-nCoV (Novel 2019) Coronavirus.   Human Metapneumovirus Not Detected   Human Rhin/Enterovirus Not Detected   Influenza A Not Detected   Influenza A, H1 Not Detected   Influenza A 2009 H1N1 Not Detected   Influenza A, H3 Not Detected   Influenza B Not Detected   Parainfluenza Virus 1 Not Detected   Parainfluenza Virus 2 Not Detected   Parainfluenza Virus 3 Not Detected   Parainfluenza Virus 4 Not Detected   Respiratory Syncytial Virus A Not Detected   Respiratory Syncytial Virus B Not Detected   Chlamydia Pneumoniae Not Detected   Mycoplasma Pneumoniae Not Detected            12/04/2023 1922 12/04/2023 1926 Mycoplasma pneumonia abys IgG and IgM [[id number]]   Peripheral Blood    In process Component Value   No component results            12/04/2023 1238 12/04/2023 1319 Symptomatic Influenza A/B, RSV, & SARS-CoV2 PCR (COVID-19) Nasopharyngeal [[id number]]    Swab from Nasopharyngeal    Final result Component Value   Influenza A PCR Negative   Influenza B PCR Negative   RSV PCR Negative   SARS CoV2 PCR Negative   NEGATIVE: SARS-CoV-2 (COVID-19) RNA not detected, presumed negative.                  Recommendations/Interventions:  1. None at this time.    Malgorzata Otoole, [facility]  December 6, 2023

## 2023-12-06 NOTE — PLAN OF CARE
Plan of Care Reviewed With: patient    VS as charted. Denies pain. PO Ativan given x 1 for anxiety. At re-assessment of pt anxiousness resolved.   O2: NC 4 LPM. Sats in the mid 90's. Dyspnea w/ exertion.   Frequent, congested, loose productive cough. Expelling sputum.   LOC x 4.   Telemetry.   IV SL & flushes well.   Assist, stand by.   Use of call light appropriately & makes needs known.   Antx: IV Rocephin & IV Azithromycin.   No nicotine patch.   Pt napped on & off throughout the shift.   Appetite & PO fluid intake: good.   Face to face report given with opportunity to observe patient.    Report given to Dedra KUHN RN.     Tootie Barone RN   12/6/2023  7:39 AM

## 2023-12-06 NOTE — PROGRESS NOTES
[facility]    Hospitalist Progress Note     Glenda Robins is a 53 year old female admitted on 12/4/2023.  She has significant history of schizoaffective disorder, tobacco abuse, no significant cardiac history.  She presents to ED with 3 weeks worth of with worsening cough and dyspnea.  Some yellowish sputum.  Intermittent fevers per the patient upon presentation to the ER her blood pressure is 116/84, temp 97, pulse 114, room air sats are 88%.  She is placed on 3 L O2.  Chest x-ray was unremarkable CT scan did show some patchy infiltrates and evidence of some mild emphysematous changes.  Cultures were done.  She was started empirically on Rocephin and azithromycin.  Also started on bronchodilators.     Hospital problems  Multifocal lung consolidation: pneumonia vs pneumonitis vs viral pneumonia.  -12/6: Procalcitonin was negative CRP was 10.  Sputum was obtained still awaiting culture results.  She had the viral panel which was on revealing.  Is currently empirically on the Rocephin and azithromycin.  Yesterday white count 7900.  Still requiring 4 L of O2.  Her lung exam she does have a lot of rhonchi and fair amount of expiratory wheezes right greater than left.  Still feels relatively dyspneic.  This been going on for about 3 weeks.  Progressively gotten worse.  We will continue with the antibiotics we will give her some steroids here today dose of Solu-Medrol and prednisone.  Continue to work on pulmonary toilet ambulate.    Acute hypoxic respiratory Failure   CTA chest done showing No acute pulmonary emboli to the subsegmental level.  Patchy airspace consolidation in the dependent, mid right more so than  left lungs. Differential considerations include multifocal pneumonia  or aspiration pneumonitis. Recommend follow-up to resolution.   Polycystic disease of the liver.   -Rocephin and azithromycin  -Cultures  -Wean off oxygen  -12/6: Patient remains on 4 L of O2 with sats 93% afebrile otherwise  hemodynamically stable.  Babita on the Rocephin and azithromycin.  Nursing notes states frequent episodes of wheezing.  Productive cough.  Has not been on steroids to this point.  As above we will start her on steroids.     History of tobacco use  Suspect COPD component  Duonebs  Prednisone  Needs PFT out patient  -12/6: Suspect she does have some underlying obstructive lung disease based on her wheezing tobacco use and CT scan showing some emphysematous changes.     Schizoaffective disorder  - continue home meds  -12/6: Very stable very pleasant.     Elevated hemoglobin:  - CO is normal 1.6   Hemoglobin was noted to be 17 on admission.  -12/6: Hemoglobin was 14 yesterday.        Diet: Regular Diet Adult Thin Liquids (level 0)  Fluids: Lock    DVT Prophylaxis: Enoxaparin (Lovenox) SQ  Code Status: Full Code    Disposition: Expected discharge in 2-3 days once pulmonary status improves.    Magdaleno Wells MD    Interval History   Patient's morning says feeling a little bit better, still has significant productive cough.  Still requiring 4 L nasal cannula.  A lot of wheezing persists will get treated with some steroids continue to work on pulmonary toilet.  Otherwise she seems to be stable at this point we will ambulate also.    -Data reviewed today: I reviewed all new labs and imaging results over the last 24 hours. I personally reviewed no images or EKG's today.    Physical Exam   Temp: 99.7  F (37.6  C) Temp src: Tympanic BP: 112/59 Pulse: 91   Resp: 20 SpO2: 92 % O2 Device: Nasal cannula Oxygen Delivery: 4 LPM  Vitals:    12/04/23 1920 12/05/23 0525 12/06/23 0554   Weight: 81.1 kg (178 lb 12.8 oz) 82.1 kg (181 lb) 83.3 kg (183 lb 10.3 oz)     Vital Signs with Ranges  Temp:  [98.1  F (36.7  C)-99.7  F (37.6  C)] 99.7  F (37.6  C)  Pulse:  [] 91  Resp:  [20] 20  BP: ()/(52-74) 112/59  SpO2:  [89 %-100 %] 92 %  I/O last 3 completed shifts:  In: 90 [P.O.:90]  Out: 200 [Urine:200]    Peripheral IV 12/06/23  Anterior;Right Lower forearm (Active)   Site Assessment WDL 12/06/23 1000   Line Status Saline locked 12/06/23 1000   Dressing Transparent 12/06/23 1000   Dressing Status clean;dry;intact 12/06/23 1000   Dressing Intervention New dressing  12/06/23 1000   Phlebitis Scale 0-->no symptoms 12/06/23 1000   Infiltration? no 12/06/23 1000   Number of days: 0     No line/device    Constitutional: Alert and oriented x3. No distress    HEENT: Normocephalic/atraumatic, PERRL, EOMI, mouth moist, neck supple, no LN.     Cardiovascular: RRR. No  Murmur, no  rubs, or gallops.  JVD flat.  Bruits no .  Pulses 2    Respiratory: She has some decreased breath sounds bilaterally.  A lot of expiratory wheezes right greater than left.    Abdomen: Soft, nontender, nondistended, no organomegaly. Bowel sounds present    Extremities: Warm/dry. noedema    Neuro:   Non focal, cranial nerves intact, Moves all extremities.  Medications      ARIPiprazole  5 mg Oral At Bedtime    azithromycin  500 mg Intravenous At Bedtime    cefTRIAXone  1 g Intravenous Q24H    enoxaparin ANTICOAGULANT  40 mg Subcutaneous At Bedtime    ipratropium - albuterol 0.5 mg/2.5 mg/3 mL  3 mL Nebulization 4x daily    methylPREDNISolone  62.5 mg Intravenous Once    nicotine  1 patch Transdermal Daily    nicotine   Transdermal Q8H    [START ON 12/7/2023] predniSONE  40 mg Oral Daily    QUEtiapine  150 mg Oral At Bedtime    sodium chloride (PF)  3 mL Intracatheter Q8H       Data   Recent Labs   Lab 12/06/23  0550 12/05/23  0518 12/04/23  1237   WBC  --  7.9 10.9   HGB  --  14.1 17.1*   MCV  --  101* 97   PLT  --  292 375   NA  --  141 141   POTASSIUM 4.4 3.9 4.0   CHLORIDE  --  104 103   CO2  --  30* 24   BUN  --  18.2 20.8*   CR  --  0.67 0.68   ANIONGAP  --  7 14   BEST  --  9.0 10.0   GLC  --  118* 122*   ALBUMIN  --   --  4.4   PROTTOTAL  --   --  7.7   BILITOTAL  --   --  0.5   ALKPHOS  --   --  90   ALT  --   --  23   AST  --   --  25       No results found for this or  any previous visit (from the past 24 hour(s)).

## 2023-12-06 NOTE — PLAN OF CARE
Goal Outcome Evaluation:  Upon general assessment, patient was reporting some feelings of air hunger, sats were 94% on the 4 liters of supplemental oxygen, patient was preemptively given the ativan, and shortly thereafter, we did start the steroids, patient has reported feeling much better, patient has been able to ambulate independently and has gotten up frequently to use the bathroom, patient reports some rib discomfort from all the coughing, but denies acute pain or discomfort, patient has been resting comfortably, patient makes her needs known well, vitals as charted, the lungs are wheezy throughout, with expiratory wheezes more pronounced on the right, patient has frequent productive coughing episodes. Patient asleep at this time appears comfortable.

## 2023-12-06 NOTE — PLAN OF CARE
Goal Outcome Evaluation:patient was able to ambulate to the bathroom on room air, sats were 90% on the room air after she ambulated, as a result, while patient was sitting in the chair, we did turn her oxygen to 2 liters with sats 91%. IV  did blow, and we are attempting to re start the IV.

## 2023-12-06 NOTE — PROGRESS NOTES
Spoke with Glenda about an establish care appointment.  She is aware that Linda Mendoza NP would not be primary care.  Agreeable to writer scheduling an appointment at [place].  Appointment with Radha Andres NP scheduled and included in discharge instructions.  Appointment is a hospital follow up and establish care all in one.

## 2023-12-07 LAB
ANION GAP SERPL CALCULATED.3IONS-SCNC: 7 MMOL/L (ref 7–15)
BUN SERPL-MCNC: 10.9 MG/DL (ref 6–20)
CALCIUM SERPL-MCNC: 9.6 MG/DL (ref 8.6–10)
CHLORIDE SERPL-SCNC: 107 MMOL/L (ref 98–107)
CREAT SERPL-MCNC: 0.52 MG/DL (ref 0.51–0.95)
CRP SERPL-MCNC: 40.06 MG/L
DEPRECATED HCO3 PLAS-SCNC: 26 MMOL/L (ref 22–29)
EGFRCR SERPLBLD CKD-EPI 2021: >90 ML/MIN/1.73M2
ERYTHROCYTE [DISTWIDTH] IN BLOOD BY AUTOMATED COUNT: 14.2 % (ref 10–15)
GLUCOSE SERPL-MCNC: 136 MG/DL (ref 70–99)
HCT VFR BLD AUTO: 40.7 % (ref 35–47)
HGB BLD-MCNC: 13.6 G/DL (ref 11.7–15.7)
M PNEUMO IGG SER IA-ACNC: 0.5 U/L
M PNEUMO IGM SER IA-ACNC: 0.25 U/L
MCH RBC QN AUTO: 33.7 PG (ref 26.5–33)
MCHC RBC AUTO-ENTMCNC: 33.4 G/DL (ref 31.5–36.5)
MCV RBC AUTO: 101 FL (ref 78–100)
PLATELET # BLD AUTO: 278 10E3/UL (ref 150–450)
POTASSIUM SERPL-SCNC: 5.3 MMOL/L (ref 3.4–5.3)
RBC # BLD AUTO: 4.03 10E6/UL (ref 3.8–5.2)
SODIUM SERPL-SCNC: 140 MMOL/L (ref 135–145)
WBC # BLD AUTO: 12.4 10E3/UL (ref 4–11)

## 2023-12-07 PROCEDURE — 999N000157 HC STATISTIC RCP TIME EA 10 MIN

## 2023-12-07 PROCEDURE — 94799 UNLISTED PULMONARY SVC/PX: CPT

## 2023-12-07 PROCEDURE — 250N000012 HC RX MED GY IP 250 OP 636 PS 637: Performed by: INTERNAL MEDICINE

## 2023-12-07 PROCEDURE — 94640 AIRWAY INHALATION TREATMENT: CPT | Mod: 76

## 2023-12-07 PROCEDURE — 85027 COMPLETE CBC AUTOMATED: CPT | Performed by: INTERNAL MEDICINE

## 2023-12-07 PROCEDURE — 80048 BASIC METABOLIC PNL TOTAL CA: CPT | Performed by: INTERNAL MEDICINE

## 2023-12-07 PROCEDURE — 94640 AIRWAY INHALATION TREATMENT: CPT

## 2023-12-07 PROCEDURE — 250N000013 HC RX MED GY IP 250 OP 250 PS 637: Performed by: INTERNAL MEDICINE

## 2023-12-07 PROCEDURE — 120N000001 HC R&B MED SURG/OB

## 2023-12-07 PROCEDURE — 99232 SBSQ HOSP IP/OBS MODERATE 35: CPT | Performed by: INTERNAL MEDICINE

## 2023-12-07 PROCEDURE — 36415 COLL VENOUS BLD VENIPUNCTURE: CPT | Performed by: INTERNAL MEDICINE

## 2023-12-07 PROCEDURE — 250N000013 HC RX MED GY IP 250 OP 250 PS 637: Performed by: HOSPITALIST

## 2023-12-07 PROCEDURE — 86140 C-REACTIVE PROTEIN: CPT | Performed by: INTERNAL MEDICINE

## 2023-12-07 PROCEDURE — 258N000003 HC RX IP 258 OP 636: Performed by: INTERNAL MEDICINE

## 2023-12-07 PROCEDURE — 250N000009 HC RX 250: Performed by: HOSPITALIST

## 2023-12-07 PROCEDURE — 250N000011 HC RX IP 250 OP 636: Mod: JZ | Performed by: INTERNAL MEDICINE

## 2023-12-07 RX ORDER — GUAIFENESIN 600 MG/1
600 TABLET, EXTENDED RELEASE ORAL 2 TIMES DAILY
Status: DISCONTINUED | OUTPATIENT
Start: 2023-12-07 | End: 2023-12-09 | Stop reason: HOSPADM

## 2023-12-07 RX ADMIN — PREDNISONE 40 MG: 20 TABLET ORAL at 08:08

## 2023-12-07 RX ADMIN — NICOTINE 1 PATCH: 21 PATCH, EXTENDED RELEASE TRANSDERMAL at 17:21

## 2023-12-07 RX ADMIN — ENOXAPARIN SODIUM 40 MG: 40 INJECTION SUBCUTANEOUS at 19:27

## 2023-12-07 RX ADMIN — CEFTRIAXONE SODIUM 1 G: 1 INJECTION, SOLUTION INTRAVENOUS at 14:35

## 2023-12-07 RX ADMIN — IPRATROPIUM BROMIDE AND ALBUTEROL SULFATE 3 ML: .5; 3 SOLUTION RESPIRATORY (INHALATION) at 19:15

## 2023-12-07 RX ADMIN — ARIPIPRAZOLE 5 MG: 5 TABLET ORAL at 19:26

## 2023-12-07 RX ADMIN — QUETIAPINE FUMARATE 150 MG: 50 TABLET ORAL at 19:27

## 2023-12-07 RX ADMIN — LORAZEPAM 0.5 MG: 0.5 TABLET ORAL at 09:03

## 2023-12-07 RX ADMIN — LORAZEPAM 0.5 MG: 0.5 TABLET ORAL at 16:30

## 2023-12-07 RX ADMIN — GUAIFENESIN 600 MG: 600 TABLET, EXTENDED RELEASE ORAL at 22:45

## 2023-12-07 RX ADMIN — IPRATROPIUM BROMIDE AND ALBUTEROL SULFATE 3 ML: .5; 3 SOLUTION RESPIRATORY (INHALATION) at 07:46

## 2023-12-07 RX ADMIN — AZITHROMYCIN 500 MG: 500 INJECTION, POWDER, LYOPHILIZED, FOR SOLUTION INTRAVENOUS at 20:31

## 2023-12-07 RX ADMIN — IPRATROPIUM BROMIDE AND ALBUTEROL SULFATE 3 ML: .5; 3 SOLUTION RESPIRATORY (INHALATION) at 15:40

## 2023-12-07 RX ADMIN — IPRATROPIUM BROMIDE AND ALBUTEROL SULFATE 3 ML: .5; 3 SOLUTION RESPIRATORY (INHALATION) at 11:22

## 2023-12-07 ASSESSMENT — ACTIVITIES OF DAILY LIVING (ADL)
ADLS_ACUITY_SCORE: 22
ADLS_ACUITY_SCORE: 24
ADLS_ACUITY_SCORE: 22
ADLS_ACUITY_SCORE: 22
ADLS_ACUITY_SCORE: 24
ADLS_ACUITY_SCORE: 22
ADLS_ACUITY_SCORE: 24
ADLS_ACUITY_SCORE: 22

## 2023-12-07 NOTE — PHARMACY-MEDICATION REGIMEN REVIEW
Pharmacy Antimicrobial Stewardship Assessment     Current Antimicrobial Therapy:  Anti-infectives (From now, onward)      Start     Dose/Rate Route Frequency Ordered Stop    23 1500  cefTRIAXone in d5w (ROCEPHIN) intermittent infusion 1 g         1 g  over 30 Minutes Intravenous EVERY 24 HOURS 23 1930  azithromycin (ZITHROMAX) 500 mg in sodium chloride 0.9 % 250 mL intermittent infusion         500 mg  over 1 Hours Intravenous AT BEDTIME 23              Indication: CAP    Days of Therapy: 4     Pertinent Labs:    Recent labs: (last 7 days)     23  1237 23  1826 23  0518 23  0535   WBC 10.9  --  7.9 12.4*   PCAL  --  0.06  --   --        Temperature:  Temp (24hrs), Av.3  F (36.8  C), Min:97.1  F (36.2  C), Max:99.7  F (37.6  C)      Culture Results:   7-Day Micro Results       Collected Updated Procedure Result Status      2023 1026 2023 1030 Respiratory Aerobic Bacterial Culture with Gram Stain [[id number]]   (Abnormal)   Sputum from Expectorate    Preliminary result Component Value   Culture Culture in progress  [P]     1+ Normal bobby to date  [P]    Gram Stain Result <10 Squamous epithelial cells/low power field  [P]     >25 PMNs/low power field  [P]     1+ Gram positive cocci  [P]                2023 0918 2023 0956 Respiratory Aerobic Bacterial Culture with Gram Stain [[id number]]   Sputum from Expectorate    Final result Component Value   Culture >10 Squamous epithelial cells/low power field indicates oral contamination. Please recollect.   Gram Stain Result >10 Squamous epithelial cells/low power field    >25 PMNs/low power field               2023 0742 2023 Legionella pneumophila antigen urine [[id number]]   Urine, Midstream    Final result Component Value   Legionella pneumophila serogroup 1 urinary antigen Negative   Suggests no recent or current infection. Infection due to Legionella cannot  be ruled out, since other serogroups and species may cause disease, antigen may not be present in urine in early infection, and the level of antigen present in the urine may be below detectable limits of the test.            12/04/2023 2004 12/05/2023 1822 Respiratory Panel PCR [[id number]]    Swab from Nasopharyngeal    Final result Component Value   Adenovirus Not Detected   Coronavirus Not Detected   This test detects Coronavirus 229E, HKU1, NL63 and OC43 but does not distinguish between them. It does not detect MERS ( Respiratory Syndrome), SARS (Severe Acute Respiratory Syndrome) or 2019-nCoV (Novel 2019) Coronavirus.   Human Metapneumovirus Not Detected   Human Rhin/Enterovirus Not Detected   Influenza A Not Detected   Influenza A, H1 Not Detected   Influenza A 2009 H1N1 Not Detected   Influenza A, H3 Not Detected   Influenza B Not Detected   Parainfluenza Virus 1 Not Detected   Parainfluenza Virus 2 Not Detected   Parainfluenza Virus 3 Not Detected   Parainfluenza Virus 4 Not Detected   Respiratory Syncytial Virus A Not Detected   Respiratory Syncytial Virus B Not Detected   Chlamydia Pneumoniae Not Detected   Mycoplasma Pneumoniae Not Detected            12/04/2023 1922 12/04/2023 1926 Mycoplasma pneumonia abys IgG and IgM [[id number]]   Peripheral Blood    In process Component Value   No component results            12/04/2023 1238 12/04/2023 1319 Symptomatic Influenza A/B, RSV, & SARS-CoV2 PCR (COVID-19) Nasopharyngeal [[id number]]    Swab from Nasopharyngeal    Final result Component Value   Influenza A PCR Negative   Influenza B PCR Negative   RSV PCR Negative   SARS CoV2 PCR Negative   NEGATIVE: SARS-CoV-2 (COVID-19) RNA not detected, presumed negative.                  Recommendations/Interventions:  1. None at this time.    Malgorzata [facility], [facility]  December 7, 2023

## 2023-12-07 NOTE — PLAN OF CARE
Goal Outcome Evaluation:       Reason for hospital stay:  acute respiratory failure with hypoxia   Living situation PTA: home  Most recent vitals: /77 (BP Location: Right arm, Patient Position: Semi-Stephens's, Cuff Size: Adult Regular)   Pulse 96   Temp 97.4  F (36.3  C) (Tympanic)   Resp 20   Ht 1.524 m (5')   Wt 83.5 kg (184 lb 1.4 oz)   SpO2 (!) 89%   BMI 35.95 kg/m      Pain Management:  denied pain   LOC:  A&O   Cardiac:  apical regular   Respiratory:  inspiratory and expiratory wheezes all lobes - 3 LPM NC was weaned down to 1.5 lmp nc and did not tolerate long term   GI:  WNL  :  WNL  Skin Issues:  please see charting    IVF:  saline locked   ABX:  rocephin and zirthro    Nutrition: regular   ADL's:  independent   Ambulation:independent   Safety:  call light in reach, non slid socks when out of bed, room near nurses station with door open, wheels locked on bed       Comments: ativan x2 - improved anxiety related to breathing       Face to face report given with opportunity to observe patient.    Report given to Yaneth Silver RN   12/7/2023  7:01 PM

## 2023-12-07 NOTE — PLAN OF CARE
Goal Outcome Evaluation:      Plan of Care Reviewed With: patient    VS as charted. Denies pain.   O2: NC 3 LPM. Sats in the mid 90's. Dyspnea w/ exertion.   Frequent, congested, loose productive cough. Expelling sputum.   LOC x 4.   Telemetry.   IV SL & flushes well.   Assist, stand by.   Use of call light appropriately & makes needs known.   Antx: IV Rocephin & IV Azithromycin.   Steroids were started this morning. Pt is doing & feeling well.   No nicotine patch.   Pt napped on & off throughout the shift.   Appetite & PO fluid intake: good.   Face to face report given with opportunity to observe patient.    Report given to JULIA Wolfe.     Tootie Barone RN   12/6/2023  11:51 PM

## 2023-12-07 NOTE — PLAN OF CARE
Reason for hospital stay:  Acute respiratory failure with hypoxia  Most recent vitals: /55 (BP Location: Right arm, Patient Position: Semi-Stephens's, Cuff Size: Adult Regular)   Pulse 90   Temp 97.6  F (36.4  C) (Tympanic)   Resp 18   Ht 1.524 m (5')   Wt 83.5 kg (184 lb 1.4 oz)   SpO2 95%   BMI 35.95 kg/m      Pain Management:  Patient denied any pain overnight  LOC:  A&O x4  Cardiac:  Apical pulse regular but tachycardic at times  Respiratory:  Maintaining sats on 3 LPM nasal cannula, dyspnea w/ exertion patient desats to 87-89% when ambulating to bathroom but recovers quickly. Frequent congested loose productive cough noted with a small amount of white, yellow, frothy, thick secretions. Lung sounds with both expiratory and inspiratory wheezes throughout  GI:  Bowel sounds audible and normoactive  :  Voiding spontaneously without difficulty   Skin Issues:  Scattered bruising    IVF:  SL  ABX:  IV Zithromax and Rocephin    Nutrition: Regular diet  Activity: Ambulates to bathroom with a stand by assist  Safety:  Call light within reach, calls appropriately, makes needs known, lighting adjusted, nonskid footwear in use, bed in lowest position, wheels locked    Comments:  No nicotine patch in place    Face to face report given with opportunity to observe patient.    Report given to JULIA Garcia RN   12/7/2023  7:07 AM

## 2023-12-07 NOTE — PROGRESS NOTES
[facility]    Hospitalist Progress Note     Glenda Robins is a 53 year old female admitted on 12/4/2023.  She has significant history of schizoaffective disorder, tobacco abuse, no significant cardiac history.  She presents to ED with 3 weeks worth of with worsening cough and dyspnea.  Some yellowish sputum.  Intermittent fevers per the patient upon presentation to the ER her blood pressure is 116/84, temp 97, pulse 114, room air sats are 88%.  She is placed on 3 L O2.  Chest x-ray was unremarkable CT scan did show some patchy infiltrates and evidence of some mild emphysematous changes.  Cultures were done.  She was started empirically on Rocephin and azithromycin.  Also started on bronchodilators.     Hospital problems  Multifocal lung consolidation: pneumonia vs pneumonitis vs viral pneumonia.  -12/6: Procalcitonin was negative CRP was 10.  Sputum was obtained still awaiting culture results.  She had the viral panel which was on revealing.  Is currently empirically on the Rocephin and azithromycin.  Yesterday white count 7900.  Still requiring 4 L of O2.  Her lung exam she does have a lot of rhonchi and fair amount of expiratory wheezes right greater than left.  Still feels relatively dyspneic.  This been going on for about 3 weeks.  Progressively gotten worse.  We will continue with the antibiotics we will give her some steroids here today dose of Solu-Medrol and prednisone.  Continue to work on pulmonary toilet ambulate.  -12/7: Patient remains afebrile white count up a little bit to 12.4.  May be secondary to steroid initiation.  Remains on Rocephin and azithromycin.     Acute hypoxic respiratory Failure   CTA chest done showing No acute pulmonary emboli to the subsegmental level.  Patchy airspace consolidation in the dependent, mid right more so than  left lungs. Differential considerations include multifocal pneumonia  or aspiration pneumonitis. Recommend follow-up to resolution.   Polycystic  disease of the liver.   -Rocephin and azithromycin  -Cultures  -Wean off oxygen  -12/6: Patient remains on 4 L of O2 with sats 93% afebrile otherwise hemodynamically stable.  Babita on the Rocephin and azithromycin.  Nursing notes states frequent episodes of wheezing.  Productive cough.  Has not been on steroids to this point.  As above we will start her on steroids.  -12/7: Patient was feeling a lot better yesterday afternoon down to 3 L.  Would like to continue to wean this down however I did warn her that she may require some oxygen at discharge.  Likely does have underlying COPD and this been going on for about 3 weeks prior to her presentation.  Continue to work on her pulmonary toilet ambulate flutter valve center.  Her lungs still some expiratory wheezes however less rhonchi is noted.  She is feeling better.  However my feeling is she likely will need oxygen at the time she is discharged however we will continue to work on this aggressively.  Will tolerate O2 sats 89 to 90%.     History of tobacco use  Suspect COPD component  Duonebs  Prednisone  Needs PFT out patient  -12/6: Suspect she does have some underlying obstructive lung disease based on her wheezing tobacco use and CT scan showing some emphysematous changes.  -12/7: Likely underlying COPD once she does become improved at home she should undergo formal pulmonary function testing.     Schizoaffective disorder  - continue home meds  -12/6: Very stable very pleasant.  -12/7: No issues     Elevated hemoglobin:  - CO is normal 1.6   Hemoglobin was noted to be 17 on admission.  -12/6: Hemoglobin was 14 yesterday.  Hemoglobins been in the normal range no major concerns.      Diet: Regular Diet Adult Thin Liquids (level 0)  Fluids: None    DVT Prophylaxis: Enoxaparin (Lovenox) SQ  Code Status: Full Code    Disposition: Expected discharge in 1-2 days once able to ambulate and perform her usual ADLs..    Magdaleno Wells MD    Interval History   Patient feels  better.  No coughing up some junk.  Still requiring 3 L of O2.  Denies any other new problems any chest pains etc.  No fevers she is otherwise hemodynamically stable.  Will continue to work on pulmonary toilet aggressively.  Hope to be able to discharge in the next 1 to 2 days when she can get up and do what she has to do at home as she does live independently.  Mr. Ramos require home oxygen.    -Data reviewed today: I reviewed all new labs and imaging results over the last 24 hours. I personally reviewed no images or EKG's today.    Physical Exam   Temp: 97.6  F (36.4  C) Temp src: Tympanic BP: 111/55 Pulse: 90   Resp: 18 SpO2: 95 % O2 Device: Nasal cannula Oxygen Delivery: 3 LPM  Vitals:    12/05/23 0525 12/06/23 0554 12/07/23 0500   Weight: 82.1 kg (181 lb) 83.3 kg (183 lb 10.3 oz) 83.5 kg (184 lb 1.4 oz)     Vital Signs with Ranges  Temp:  [97.6  F (36.4  C)-99.7  F (37.6  C)] 97.6  F (36.4  C)  Pulse:  [] 90  Resp:  [18-20] 18  BP: (111-130)/(55-73) 111/55  SpO2:  [90 %-95 %] 95 %  I/O last 3 completed shifts:  In: 333 [P.O.:330; I.V.:3]  Out: 750 [Urine:750]    Peripheral IV 12/06/23 Anterior;Left Upper forearm (Active)   Site Assessment WDL 12/06/23 2358   Line Status Saline locked 12/06/23 2358   Dressing Transparent 12/06/23 2358   Dressing Status clean;dry;intact 12/06/23 2358   Line Intervention Flushed 12/06/23 2358   Phlebitis Scale 0-->no symptoms 12/06/23 2358   Infiltration? no 12/06/23 2358   Number of days: 1     No line/device    Constitutional: Alert and oriented x3. No distress    HEENT: Normocephalic/atraumatic, PERRL, EOMI, mouth moist, neck supple, no LN.     Cardiovascular: RRR.  No murmur, no  rubs, or gallops.  JVD no.  Bruits no.  Pulses 2    Respiratory: Less rhonchi and crackles noted.  Still expiratory wheezes bilaterally however.       Abdomen: Soft, nontender, nondistended, no organomegaly. Bowel sounds present    Extremities: Warm/dry.  No edema    Neuro:   Non focal, cranial  nerves intact, Moves all extremities.  Medications      ARIPiprazole  5 mg Oral At Bedtime    azithromycin  500 mg Intravenous At Bedtime    cefTRIAXone  1 g Intravenous Q24H    enoxaparin ANTICOAGULANT  40 mg Subcutaneous At Bedtime    ipratropium - albuterol 0.5 mg/2.5 mg/3 mL  3 mL Nebulization 4x daily    nicotine  1 patch Transdermal Daily    nicotine   Transdermal Q8H    predniSONE  40 mg Oral Daily    QUEtiapine  150 mg Oral At Bedtime    sodium chloride (PF)  3 mL Intracatheter Q8H       Data   Recent Labs   Lab 12/07/23  0535 12/07/23  0534 12/06/23  0550 12/05/23  0518 12/04/23  1237   WBC 12.4*  --   --  7.9 10.9   HGB 13.6  --   --  14.1 17.1*   *  --   --  101* 97     --   --  292 375   NA  --  140  --  141 141   POTASSIUM  --  5.3 4.4 3.9 4.0   CHLORIDE  --  107  --  104 103   CO2  --  26  --  30* 24   BUN  --  10.9  --  18.2 20.8*   CR  --  0.52  --  0.67 0.68   ANIONGAP  --  7  --  7 14   BEST  --  9.6  --  9.0 10.0   GLC  --  136*  --  118* 122*   ALBUMIN  --   --   --   --  4.4   PROTTOTAL  --   --   --   --  7.7   BILITOTAL  --   --   --   --  0.5   ALKPHOS  --   --   --   --  90   ALT  --   --   --   --  23   AST  --   --   --   --  25       No results found for this or any previous visit (from the past 24 hour(s)).

## 2023-12-08 LAB
ANION GAP SERPL CALCULATED.3IONS-SCNC: 8 MMOL/L (ref 7–15)
BACTERIA SPT CULT: ABNORMAL
BUN SERPL-MCNC: 14.1 MG/DL (ref 6–20)
CALCIUM SERPL-MCNC: 9.3 MG/DL (ref 8.6–10)
CHLORIDE SERPL-SCNC: 106 MMOL/L (ref 98–107)
CREAT SERPL-MCNC: 0.47 MG/DL (ref 0.51–0.95)
DEPRECATED HCO3 PLAS-SCNC: 26 MMOL/L (ref 22–29)
EGFRCR SERPLBLD CKD-EPI 2021: >90 ML/MIN/1.73M2
ERYTHROCYTE [DISTWIDTH] IN BLOOD BY AUTOMATED COUNT: 14.3 % (ref 10–15)
GLUCOSE SERPL-MCNC: 127 MG/DL (ref 70–99)
GRAM STAIN RESULT: ABNORMAL
HCT VFR BLD AUTO: 42.3 % (ref 35–47)
HGB BLD-MCNC: 13.9 G/DL (ref 11.7–15.7)
MCH RBC QN AUTO: 33.4 PG (ref 26.5–33)
MCHC RBC AUTO-ENTMCNC: 32.9 G/DL (ref 31.5–36.5)
MCV RBC AUTO: 102 FL (ref 78–100)
PLATELET # BLD AUTO: 299 10E3/UL (ref 150–450)
POTASSIUM SERPL-SCNC: 4.6 MMOL/L (ref 3.4–5.3)
RBC # BLD AUTO: 4.16 10E6/UL (ref 3.8–5.2)
SODIUM SERPL-SCNC: 140 MMOL/L (ref 135–145)
WBC # BLD AUTO: 12.2 10E3/UL (ref 4–11)

## 2023-12-08 PROCEDURE — 250N000013 HC RX MED GY IP 250 OP 250 PS 637: Performed by: INTERNAL MEDICINE

## 2023-12-08 PROCEDURE — 250N000011 HC RX IP 250 OP 636: Mod: JZ | Performed by: INTERNAL MEDICINE

## 2023-12-08 PROCEDURE — 80048 BASIC METABOLIC PNL TOTAL CA: CPT | Performed by: INTERNAL MEDICINE

## 2023-12-08 PROCEDURE — 250N000013 HC RX MED GY IP 250 OP 250 PS 637: Performed by: HOSPITALIST

## 2023-12-08 PROCEDURE — 85027 COMPLETE CBC AUTOMATED: CPT | Performed by: INTERNAL MEDICINE

## 2023-12-08 PROCEDURE — 94640 AIRWAY INHALATION TREATMENT: CPT | Mod: 76

## 2023-12-08 PROCEDURE — 36415 COLL VENOUS BLD VENIPUNCTURE: CPT | Performed by: INTERNAL MEDICINE

## 2023-12-08 PROCEDURE — 94799 UNLISTED PULMONARY SVC/PX: CPT

## 2023-12-08 PROCEDURE — 250N000012 HC RX MED GY IP 250 OP 636 PS 637: Performed by: INTERNAL MEDICINE

## 2023-12-08 PROCEDURE — 120N000001 HC R&B MED SURG/OB

## 2023-12-08 PROCEDURE — 94640 AIRWAY INHALATION TREATMENT: CPT

## 2023-12-08 PROCEDURE — 999N000157 HC STATISTIC RCP TIME EA 10 MIN

## 2023-12-08 PROCEDURE — 99232 SBSQ HOSP IP/OBS MODERATE 35: CPT | Performed by: INTERNAL MEDICINE

## 2023-12-08 PROCEDURE — 250N000009 HC RX 250: Performed by: HOSPITALIST

## 2023-12-08 RX ORDER — AZITHROMYCIN 250 MG/1
250 TABLET, FILM COATED ORAL EVERY EVENING
Qty: 3 TABLET | Refills: 0 | Status: DISCONTINUED | OUTPATIENT
Start: 2023-12-08 | End: 2023-12-09 | Stop reason: HOSPADM

## 2023-12-08 RX ADMIN — AMOXICILLIN AND CLAVULANATE POTASSIUM 1 TABLET: 875; 125 TABLET, FILM COATED ORAL at 20:04

## 2023-12-08 RX ADMIN — NICOTINE 1 PATCH: 21 PATCH, EXTENDED RELEASE TRANSDERMAL at 09:47

## 2023-12-08 RX ADMIN — IPRATROPIUM BROMIDE AND ALBUTEROL SULFATE 3 ML: .5; 3 SOLUTION RESPIRATORY (INHALATION) at 11:35

## 2023-12-08 RX ADMIN — IPRATROPIUM BROMIDE AND ALBUTEROL SULFATE 3 ML: .5; 3 SOLUTION RESPIRATORY (INHALATION) at 15:45

## 2023-12-08 RX ADMIN — PREDNISONE 40 MG: 20 TABLET ORAL at 09:47

## 2023-12-08 RX ADMIN — GUAIFENESIN 600 MG: 600 TABLET, EXTENDED RELEASE ORAL at 09:47

## 2023-12-08 RX ADMIN — ARIPIPRAZOLE 5 MG: 5 TABLET ORAL at 20:03

## 2023-12-08 RX ADMIN — IPRATROPIUM BROMIDE AND ALBUTEROL SULFATE 3 ML: .5; 3 SOLUTION RESPIRATORY (INHALATION) at 19:22

## 2023-12-08 RX ADMIN — AMOXICILLIN AND CLAVULANATE POTASSIUM 1 TABLET: 875; 125 TABLET, FILM COATED ORAL at 10:58

## 2023-12-08 RX ADMIN — AZITHROMYCIN 250 MG: 250 TABLET, FILM COATED ORAL at 20:03

## 2023-12-08 RX ADMIN — LORAZEPAM 0.5 MG: 0.5 TABLET ORAL at 14:40

## 2023-12-08 RX ADMIN — ENOXAPARIN SODIUM 40 MG: 40 INJECTION SUBCUTANEOUS at 20:02

## 2023-12-08 RX ADMIN — GUAIFENESIN 600 MG: 600 TABLET, EXTENDED RELEASE ORAL at 20:03

## 2023-12-08 RX ADMIN — LORAZEPAM 0.5 MG: 0.5 TABLET ORAL at 09:47

## 2023-12-08 RX ADMIN — IPRATROPIUM BROMIDE AND ALBUTEROL SULFATE 3 ML: .5; 3 SOLUTION RESPIRATORY (INHALATION) at 07:45

## 2023-12-08 RX ADMIN — QUETIAPINE FUMARATE 150 MG: 50 TABLET ORAL at 20:03

## 2023-12-08 ASSESSMENT — ACTIVITIES OF DAILY LIVING (ADL)
ADLS_ACUITY_SCORE: 22

## 2023-12-08 NOTE — PLAN OF CARE
Reason for hospital stay:  Acute Respiratory Failure w/ Hypoxia  Living situation PTA: Home  Most recent vitals: /64 (BP Location: Right arm, Patient Position: Semi-Stephens's, Cuff Size: Adult Regular)   Pulse 93   Temp 97.8  F (36.6  C) (Tympanic)   Resp 18   Ht 1.524 m (5')   Wt 82.7 kg (182 lb 5.1 oz)   SpO2 (!) 90%   BMI 35.61 kg/m      Pain Management:  Denies pain this shift  LOC:  A&O x 4  Cardiac:  HRR reg this shift  Respiratory:  Lungs coarse expiratory wheezes throughout. O2 90% on 2LPM humidified oxygen.  GI:  Normoactive BS x 4  :  Voids spontaneously w/o difficulty  Skin Issues:  Some scattered bruising present, no adjustments needed.    IVF:  Sl  ABX:  Switched to oral Zithromax and Augmentin today.    Nutrition: regular diet w/ good appetite  Activity: Independent in room. Walked 1 loop in the hallway this shift. Pt tolerated well.  Safety:  Up in chair w/ wheels locked. Call light and personal items within reach and makes needs known. Room near nurses station w/ door open and frequent rounding.  Face to face report given with opportunity to observe patient.    Report given to JULIA Yarbrough RN   12/8/2023  3:13 PM

## 2023-12-08 NOTE — PROGRESS NOTES
[facility]    Hospitalist Progress Note    Glenda Robins is a 53 year old female admitted on 12/4/2023.  She has significant history of schizoaffective disorder, tobacco abuse, no significant cardiac history.  She presents to ED with 3 weeks worth of with worsening cough and dyspnea.  Some yellowish sputum.  Intermittent fevers per the patient upon presentation to the ER her blood pressure is 116/84, temp 97, pulse 114, room air sats are 88%.  She is placed on 3 L O2.  Chest x-ray was unremarkable CT scan did show some patchy infiltrates and evidence of some mild emphysematous changes.  Cultures were done.  She was started empirically on Rocephin and azithromycin.  Also started on bronchodilators.     Hospital problems    Multifocal lung consolidation: pneumonia vs pneumonitis vs viral pneumonia.  -12/6: Procalcitonin was negative CRP was 10.  Sputum was obtained still awaiting culture results.  She had the viral panel which was on revealing.  Is currently empirically on the Rocephin and azithromycin.  Yesterday white count 7900.  Still requiring 4 L of O2.  Her lung exam she does have a lot of rhonchi and fair amount of expiratory wheezes right greater than left.  Still feels relatively dyspneic.  This been going on for about 3 weeks.  Progressively gotten worse.  We will continue with the antibiotics we will give her some steroids here today dose of Solu-Medrol and prednisone.  Continue to work on pulmonary toilet ambulate.  -12/7: Patient remains afebrile white count up a little bit to 12.4.  May be secondary to steroid initiation.  Remains on Rocephin and azithromycin.  -12/8: No fevers white count remains stable at 12,000.  No fevers at all.  Continues on antibiotics we will switch over to orals today probably viral or mycoplasma IgG was elevated IgM was negative.  Sputum that was obtained just showed normal oral bobby.     Acute hypoxic respiratory Failure   CTA chest done showing No acute  pulmonary emboli to the subsegmental level.  Patchy airspace consolidation in the dependent, mid right more so than  left lungs. Differential considerations include multifocal pneumonia  or aspiration pneumonitis. Recommend follow-up to resolution.   Polycystic disease of the liver.   -Rocephin and azithromycin  -Cultures  -Wean off oxygen  -12/6: Patient remains on 4 L of O2 with sats 93% afebrile otherwise hemodynamically stable.  Babita on the Rocephin and azithromycin.  Nursing notes states frequent episodes of wheezing.  Productive cough.  Has not been on steroids to this point.  As above we will start her on steroids.  -12/7: Patient was feeling a lot better yesterday afternoon down to 3 L.  Would like to continue to wean this down however I did warn her that she may require some oxygen at discharge.  Likely does have underlying COPD and this been going on for about 3 weeks prior to her presentation.  Continue to work on her pulmonary toilet ambulate flutter valve center.  Her lungs still some expiratory wheezes however less rhonchi is noted.  She is feeling better.  However my feeling is she likely will need oxygen at the time she is discharged however we will continue to work on this aggressively.  Will tolerate O2 sats 89 to 90%.  -12/8: Still requires O2 she has between 2 to 3 L.  Wheezing is improved.  Is on prednisone bronchodilators antibiotics.  Working on pulmonary toilet as best we can.  Unfortunately believe she is likely will need oxygen at home she does not like that idea but explained to her we may not have much option.  I think she does have fairly significant underlying COPD that is been present for some time and this infection just sort of tipped her over.  Will need formal PFTs once stabilizes.     History of tobacco use  Suspect COPD component  Duonebs  Prednisone  Needs PFT out patient  -12/6: Suspect she does have some underlying obstructive lung disease based on her wheezing tobacco use  and CT scan showing some emphysematous changes.  -12/7: Likely underlying COPD once she does become improved at home she should undergo formal pulmonary function testing.  -12/8: As above nebs steroids antibiotics will put her on Pulmicort after course of oral steroids is finished.  Continue to ambulate as best she can.     Schizoaffective disorder  - continue home meds  -12/6: Very stable very pleasant.  -12/7: No issues     Elevated hemoglobin:  - CO is normal 1.6   Hemoglobin was noted to be 17 on admission.  -12/6: Hemoglobin was 14 yesterday.  Hemoglobins been in the normal range no major concerns.      Diet: Regular Diet Adult Thin Liquids (level 0)  Fluids: None    DVT Prophylaxis: Enoxaparin (Lovenox) SQ  Code Status: Full Code    Disposition: Expected discharge in 1-2 days once able to do ADLs    Magdaleno Wells MD    Interval History   Is feeling somewhat better still requiring O2 however.  Dyspneic on exertion.  Secretions are thinned out she Mucinex is helping her cough things up.  All cultures negative.  Afebrile we will switch her over to oral antibiotics she is on oral prednisone continue the bronchodilators probably switch her over to some Pulmicort once steroids are done.  Continue to ambulate work on pulmonary toilet.  Did discuss with her that she may very well require home oxygen.    -Data reviewed today: I reviewed all new labs and imaging results over the last 24 hours. I personally reviewed no images or EKG's today.    Physical Exam   Temp: 97.8  F (36.6  C) Temp src: Tympanic BP: 112/64 Pulse: 93   Resp: 18 SpO2: (!) 90 % O2 Device: Nasal cannula with humidification Oxygen Delivery: 2 LPM  Vitals:    12/06/23 0554 12/07/23 0500 12/08/23 0200   Weight: 83.3 kg (183 lb 10.3 oz) 83.5 kg (184 lb 1.4 oz) 82.7 kg (182 lb 5.1 oz)     Vital Signs with Ranges  Temp:  [97  F (36.1  C)-97.8  F (36.6  C)] 97.8  F (36.6  C)  Pulse:  [] 93  Resp:  [18-22] 18  BP: (112-156)/(59-77) 112/64  SpO2:  [88  %-97 %] 90 %  I/O last 3 completed shifts:  In: 654 [P.O.:360; I.V.:294]  Out: 400 [Urine:400]    Peripheral IV 12/07/23 Right Hand (Active)   Site Assessment WDL 12/08/23 0937   Line Status Saline locked 12/08/23 0937   Dressing Transparent 12/08/23 0937   Dressing Status clean;dry;intact 12/08/23 0937   Line Intervention Flushed 12/08/23 0937   Phlebitis Scale 0-->no symptoms 12/08/23 0937   Infiltration? no 12/08/23 0937   Number of days: 1     No line/device    Constitutional: Alert and oriented x3. No distress    HEENT: Normocephalic/atraumatic, PERRL, EOMI, mouth moist, neck supple, no LN.     Cardiovascular: RRR. no Murmur, no  rubs, or gallops.  JVD no.  Bruits no.  Pulses 2    Respiratory: Patient with less wheezing still some expiratory ones are noted.  Not as much upper airway rhonchi no areas of consolidation.      Abdomen: Soft, nontender, nondistended, no organomegaly. Bowel sounds present    Extremities: Warm/dry. No edema    Neuro:   Non focal, cranial nerves intact, Moves all extremities.  Medications      amoxicillin-clavulanate  1 tablet Oral Q12H AB (08/20)    ARIPiprazole  5 mg Oral At Bedtime    azithromycin  250 mg Oral Daily    enoxaparin ANTICOAGULANT  40 mg Subcutaneous At Bedtime    guaiFENesin  600 mg Oral BID    ipratropium - albuterol 0.5 mg/2.5 mg/3 mL  3 mL Nebulization 4x daily    nicotine  1 patch Transdermal Daily    nicotine   Transdermal Q8H    predniSONE  40 mg Oral Daily    QUEtiapine  150 mg Oral At Bedtime    sodium chloride (PF)  3 mL Intracatheter Q8H       Data   Recent Labs   Lab 12/08/23  0516 12/07/23  0535 12/07/23  0534 12/06/23  0550 12/05/23  0518 12/04/23  1237   WBC 12.2* 12.4*  --   --  7.9 10.9   HGB 13.9 13.6  --   --  14.1 17.1*   * 101*  --   --  101* 97    278  --   --  292 375     --  140  --  141 141   POTASSIUM 4.6  --  5.3 4.4 3.9 4.0   CHLORIDE 106  --  107  --  104 103   CO2 26  --  26  --  30* 24   BUN 14.1  --  10.9  --  18.2  20.8*   CR 0.47*  --  0.52  --  0.67 0.68   ANIONGAP 8  --  7  --  7 14   BEST 9.3  --  9.6  --  9.0 10.0   *  --  136*  --  118* 122*   ALBUMIN  --   --   --   --   --  4.4   PROTTOTAL  --   --   --   --   --  7.7   BILITOTAL  --   --   --   --   --  0.5   ALKPHOS  --   --   --   --   --  90   ALT  --   --   --   --   --  23   AST  --   --   --   --   --  25       No results found for this or any previous visit (from the past 24 hour(s)).

## 2023-12-08 NOTE — PLAN OF CARE
Reason for hospital stay:  Acute respiratory failure with hypoxia   Living situation PTA: Home  Most recent vitals: /59 (BP Location: Right arm, Patient Position: Semi-Stephens's, Cuff Size: Adult Regular)   Pulse 87   Temp 97  F (36.1  C) (Tympanic)   Resp 20   Ht 1.524 m (5')   Wt 82.7 kg (182 lb 5.1 oz)   SpO2 94%   BMI 35.61 kg/m      Pain Management:  Patient denied any pain overnight  LOC:  A&O x4  Cardiac:  Apical pulse regular but tachycardic at times  Respiratory:  Maintaining sats on 3 LPM nasal cannula, dyspnea w/ exertion patient desats to 87-89% when ambulating to bathroom but recovers quickly. Scheduled nebs administered as ordered. Frequent congested loose productive cough noted with a small amount of white, yellow, frothy, thick secretions. Lung sounds with both expiratory and inspiratory wheezes throughout at time of initial assessment, upon reassessment lung sounds clear and equal bilaterally with expiratory wheezes noted in left lobes. Administered PRN Mucinex - Patient reported medication was effective  GI:  Bowel sounds audible and normoactive  :  Voiding spontaneously without difficulty   Skin Issues:  Scattered bruising     IVF:  SL  ABX:  IV Zithromax and Rocephin     Nutrition: Regular diet  Activity: Ambulates to bathroom independently  Safety:  Call light within reach, calls appropriately, makes needs known, lighting adjusted, nonskid footwear in use, bed in lowest position, wheels locked     Comments:  Nicotine patch in place to left shoulder    Face to face report given with opportunity to observe patient.    Report given to JULIA Burnett RN   12/8/2023  7:14 AM

## 2023-12-08 NOTE — PHARMACY-MEDICATION REGIMEN REVIEW
Pharmacy Antimicrobial Stewardship Assessment     Current Antimicrobial Therapy:  Anti-infectives (From now, onward)      Start     Dose/Rate Route Frequency Ordered Stop    23  azithromycin (ZITHROMAX) tablet 250 mg         250 mg Oral EVERY EVENING 23 1025 23 103  amoxicillin-clavulanate (AUGMENTIN) 875-125 MG per tablet 1 tablet         1 tablet Oral EVERY 12 HOURS SCHEDULED 23 1025 12/15/23 0759            Indication: CAP    Days of Therapy: day 5 of azithromycin, day 1 of Augmentin (day 5 overall of a beta-lactam)     Pertinent Labs:    Recent labs: (last 7 days)     23  1237 23  1826 23  0518 23  0535 23  0516   WBC 10.9  --  7.9 12.4* 12.2*   PCAL  --  0.06  --   --   --        Temperature:  Temp (24hrs), Av.4  F (36.3  C), Min:97  F (36.1  C), Max:97.8  F (36.6  C)      Culture Results:   7-Day Micro Results       Collected Updated Procedure Result Status      2023 1026 2023 0642 Respiratory Aerobic Bacterial Culture with Gram Stain [[id number]]   (Abnormal)   Sputum from Expectorate    Final result Component Value   Culture 1+ Normal bobby   Gram Stain Result <10 Squamous epithelial cells/low power field    >25 PMNs/low power field    1+ Gram positive cocci               2023 0918 2023 0956 Respiratory Aerobic Bacterial Culture with Gram Stain [[id number]]   Sputum from Expectorate    Final result Component Value   Culture >10 Squamous epithelial cells/low power field indicates oral contamination. Please recollect.   Gram Stain Result >10 Squamous epithelial cells/low power field    >25 PMNs/low power field               2023 0742 2023 Legionella pneumophila antigen urine [[id number]]   Urine, Midstream    Final result Component Value   Legionella pneumophila serogroup 1 urinary antigen Negative   Suggests no recent or current infection. Infection due to Legionella cannot be ruled  out, since other serogroups and species may cause disease, antigen may not be present in urine in early infection, and the level of antigen present in the urine may be below detectable limits of the test.            12/04/2023 2004 12/05/2023 1822 Respiratory Panel PCR [[id number]]    Swab from Nasopharyngeal    Final result Component Value   Adenovirus Not Detected   Coronavirus Not Detected   This test detects Coronavirus 229E, HKU1, NL63 and OC43 but does not distinguish between them. It does not detect MERS ( Respiratory Syndrome), SARS (Severe Acute Respiratory Syndrome) or 2019-nCoV (Novel 2019) Coronavirus.   Human Metapneumovirus Not Detected   Human Rhin/Enterovirus Not Detected   Influenza A Not Detected   Influenza A, H1 Not Detected   Influenza A 2009 H1N1 Not Detected   Influenza A, H3 Not Detected   Influenza B Not Detected   Parainfluenza Virus 1 Not Detected   Parainfluenza Virus 2 Not Detected   Parainfluenza Virus 3 Not Detected   Parainfluenza Virus 4 Not Detected   Respiratory Syncytial Virus A Not Detected   Respiratory Syncytial Virus B Not Detected   Chlamydia Pneumoniae Not Detected   Mycoplasma Pneumoniae Not Detected            12/04/2023 1922 12/07/2023 1426 Mycoplasma pneumonia abys IgG and IgM [[id number]]   (Abnormal)   Peripheral Blood    Final result Component Value Units   Myco Pneumoniae IgG 0.50 U/L   INTERPRETIVE INFORMATION:  Mycoplasma pneumoniae Ab, IgG    0.09 U/L or less ............ Negative    0.10 - 0.32 U/L ............. Equivocal    0.33 U/L or greater ......... Positive    INTERPRETIVE DATA: Over 50% of healthy adults have a   relatively high background of specific M. pneumoniae IgG   antibodies in their sera, probably because of past M.   pneumoniae infections. Therefore, paired sera obtained with   a time interval of 1 to 3 weeks are highly recommended in   adults to confirm reinfection by M. pneumoniae, which is   demonstrated by a significant change  in IgG antibodies. A   significant change is indicated if one sample is above 0.32   U/L and the other is below 0.20 U/L.   Myco Pneumoniae IgM 0.25 U/L   INTERPRETIVE INFORMATION:  Mycoplasma pneumoniae Ab, IgM    0.76 U/L or less .......... Negative: No clinically                                significant amount of                                M. pneumoniae IgM antibody                                detected.    0.77 - 0.95 U/L ........... Low Positive: M. pneumoniae-                                specific IgM presumptively                                detected. Collection of a                                follow-up sample in 1-2                                weeks is recommended to                                assure reactivity.    0.96 U/L or greater ....... Positive: Highly significant                                amount of M. pneumoniae-                                specific IgM antibody                                detected. However, low levels                                of IgM antibodies may                                occasionally persist for more                                than 12 months post-infection.  Performed By: [facility]  [address] [place] [id number]  : Viktor Vaz MD, PhD  CLIA Number: [id number]            12/04/2023 1238 12/04/2023 1319 Symptomatic Influenza A/B, RSV, & SARS-CoV2 PCR (COVID-19) Nasopharyngeal [[id number]]    Swab from Nasopharyngeal    Final result Component Value   Influenza A PCR Negative   Influenza B PCR Negative   RSV PCR Negative   SARS CoV2 PCR Negative   NEGATIVE: SARS-CoV-2 (COVID-19) RNA not detected, presumed negative.                  Recommendations/Interventions:  1. None at this time.    Malgorzata Otoole, [facility]  December 8, 2023

## 2023-12-08 NOTE — PLAN OF CARE
Patient A&O, ambulating in room and hallways independently. Remains on 2 LPM O2 via NC. Lungs coarse, rhonchi throughout. Harsh, congested cough. Remains afebrile. Vital signs and assessment as charted. Patient was transitioned to PO antibiotics today. Declines offers of supper, encouraged to order something in case she gets hungry later. Call light within reach, makes needs known.     Face to face report given with opportunity to observe patient.    Report given to Meg Cr RN   12/8/2023  7:20 PM

## 2023-12-09 VITALS
HEIGHT: 60 IN | DIASTOLIC BLOOD PRESSURE: 68 MMHG | OXYGEN SATURATION: 91 % | HEART RATE: 92 BPM | TEMPERATURE: 96.9 F | RESPIRATION RATE: 20 BRPM | SYSTOLIC BLOOD PRESSURE: 119 MMHG | WEIGHT: 190.7 LBS | BODY MASS INDEX: 37.44 KG/M2

## 2023-12-09 LAB
ANION GAP SERPL CALCULATED.3IONS-SCNC: 12 MMOL/L (ref 7–15)
BUN SERPL-MCNC: 15.6 MG/DL (ref 6–20)
CALCIUM SERPL-MCNC: 9.5 MG/DL (ref 8.6–10)
CHLORIDE SERPL-SCNC: 101 MMOL/L (ref 98–107)
CREAT SERPL-MCNC: 0.56 MG/DL (ref 0.51–0.95)
DEPRECATED HCO3 PLAS-SCNC: 24 MMOL/L (ref 22–29)
EGFRCR SERPLBLD CKD-EPI 2021: >90 ML/MIN/1.73M2
ERYTHROCYTE [DISTWIDTH] IN BLOOD BY AUTOMATED COUNT: 14.1 % (ref 10–15)
GLUCOSE SERPL-MCNC: 115 MG/DL (ref 70–99)
HCT VFR BLD AUTO: 42.9 % (ref 35–47)
HGB BLD-MCNC: 14.5 G/DL (ref 11.7–15.7)
MCH RBC QN AUTO: 33.4 PG (ref 26.5–33)
MCHC RBC AUTO-ENTMCNC: 33.8 G/DL (ref 31.5–36.5)
MCV RBC AUTO: 99 FL (ref 78–100)
PLATELET # BLD AUTO: 295 10E3/UL (ref 150–450)
POTASSIUM SERPL-SCNC: 3.9 MMOL/L (ref 3.4–5.3)
RBC # BLD AUTO: 4.34 10E6/UL (ref 3.8–5.2)
SODIUM SERPL-SCNC: 137 MMOL/L (ref 135–145)
WBC # BLD AUTO: 10.8 10E3/UL (ref 4–11)

## 2023-12-09 PROCEDURE — 250N000012 HC RX MED GY IP 250 OP 636 PS 637: Performed by: INTERNAL MEDICINE

## 2023-12-09 PROCEDURE — 250N000013 HC RX MED GY IP 250 OP 250 PS 637: Performed by: INTERNAL MEDICINE

## 2023-12-09 PROCEDURE — 250N000009 HC RX 250: Performed by: HOSPITALIST

## 2023-12-09 PROCEDURE — 99239 HOSP IP/OBS DSCHRG MGMT >30: CPT | Performed by: NURSE PRACTITIONER

## 2023-12-09 PROCEDURE — 80048 BASIC METABOLIC PNL TOTAL CA: CPT | Performed by: INTERNAL MEDICINE

## 2023-12-09 PROCEDURE — 94799 UNLISTED PULMONARY SVC/PX: CPT

## 2023-12-09 PROCEDURE — 85014 HEMATOCRIT: CPT | Performed by: INTERNAL MEDICINE

## 2023-12-09 PROCEDURE — 36415 COLL VENOUS BLD VENIPUNCTURE: CPT | Performed by: INTERNAL MEDICINE

## 2023-12-09 PROCEDURE — 94640 AIRWAY INHALATION TREATMENT: CPT

## 2023-12-09 PROCEDURE — 94640 AIRWAY INHALATION TREATMENT: CPT | Mod: 76

## 2023-12-09 PROCEDURE — 250N000013 HC RX MED GY IP 250 OP 250 PS 637: Performed by: HOSPITALIST

## 2023-12-09 PROCEDURE — 999N000157 HC STATISTIC RCP TIME EA 10 MIN

## 2023-12-09 RX ORDER — AZITHROMYCIN 250 MG/1
250 TABLET, FILM COATED ORAL EVERY EVENING
Qty: 2 TABLET | Refills: 0 | Status: SHIPPED | OUTPATIENT
Start: 2023-12-09 | End: 2023-12-11

## 2023-12-09 RX ORDER — IPRATROPIUM BROMIDE AND ALBUTEROL SULFATE 2.5; .5 MG/3ML; MG/3ML
3 SOLUTION RESPIRATORY (INHALATION) EVERY 6 HOURS PRN
Qty: 90 ML | Refills: 0 | Status: SHIPPED | OUTPATIENT
Start: 2023-12-09 | End: 2024-03-04

## 2023-12-09 RX ORDER — GUAIFENESIN 600 MG/1
600 TABLET, EXTENDED RELEASE ORAL 2 TIMES DAILY
COMMUNITY
Start: 2023-12-09 | End: 2023-12-16

## 2023-12-09 RX ORDER — PREDNISONE 20 MG/1
TABLET ORAL
Qty: 9 TABLET | Refills: 0 | Status: SHIPPED | OUTPATIENT
Start: 2023-12-10 | End: 2023-12-18

## 2023-12-09 RX ADMIN — LORAZEPAM 0.5 MG: 0.5 TABLET ORAL at 11:59

## 2023-12-09 RX ADMIN — NICOTINE 1 PATCH: 21 PATCH, EXTENDED RELEASE TRANSDERMAL at 09:12

## 2023-12-09 RX ADMIN — GUAIFENESIN 600 MG: 600 TABLET, EXTENDED RELEASE ORAL at 09:15

## 2023-12-09 RX ADMIN — IPRATROPIUM BROMIDE AND ALBUTEROL SULFATE 3 ML: .5; 3 SOLUTION RESPIRATORY (INHALATION) at 07:34

## 2023-12-09 RX ADMIN — PREDNISONE 40 MG: 20 TABLET ORAL at 09:15

## 2023-12-09 RX ADMIN — IPRATROPIUM BROMIDE AND ALBUTEROL SULFATE 3 ML: .5; 3 SOLUTION RESPIRATORY (INHALATION) at 11:27

## 2023-12-09 RX ADMIN — AMOXICILLIN AND CLAVULANATE POTASSIUM 1 TABLET: 875; 125 TABLET, FILM COATED ORAL at 09:14

## 2023-12-09 ASSESSMENT — ACTIVITIES OF DAILY LIVING (ADL)
ADLS_ACUITY_SCORE: 22

## 2023-12-09 NOTE — PHARMACY
[facility]    Pharmacy      Antimicrobial Stewardship Note     Current antimicrobial therapy:  Anti-infectives (From now, onward)      Start     Dose/Rate Route Frequency Ordered Stop    12/08/23 2000  azithromycin (ZITHROMAX) tablet 250 mg         250 mg Oral EVERY EVENING 12/08/23 1025 12/11/23 1959 12/08/23 1030  amoxicillin-clavulanate (AUGMENTIN) 875-125 MG per tablet 1 tablet         1 tablet Oral EVERY 12 HOURS SCHEDULED 12/08/23 1025 12/15/23 0759            Indication: CAP    Days of Therapy: 6     Pertinent labs:  Creatinine   Creatinine   Date Value Ref Range Status   12/09/2023 0.56 0.51 - 0.95 mg/dL Final   12/08/2023 0.47 (L) 0.51 - 0.95 mg/dL Final   12/07/2023 0.52 0.51 - 0.95 mg/dL Final   08/22/2020 0.59 0.52 - 1.04 mg/dL Final   08/16/2020 0.52 0.52 - 1.04 mg/dL Final   08/10/2020 0.54 0.52 - 1.04 mg/dL Final     WBC   WBC   Date Value Ref Range Status   08/22/2020 7.2 4.0 - 11.0 10e9/L Final   08/10/2020 13.7 (H) 4.0 - 11.0 10e9/L Final   04/02/2018 6.9 4.0 - 11.0 10e9/L Final     WBC Count   Date Value Ref Range Status   12/09/2023 10.8 4.0 - 11.0 10e3/uL Final   12/08/2023 12.2 (H) 4.0 - 11.0 10e3/uL Final   12/07/2023 12.4 (H) 4.0 - 11.0 10e3/uL Final     Procalcitonin   Procalcitonin   Date Value Ref Range Status   12/04/2023 0.06 <0.50 ng/mL Final     Comment:     Interpretation and Recommendations     <0.5 ng/mL:   Systemic bacterial infection unlikely. Local bacterial infection is possible.     0.5-1.99 ng/mL:   Systemic bacterial infection possible, but various other conditions are known to induce PCT as well.     >=2.00 ng/mL:   Systemic bacterial infection likely, unless other causes are known.     Decision to start antibiotics should not be based on procalcitonin level alone. See Procalcitonin Guidance document for more details. [email or web address]-[email or web address]-guidance-[email or web address]-[email or web address]    Factors that may affect PCT levels (not  "all-inclusive):     - Increased PCT level           Severe trauma/burns           Invasive surgery           Cooling therapy after cardiac arrest/surgery           Treatment with agents which stimulate cytokines           Acute kidney injury           Chronic kidney disease and end stage renal disease           Acute graft vs host disease           Non-specific shock causing decreased organ perfusion and/or infarction       - Normal or unchanged PCT level           Early in infections (if low and infection is suspected, repeating in 6-12 hours is recommended)           Chronic infections (endocarditis, osteomyelitis, prosthetic device/graft infections)           Localized infections (cellulitis, wound infections, intra-abdominal abscess)     Note: PCT has not been extensively studied in pregnancy/breastfeeding, pediatrics, severe immunosuppression, and cystic fibrosis.     CRP No results found for: \"CRP\"    Culture Results:      Recommendations/Interventions:  1. None.    Herson Pollock, [facility]  December 9, 2023        "

## 2023-12-09 NOTE — PLAN OF CARE
Patient's Sats 88% on 2 LPM this morning, encouraged deep breath and cough, patient also performed flutter valve, has been up ambulating in the hallway x1 this morning already, Sats remain 87-89% on 2 LPM, turned up to 2.5 LPM. Patient had her neb already this morning. Scheduled Prednisone, Augmentin and Mucinex given at this time. Sats 91% on 2.5 LPM O2. Lungs diminished, tight, expiratory wheezes. Will reassess.

## 2023-12-09 NOTE — PROGRESS NOTES
Home nebulizer instruct completed with patient. Patient acknowledged and understood use, cleaning, HME contact information, supply ordering and who to reach for additional questions.

## 2023-12-09 NOTE — PROGRESS NOTES
Patient has been assessed for Home Oxygen needs.  Oxygen readings:   *   RA - at rest  Pulse oximetry SPO2 85%  *   RA - during activity/with exercise SPO2 85 %  *   O2 at  2 liters/minute (at rest) ...SPO2 91 %  *   O2 at  4 liters/minute (during activity/with exercise) ...SPO2 90 %

## 2023-12-09 NOTE — PROGRESS NOTES
I certify that this patient, Glenda Robins has been under my care (or a nurse practitioner or physican's assistant working with me). This is the face-to-face encounter for oxygen medical necessity.      At the time of this encounter supplemental oxygen is reasonable and necessary and is expected to improve the patient's condition in a home setting.       Patient has continued oxygen desaturation due to Pneumonia J18.9.    If portability is ordered, is the patient mobile within the home? yes

## 2023-12-09 NOTE — PLAN OF CARE
Patient discharged at 4:45 PM via wheel chair accompanied by daughter and staff. Prescriptions sent to patients preferred pharmacy. All belongings sent with patient.     Discharge instructions reviewed with patient.    Patient discharged to home.       Surgical Patient   Surgical Procedures during stay: N/A  Did patient receive discharge instruction on wound care and recognition of infection symptoms? N/A    MISC  Follow up appointment made:  Yes  Home medications returned to patient: N/A  Patient reports pain was well managed at discharge: Yes

## 2023-12-09 NOTE — DISCHARGE SUMMARY
[facility]    Discharge Summary  Hospitalist    Date of Admission:  2023  Date of Discharge:  2023  4:45 PM  Discharging Provider: Delores Pennington NP  Date of Service (when I saw the patient): 23    Discharge Diagnoses     Principal Problem:    Acute respiratory failure with hypoxia (H)  Active Problems:    Schizophrenia (H)    Cigarette nicotine dependence, uncomplicated    Multifocal lung consolidation (H24)      History of Present Illness   From admission: Glenda Robins is a 53 year old female who has PMH of schizoaffective disorder, paranoia, who smokes tobacco and moved to [place] from [place] and not established yet with PCP, presents to ED with worsening dyspnea and cough. Symptoms began 2-3 weeks ago. Started as viral disease with congestion and sinus pressure. Couple of family members including children sick. Admits fever but afebrile now, productive cough, but no hemoptysis, no chest pain. Occasionally coughing spell trigger vomiting.   ED course; no acute events. Presents afebrile, tachycardic and found desaturating to 88% on RA. No h/o COPD, asthma. Smoker.   Blood tests: VB.42/45/88 CO 1.6 CRP 10.09 Troponin 7 Procalcitonin 0.06   CT chest: no PE, but multifocal alveolar infiltrates. Started on Rocephin and doxycycline       Hospital Course     Multifocal lung consolidation: pneumonia vs pneumonitis vs viral pneumonia.  -12/: Procalcitonin was negative CRP was 10.  Sputum was obtained still awaiting culture results.  She had the viral panel which was on revealing.  Is currently empirically on the Rocephin and azithromycin.  Yesterday white count 7900.  Still requiring 4 L of O2.  Her lung exam she does have a lot of rhonchi and fair amount of expiratory wheezes right greater than left.  Still feels relatively dyspneic.  This been going on for about 3 weeks.  Progressively gotten worse.  We will continue with the antibiotics we will give her some steroids here today  dose of Solu-Medrol and prednisone.  Continue to work on pulmonary toilet ambulate.  -12/7: Patient remains afebrile white count up a little bit to 12.4.  May be secondary to steroid initiation.  Remains on Rocephin and azithromycin.  -12/8: No fevers white count remains stable at 12,000.  No fevers at all.  Continues on antibiotics we will switch over to orals today probably viral or mycoplasma IgG was elevated IgM was negative.  Sputum that was obtained just showed normal oral bobby.     Acute hypoxic respiratory Failure   CTA chest done showing No acute pulmonary emboli to the subsegmental level.  Patchy airspace consolidation in the dependent, mid right more so than  left lungs. Differential considerations include multifocal pneumonia  or aspiration pneumonitis. Recommend follow-up to resolution.   Polycystic disease of the liver.   -Rocephin and azithromycin  -Cultures  -Wean off oxygen  -12/6: Patient remains on 4 L of O2 with sats 93% afebrile otherwise hemodynamically stable.  Babita on the Rocephin and azithromycin.  Nursing notes states frequent episodes of wheezing.  Productive cough.  Has not been on steroids to this point.  As above we will start her on steroids.  -12/7: Patient was feeling a lot better yesterday afternoon down to 3 L.  Would like to continue to wean this down however I did warn her that she may require some oxygen at discharge.  Likely does have underlying COPD and this been going on for about 3 weeks prior to her presentation.  [facility]  Her lungs still some expiratory wheezes however less rhonchi is noted.  She is feeling better.  However my feeling is she likely will need oxygen at the time she is discharged however we will continue to work on this aggressively.  Will tolerate O2 sats 89 to 90%.  -12/8: Still requires O2 she has between 2 to 3 L.  Wheezing is improved.  Is on prednisone bronchodilators antibiotics.   Working on pulmonary toilet as best we can.  Unfortunately believe she is likely will need oxygen at home she does not like that idea but explained to her we may not have much option.  I think she does have fairly significant underlying COPD that is been present for some time and this infection just sort of tipped her over.  Will need formal PFTs once stabilizes.     History of tobacco use  Suspect COPD component  Duonebs  Prednisone  Needs PFT out patient  -12/6: Suspect she does have some underlying obstructive lung disease based on her wheezing tobacco use and CT scan showing some emphysematous changes.  -12/7: Likely underlying COPD once she does become improved at home she should undergo formal pulmonary function testing.  -12/8: As above nebs steroids antibiotics will put her on Pulmicort after course of oral steroids is finished.  Continue to ambulate as best she can.     Schizoaffective disorder  - continue home meds  -12/6: Very stable very pleasant.  -12/7: No issues     Elevated hemoglobin:  - CO is normal 1.6   Hemoglobin was noted to be 17 on admission.  -12/6: Hemoglobin was 14 yesterday.  Hemoglobins been in the normal range no major concerns.      Delores Pennington CNP      Pending Results     Unresulted Labs Ordered in the Past 30 Days of this Admission       No orders found from 11/4/2023 to 12/5/2023.            Code Status   Full Code       Primary Care Physician   No Ref-Primary, Physician           Discharge Disposition   Discharged to home  Condition at discharge: Stable    Consultations This Hospital Stay   SMOKING CESSATION PROGRAM IP CONSULT  SPEECH LANGUAGE PATH ADULT IP CONSULT  RESPIRATORY CARE IP CONSULT  RESPIRATORY CARE IP CONSULT    Time Spent on this Encounter   IDelores NP, personally saw the patient today and spent greater than 30 minutes discharging this patient.    Discharge Orders      Reason for your hospital stay    Pneumonia     Follow-up and recommended labs  and tests     Follow up with primary care provider within 7 days for hospital follow- up.  No follow up labs or test are needed.     Activity    Your activity upon discharge: activity as tolerated     Oxygen Adult/Peds    Oxygen Documentation  I certify that this patient, Glenda Robins has been under my care (or a nurse practitioner or physican's assistant working with me). This is the face-to-face encounter for oxygen medical necessity.      At the time of this encounter supplemental oxygen is reasonable and necessary and is expected to improve the patient's condition in a home setting.       Patient has continued oxygen desaturation due to Emphysema J43.9  Pneumonia J18.9.    If portability is ordered, is the patient mobile within the home? yes     Nebulizer and Nebulizer Supplies    Nebulizer Documentation  I attest that I have seen and evaluated Glenda Robins. She has a diagnosis of J18.9 - Pneumonia, unspecified organism and a nebulizer machine is needed to administer medication to improve breathing passages.     I, the undersigned, certify that the above prescribed supplies are medically necessary for this patient and is both reasonable and necessary in reference to accepted standards of medical and necessary in reference to accepted standards of medical practice in the treatment of this patient's condition and is not prescribed as a convenience.     Diet    Follow this diet upon discharge: Orders Placed This Encounter      Regular Diet Adult Thin Liquids (level 0)     Discharge Medications   Current Discharge Medication List        START taking these medications    Details   amoxicillin-clavulanate (AUGMENTIN) 875-125 MG tablet Take 1 tablet by mouth every 12 hours for 7 days  Qty: 14 tablet, Refills: 0    Associated Diagnoses: Multifocal lung consolidation (H24)      azithromycin (ZITHROMAX) 250 MG tablet Take 1 tablet (250 mg) by mouth every evening for 2 days  Qty: 2 tablet, Refills: 0    Associated  Diagnoses: Multifocal lung consolidation (H24)      guaiFENesin (MUCINEX) 600 MG 12 hr tablet Take 1 tablet (600 mg) by mouth 2 times daily for 7 days    Associated Diagnoses: Multifocal lung consolidation (H24)      ipratropium - albuterol 0.5 mg/2.5 mg/3 mL (DUONEB) 0.5-2.5 (3) MG/3ML neb solution Take 1 vial (3 mLs) by nebulization every 6 hours as needed for shortness of breath, wheezing or cough  Qty: 90 mL, Refills: 0    Associated Diagnoses: Multifocal lung consolidation (H24)      predniSONE (DELTASONE) 20 MG tablet Take 1.5 tablets (30 mg) by mouth daily for 3 days, THEN 1 tablet (20 mg) daily for 3 days, THEN 0.5 tablets (10 mg) daily for 3 days.  Qty: 9 tablet, Refills: 0    Associated Diagnoses: Multifocal lung consolidation (H24)           CONTINUE these medications which have NOT CHANGED    Details   albuterol (PROAIR HFA/PROVENTIL HFA/VENTOLIN HFA) 108 (90 Base) MCG/ACT inhaler Inhale 1-2 puffs into the lungs every 4 hours as needed for shortness of breath / dyspnea or wheezing  Qty: 1 Inhaler, Refills: 0    Comments: Pharmacy may dispense brand covered by insurance (Proair, or proventil or ventolin or generic albuterol inhaler)  Associated Diagnoses: Mild intermittent asthma, unspecified whether complicated      ARIPiprazole (ABILIFY) 5 MG tablet Take 5 mg by mouth at bedtime      LORazepam (ATIVAN) 0.5 MG tablet Take 0.5 mg by mouth 3 times daily as needed for anxiety      naproxen (NAPROSYN) 500 MG tablet Take 1 tablet (500 mg) by mouth 2 times daily as needed for moderate pain  Qty: 30 tablet, Refills: 0    Associated Diagnoses: Dental abscess      !! QUEtiapine (SEROQUEL) 100 MG tablet Take 100 mg by mouth at bedtime -take along with a 50 mg tablet for a total nightly dose of 150 mg.      !! QUEtiapine (SEROQUEL) 50 MG tablet Take 50 mg by mouth at bedtime -take along with a 100 mg tablet for a total nightly dose of 150 mg.       !! - Potential duplicate medications found. Please discuss with  provider.        Allergies   Allergies   Allergen Reactions    Shrimp Anaphylaxis    Risperdal [Risperidone] Other (See Comments)     Elevated prolactin     Data   Most Recent 3 CBC's:  Recent Labs   Lab Test 12/09/23  0528 12/08/23  0516 12/07/23  0535   WBC 10.8 12.2* 12.4*   HGB 14.5 13.9 13.6   MCV 99 102* 101*    299 278      Most Recent 3 BMP's:  Recent Labs   Lab Test 12/09/23  0528 12/08/23  0516 12/07/23  0534    140 140   POTASSIUM 3.9 4.6 5.3   CHLORIDE 101 106 107   CO2 24 26 26   BUN 15.6 14.1 10.9   CR 0.56 0.47* 0.52   ANIONGAP 12 8 7   BEST 9.5 9.3 9.6   * 127* 136*     Most Recent 2 LFT's:  Recent Labs   Lab Test 12/04/23  1237 08/22/20  0553   AST 25 22   ALT 23 61*   ALKPHOS 90 103   BILITOTAL 0.5 0.3     Most Recent INR's and Anticoagulation Dosing History:  Anticoagulation Dose History           No data to display              Most Recent 3 Troponin's:  Recent Labs   Lab Test 09/11/20  0922   TROPI <0.015     Most Recent Cholesterol Panel:No lab results found.  Most Recent 6 Bacteria Isolates From Any Culture (See EPIC Reports for Culture Details):No lab results found.  Most Recent TSH, T4 and A1c Labs:  Recent Labs   Lab Test 03/13/18  0000   TSH 1.21     Results for orders placed or performed during the hospital encounter of 12/04/23   XR Chest 2 Views    Narrative    PROCEDURE:  XR CHEST 2 VIEWS    HISTORY:  Shortness of breath.     COMPARISON:  None.    FINDINGS:   The cardiac silhouette is normal in size. The pulmonary vasculature is  normal.  The lungs are clear. There is an old healed left rib fracture  with some associated pleural thickening.      Impression    IMPRESSION:  No acute cardiopulmonary disease.      ARAM MCMANUS MD         SYSTEM ID:  [id number]   CTA Chest with Contrast    Narrative    PROCEDURE:  CTA CHEST WITH CONTRAST.    HISTORY:  Evaluate for pulmonary embolism.  Hypoxia, shortness of  breath    TECHNIQUE:  Initial pre-contrast  and localizer  images were  obtained.  Contrast enhanced helical CT pulmonary angiography was then  performed.  Routine transaxial and post-processed (multiplanar and/or  MIP) reformations were obtained. This CT exam was performed using one  or more the following dose reduction techniques: automated exposure  control, adjustment of the mA and/or kV according to patient size,  and/or iterative reconstruction technique.    COMPARISON:  None.    MEDS/CONTRAST: isovue 370 67mL    PULMONARY CTA FINDINGS:  This is a diagnostic quality helical CT  pulmonary angiogram.  There is no acute pulmonary embolism to the  first subsegmental pulmonary artery level.    OTHER FINDINGS:      The neck base is grossly symmetric. Cardiac size is normal. There is  no pericardial or pleural effusion. The thoracic aorta and main  pulmonary artery are within normal limits in size. A few upper normal  caliber mediastinal nodes are nonspecific.    Limited views of the upper abdomen reveal numerous hepatic cysts.    The pleura is without thickening or effusions. The central airways are  unremarkable. Posterior dependent central lung patchy airspace  consolidation is seen, worse on the right than the left. Mild  centrilobular emphysematous changes are present    No suspicious osseous lesion is identified.      Impression    IMPRESSION:    No acute pulmonary emboli to the subsegmental level.    Patchy airspace consolidation in the dependent, mid right more so than  left lungs. Differential considerations include multifocal pneumonia  or aspiration pneumonitis. Recommend follow-up to resolution.    Polycystic disease of the liver.     AR BLAIR MD         SYSTEM ID:  [id number]

## 2023-12-09 NOTE — PLAN OF CARE
Reason for admission: Acute respiratory failure with hypoxia   Pt is Aox4.  Up ad clemente in room IND. Makes needs known, Call light within reach, wheels locked, ID band on. Denies pain throughout shift. IV SL  VS as charted, Assessment as charted    /72 (BP Location: Right arm, Patient Position: Semi-Stephens's, Cuff Size: Adult Regular)   Pulse 81   Temp 96.9  F (36.1  C) (Tympanic)   Resp 18   Ht 1.524 m (5')   Wt 82.7 kg (182 lb 5.1 oz)   SpO2 93%   BMI 35.61 kg/m    Face to face report given with opportunity to observe patient.    Report given to Deena Phillips RN on 12/9/2023 at 7:02 AM

## 2023-12-14 PROBLEM — S82.101A CLOSED FRACTURE OF RIGHT PROXIMAL TIBIA: Status: ACTIVE | Noted: 2020-10-15

## 2023-12-14 PROBLEM — F25.0 SCHIZOAFFECTIVE DISORDER, BIPOLAR TYPE (H): Status: ACTIVE | Noted: 2020-10-15

## 2023-12-14 PROBLEM — H52.203 MYOPIA OF BOTH EYES WITH ASTIGMATISM AND PRESBYOPIA: Status: ACTIVE | Noted: 2020-09-24

## 2023-12-14 PROBLEM — S42.255A CLOSED NONDISPLACED FRACTURE OF GREATER TUBEROSITY OF LEFT HUMERUS: Status: ACTIVE | Noted: 2020-10-15

## 2023-12-14 PROBLEM — H52.4 MYOPIA OF BOTH EYES WITH ASTIGMATISM AND PRESBYOPIA: Status: ACTIVE | Noted: 2020-09-24

## 2023-12-14 PROBLEM — S22.42XD CLOSED FRACTURE OF MULTIPLE RIBS OF LEFT SIDE WITH ROUTINE HEALING: Status: ACTIVE | Noted: 2020-10-15

## 2023-12-14 PROBLEM — H25.043 POSTERIOR SUBCAPSULAR AGE-RELATED CATARACT OF BOTH EYES: Status: ACTIVE | Noted: 2020-09-24

## 2023-12-14 PROBLEM — H52.13 MYOPIA OF BOTH EYES WITH ASTIGMATISM AND PRESBYOPIA: Status: ACTIVE | Noted: 2020-09-24

## 2023-12-18 ENCOUNTER — OFFICE VISIT (OUTPATIENT)
Dept: FAMILY MEDICINE | Facility: OTHER | Age: 53
End: 2023-12-18
Attending: NURSE PRACTITIONER
Payer: MEDICARE

## 2023-12-18 VITALS
WEIGHT: 187 LBS | DIASTOLIC BLOOD PRESSURE: 70 MMHG | HEIGHT: 60 IN | SYSTOLIC BLOOD PRESSURE: 120 MMHG | TEMPERATURE: 98.2 F | HEART RATE: 108 BPM | OXYGEN SATURATION: 94 % | BODY MASS INDEX: 36.71 KG/M2

## 2023-12-18 DIAGNOSIS — F25.0 SCHIZOAFFECTIVE DISORDER, BIPOLAR TYPE (H): ICD-10-CM

## 2023-12-18 DIAGNOSIS — Z09 HOSPITAL DISCHARGE FOLLOW-UP: Primary | ICD-10-CM

## 2023-12-18 DIAGNOSIS — J45.20 MILD INTERMITTENT ASTHMA, UNSPECIFIED WHETHER COMPLICATED: ICD-10-CM

## 2023-12-18 DIAGNOSIS — J18.9 MULTIFOCAL PNEUMONIA: ICD-10-CM

## 2023-12-18 PROCEDURE — 99213 OFFICE O/P EST LOW 20 MIN: CPT | Performed by: NURSE PRACTITIONER

## 2023-12-18 PROCEDURE — G0463 HOSPITAL OUTPT CLINIC VISIT: HCPCS

## 2023-12-18 RX ORDER — ALBUTEROL SULFATE 90 UG/1
1-2 AEROSOL, METERED RESPIRATORY (INHALATION) EVERY 4 HOURS PRN
Qty: 18 G | Refills: 3 | Status: SHIPPED | OUTPATIENT
Start: 2023-12-18

## 2023-12-18 ASSESSMENT — ASTHMA QUESTIONNAIRES
QUESTION_2 LAST FOUR WEEKS HOW OFTEN HAVE YOU HAD SHORTNESS OF BREATH: NOT AT ALL
ACT_TOTALSCORE: 22
QUESTION_3 LAST FOUR WEEKS HOW OFTEN DID YOUR ASTHMA SYMPTOMS (WHEEZING, COUGHING, SHORTNESS OF BREATH, CHEST TIGHTNESS OR PAIN) WAKE YOU UP AT NIGHT OR EARLIER THAN USUAL IN THE MORNING: NOT AT ALL
QUESTION_1 LAST FOUR WEEKS HOW MUCH OF THE TIME DID YOUR ASTHMA KEEP YOU FROM GETTING AS MUCH DONE AT WORK, SCHOOL OR AT HOME: A LITTLE OF THE TIME
ACT_TOTALSCORE: 22
QUESTION_5 LAST FOUR WEEKS HOW WOULD YOU RATE YOUR ASTHMA CONTROL: WELL CONTROLLED
QUESTION_4 LAST FOUR WEEKS HOW OFTEN HAVE YOU USED YOUR RESCUE INHALER OR NEBULIZER MEDICATION (SUCH AS ALBUTEROL): ONCE A WEEK OR LESS

## 2023-12-18 ASSESSMENT — PATIENT HEALTH QUESTIONNAIRE - PHQ9
SUM OF ALL RESPONSES TO PHQ QUESTIONS 1-9: 0
SUM OF ALL RESPONSES TO PHQ QUESTIONS 1-9: 0
10. IF YOU CHECKED OFF ANY PROBLEMS, HOW DIFFICULT HAVE THESE PROBLEMS MADE IT FOR YOU TO DO YOUR WORK, TAKE CARE OF THINGS AT HOME, OR GET ALONG WITH OTHER PEOPLE: NOT DIFFICULT AT ALL

## 2023-12-18 ASSESSMENT — ANXIETY QUESTIONNAIRES
7. FEELING AFRAID AS IF SOMETHING AWFUL MIGHT HAPPEN: NOT AT ALL
5. BEING SO RESTLESS THAT IT IS HARD TO SIT STILL: NOT AT ALL
2. NOT BEING ABLE TO STOP OR CONTROL WORRYING: NOT AT ALL
IF YOU CHECKED OFF ANY PROBLEMS ON THIS QUESTIONNAIRE, HOW DIFFICULT HAVE THESE PROBLEMS MADE IT FOR YOU TO DO YOUR WORK, TAKE CARE OF THINGS AT HOME, OR GET ALONG WITH OTHER PEOPLE: NOT DIFFICULT AT ALL
GAD7 TOTAL SCORE: 1
3. WORRYING TOO MUCH ABOUT DIFFERENT THINGS: NOT AT ALL
4. TROUBLE RELAXING: NOT AT ALL
GAD7 TOTAL SCORE: 1
6. BECOMING EASILY ANNOYED OR IRRITABLE: NOT AT ALL
1. FEELING NERVOUS, ANXIOUS, OR ON EDGE: SEVERAL DAYS

## 2023-12-18 ASSESSMENT — PAIN SCALES - GENERAL: PAINLEVEL: NO PAIN (0)

## 2023-12-18 NOTE — PATIENT INSTRUCTIONS
Ok to try and decrease use of oxygen - to as needed for now and we can reassess in a month  Use albuterol and duonebs as needed  Musinex as needed     [facility]  [address]  [place]  Phone number [phone]    Scheduling appointments   [phone] or  [phone]    Nurse Storm BARRAZA FNP-BC  Family Nurse Practitioner       LIFESTYLE CHANGES    Eat a Healthy diet  Eat more vegetables/plants in your diet  Eat health fats  [place] oil  avocados  Eat healthy proteins  Poultry without the skin  Fish  Limit red meat  Nuts - limit to 1/4 cup per day   Increase physical activity  Slowly work up to 30 minutes of physical activity most days of the week  Aerobic activity 150 minute a week  2 days of resistance exercising         Try daily yoga and meditation

## 2023-12-18 NOTE — PROGRESS NOTES
Assessment & Plan     Hospital discharge follow-up  Multifocal pneumonia  Symptoms continue to improve  Ok to try and use oxygen as needed  Nebs and inhaler as needed  Follow up in a month     Schizoaffective disorder, bipolar type (H)  Symptoms well controlled with current treatment   Continue to follow up with psychiatry as scheduled     Mild intermittent asthma, unspecified whether complicated  Rare symptoms - new pneumonia   Refilled inhaler for her to have around as needed   - albuterol (PROAIR HFA/PROVENTIL HFA/VENTOLIN HFA) 108 (90 Base) MCG/ACT inhaler; Inhale 1-2 puffs into the lungs every 4 hours as needed for shortness of breath or wheezing      I spent a total of 23 minutes on the day of the visit.   Time spent by me doing chart review, history and exam, documentation and further activities per the note     MED REC REQUIRED  Post Medication Reconciliation Status: discharge medications reconciled, continue medications without change  BMI:   Estimated body mass index is 36.52 kg/m  as calculated from the following:    Height as of this encounter: 1.524 m (5').    Weight as of this encounter: 84.8 kg (187 lb).   Weight management plan: Discussed healthy diet and exercise guidelines    See Patient Instructions    No follow-ups on file.    [facility] [place] [facility]   Glenda is a 53 year old, presenting for the following health issues:  Hospital F/U        12/18/2023    10:44 AM   Additional Questions   Roomed by Storm Roberts CMA   Accompanied by Self         12/18/2023    10:44 AM   Patient Reported Additional Medications   Patient reports taking the following new medications None       HPI       Hospital Follow-up Visit:    Hospital/Nursing Home/IP Rehab Facility: [facility]  Date of Admission: 12/4/23  Date of Discharge: 12/9/23  Reason(s) for Admission: Multifocal pneumonia  Principal Problem:    Acute respiratory failure with hypoxia  (H)  Active Problems:    Schizophrenia (H)    Cigarette nicotine dependence, uncomplicated    Multifocal lung consolidation (H24)  Was your hospitalization related to COVID-19? No   Problems taking medications regularly:  None  Medication changes since discharge: Just finished prednic=sone  Augmentin for 7 days after discharge  Azithromycin 2 days post discharge   Mucinex for 7 days   Duoneb - ok to use as needed now   Prednisone for 9 days following discharge - last dose was today   Problems adhering to non-medication therapy:  None    Summary of hospitalization:  [facility] discharge summary reviewed  Diagnostic Tests/Treatments reviewed.  Follow up needed: none  Other Healthcare Providers Involved in Patient s Care:          oxygen 2L/NC - she continues to use continuously   She is checking her oxygen level at home and the readings are the same without or without oxygen 91-93%  Update since discharge: improved.         Plan of care communicated with patient           Schizoaffective with bipolar  She follows with Peewee Rodgers - at [facility] for medication management  No current counseling - does not feel like she needs at this time  Feels her current treatment is working well for her mental health      Asthma Follow-Up  No current asthma, but did have some cough and SOB due to recent pneumonia   Was ACT completed today?  Yes         No data to display            .act     How many days per week do you miss taking your asthma controller medication?  I do not have an asthma controller medication  Please describe any recent triggers for your asthma: None  Have you had any Emergency Room Visits, Urgent Care Visits, or Hospital Admissions since your last office visit?  No - recent pneumonia diagnosis       Review of Systems   CONSTITUTIONAL: NEGATIVE for fever, chills, change in weight  INTEGUMENTARY/SKIN: NEGATIVE for worrisome rashes, moles or lesions  EYES: NEGATIVE for vision changes  or irritation  ENT/MOUTH: NEGATIVE for ear, mouth and throat problems  RESP:Hx asthma and Hx pneumonia - currently on oxygen for pneumonia, productive cough   CV: NEGATIVE for chest pain, palpitations or peripheral edema  GI: diarrhea - loose stools a couple per day   : denies dysuria   MUSCULOSKELETAL: NEGATIVE for significant arthralgias or myalgia  NEURO: NEGATIVE for weakness, dizziness or paresthesias  ENDOCRINE: NEGATIVE for temperature intolerance, skin/hair changes  PSYCHIATRIC: HX anxiety and HX depression - currently controlled       Objective    /70   Pulse 108   Temp 98.2  F (36.8  C) (Tympanic)   Ht 1.524 m (5')   Wt 84.8 kg (187 lb)   SpO2 94%   BMI 36.52 kg/m    Body mass index is 36.52 kg/m .  Physical Exam   GENERAL: alert and obese  NECK: no adenopathy, no asymmetry, masses, or scars and thyroid normal to palpation  RESP: slight wheezing bilateral lower lobs, moist congested cough   CV: regular rate and rhythm, normal S1 S2, no S3 or S4, no murmur, click or rub, no peripheral edema and peripheral pulses strong  ABDOMEN: soft, nontender, no hepatosplenomegaly, no masses and bowel sounds normal  PSYCH: mentation appears normal, affect normal/bright    Reviewed hospital labs in EPIC

## 2024-01-04 ENCOUNTER — TRANSFERRED RECORDS (OUTPATIENT)
Dept: HEALTH INFORMATION MANAGEMENT | Facility: CLINIC | Age: 54
End: 2024-01-04

## 2024-01-15 NOTE — PROGRESS NOTES
Assessment & Plan     Multifocal pneumonia  Symptoms improved, no SOB or cough  Oxygen level 93-94% on room air - no further need for home oxygen   Continue to use inhaler as needed     Note for oxygen supply - no further need for home oxygen              Nicotine/Tobacco Cessation:  She reports that she has been smoking cigarettes. She has been exposed to tobacco smoke. She has never used smokeless tobacco.  Nicotine/Tobacco Cessation Plan:   Information offered: Patient not interested at this time  States she only smokes on a rare occasion     See Patient Instructions    No follow-ups on file.    [facility]AD[facility]spin[facility] [place] Doshi is a 53 year old, presenting for the following health issues:  Asthma      HPI     Colon screening - will consider in the future   PAP - should schedule physical in the future   Mammogram - will consider in the future     Asthma Follow-Up    Was ACT completed today?  Yes        1/16/2024     1:29 PM   ACT Total Scores   ACT TOTAL SCORE (Goal Greater than or Equal to 20) 23   In the past 12 months, how many times did you visit the emergency room for your asthma without being admitted to the hospital? 0   In the past 12 months, how many times were you hospitalized overnight because of your asthma? 1        How many days per week do you miss taking your asthma controller medication?  I do not have an asthma controller medication  Please describe any recent triggers for your asthma: Patient is unaware of triggers  Have you had any Emergency Room Visits, Urgent Care Visits, or Hospital Admissions since your last office visit?  Yes  Number of ER or Urgent Care visits for asthma: 1        Concern - Pneumonia  Onset: 12/4/23 hospital - hospital follow up was done on 12/18/23  Therapies tried and outcome: nebs, inhalers, home O2  Stopped using home oxygen - does not need   Oxygen sats in clinic on room air 93%      Review of Systems    CONSTITUTIONAL: NEGATIVE for fever, chills, change in weight  RESP: NEGATIVE for significant cough or SOB  CV: NEGATIVE for chest pain, palpitations or peripheral edema  NEURO: NEGATIVE for weakness, dizziness or paresthesias      Objective    /70   Pulse 100   Temp (!) 96.6  F (35.9  C) (Tympanic)   Wt 84.8 kg (187 lb)   SpO2 93%   BMI 36.52 kg/m    Body mass index is 36.52 kg/m .  Physical Exam   GENERAL: alert and no distress  RESP: lungs clear to auscultation - no rales, rhonchi or wheezes  CV: regular rate and rhythm, normal S1 S2, no S3 or S4, no murmur, click or rub, no peripheral edema and peripheral pulses strong  ABDOMEN: soft, nontender, no hepatosplenomegaly, no masses and bowel sounds normal  PSYCH: mentation appears normal, affect normal/bright    Admission on 12/04/2023, Discharged on 12/09/2023   Component Date Value Ref Range Status    pH Venous 12/04/2023 7.42  7.32 - 7.43 Final    pCO2 Venous 12/04/2023 42  40 - 50 mm Hg Final    pO2 Venous 12/04/2023 71 (H)  25 - 47 mm Hg Final    Bicarbonate Venous 12/04/2023 27  21 - 28 mmol/L Final    FIO2 12/04/2023 40   Final    Oxyhemoglobin Venous 12/04/2023 92 (H)  70 - 75 % Final    Base Excess/Deficit 12/04/2023 2.1 (H)  -7.7 - 1.9 mmol/L Final    WBC Count 12/04/2023 10.9  4.0 - 11.0 10e3/uL Final    RBC Count 12/04/2023 5.15  3.80 - 5.20 10e6/uL Final    Hemoglobin 12/04/2023 17.1 (H)  11.7 - 15.7 g/dL Final    Hematocrit 12/04/2023 50.1 (H)  35.0 - 47.0 % Final    MCV 12/04/2023 97  78 - 100 fL Final    MCH 12/04/2023 33.2 (H)  26.5 - 33.0 pg Final    MCHC 12/04/2023 34.1  31.5 - 36.5 g/dL Final    RDW 12/04/2023 14.0  10.0 - 15.0 % Final    Platelet Count 12/04/2023 375  150 - 450 10e3/uL Final    Sodium 12/04/2023 141  135 - 145 mmol/L Final    Reference intervals for this test were updated on 09/26/2023 to more accurately reflect our healthy population. There may be differences in the flagging of prior results with similar values  performed with this method. Interpretation of those prior results can be made in the context of the updated reference intervals.     Potassium 12/04/2023 4.0  3.4 - 5.3 mmol/L Final    Chloride 12/04/2023 103  98 - 107 mmol/L Final    Carbon Dioxide (CO2) 12/04/2023 24  22 - 29 mmol/L Final    Anion Gap 12/04/2023 14  7 - 15 mmol/L Final    Urea Nitrogen 12/04/2023 20.8 (H)  6.0 - 20.0 mg/dL Final    Creatinine 12/04/2023 0.68  0.51 - 0.95 mg/dL Final    GFR Estimate 12/04/2023 >90  >60 mL/min/1.73m2 Final    Calcium 12/04/2023 10.0  8.6 - 10.0 mg/dL Final    Glucose 12/04/2023 122 (H)  70 - 99 mg/dL Final    Troponin T, High Sensitivity 12/04/2023 7  <=14 ng/L Final    Either a High Sensitivity Troponin T baseline (0 hours) value = 100 ng/L, or an increase in High Sensitivity Troponin T = 7 ng/L at 2 hours compared to 0 hours (2-0 hours), suggests myocardial injury, and urgent clinical attention is required.    If the 2-0 hours increase is <7 ng/L, a High Sensitivity Troponin T result above gender-specific reference ranges warrants further evaluation.   Recommendations for further evaluation include correlation with clinical decision-making tool (e.g., HEART), a 3rd High Sensitivity Troponin T test 2 hours after the 2nd (a 20% change from baseline would represent concern), admission for observation, close PCC/cardiology follow-up, or urgent outpatient provocative testing.    Magnesium 12/04/2023 2.3  1.7 - 2.3 mg/dL Final    Protein Total 12/04/2023 7.7  6.4 - 8.3 g/dL Final    Albumin 12/04/2023 4.4  3.5 - 5.2 g/dL Final    Bilirubin Total 12/04/2023 0.5  <=1.2 mg/dL Final    Alkaline Phosphatase 12/04/2023 90  40 - 150 U/L Final    Reference intervals for this test were updated on 11/14/2023 to more accurately reflect our healthy population. There may be differences in the flagging of prior results with similar values performed with this method. Interpretation of those prior results can be made in the context of  the updated reference intervals.    AST 12/04/2023 25  0 - 45 U/L Final    Reference intervals for this test were updated on 6/12/2023 to more accurately reflect our healthy population. There may be differences in the flagging of prior results with similar values performed with this method. Interpretation of those prior results can be made in the context of the updated reference intervals.    ALT 12/04/2023 23  0 - 50 U/L Final    Reference intervals for this test were updated on 6/12/2023 to more accurately reflect our healthy population. There may be differences in the flagging of prior results with similar values performed with this method. Interpretation of those prior results can be made in the context of the updated reference intervals.      Bilirubin Direct 12/04/2023 <0.20  0.00 - 0.30 mg/dL Final    Ventricular Rate 12/04/2023 102  BPM Final    Atrial Rate 12/04/2023 102  BPM Final    CO Interval 12/04/2023 134  ms Final    QRS Duration 12/04/2023 78  ms Final    QT 12/04/2023 308  ms Final    QTc 12/04/2023 401  ms Final    P Axis 12/04/2023 74  degrees Final    R AXIS 12/04/2023 -23  degrees Final    T Axis 12/04/2023 36  degrees Final    Interpretation ECG 12/04/2023    Final                    Value:Sinus tachycardia  Low voltage QRS  Possible Inferior infarct , age undetermined  Abnormal ECG  No previous ECGs available  Confirmed by MD Queenie, Los ([id number]) on 12/6/2023 1:40:55 PM      Influenza A PCR 12/04/2023 Negative  Negative Final    Influenza B PCR 12/04/2023 Negative  Negative Final    RSV PCR 12/04/2023 Negative  Negative Final    SARS CoV2 PCR 12/04/2023 Negative  Negative Final    NEGATIVE: SARS-CoV-2 (COVID-19) RNA not detected, presumed negative.    Hold Specimen 12/04/2023 JIC   Final    Hold Specimen 12/04/2023 JI   Final    Procalcitonin 12/04/2023 0.06  <0.50 ng/mL Final    Comment: Interpretation and Recommendations     <0.5 ng/mL:   Systemic bacterial infection unlikely.  Local bacterial infection is possible.     0.5-1.99 ng/mL:   Systemic bacterial infection possible, but various other conditions are known to induce PCT as well.     >=2.00 ng/mL:   Systemic bacterial infection likely, unless other causes are known.     Decision to start antibiotics should not be based on procalcitonin level alone. See Procalcitonin Guidance document for more details. [email or web address]    Factors that may affect PCT levels (not all-inclusive):     - Increased PCT level           Severe trauma/burns           Invasive surgery           Cooling therapy after cardiac arrest/surgery           Treatment with agents which stimulate cytokines           Acute kidney injury           Chronic kidney disease and end stage renal disease           Acute graft vs host disease           Non-specific shock                            causing decreased organ perfusion and/or infarction       - Normal or unchanged PCT level           Early in infections (if low and infection is suspected, repeating in 6-12 hours is recommended)           Chronic infections (endocarditis, osteomyelitis, prosthetic device/graft infections)           Localized infections (cellulitis, wound infections, intra-abdominal abscess)     Note: PCT has not been extensively studied in pregnancy/breastfeeding, pediatrics, severe immunosuppression, and cystic fibrosis.    CRP Inflammation 12/04/2023 10.09 (H)  <5.00 mg/L Final    Adenovirus 12/04/2023 Not Detected  Not Detected Final    Coronavirus 12/04/2023 Not Detected  Not Detected Final    This test detects Coronavirus 229E, HKU1, NL63 and OC43 but does not distinguish between them. It does not detect MERS ( Respiratory Syndrome), SARS (Severe Acute Respiratory Syndrome) or 2019-nCoV (Novel 2019) Coronavirus.    Human Metapneumovirus 12/04/2023 Not Detected  Not Detected Final    Human Rhin/Enterovirus  12/04/2023 Not Detected  Not Detected Final    Influenza A 12/04/2023 Not Detected  Not Detected Final    Influenza A, H1 12/04/2023 Not Detected  Not Detected Final    Influenza A 2009 H1N1 12/04/2023 Not Detected  Not Detected Final    Influenza A, H3 12/04/2023 Not Detected  Not Detected Final    Influenza B 12/04/2023 Not Detected  Not Detected Final    Parainfluenza Virus 1 12/04/2023 Not Detected  Not Detected Final    Parainfluenza Virus 2 12/04/2023 Not Detected  Not Detected Final    Parainfluenza Virus 3 12/04/2023 Not Detected  Not Detected Final    Parainfluenza Virus 4 12/04/2023 Not Detected  Not Detected Final    Respiratory Syncytial Virus A 12/04/2023 Not Detected  Not Detected Final    Respiratory Syncytial Virus B 12/04/2023 Not Detected  Not Detected Final    Chlamydia Pneumoniae 12/04/2023 Not Detected  Not Detected Final    Mycoplasma Pneumoniae 12/04/2023 Not Detected  Not Detected Final    Culture 12/05/2023 >10 Squamous epithelial cells/low power field indicates oral contamination. Please recollect.   Final    Gram Stain Result 12/05/2023 >10 Squamous epithelial cells/low power field   Final    Gram Stain Result 12/05/2023 >25 PMNs/low power field   Final    Carbon Monoxide 12/04/2023 1.6  0.0 - 2.0 % Final    Carbon Monoxide Reference Range:  0-1 month:  0-4%  1 month & older, nonsmoker: 0-2%  1 month & older, smokers:   0-10%    Some smokers may have higher levels.  Slight dyspnea may occur with even limited CO elevation.        pH Venous 12/04/2023 7.42  7.32 - 7.43 Final    pCO2 Venous 12/04/2023 45  40 - 50 mm Hg Final    pO2 Venous 12/04/2023 88 (H)  25 - 47 mm Hg Final    Bicarbonate Venous 12/04/2023 29 (H)  21 - 28 mmol/L Final    Base Excess/Deficit 12/04/2023 3.4 (H)  -7.7 - 1.9 mmol/L Final    FIO2 12/04/2023 21   Final    Myco Pneumoniae IgG 12/04/2023 0.50 (H)  <=0.09 U/L Final    INTERPRETIVE INFORMATION:  Mycoplasma pneumoniae Ab, IgG    0.09 U/L or less ............  Negative    0.10 - 0.32 U/L ............. Equivocal    0.33 U/L or greater ......... Positive    INTERPRETIVE DATA: Over 50% of healthy adults have a   relatively high background of specific M. pneumoniae IgG   antibodies in their sera, probably because of past M.   pneumoniae infections. Therefore, paired sera obtained with   a time interval of 1 to 3 weeks are highly recommended in   adults to confirm reinfection by M. pneumoniae, which is   demonstrated by a significant change in IgG antibodies. A   significant change is indicated if one sample is above 0.32   U/L and the other is below 0.20 U/L.    Myco Pneumoniae IgM 12/04/2023 0.25  <=0.76 U/L Final    Comment: INTERPRETIVE INFORMATION:  Mycoplasma pneumoniae Ab, IgM    0.76 U/L or less .......... Negative: No clinically                                significant amount of                                M. pneumoniae IgM antibody                                detected.    0.77 - 0.95 U/L ........... Low Positive: M. pneumoniae-                                specific IgM presumptively                                detected. Collection of a                                follow-up sample in 1-2                                weeks is recommended to                                assure reactivity.    0.96 U/L or greater ....... Positive: Highly significant                                amount of M. pneumoniae-                                specific IgM antibody                                detected. However, low levels                                of IgM antibodies may                                occasionally persist for more                                than 12 months                            post-infection.  Performed By: [facility]  [address] [place] [id number]  : Viktor Vaz MD, PhD  CLIA Number: [id number]    Legionella pneumophila serogroup 1* 12/05/2023 Negative  Negative Final    Suggests no  recent or current infection. Infection due to Legionella cannot be ruled out, since other serogroups and species may cause disease, antigen may not be present in urine in early infection, and the level of antigen present in the urine may be below detectable limits of the test.    Sodium 12/05/2023 141  135 - 145 mmol/L Final    Reference intervals for this test were updated on 09/26/2023 to more accurately reflect our healthy population. There may be differences in the flagging of prior results with similar values performed with this method. Interpretation of those prior results can be made in the context of the updated reference intervals.     Potassium 12/05/2023 3.9  3.4 - 5.3 mmol/L Final    Chloride 12/05/2023 104  98 - 107 mmol/L Final    Carbon Dioxide (CO2) 12/05/2023 30 (H)  22 - 29 mmol/L Final    Anion Gap 12/05/2023 7  7 - 15 mmol/L Final    Urea Nitrogen 12/05/2023 18.2  6.0 - 20.0 mg/dL Final    Creatinine 12/05/2023 0.67  0.51 - 0.95 mg/dL Final    GFR Estimate 12/05/2023 >90  >60 mL/min/1.73m2 Final    Calcium 12/05/2023 9.0  8.6 - 10.0 mg/dL Final    Glucose 12/05/2023 118 (H)  70 - 99 mg/dL Final    WBC Count 12/05/2023 7.9  4.0 - 11.0 10e3/uL Final    RBC Count 12/05/2023 4.16  3.80 - 5.20 10e6/uL Final    Hemoglobin 12/05/2023 14.1  11.7 - 15.7 g/dL Final    Hematocrit 12/05/2023 41.8  35.0 - 47.0 % Final    MCV 12/05/2023 101 (H)  78 - 100 fL Final    MCH 12/05/2023 33.9 (H)  26.5 - 33.0 pg Final    MCHC 12/05/2023 33.7  31.5 - 36.5 g/dL Final    RDW 12/05/2023 14.1  10.0 - 15.0 % Final    Platelet Count 12/05/2023 292  150 - 450 10e3/uL Final    % Neutrophils 12/05/2023 52  % Final    % Lymphocytes 12/05/2023 37  % Final    % Monocytes 12/05/2023 7  % Final    % Eosinophils 12/05/2023 3  % Final    % Basophils 12/05/2023 1  % Final    % Immature Granulocytes 12/05/2023 0  % Final    NRBCs per 100 WBC 12/05/2023 0  <1 /100 Final    Absolute Neutrophils 12/05/2023 4.1  1.6 - 8.3 10e3/uL Final     Absolute Lymphocytes 12/05/2023 2.9  0.8 - 5.3 10e3/uL Final    Absolute Monocytes 12/05/2023 0.6  0.0 - 1.3 10e3/uL Final    Absolute Eosinophils 12/05/2023 0.2  0.0 - 0.7 10e3/uL Final    Absolute Basophils 12/05/2023 0.1  0.0 - 0.2 10e3/uL Final    Absolute Immature Granulocytes 12/05/2023 0.0  <=0.4 10e3/uL Final    Absolute NRBCs 12/05/2023 0.0  10e3/uL Final    Culture 12/05/2023 1+ Normal bobby   Final    Gram Stain Result 12/05/2023 <10 Squamous epithelial cells/low power field (A)   Final    Gram Stain Result 12/05/2023 >25 PMNs/low power field (A)   Final    Gram Stain Result 12/05/2023 1+ Gram positive cocci (A)   Final    Potassium 12/06/2023 4.4  3.4 - 5.3 mmol/L Final    Specimen slightly hemolyzed. The reported potassium value may be falsely elevated. Analysis of a non-hemolyzed specimen (i.e. re-draw) may result in a lower potassium value.    Magnesium 12/06/2023 2.1  1.7 - 2.3 mg/dL Final    Sodium 12/07/2023 140  135 - 145 mmol/L Final    Reference intervals for this test were updated on 09/26/2023 to more accurately reflect our healthy population. There may be differences in the flagging of prior results with similar values performed with this method. Interpretation of those prior results can be made in the context of the updated reference intervals.     Potassium 12/07/2023 5.3  3.4 - 5.3 mmol/L Final    Chloride 12/07/2023 107  98 - 107 mmol/L Final    Carbon Dioxide (CO2) 12/07/2023 26  22 - 29 mmol/L Final    Anion Gap 12/07/2023 7  7 - 15 mmol/L Final    Urea Nitrogen 12/07/2023 10.9  6.0 - 20.0 mg/dL Final    Creatinine 12/07/2023 0.52  0.51 - 0.95 mg/dL Final    GFR Estimate 12/07/2023 >90  >60 mL/min/1.73m2 Final    Calcium 12/07/2023 9.6  8.6 - 10.0 mg/dL Final    Glucose 12/07/2023 136 (H)  70 - 99 mg/dL Final    WBC Count 12/07/2023 12.4 (H)  4.0 - 11.0 10e3/uL Final    RBC Count 12/07/2023 4.03  3.80 - 5.20 10e6/uL Final    Hemoglobin 12/07/2023 13.6  11.7 - 15.7 g/dL Final     Hematocrit 12/07/2023 40.7  35.0 - 47.0 % Final    MCV 12/07/2023 101 (H)  78 - 100 fL Final    MCH 12/07/2023 33.7 (H)  26.5 - 33.0 pg Final    MCHC 12/07/2023 33.4  31.5 - 36.5 g/dL Final    RDW 12/07/2023 14.2  10.0 - 15.0 % Final    Platelet Count 12/07/2023 278  150 - 450 10e3/uL Final    CRP Inflammation 12/07/2023 40.06 (H)  <5.00 mg/L Final    Sodium 12/08/2023 140  135 - 145 mmol/L Final    Reference intervals for this test were updated on 09/26/2023 to more accurately reflect our healthy population. There may be differences in the flagging of prior results with similar values performed with this method. Interpretation of those prior results can be made in the context of the updated reference intervals.     Potassium 12/08/2023 4.6  3.4 - 5.3 mmol/L Final    Chloride 12/08/2023 106  98 - 107 mmol/L Final    Carbon Dioxide (CO2) 12/08/2023 26  22 - 29 mmol/L Final    Anion Gap 12/08/2023 8  7 - 15 mmol/L Final    Urea Nitrogen 12/08/2023 14.1  6.0 - 20.0 mg/dL Final    Creatinine 12/08/2023 0.47 (L)  0.51 - 0.95 mg/dL Final    GFR Estimate 12/08/2023 >90  >60 mL/min/1.73m2 Final    Calcium 12/08/2023 9.3  8.6 - 10.0 mg/dL Final    Glucose 12/08/2023 127 (H)  70 - 99 mg/dL Final    WBC Count 12/08/2023 12.2 (H)  4.0 - 11.0 10e3/uL Final    RBC Count 12/08/2023 4.16  3.80 - 5.20 10e6/uL Final    Hemoglobin 12/08/2023 13.9  11.7 - 15.7 g/dL Final    Hematocrit 12/08/2023 42.3  35.0 - 47.0 % Final    MCV 12/08/2023 102 (H)  78 - 100 fL Final    MCH 12/08/2023 33.4 (H)  26.5 - 33.0 pg Final    MCHC 12/08/2023 32.9  31.5 - 36.5 g/dL Final    RDW 12/08/2023 14.3  10.0 - 15.0 % Final    Platelet Count 12/08/2023 299  150 - 450 10e3/uL Final    Sodium 12/09/2023 137  135 - 145 mmol/L Final    Reference intervals for this test were updated on 09/26/2023 to more accurately reflect our healthy population. There may be differences in the flagging of prior results with similar values performed with this method.  Interpretation of those prior results can be made in the context of the updated reference intervals.     Potassium 12/09/2023 3.9  3.4 - 5.3 mmol/L Final    Chloride 12/09/2023 101  98 - 107 mmol/L Final    Carbon Dioxide (CO2) 12/09/2023 24  22 - 29 mmol/L Final    Anion Gap 12/09/2023 12  7 - 15 mmol/L Final    Urea Nitrogen 12/09/2023 15.6  6.0 - 20.0 mg/dL Final    Creatinine 12/09/2023 0.56  0.51 - 0.95 mg/dL Final    GFR Estimate 12/09/2023 >90  >60 mL/min/1.73m2 Final    Calcium 12/09/2023 9.5  8.6 - 10.0 mg/dL Final    Glucose 12/09/2023 115 (H)  70 - 99 mg/dL Final    WBC Count 12/09/2023 10.8  4.0 - 11.0 10e3/uL Final    RBC Count 12/09/2023 4.34  3.80 - 5.20 10e6/uL Final    Hemoglobin 12/09/2023 14.5  11.7 - 15.7 g/dL Final    Hematocrit 12/09/2023 42.9  35.0 - 47.0 % Final    MCV 12/09/2023 99  78 - 100 fL Final    MCH 12/09/2023 33.4 (H)  26.5 - 33.0 pg Final    MCHC 12/09/2023 33.8  31.5 - 36.5 g/dL Final    RDW 12/09/2023 14.1  10.0 - 15.0 % Final    Platelet Count 12/09/2023 295  150 - 450 10e3/uL Final

## 2024-01-16 ENCOUNTER — OFFICE VISIT (OUTPATIENT)
Dept: FAMILY MEDICINE | Facility: OTHER | Age: 54
End: 2024-01-16
Attending: NURSE PRACTITIONER
Payer: MEDICARE

## 2024-01-16 VITALS
TEMPERATURE: 96.6 F | HEART RATE: 100 BPM | SYSTOLIC BLOOD PRESSURE: 114 MMHG | DIASTOLIC BLOOD PRESSURE: 70 MMHG | WEIGHT: 187 LBS | OXYGEN SATURATION: 93 % | BODY MASS INDEX: 36.52 KG/M2

## 2024-01-16 DIAGNOSIS — J18.9 MULTIFOCAL PNEUMONIA: Primary | ICD-10-CM

## 2024-01-16 PROCEDURE — 99213 OFFICE O/P EST LOW 20 MIN: CPT | Performed by: NURSE PRACTITIONER

## 2024-01-16 PROCEDURE — G0463 HOSPITAL OUTPT CLINIC VISIT: HCPCS

## 2024-01-16 RX ORDER — MOXIFLOXACIN 5 MG/ML
SOLUTION/ DROPS OPHTHALMIC
COMMUNITY
Start: 2024-01-15

## 2024-01-16 ASSESSMENT — ASTHMA QUESTIONNAIRES
ACT_TOTALSCORE: 23
QUESTION_3 LAST FOUR WEEKS HOW OFTEN DID YOUR ASTHMA SYMPTOMS (WHEEZING, COUGHING, SHORTNESS OF BREATH, CHEST TIGHTNESS OR PAIN) WAKE YOU UP AT NIGHT OR EARLIER THAN USUAL IN THE MORNING: NOT AT ALL
HOSPITALIZATION_OVERNIGHT_LAST_YEAR_TOTAL: ONE
ACT_TOTALSCORE: 23
QUESTION_2 LAST FOUR WEEKS HOW OFTEN HAVE YOU HAD SHORTNESS OF BREATH: ONCE OR TWICE A WEEK
QUESTION_5 LAST FOUR WEEKS HOW WOULD YOU RATE YOUR ASTHMA CONTROL: COMPLETELY CONTROLLED
QUESTION_4 LAST FOUR WEEKS HOW OFTEN HAVE YOU USED YOUR RESCUE INHALER OR NEBULIZER MEDICATION (SUCH AS ALBUTEROL): ONCE A WEEK OR LESS
QUESTION_1 LAST FOUR WEEKS HOW MUCH OF THE TIME DID YOUR ASTHMA KEEP YOU FROM GETTING AS MUCH DONE AT WORK, SCHOOL OR AT HOME: NONE OF THE TIME

## 2024-01-16 ASSESSMENT — PAIN SCALES - GENERAL: PAINLEVEL: NO PAIN (0)

## 2024-01-16 NOTE — LETTER
To whom it may concern    RE: Glenda Robins    Pneumonia follow up 1/16/2024.  No current SOB or cough.  Oxygen level 93-94% on  room air.  No further need for oxygen at this time.  Ok to return oxygen                 Radha BARRAZA FNP-BC  Family Nurse Practitioner

## 2024-01-16 NOTE — PATIENT INSTRUCTIONS
My Asthma Action Plan    Name: Glenda Robins   YOB: 1970  Date: 1/16/2024   My doctor: MADI Espinosa CNP   [facility]        My Rescue Medicine:   Albuterol inhaler (Proair/Ventolin/Proventil HFA)  2-4 puffs EVERY 4 HOURS as needed. Use a spacer if recommended by your provider.   My Asthma Severity:   Intermittent / Exercise Induced  Know your asthma triggers:   None          GREEN ZONE   Good Control  I feel good  No cough or wheeze  Can work, sleep and play without asthma symptoms       Take your asthma control medicine every day.     If exercise triggers your asthma, take your rescue medication  15 minutes before exercise or sports, and  During exercise if you have asthma symptoms  Spacer to use with inhaler: If you have a spacer, make sure to use it with your inhaler             YELLOW ZONE Getting Worse  I have ANY of these:  I do not feel good  Cough or wheeze  Chest feels tight  Wake up at night   Keep taking your Green Zone medications  Start taking your rescue medicine:  every 20 minutes for up to 1 hour. Then every 4 hours for 24-48 hours.  If you stay in the Yellow Zone for more than 12-24 hours, contact your doctor.  If you do not return to the Green Zone in 12-24 hours or you get worse, start taking your oral steroid medicine if prescribed by your provider.           RED ZONE Medical Alert - Get Help  I have ANY of these:  I feel awful  Medicine is not helping  Breathing getting harder  Trouble walking or talking  Nose opens wide to breathe       Take your rescue medicine NOW  If your provider has prescribed an oral steroid medicine, start taking it NOW  Call your doctor NOW  If you are still in the Red Zone after 20 minutes and you have not reached your doctor:  Take your rescue medicine again and  Call 911 or go to the emergency room right away    See your regular doctor within 2 weeks of an Emergency Room or Urgent Care visit for  follow-up treatment.          Annual Reminders:  Meet with Asthma Educator,  Flu Shot in the Fall, consider Pneumonia Vaccination for patients with asthma (aged 19 and older).    Pharmacy: [id number] - [place], [place] - [address]    Electronically signed by MADI Espinosa CNP   Date: 01/16/24                    Asthma Triggers  How To Control Things That Make Your Asthma Worse    Triggers are things that make your asthma worse.  Look at the list below to help you find your triggers and   what you can do about them. You can help prevent asthma flare-ups by staying away from your triggers.      Trigger                                                          What you can do   Cigarette Smoke  Tobacco smoke can make asthma worse. Do not allow smoking in your home, car or around you.  Be sure no one smokes at a child s day care or school.  If you smoke, ask your health care provider for ways to help you quit.  Ask family members to quit too.  Ask your health care provider for a referral to Quit Plan to help you quit smoking, or call 3-223-281-PLAN.     Colds, Flu, Bronchitis  These are common triggers of asthma. Wash your hands often.  Don t touch your eyes, nose or mouth.  Get a flu shot every year.     Dust Mites  These are tiny bugs that live in cloth or carpet. They are too small to see. Wash sheets and blankets in hot water every week.   Encase pillows and mattress in dust mite proof covers.  Avoid having carpet if you can. If you have carpet, vacuum weekly.   Use a dust mask and HEPA vacuum.   Pollen and Outdoor Mold  Some people are allergic to trees, grass, or weed pollen, or molds. Try to keep your windows closed.  Limit time out doors when pollen count is high.   Ask you health care provider about taking medicine during allergy season.     Animal Dander  Some people are allergic to skin flakes, urine or saliva from pets with fur or feathers. Keep pets with fur or feathers out of  your home.    If you can t keep the pet outdoors, then keep the pet out of your bedroom.  Keep the bedroom door closed.  Keep pets off cloth furniture and away from stuffed toys.     Mice, Rats, and Cockroaches  Some people are allergic to the waste from these pests.   Cover food and garbage.  Clean up spills and food crumbs.  Store grease in the refrigerator.   Keep food out of the bedroom.   Indoor Mold  This can be a trigger if your home has high moisture. Fix leaking faucets, pipes, or other sources of water.   Clean moldy surfaces.  Dehumidify basement if it is damp and smelly.   Smoke, Strong Odors, and Sprays  These can reduce air quality. Stay away from strong odors and sprays, such as perfume, powder, hair spray, paints, smoke incense, paint, cleaning products, candles and new carpet.   Exercise or Sports  Some people with asthma have this trigger. Be active!  Ask your doctor about taking medicine before sports or exercise to prevent symptoms.    Warm up for 5-10 minutes before and after sports or exercise.     Other Triggers of Asthma  Cold air:  Cover your nose and mouth with a scarf.  Sometimes laughing or crying can be a trigger.  Some medicines and food can trigger asthma.

## 2024-02-16 NOTE — PLAN OF CARE
Occupational Therapy Evaluation     Patient Name: Chau Lou III  Today's Date: 2/16/2024  Problem List  Principal Problem:    Hiatal hernia    Past Medical History  Past Medical History:   Diagnosis Date    Anxiety     Asthma     BPH (benign prostatic hyperplasia)     COPD (chronic obstructive pulmonary disease) (HCC)     Depression     GERD (gastroesophageal reflux disease)     Hiatal hernia     Hyperlipidemia     Hypertension     Suicide attempt (HCC)      Past Surgical History  Past Surgical History:   Procedure Laterality Date    COLONOSCOPY      ESOPHAGOGASTRODUODENOSCOPY N/A 03/01/2019    Procedure: ESOPHAGOGASTRODUODENOSCOPY (EGD);  Surgeon: Travis Nathan MD;  Location: [facility];  Service: Gastroenterology    FRACTURE SURGERY      finger surgery    HERNIA REPAIR      x2    WA LAPS RPR PARAESPHGL HRNA INCL FUNDPLSTY W/MESH N/A 2/15/2024    Procedure: REPAIR HERNIA PARAESOPHAGEAL  LAPAROSCOPIC;  Surgeon: Robbie Peguero MD;  Location: [facility] OR;  Service: General    WA LAPS SURG ESOPG/GSTR FUNDOPLASTY N/A 2/15/2024    Procedure: FUNDOPLICATION TOUPET LAPAROSCOPIC;  Surgeon: Robbie Peguero MD;  Location:  MAIN OR;  Service: General    SKIN GRAFT Right     Foot        02/16/24 1016   OT Last Visit   OT Visit Date 02/16/24   Note Type   Note type Evaluation   Restrictions/Precautions   Weight Bearing Precautions Per Order No   Other Precautions Fall Risk;Pain   Home Living   Type of Home House   Home Layout Multi-level  (Pt reports living in a 3 SH w/ a spiral staircase.)   Additional Comments Pt agitated and irritable t/o session; limited home setup information gathered.   Prior Function   Level of [place] Independent with ADLs;Independent with functional mobility   Comments Pt agitated and irritable t/o session; limited PLOF information gathered.   Lifestyle   Autonomy During OT evaluation, pt demonstrated I w/ ADLs and fxnl mobility w/o AD.   Intrinsic Gratification Pt reports taking  "Problem: Patient Care Overview  Goal: Individualization & Mutuality  Contract for safety on admission  Cooperative with admission process  Oriented to person, place and time within 2 days of admission  No harm to self or others while on unit  Will deny suicidal ideation on discharge  Safe withdrawal from medication / stable vitals  Will agree to tx team recommendations for meds and follow up care  Will take meds as prescribed while on unit  Delusions about being pregnant will subside by discharge date  Will attend 75% of groups   Outcome: No Change  Patient in bed on arrival.  Patient denies SI, HI, hallucinations, depression, anxiety, pain.  Patient slightly irritable when asked questions this morning but is cooperative.  Patient cooperative with scheduled phenobarbital and verbalizes understanding of indication for medication.  Patient continues to have delusional thinking regarding being pregnant and rubs her stomach throughout assessment.  Her affect is flat and appears disheveled.  Patient is isolative to her room most of the shift but does eat meals in the lounge.  Patient does not engage in conversation with peers.  Will continue to monitor.    1350:  Patient states she does not need to be on phenobarbital because she isn't \"addicted\" to anything.  Attempted to educate patient on indications for phenobarbital.  She refuses this education and requests to speak with NP.  Sticky note left for NP.  Patient states she does not know why she is in [place] but \"I know that I'm not pregnant.\".      Problem: Thought Process Alteration (Adult)  Goal: Improved Thought Process  Will have reality based conversation by discharge date   Outcome: No Change  Patient continues to have delusional thinking regarding being pregnant.      " "walks almost everyday.   General   Family/Caregiver Present No   Subjective   Subjective \"I don't need therapy! Who sent you in here?\"   ADL   Where Assessed Edge of bed   Eating Assistance 7  Independent   Grooming Assistance 7  Independent   UB Bathing Assistance 7  Independent   LB Bathing Assistance 6  Modified Independent   UB Dressing Assistance 7  Independent   LB Dressing Assistance 6  Modified independent   LB Dressing Deficit Setup;Thread RLE into pants;Thread LLE into pants;Thread RLE into underwear;Thread LLE into underwear;Pull up over hips;Fasteners   Toileting Assistance  6  Modified independent   Bed Mobility   Supine to Sit 6  Modified independent   Additional items HOB elevated;Bedrails   Sit to Supine Unable to assess   Additional Comments Pt seated EOB at end of OT evaluation w/ all needs met.   Transfers   Sit to Stand 7  Independent   Stand to Sit 7  Independent   Additional Comments completed w/o AD   Functional Mobility   Functional Mobility 5  Supervision   Additional Comments Pt ambulated household distance w/ S and w/o AD.   Additional items   (no AD)   Balance   Static Sitting Fair +   Dynamic Sitting Fair +   Static Standing Fair   Dynamic Standing Fair -   Ambulatory Fair -   Activity Tolerance   Activity Tolerance Patient tolerated treatment well   Medical Staff Made Aware PTFariha, due to pt's medical complexity and multiple comorbidities   Nurse Made Aware RN clearance prior to session   RUE Assessment   RUE Assessment WFL   LUE Assessment   LUE Assessment WFL   Hand Function   Gross Motor Coordination Functional   Fine Motor Coordination Functional   Cognition   Arousal/Participation Alert;Responsive   Attention Within functional limits   Following Commands Follows one step commands without difficulty   Comments Pt was agitated and irritable t/o session. Pt willing to participate in OT evaluation w/ max encouragement.   Assessment   Assessment Pt is a 59 y.o. male seen for OT " evaluation s/p admission to [facility] on 2/15/2024. Pt diagnosed with Hiatal hernia; s/p lap PEHR with fundoplication on 2/15. Pt has a significant PMH impacting occupational performance including: Anxiety, Asthma, BPH (benign prostatic hyperplasia), COPD (chronic obstructive pulmonary disease) ([facility]), Depression, GERD (gastroesophageal reflux disease), Hiatal hernia, Hyperlipidemia, Hypertension, and Suicide attempt ([facility]). Pt with active OT evaluation and treatment orders and activity orders. PTA, pt living in a 3 SH w/ a spiral staircase. Pt demonstrated I w/ ADLs and fxnl mobility during OT evaluation. Pt agitated and irritable t/o session; limited home setup information and PLOF information was gathered. Pt agreeable and willing to participate in OT evaluation. During evaluation, pt was for eating, grooming, and UB ADLs and mod I for LB ADLs and toileting. Pt was also mod I for bed mobility, I for transfers, and S for fxnl mobility w/o AD. Pt performing ADLs and mobility at baseline level of independence. No further acute OT needs identified at this time. Recommend continued active ADL participation and mobilization with hospital staff while in the hospital to increase pt’s endurance and strength upon D/C. From OT standpoint, recommend D/C to home with social support when medically cleared. D/C pt from OT caseload at this time.   Goals   Patient Goals none stated   Plan   OT Frequency Eval only   Discharge Recommendation   Rehab Resource Intensity Level, OT No post-acute rehabilitation needs   AM-PAC Daily Activity Inpatient   Lower Body Dressing 4   Bathing 4   Toileting 4   Upper Body Dressing 4   Grooming 4   Eating 4   Daily Activity Raw Score 24   Daily Activity Standardized Score (Calc for Raw Score >=11) 57.54   AM-PAC Applied Cognition Inpatient   Following a Speech/Presentation 4   Understanding Ordinary Conversation 4   Taking Medications 4   Remembering Where Things Are Placed or Put Away 4    Remembering List of 4-5 Errands 4   Taking Care of Complicated Tasks 4   Applied Cognition Raw Score 24   Applied Cognition Standardized Score 62.21       The patient's raw score on the -PAC Daily Activity Inpatient Short Form is 24. A raw score of greater than or equal to 19 suggests the patient may benefit from discharge to home. Please refer to the recommendation of the Occupational Therapist for safe discharge planning.    Ramona Beauchamp MS, OTR/L

## 2024-02-29 NOTE — H&P (VIEW-ONLY)
Preoperative Evaluation  [facility] [place]  [address]  [place] [address]  Phone: [phone]  Primary Provider: Radha Maldonado  Pre-op Performing Provider: RADHA MALDONADO  Mar 4, 2024       Glenda is a 53 year old, presenting for the following:  Pre-Op Exam        12/18/2023    10:44 AM   Additional Questions   Roomed by Storm Roberts CMA   Accompanied by Self     Surgical Information  Surgery/Procedure: Bilateral cataract removal  Surgery Location: [facility]  Surgeon: Dr. Camarena  Surgery Date: Left eye 3/26/24, Right eye 3/12/24  Time of Surgery: TBD  Where patient plans to recover: At home with family  Fax number for surgical facility: Note does not need to be faxed, will be available electronically in Epic.    Assessment & Plan     The proposed surgical procedure is considered LOW risk.    Preop general physical exam  Cataract of both eyes, unspecified cataract type  Right eye will be completed on 3/12/24 and left eye will be completed on 3/26/24. Chronic conditions including asthma and depression are well controlled at this time. Discussed medication hold times and encouraged smoking cessation for improved surgical outcomes.      Cleared for surgery.      - No identified additional risk factors other than previously addressed    Antiplatelet or Anticoagulation Medication Instructions   - Patient is on no antiplatelet or anticoagulation medications.    Additional Medication Instructions  Patient is to take all scheduled medications on the day of surgery EXCEPT for modifications listed below:  - STOP taking all vitamins and herbal supplements 14 days before surgery.  - Bring albuterol inhaler with her     Recommendation  APPROVAL GIVEN to proceed with proposed procedure, without further diagnostic evaluation.    Prescription drug management      Subjective     HPI related to upcoming procedure: Has posterior subcapsular age-related cataracts of both eyes. Patient reports it  is fast growing over past year. Has used glasses since 13 years old. Also used contacts for some time but no longer uses. Both parents had cataracts and surgery to correct. Vision is quite blurry today. Right eye will be completed first on 3/12/24 and left eye will be completed on 3/26/24.        3/4/2024     8:57 AM   Preop Questions   1. Have you ever had a heart attack or stroke? No   2. Have you ever had surgery on your heart or blood vessels, such as a stent placement, a coronary artery bypass, or surgery on an artery in your head, neck, heart, or legs? No   3. Do you have chest pain with activity? No   4. Do you have a history of  heart failure? No   5. Do you currently have a cold, bronchitis or symptoms of other infection? No   6. Do you have a cough, shortness of breath, or wheezing? No   7. Do you or anyone in your family have previous history of blood clots? No   8. Do you or does anyone in your family have a serious bleeding problem such as prolonged bleeding following surgeries or cuts? No   9. Have you ever had problems with anemia or been told to take iron pills? No   10. Have you had any abnormal blood loss such as black, tarry or bloody stools, or abnormal vaginal bleeding? No   11. Have you ever had a blood transfusion? No   12. Are you willing to have a blood transfusion if it is medically needed before, during, or after your surgery? Yes   13. Have you or any of your relatives ever had problems with anesthesia? No   14. Do you have sleep apnea, excessive snoring or daytime drowsiness? No   15. Do you have any artifical heart valves or other implanted medical devices like a pacemaker, defibrillator, or continuous glucose monitor? No   16. Do you have artificial joints? No   17. Are you allergic to latex? No   18. Is there any chance that you may be pregnant? No     Health Care Directive  Patient does not have a Health Care Directive or Living Will: Discussed advance care planning with patient;  however, patient declined at this time.    Preoperative Review of    reviewed - controlled substances prescribed by other outside provider(s).      Status of Chronic Conditions:  See problem list for active medical problems.  Problems all longstanding and stable, except as noted/documented.  See ROS for pertinent symptoms related to these conditions.    ASTHMA - Patient has a longstanding history of moderate-severe Asthma . Patient has been doing well overall noting NO SYMPTOMS and continues on medication regimen consisting of albuterol inhaler PRN without adverse reactions or side effects. Taking albuterol ~1x/month     DEPRESSION - Patient has a long history of Depression of moderate severity requiring medication for control with recent symptoms being stable..Current symptoms of depression include none. Taking Seroquel daily, Abilify daily, and ativan TID PRN.    Patient Active Problem List    Diagnosis Date Noted    Acute respiratory failure with hypoxia (H) 12/04/2023     Priority: Medium    Multifocal pneumonia 12/04/2023     Priority: Medium    Multifocal lung consolidation (H24) 12/04/2023     Priority: Medium    Cigarette nicotine dependence, uncomplicated 10/15/2020     Priority: Medium    Closed fracture of multiple ribs of left side with routine healing 10/15/2020     Priority: Medium    Closed fracture of right proximal tibia 10/15/2020     Priority: Medium    Closed nondisplaced fracture of greater tuberosity of left humerus 10/15/2020     Priority: Medium    Schizoaffective disorder, bipolar type (H) 10/15/2020     Priority: Medium    Myopia of both eyes with astigmatism and presbyopia 09/24/2020     Priority: Medium    Posterior subcapsular age-related cataract of both eyes 09/24/2020     Priority: Medium    Disorganized behavior 08/10/2020     Priority: Medium    Schizophrenia (H) 03/21/2018     Priority: Medium    Psychosis (H) 03/14/2018     Priority: Medium      No past medical history on  file.  No past surgical history on file.  Current Outpatient Medications   Medication Sig Dispense Refill    albuterol (PROAIR HFA/PROVENTIL HFA/VENTOLIN HFA) 108 (90 Base) MCG/ACT inhaler Inhale 1-2 puffs into the lungs every 4 hours as needed for shortness of breath or wheezing 18 g 3    ARIPiprazole (ABILIFY) 5 MG tablet Take 5 mg by mouth at bedtime      LORazepam (ATIVAN) 0.5 MG tablet Take 0.5 mg by mouth 3 times daily as needed for anxiety      polyethylene glycol-propylene glycol (SYSTANE ULTRA) 0.4-0.3 % SOLN ophthalmic solution Place 1 drop into both eyes every hour as needed for dry eyes      QUEtiapine (SEROQUEL) 100 MG tablet Take 100 mg by mouth at bedtime -take along with a 50 mg tablet for a total nightly dose of 150 mg.      QUEtiapine (SEROQUEL) 50 MG tablet Take 50 mg by mouth at bedtime -take along with a 100 mg tablet for a total nightly dose of 150 mg.      ipratropium - albuterol 0.5 mg/2.5 mg/3 mL (DUONEB) 0.5-2.5 (3) MG/3ML neb solution Take 1 vial (3 mLs) by nebulization every 6 hours as needed for shortness of breath, wheezing or cough 90 mL 0    moxifloxacin (VIGAMOX) 0.5 % ophthalmic solution  (Patient not taking: Reported on 1/16/2024)      naproxen (NAPROSYN) 500 MG tablet Take 1 tablet (500 mg) by mouth 2 times daily as needed for moderate pain 30 tablet 0     Allergies   Allergen Reactions    Shrimp Anaphylaxis    Shrimp (Diagnostic) Anaphylaxis    Risperdal [Risperidone] Other (See Comments)     Elevated prolactin      Social History     Tobacco Use    Smoking status: Some Days     Types: Cigarettes     Passive exposure: Current    Smokeless tobacco: Never   Substance Use Topics    Alcohol use: Not on file     No family history on file.  History   Drug Use Not on file         Review of Systems    Review of Systems  CONSTITUTIONAL: NEGATIVE for fever, chills, change in weight  INTEGUMENTARY/SKIN: NEGATIVE for worrisome rashes, moles or lesions  EYES: POSITIVE for vision changes -  blurry, Negative for irritation  ENT/MOUTH: NEGATIVE for mouth and throat problems. POSITIVE for occasional ear pain.  RESP: NEGATIVE for significant cough or SOB  BREAST: NEGATIVE for masses, tenderness or discharge  CV: NEGATIVE for chest pain, palpitations or peripheral edema  GI: NEGATIVE for nausea, abdominal pain, heartburn, or change in bowel habits  : NEGATIVE for frequency, dysuria, or hematuria  MUSCULOSKELETAL: NEGATIVE for significant arthralgias or myalgia  NEURO: NEGATIVE for weakness, dizziness or paresthesias  ENDOCRINE: NEGATIVE for temperature intolerance, skin/hair changes  HEME: NEGATIVE for bleeding problems  PSYCHIATRIC: NEGATIVE for changes in mood or affect    Objective    /72 (BP Location: Right arm, Patient Position: Sitting, Cuff Size: Adult Regular)   Pulse 98   Temp 97.4  F (36.3  C) (Tympanic)   Ht 1.524 m (5')   Wt 85 kg (187 lb 4.8 oz)   SpO2 94%   BMI 36.58 kg/m     Estimated body mass index is 36.58 kg/m  as calculated from the following:    Height as of this encounter: 1.524 m (5').    Weight as of this encounter: 85 kg (187 lb 4.8 oz).    Physical Exam  GENERAL: alert and no distress  EYES: Eyes grossly normal to inspection, PERRL and conjunctivae and sclerae normal  HENT: ear canals and TM's normal, nose and mouth without ulcers or lesions  NECK: no adenopathy, no asymmetry, masses, or scars  RESP: lungs clear to auscultation - no rales, rhonchi or wheezes  CV: regular rate and rhythm, normal S1 S2, no S3 or S4, no murmur, click or rub, no peripheral edema  ABDOMEN: soft, nontender, no hepatosplenomegaly, no masses and bowel sounds normal  MS: no gross musculoskeletal defects noted, no edema  SKIN: no suspicious lesions or rashes  NEURO: normal strength and tone, mentation intact and speech normal  PSYCH: mentation appears normal, affect normal/bright    Recent Labs   Lab Test 12/09/23  0528 12/08/23  0516   HGB 14.5 13.9    299    140   POTASSIUM 3.9  4.6   CR 0.56 0.47*      Diagnostics  None    Revised Cardiac Risk Index (RCRI)  The patient has the following serious cardiovascular risks for perioperative complications:   - No serious cardiac risks = 0 points     RCRI Interpretation: 0 points: Class I (very low risk - 0.4% complication rate)     TASHA Carrillo-S2  Signed Electronically by: MADI Espinosa CNP  Copy of this evaluation report is provided to requesting physician.     I was present with the nurse practitioner student who participated in the service and in the documentation of the note. I have verified the history and personally performed the physical exam and medical decision making. I agree with the assessment and plan of care as documented in the note.     Radha BARRAZA FNP-BC  Family Nurse Practitioner

## 2024-02-29 NOTE — H&P (VIEW-ONLY)
Preoperative Evaluation  [facility] - [place]  [address]  [place] [address]  Phone: [phone]  Primary Provider: Radha Maldonado  Pre-op Performing Provider: RADHA MALDONADO  Mar 4, 2024       Glenda is a 53 year old, presenting for the following:  Pre-Op Exam        12/18/2023    10:44 AM   Additional Questions   Roomed by Storm Roberts CMA   Accompanied by Self     Surgical Information  Surgery/Procedure: Bilateral cataract removal  [place] [facility] [place] Dr. Camarena  Surgery Date: Left eye 3/26/24, Right eye 3/12/24  Time of Surgery: TBD  Where patient plans to recover: At home with family  Fax number for surgical facility: Note does not need to be faxed, will be available electronically in Epic.    Assessment & Plan     The proposed surgical procedure is considered LOW risk.    Preop general physical exam  Cataract of both eyes, unspecified cataract type  Right eye will be completed on 3/12/24 and left eye will be completed on 3/26/24. Chronic conditions including asthma and depression are well controlled at this time. Discussed medication hold times and encouraged smoking cessation for improved surgical outcomes.      Cleared for surgery.      - No identified additional risk factors other than previously addressed    Antiplatelet or Anticoagulation Medication Instructions   - Patient is on no antiplatelet or anticoagulation medications.    Additional Medication Instructions  Patient is to take all scheduled medications on the day of surgery EXCEPT for modifications listed below:  - STOP taking all vitamins and herbal supplements 14 days before surgery.  - Bring albuterol inhaler with her     Recommendation  APPROVAL GIVEN to proceed with proposed procedure, without further diagnostic evaluation.    Prescription drug management      Subjective     HPI related to upcoming procedure: Has posterior subcapsular age-related cataracts of both eyes. Patient reports it  is fast growing over past year. Has used glasses since 13 years old. Also used contacts for some time but no longer uses. Both parents had cataracts and surgery to correct. Vision is quite blurry today. Right eye will be completed first on 3/12/24 and left eye will be completed on 3/26/24.        3/4/2024     8:57 AM   Preop Questions   1. Have you ever had a heart attack or stroke? No   2. Have you ever had surgery on your heart or blood vessels, such as a stent placement, a coronary artery bypass, or surgery on an artery in your head, neck, heart, or legs? No   3. Do you have chest pain with activity? No   4. Do you have a history of  heart failure? No   5. Do you currently have a cold, bronchitis or symptoms of other infection? No   6. Do you have a cough, shortness of breath, or wheezing? No   7. Do you or anyone in your family have previous history of blood clots? No   8. Do you or does anyone in your family have a serious bleeding problem such as prolonged bleeding following surgeries or cuts? No   9. Have you ever had problems with anemia or been told to take iron pills? No   10. Have you had any abnormal blood loss such as black, tarry or bloody stools, or abnormal vaginal bleeding? No   11. Have you ever had a blood transfusion? No   12. Are you willing to have a blood transfusion if it is medically needed before, during, or after your surgery? Yes   13. Have you or any of your relatives ever had problems with anesthesia? No   14. Do you have sleep apnea, excessive snoring or daytime drowsiness? No   15. Do you have any artifical heart valves or other implanted medical devices like a pacemaker, defibrillator, or continuous glucose monitor? No   16. Do you have artificial joints? No   17. Are you allergic to latex? No   18. Is there any chance that you may be pregnant? No     Health Care Directive  Patient does not have a Health Care Directive or Living Will: Discussed advance care planning with patient;  however, patient declined at this time.    Preoperative Review of    reviewed - controlled substances prescribed by other outside provider(s).      Status of Chronic Conditions:  See problem list for active medical problems.  Problems all longstanding and stable, except as noted/documented.  See ROS for pertinent symptoms related to these conditions.    ASTHMA - Patient has a longstanding history of moderate-severe Asthma . Patient has been doing well overall noting NO SYMPTOMS and continues on medication regimen consisting of albuterol inhaler PRN without adverse reactions or side effects. Taking albuterol ~1x/month     DEPRESSION - Patient has a long history of Depression of moderate severity requiring medication for control with recent symptoms being stable..Current symptoms of depression include none. Taking Seroquel daily, Abilify daily, and ativan TID PRN.    Patient Active Problem List    Diagnosis Date Noted    Acute respiratory failure with hypoxia (H) 12/04/2023     Priority: Medium    Multifocal pneumonia 12/04/2023     Priority: Medium    Multifocal lung consolidation (H24) 12/04/2023     Priority: Medium    Cigarette nicotine dependence, uncomplicated 10/15/2020     Priority: Medium    Closed fracture of multiple ribs of left side with routine healing 10/15/2020     Priority: Medium    Closed fracture of right proximal tibia 10/15/2020     Priority: Medium    Closed nondisplaced fracture of greater tuberosity of left humerus 10/15/2020     Priority: Medium    Schizoaffective disorder, bipolar type (H) 10/15/2020     Priority: Medium    Myopia of both eyes with astigmatism and presbyopia 09/24/2020     Priority: Medium    Posterior subcapsular age-related cataract of both eyes 09/24/2020     Priority: Medium    Disorganized behavior 08/10/2020     Priority: Medium    Schizophrenia (H) 03/21/2018     Priority: Medium    Psychosis (H) 03/14/2018     Priority: Medium      No past medical history on  file.  No past surgical history on file.  Current Outpatient Medications   Medication Sig Dispense Refill    albuterol (PROAIR HFA/PROVENTIL HFA/VENTOLIN HFA) 108 (90 Base) MCG/ACT inhaler Inhale 1-2 puffs into the lungs every 4 hours as needed for shortness of breath or wheezing 18 g 3    ARIPiprazole (ABILIFY) 5 MG tablet Take 5 mg by mouth at bedtime      LORazepam (ATIVAN) 0.5 MG tablet Take 0.5 mg by mouth 3 times daily as needed for anxiety      polyethylene glycol-propylene glycol (SYSTANE ULTRA) 0.4-0.3 % SOLN ophthalmic solution Place 1 drop into both eyes every hour as needed for dry eyes      QUEtiapine (SEROQUEL) 100 MG tablet Take 100 mg by mouth at bedtime -take along with a 50 mg tablet for a total nightly dose of 150 mg.      QUEtiapine (SEROQUEL) 50 MG tablet Take 50 mg by mouth at bedtime -take along with a 100 mg tablet for a total nightly dose of 150 mg.      ipratropium - albuterol 0.5 mg/2.5 mg/3 mL (DUONEB) 0.5-2.5 (3) MG/3ML neb solution Take 1 vial (3 mLs) by nebulization every 6 hours as needed for shortness of breath, wheezing or cough 90 mL 0    moxifloxacin (VIGAMOX) 0.5 % ophthalmic solution  (Patient not taking: Reported on 1/16/2024)      naproxen (NAPROSYN) 500 MG tablet Take 1 tablet (500 mg) by mouth 2 times daily as needed for moderate pain 30 tablet 0     Allergies   Allergen Reactions    Shrimp Anaphylaxis    Shrimp (Diagnostic) Anaphylaxis    Risperdal [Risperidone] Other (See Comments)     Elevated prolactin      Social History     Tobacco Use    Smoking status: Some Days     Types: Cigarettes     Passive exposure: Current    Smokeless tobacco: Never   Substance Use Topics    Alcohol use: Not on file     No family history on file.  History   Drug Use Not on file         Review of Systems    Review of Systems  CONSTITUTIONAL: NEGATIVE for fever, chills, change in weight  INTEGUMENTARY/SKIN: NEGATIVE for worrisome rashes, moles or lesions  EYES: POSITIVE for vision changes -  blurry, Negative for irritation  ENT/MOUTH: NEGATIVE for mouth and throat problems. POSITIVE for occasional ear pain.  RESP: NEGATIVE for significant cough or SOB  BREAST: NEGATIVE for masses, tenderness or discharge  CV: NEGATIVE for chest pain, palpitations or peripheral edema  GI: NEGATIVE for nausea, abdominal pain, heartburn, or change in bowel habits  : NEGATIVE for frequency, dysuria, or hematuria  MUSCULOSKELETAL: NEGATIVE for significant arthralgias or myalgia  NEURO: NEGATIVE for weakness, dizziness or paresthesias  ENDOCRINE: NEGATIVE for temperature intolerance, skin/hair changes  HEME: NEGATIVE for bleeding problems  PSYCHIATRIC: NEGATIVE for changes in mood or affect    Objective    /72 (BP Location: Right arm, Patient Position: Sitting, Cuff Size: Adult Regular)   Pulse 98   Temp 97.4  F (36.3  C) (Tympanic)   Ht 1.524 m (5')   Wt 85 kg (187 lb 4.8 oz)   SpO2 94%   BMI 36.58 kg/m     Estimated body mass index is 36.58 kg/m  as calculated from the following:    Height as of this encounter: 1.524 m (5').    Weight as of this encounter: 85 kg (187 lb 4.8 oz).    Physical Exam  GENERAL: alert and no distress  EYES: Eyes grossly normal to inspection, PERRL and conjunctivae and sclerae normal  HENT: ear canals and TM's normal, nose and mouth without ulcers or lesions  NECK: no adenopathy, no asymmetry, masses, or scars  RESP: lungs clear to auscultation - no rales, rhonchi or wheezes  CV: regular rate and rhythm, normal S1 S2, no S3 or S4, no murmur, click or rub, no peripheral edema  ABDOMEN: soft, nontender, no hepatosplenomegaly, no masses and bowel sounds normal  MS: no gross musculoskeletal defects noted, no edema  SKIN: no suspicious lesions or rashes  NEURO: normal strength and tone, mentation intact and speech normal  PSYCH: mentation appears normal, affect normal/bright    Recent Labs   Lab Test 12/09/23  0528 12/08/23  0516   HGB 14.5 13.9    299    140   POTASSIUM 3.9  4.6   CR 0.56 0.47*      Diagnostics  None    Revised Cardiac Risk Index (RCRI)  The patient has the following serious cardiovascular risks for perioperative complications:   - No serious cardiac risks = 0 points     RCRI Interpretation: 0 points: Class I (very low risk - 0.4% complication rate)     TASHA Carrillo-S2  Signed Electronically by: MADI Espinosa CNP  Copy of this evaluation report is provided to requesting physician.     I was present with the nurse practitioner student who participated in the service and in the documentation of the note. I have verified the history and personally performed the physical exam and medical decision making. I agree with the assessment and plan of care as documented in the note.     Radha BARRAZA FNP-BC  Family Nurse Practitioner

## 2024-02-29 NOTE — PROGRESS NOTES
Preoperative Evaluation  [facility] - [place]  [address]  [place] [address]  Phone: [phone]  Primary Provider: Radha Maldonado  Pre-op Performing Provider: RADHA MALDONADO  Mar 4, 2024       Glenda is a 53 year old, presenting for the following:  Pre-Op Exam        12/18/2023    10:44 AM   Additional Questions   Roomed by Storm Roberts CMA   Accompanied by Self     Surgical Information  Surgery/Procedure: Bilateral cataract removal  [place] [facility] [place]: Dr. Camarena  Surgery Date: Left eye 3/26/24, Right eye 3/12/24  Time of Surgery: TBD  Where patient plans to recover: At home with family  Fax number for surgical facility: Note does not need to be faxed, will be available electronically in Epic.    Assessment & Plan     The proposed surgical procedure is considered LOW risk.    Preop general physical exam  Cataract of both eyes, unspecified cataract type  Right eye will be completed on 3/12/24 and left eye will be completed on 3/26/24. Chronic conditions including asthma and depression are well controlled at this time. Discussed medication hold times and encouraged smoking cessation for improved surgical outcomes.      Cleared for surgery.      - No identified additional risk factors other than previously addressed    Antiplatelet or Anticoagulation Medication Instructions   - Patient is on no antiplatelet or anticoagulation medications.    Additional Medication Instructions  Patient is to take all scheduled medications on the day of surgery EXCEPT for modifications listed below:  - STOP taking all vitamins and herbal supplements 14 days before surgery.  - Bring albuterol inhaler with her     Recommendation  APPROVAL GIVEN to proceed with proposed procedure, without further diagnostic evaluation.    Prescription drug management      Subjective     HPI related to upcoming procedure: Has posterior subcapsular age-related cataracts of both eyes. Patient reports it  is fast growing over past year. Has used glasses since 13 years old. Also used contacts for some time but no longer uses. Both parents had cataracts and surgery to correct. Vision is quite blurry today. Right eye will be completed first on 3/12/24 and left eye will be completed on 3/26/24.        3/4/2024     8:57 AM   Preop Questions   1. Have you ever had a heart attack or stroke? No   2. Have you ever had surgery on your heart or blood vessels, such as a stent placement, a coronary artery bypass, or surgery on an artery in your head, neck, heart, or legs? No   3. Do you have chest pain with activity? No   4. Do you have a history of  heart failure? No   5. Do you currently have a cold, bronchitis or symptoms of other infection? No   6. Do you have a cough, shortness of breath, or wheezing? No   7. Do you or anyone in your family have previous history of blood clots? No   8. Do you or does anyone in your family have a serious bleeding problem such as prolonged bleeding following surgeries or cuts? No   9. Have you ever had problems with anemia or been told to take iron pills? No   10. Have you had any abnormal blood loss such as black, tarry or bloody stools, or abnormal vaginal bleeding? No   11. Have you ever had a blood transfusion? No   12. Are you willing to have a blood transfusion if it is medically needed before, during, or after your surgery? Yes   13. Have you or any of your relatives ever had problems with anesthesia? No   14. Do you have sleep apnea, excessive snoring or daytime drowsiness? No   15. Do you have any artifical heart valves or other implanted medical devices like a pacemaker, defibrillator, or continuous glucose monitor? No   16. Do you have artificial joints? No   17. Are you allergic to latex? No   18. Is there any chance that you may be pregnant? No     Health Care Directive  Patient does not have a Health Care Directive or Living Will: Discussed advance care planning with patient;  however, patient declined at this time.    Preoperative Review of    reviewed - controlled substances prescribed by other outside provider(s).      Status of Chronic Conditions:  See problem list for active medical problems.  Problems all longstanding and stable, except as noted/documented.  See ROS for pertinent symptoms related to these conditions.    ASTHMA - Patient has a longstanding history of moderate-severe Asthma . Patient has been doing well overall noting NO SYMPTOMS and continues on medication regimen consisting of albuterol inhaler PRN without adverse reactions or side effects. Taking albuterol ~1x/month     DEPRESSION - Patient has a long history of Depression of moderate severity requiring medication for control with recent symptoms being stable..Current symptoms of depression include none. Taking Seroquel daily, Abilify daily, and ativan TID PRN.    Patient Active Problem List    Diagnosis Date Noted    Acute respiratory failure with hypoxia (H) 12/04/2023     Priority: Medium    Multifocal pneumonia 12/04/2023     Priority: Medium    Multifocal lung consolidation (H24) 12/04/2023     Priority: Medium    Cigarette nicotine dependence, uncomplicated 10/15/2020     Priority: Medium    Closed fracture of multiple ribs of left side with routine healing 10/15/2020     Priority: Medium    Closed fracture of right proximal tibia 10/15/2020     Priority: Medium    Closed nondisplaced fracture of greater tuberosity of left humerus 10/15/2020     Priority: Medium    Schizoaffective disorder, bipolar type (H) 10/15/2020     Priority: Medium    Myopia of both eyes with astigmatism and presbyopia 09/24/2020     Priority: Medium    Posterior subcapsular age-related cataract of both eyes 09/24/2020     Priority: Medium    Disorganized behavior 08/10/2020     Priority: Medium    Schizophrenia (H) 03/21/2018     Priority: Medium    Psychosis (H) 03/14/2018     Priority: Medium      No past medical history on  file.  No past surgical history on file.  Current Outpatient Medications   Medication Sig Dispense Refill    albuterol (PROAIR HFA/PROVENTIL HFA/VENTOLIN HFA) 108 (90 Base) MCG/ACT inhaler Inhale 1-2 puffs into the lungs every 4 hours as needed for shortness of breath or wheezing 18 g 3    ARIPiprazole (ABILIFY) 5 MG tablet Take 5 mg by mouth at bedtime      LORazepam (ATIVAN) 0.5 MG tablet Take 0.5 mg by mouth 3 times daily as needed for anxiety      polyethylene glycol-propylene glycol (SYSTANE ULTRA) 0.4-0.3 % SOLN ophthalmic solution Place 1 drop into both eyes every hour as needed for dry eyes      QUEtiapine (SEROQUEL) 100 MG tablet Take 100 mg by mouth at bedtime -take along with a 50 mg tablet for a total nightly dose of 150 mg.      QUEtiapine (SEROQUEL) 50 MG tablet Take 50 mg by mouth at bedtime -take along with a 100 mg tablet for a total nightly dose of 150 mg.      ipratropium - albuterol 0.5 mg/2.5 mg/3 mL (DUONEB) 0.5-2.5 (3) MG/3ML neb solution Take 1 vial (3 mLs) by nebulization every 6 hours as needed for shortness of breath, wheezing or cough 90 mL 0    moxifloxacin (VIGAMOX) 0.5 % ophthalmic solution  (Patient not taking: Reported on 1/16/2024)      naproxen (NAPROSYN) 500 MG tablet Take 1 tablet (500 mg) by mouth 2 times daily as needed for moderate pain 30 tablet 0     Allergies   Allergen Reactions    Shrimp Anaphylaxis    Shrimp (Diagnostic) Anaphylaxis    Risperdal [Risperidone] Other (See Comments)     Elevated prolactin      Social History     Tobacco Use    Smoking status: Some Days     Types: Cigarettes     Passive exposure: Current    Smokeless tobacco: Never   Substance Use Topics    Alcohol use: Not on file     No family history on file.  History   Drug Use Not on file         Review of Systems    Review of Systems  CONSTITUTIONAL: NEGATIVE for fever, chills, change in weight  INTEGUMENTARY/SKIN: NEGATIVE for worrisome rashes, moles or lesions  EYES: POSITIVE for vision changes -  blurry, Negative for irritation  ENT/MOUTH: NEGATIVE for mouth and throat problems. POSITIVE for occasional ear pain.  RESP: NEGATIVE for significant cough or SOB  BREAST: NEGATIVE for masses, tenderness or discharge  CV: NEGATIVE for chest pain, palpitations or peripheral edema  GI: NEGATIVE for nausea, abdominal pain, heartburn, or change in bowel habits  : NEGATIVE for frequency, dysuria, or hematuria  MUSCULOSKELETAL: NEGATIVE for significant arthralgias or myalgia  NEURO: NEGATIVE for weakness, dizziness or paresthesias  ENDOCRINE: NEGATIVE for temperature intolerance, skin/hair changes  HEME: NEGATIVE for bleeding problems  PSYCHIATRIC: NEGATIVE for changes in mood or affect    Objective    /72 (BP Location: Right arm, Patient Position: Sitting, Cuff Size: Adult Regular)   Pulse 98   Temp 97.4  F (36.3  C) (Tympanic)   Ht 1.524 m (5')   Wt 85 kg (187 lb 4.8 oz)   SpO2 94%   BMI 36.58 kg/m     Estimated body mass index is 36.58 kg/m  as calculated from the following:    Height as of this encounter: 1.524 m (5').    Weight as of this encounter: 85 kg (187 lb 4.8 oz).    Physical Exam  GENERAL: alert and no distress  EYES: Eyes grossly normal to inspection, PERRL and conjunctivae and sclerae normal  HENT: ear canals and TM's normal, nose and mouth without ulcers or lesions  NECK: no adenopathy, no asymmetry, masses, or scars  RESP: lungs clear to auscultation - no rales, rhonchi or wheezes  CV: regular rate and rhythm, normal S1 S2, no S3 or S4, no murmur, click or rub, no peripheral edema  ABDOMEN: soft, nontender, no hepatosplenomegaly, no masses and bowel sounds normal  MS: no gross musculoskeletal defects noted, no edema  SKIN: no suspicious lesions or rashes  NEURO: normal strength and tone, mentation intact and speech normal  PSYCH: mentation appears normal, affect normal/bright    Recent Labs   Lab Test 12/09/23  0528 12/08/23  0516   HGB 14.5 13.9    299    140   POTASSIUM 3.9  4.6   CR 0.56 0.47*      Diagnostics  None    Revised Cardiac Risk Index (RCRI)  The patient has the following serious cardiovascular risks for perioperative complications:   - No serious cardiac risks = 0 points     RCRI Interpretation: 0 points: Class I (very low risk - 0.4% complication rate)     TASHA Carrillo-S2  Signed Electronically by: MADI Espinosa CNP  Copy of this evaluation report is provided to requesting physician.     I was present with the nurse practitioner student who participated in the service and in the documentation of the note. I have verified the history and personally performed the physical exam and medical decision making. I agree with the assessment and plan of care as documented in the note.     Radha BARRAZA FNP-BC  Family Nurse Practitioner

## 2024-03-04 ENCOUNTER — ANESTHESIA EVENT (OUTPATIENT)
Dept: SURGERY | Facility: HOSPITAL | Age: 54
End: 2024-03-04
Payer: COMMERCIAL

## 2024-03-04 ENCOUNTER — OFFICE VISIT (OUTPATIENT)
Dept: FAMILY MEDICINE | Facility: OTHER | Age: 54
End: 2024-03-04
Attending: NURSE PRACTITIONER
Payer: COMMERCIAL

## 2024-03-04 VITALS
HEIGHT: 60 IN | HEART RATE: 98 BPM | OXYGEN SATURATION: 94 % | WEIGHT: 187.3 LBS | BODY MASS INDEX: 36.77 KG/M2 | DIASTOLIC BLOOD PRESSURE: 72 MMHG | TEMPERATURE: 97.4 F | SYSTOLIC BLOOD PRESSURE: 110 MMHG

## 2024-03-04 DIAGNOSIS — H26.9 CATARACT OF BOTH EYES, UNSPECIFIED CATARACT TYPE: ICD-10-CM

## 2024-03-04 DIAGNOSIS — Z01.818 PREOP GENERAL PHYSICAL EXAM: Primary | ICD-10-CM

## 2024-03-04 PROCEDURE — G0463 HOSPITAL OUTPT CLINIC VISIT: HCPCS

## 2024-03-04 PROCEDURE — 99213 OFFICE O/P EST LOW 20 MIN: CPT | Performed by: NURSE PRACTITIONER

## 2024-03-04 ASSESSMENT — PAIN SCALES - GENERAL: PAINLEVEL: NO PAIN (0)

## 2024-03-04 ASSESSMENT — LIFESTYLE VARIABLES: TOBACCO_USE: 1

## 2024-03-04 NOTE — PATIENT INSTRUCTIONS
Preparing for Your Surgery  Getting started  A nurse will call you to review your health history and instructions. They will give you an arrival time based on your scheduled surgery time. Please be ready to share:  Your doctor's clinic name and phone number  Your medical, surgical, and anesthesia history  A list of allergies and sensitivities  A list of medicines, including herbal treatments and over-the-counter drugs  Whether the patient has a legal guardian (ask how to send us the papers in advance)  Please tell us if you're pregnant--or if there's any chance you might be pregnant. Some surgeries may injure a fetus (unborn baby), so they require a pregnancy test. Surgeries that are safe for a fetus don't always need a test, and you can choose whether to have one.   If you have a child who's having surgery, please ask for a copy of Preparing for Your Child's Surgery.    Preparing for surgery  Within 10 to 30 days of surgery: Have a pre-op exam (sometimes called an H&P, or History and Physical). This can be done at a clinic or pre-operative center.  If you're having a , you may not need this exam. Talk to your care team.  At your pre-op exam, talk to your care team about all medicines you take. If you need to stop any medicines before surgery, ask when to start taking them again.  We do this for your safety. Many medicines can make you bleed too much during surgery. Some change how well surgery (anesthesia) drugs work.  Call your insurance company to let them know you're having surgery. (If you don't have insurance, call [phone].)  Call your clinic if there's any change in your health. This includes signs of a cold or flu (sore throat, runny nose, cough, rash, fever). It also includes a scrape or scratch near the surgery site.  If you have questions on the day of surgery, call your hospital or surgery center.  Eating and drinking guidelines  For your safety: Unless your surgeon tells you otherwise,  follow the guidelines below.  Eat and drink as usual until 8 hours before you arrive for surgery. After that, no food or milk.  Drink clear liquids until 2 hours before you arrive. These are liquids you can see through, like water, Gatorade, and Propel Water. They also include plain black coffee and tea (no cream or milk), candy, and breath mints. You can spit out gum when you arrive.  If you drink alcohol: Stop drinking it the night before surgery.  If your care team tells you to take medicine on the morning of surgery, it's okay to take it with a sip of water.  Preventing infection  Shower or bathe the night before and morning of your surgery. Follow the instructions your clinic gave you. (If no instructions, use regular soap.)  Don't shave or clip hair near your surgery site. We'll remove the hair if needed.  Don't smoke or vape the morning of surgery. You may chew nicotine gum up to 2 hours before surgery. A nicotine patch is okay.  Note: Some surgeries require you to completely quit smoking and nicotine. Check with your surgeon.  Your care team will make every effort to keep you safe from infection. We will:  Clean our hands often with soap and water (or an alcohol-based hand rub).  Clean the skin at your surgery site with a special soap that kills germs.  Give you a special gown to keep you warm. (Cold raises the risk of infection.)  Wear special hair covers, masks, gowns and gloves during surgery.  Give antibiotic medicine, if prescribed. Not all surgeries need antibiotics.  What to bring on the day of surgery  Photo ID and insurance card  Copy of your health care directive, if you have one  Glasses and hearing aids (bring cases)  You can't wear contacts during surgery  Inhaler and eye drops, if you use them (tell us about these when you arrive)  CPAP machine or breathing device, if you use them  A few personal items, if spending the night  If you have . . .  A pacemaker, ICD (cardiac defibrillator) or other  implant: Bring the ID card.  An implanted stimulator: Bring the remote control.  A legal guardian: Bring a copy of the certified (court-stamped) guardianship papers.  Please remove any jewelry, including body piercings. Leave jewelry and other valuables at home.  If you're going home the day of surgery  You must have a responsible adult drive you home. They should stay with you overnight as well.  If you don't have someone to stay with you, and you aren't safe to go home alone, we may keep you overnight. Insurance often won't pay for this.  After surgery  If it's hard to control your pain or you need more pain medicine, please call your surgeon's office.  Questions?   If you have any questions for your care team, list them here: _________________________________________________________________________________________________________________________________________________________________________ ____________________________________ ____________________________________ ____________________________________  For informational purposes only. Not to replace the advice of your health care provider. Copyright   2003, 2019 [facility]. All rights reserved. Clinically reviewed by Silvia Holloway MD. [employer] [id number] - REV 12/22.          How to Take Your Medication Before Surgery  - STOP taking all vitamins and herbal supplements 14 days before surgery.  - Bring albuterol inhaler with her   -ok to continue ativan and Seroquel and Abilify as prescribed   -Hold Naproxen 4 days before procedure  or any other Ibuprofen like medication

## 2024-03-04 NOTE — OR NURSING
Instructed to bring Albuterol inhaler, continue medications as usual up to night before, Take tylenol 650 mg 1 hr before arrival.

## 2024-03-05 NOTE — ANESTHESIA PREPROCEDURE EVALUATION
Anesthesia Pre-Procedure Evaluation    Patient: Glenda Robins   MRN: [id number] : 1970        Procedure : Procedure(s):  Phacoemulsification Cataract Extraction Posterior Chamber Lens Right Eye          No past medical history on file.   No past surgical history on file.   Allergies   Allergen Reactions     Shrimp Anaphylaxis     Shrimp (Diagnostic) Anaphylaxis     Risperdal [Risperidone] Other (See Comments)     Elevated prolactin      Social History     Tobacco Use     Smoking status: Some Days     Types: Cigarettes     Passive exposure: Current     Smokeless tobacco: Never   Substance Use Topics     Alcohol use: Not on file      Wt Readings from Last 1 Encounters:   24 85 kg (187 lb 4.8 oz)        Anesthesia Evaluation   Pt has had prior anesthetic.     No history of anesthetic complications       ROS/MED HX  ENT/Pulmonary: Comment: Hx of Acute respiratory failure with hypoxia (23)    Chest CT 23   No acute pulmonary emboli to the subsegmental level.     Patchy airspace consolidation in the dependent, mid right more so than left lungs. Differential considerations include multifocal pneumonia or aspiration pneumonitis. Recommend follow-up to resolution.     Polycystic disease of the liver.      (+)     LO risk factors,   obese,        tobacco use, Current use,    Intermittent, asthma (moderate/severe - 3/4/24 well controlled)  Treatment: Inhaler prn,       recent URI, resolved, 23 was hospitalized and discharged home oxygen, 24 f/up stated no home oxygen needed anymore:        Neurologic:  - neg neurologic ROS  (-) no CVA   Cardiovascular:  - neg cardiovascular ROS   (+)  - -   -  - -                                 Previous cardiac testing   Echo: Date: Results:    Stress Test:  Date: Results:    ECG Reviewed:  Date: 23 Results:  , Sinus tachycardia   Low voltage QRS   Possible Inferior infarct , age undetermined   Abnormal ECG     Cath:  Date: Results:   (-) taking  "anticoagulants/antiplatelets, CHF and HOUGH   METS/Exercise Tolerance:     Hematologic:  - neg hematologic  ROS  (-) history of blood clots and anemia   Musculoskeletal:  - neg musculoskeletal ROS     GI/Hepatic:  - neg GI/hepatic ROS     Renal/Genitourinary:  - neg Renal ROS     Endo:     (+)               Obesity,       Psychiatric/Substance Use:     (+) psychiatric history other (comment), schizophrenia and depression (Disorganized behavior, psychosis)   Recreational drug usage: Cannabis.    Infectious Disease:  - neg infectious disease ROS     Malignancy:  - neg malignancy ROS     Other:  - neg other ROS          Physical Exam    Airway             Respiratory Devices and Support         Dental       (+) Minor Abnormalities - some fillings, tiny chips      Cardiovascular   cardiovascular exam normal          Pulmonary   pulmonary exam normal              OUTSIDE LABS:  CBC:   Lab Results   Component Value Date    WBC 10.8 12/09/2023    WBC 12.2 (H) 12/08/2023    HGB 14.5 12/09/2023    HGB 13.9 12/08/2023    HCT 42.9 12/09/2023    HCT 42.3 12/08/2023     12/09/2023     12/08/2023     BMP:   Lab Results   Component Value Date     12/09/2023     12/08/2023    POTASSIUM 3.9 12/09/2023    POTASSIUM 4.6 12/08/2023    CHLORIDE 101 12/09/2023    CHLORIDE 106 12/08/2023    CO2 24 12/09/2023    CO2 26 12/08/2023    BUN 15.6 12/09/2023    BUN 14.1 12/08/2023    CR 0.56 12/09/2023    CR 0.47 (L) 12/08/2023     (H) 12/09/2023     (H) 12/08/2023     COAGS: No results found for: \"PTT\", \"INR\", \"FIBR\"  POC:   Lab Results   Component Value Date    HCGS Negative 03/21/2018     HEPATIC:   Lab Results   Component Value Date    ALBUMIN 4.4 12/04/2023    PROTTOTAL 7.7 12/04/2023    ALT 23 12/04/2023    AST 25 12/04/2023    ALKPHOS 90 12/04/2023    BILITOTAL 0.5 12/04/2023     OTHER:   Lab Results   Component Value Date    LACT 1.0 03/21/2018    BEST 9.5 12/09/2023    MAG 2.1 12/06/2023    TSH 1.21 " 03/13/2018       Anesthesia Plan    ASA Status:  2    NPO Status:  NPO Appropriate    Anesthesia Type: MAC.     - Reason for MAC: straight local not clinically adequate              Consents    Anesthesia Plan(s) and associated risks, benefits, and realistic alternatives discussed. Questions answered and patient/representative(s) expressed understanding.     - Discussed: Risks, Benefits and Alternatives for BOTH SEDATION and the PROCEDURE were discussed     - Discussed with:  Patient      - Specific Concerns: Aspiration, stroke, allergic reaction,seizure, heart attack, death.     - Extended Intubation/Ventilatory Support Discussed: No.      - Patient is DNR/DNI Status: No     Use of blood products discussed: No .     Postoperative Care            Comments:    Other Comments: Reviewed 3/4/24 preop from [facility].   Pre/post orders placed.            MADI Rosas CNP    I have reviewed the pertinent notes and labs in the chart from the past 30 days and (re)examined the patient.  Any updates or changes from those notes are reflected in this note.              # Obesity: Estimated body mass index is 36.58 kg/m  as calculated from the following:    Height as of 3/4/24: 1.524 m (5').    Weight as of 3/4/24: 85 kg (187 lb 4.8 oz).

## 2024-03-12 ENCOUNTER — HOSPITAL ENCOUNTER (OUTPATIENT)
Facility: HOSPITAL | Age: 54
Discharge: HOME | End: 2024-03-12
Attending: OPHTHALMOLOGY | Admitting: OPHTHALMOLOGY
Payer: COMMERCIAL

## 2024-03-12 ENCOUNTER — ANESTHESIA (OUTPATIENT)
Dept: SURGERY | Facility: HOSPITAL | Age: 54
End: 2024-03-12
Payer: COMMERCIAL

## 2024-03-12 VITALS
HEART RATE: 83 BPM | DIASTOLIC BLOOD PRESSURE: 100 MMHG | BODY MASS INDEX: 35.14 KG/M2 | OXYGEN SATURATION: 93 % | SYSTOLIC BLOOD PRESSURE: 130 MMHG | RESPIRATION RATE: 14 BRPM | HEIGHT: 60 IN | TEMPERATURE: 97.8 F | WEIGHT: 179 LBS

## 2024-03-12 PROCEDURE — V2632 POST CHMBR INTRAOCULAR LENS: HCPCS | Performed by: OPHTHALMOLOGY

## 2024-03-12 PROCEDURE — 250N000011 HC RX IP 250 OP 636: Performed by: NURSE ANESTHETIST, CERTIFIED REGISTERED

## 2024-03-12 PROCEDURE — 370N000017 HC ANESTHESIA TECHNICAL FEE, PER MIN: Performed by: OPHTHALMOLOGY

## 2024-03-12 PROCEDURE — 272N000001 HC OR GENERAL SUPPLY STERILE: Performed by: OPHTHALMOLOGY

## 2024-03-12 PROCEDURE — 710N000012 HC RECOVERY PHASE 2, PER MINUTE: Performed by: OPHTHALMOLOGY

## 2024-03-12 PROCEDURE — 999N000141 HC STATISTIC PRE-PROCEDURE NURSING ASSESSMENT: Performed by: OPHTHALMOLOGY

## 2024-03-12 PROCEDURE — 250N000011 HC RX IP 250 OP 636: Performed by: OPHTHALMOLOGY

## 2024-03-12 PROCEDURE — 66984 XCAPSL CTRC RMVL W/O ECP: CPT | Performed by: NURSE ANESTHETIST, CERTIFIED REGISTERED

## 2024-03-12 PROCEDURE — 360N000076 HC SURGERY LEVEL 3, PER MIN: Performed by: OPHTHALMOLOGY

## 2024-03-12 PROCEDURE — 250N000009 HC RX 250: Performed by: OPHTHALMOLOGY

## 2024-03-12 DEVICE — IMPLANTABLE DEVICE: Type: IMPLANTABLE DEVICE | Site: EYE | Status: FUNCTIONAL

## 2024-03-12 RX ORDER — SODIUM CHLORIDE, SODIUM LACTATE, POTASSIUM CHLORIDE, CALCIUM CHLORIDE 600; 310; 30; 20 MG/100ML; MG/100ML; MG/100ML; MG/100ML
INJECTION, SOLUTION INTRAVENOUS CONTINUOUS
Status: DISCONTINUED | OUTPATIENT
Start: 2024-03-12 | End: 2024-03-12 | Stop reason: HOSPADM

## 2024-03-12 RX ORDER — TETRACAINE HYDROCHLORIDE 5 MG/ML
SOLUTION OPHTHALMIC PRN
Status: DISCONTINUED | OUTPATIENT
Start: 2024-03-12 | End: 2024-03-12 | Stop reason: HOSPADM

## 2024-03-12 RX ORDER — CYCLOPENTOLATE HYDROCHLORIDE 20 MG/ML
1 SOLUTION/ DROPS OPHTHALMIC ONCE
Status: COMPLETED | OUTPATIENT
Start: 2024-03-12 | End: 2024-03-12

## 2024-03-12 RX ORDER — MOXIFLOXACIN 5 MG/ML
1 SOLUTION/ DROPS OPHTHALMIC
Status: COMPLETED | OUTPATIENT
Start: 2024-03-12 | End: 2024-03-12

## 2024-03-12 RX ORDER — PILOCARPINE HYDROCHLORIDE 10 MG/ML
SOLUTION/ DROPS OPHTHALMIC PRN
Status: DISCONTINUED | OUTPATIENT
Start: 2024-03-12 | End: 2024-03-12 | Stop reason: HOSPADM

## 2024-03-12 RX ORDER — ONDANSETRON 4 MG/1
4 TABLET, ORALLY DISINTEGRATING ORAL EVERY 30 MIN PRN
Status: DISCONTINUED | OUTPATIENT
Start: 2024-03-12 | End: 2024-03-12 | Stop reason: HOSPADM

## 2024-03-12 RX ORDER — PROPARACAINE HYDROCHLORIDE 5 MG/ML
1 SOLUTION/ DROPS OPHTHALMIC ONCE
Status: COMPLETED | OUTPATIENT
Start: 2024-03-12 | End: 2024-03-12

## 2024-03-12 RX ORDER — LIDOCAINE HYDROCHLORIDE 20 MG/ML
JELLY TOPICAL ONCE
Status: COMPLETED | OUTPATIENT
Start: 2024-03-12 | End: 2024-03-12

## 2024-03-12 RX ORDER — PHENYLEPHRINE HYDROCHLORIDE 100 MG/ML
1 SOLUTION/ DROPS OPHTHALMIC
Status: DISCONTINUED | OUTPATIENT
Start: 2024-03-12 | End: 2024-03-12 | Stop reason: HOSPADM

## 2024-03-12 RX ORDER — ONDANSETRON 2 MG/ML
4 INJECTION INTRAMUSCULAR; INTRAVENOUS EVERY 30 MIN PRN
Status: DISCONTINUED | OUTPATIENT
Start: 2024-03-12 | End: 2024-03-12 | Stop reason: HOSPADM

## 2024-03-12 RX ORDER — FENTANYL CITRATE 50 UG/ML
25 INJECTION, SOLUTION INTRAMUSCULAR; INTRAVENOUS EVERY 5 MIN PRN
Status: DISCONTINUED | OUTPATIENT
Start: 2024-03-12 | End: 2024-03-12 | Stop reason: HOSPADM

## 2024-03-12 RX ORDER — HYDROMORPHONE HYDROCHLORIDE 1 MG/ML
0.2 INJECTION, SOLUTION INTRAMUSCULAR; INTRAVENOUS; SUBCUTANEOUS EVERY 5 MIN PRN
Status: DISCONTINUED | OUTPATIENT
Start: 2024-03-12 | End: 2024-03-12 | Stop reason: HOSPADM

## 2024-03-12 RX ORDER — ACETAMINOPHEN 325 MG/1
650 TABLET ORAL
Status: DISCONTINUED | OUTPATIENT
Start: 2024-03-12 | End: 2024-03-12 | Stop reason: HOSPADM

## 2024-03-12 RX ORDER — LIDOCAINE HYDROCHLORIDE 10 MG/ML
INJECTION, SOLUTION EPIDURAL; INFILTRATION; INTRACAUDAL; PERINEURAL PRN
Status: DISCONTINUED | OUTPATIENT
Start: 2024-03-12 | End: 2024-03-12 | Stop reason: HOSPADM

## 2024-03-12 RX ORDER — LIDOCAINE 40 MG/G
CREAM TOPICAL
Status: DISCONTINUED | OUTPATIENT
Start: 2024-03-12 | End: 2024-03-12 | Stop reason: HOSPADM

## 2024-03-12 RX ORDER — PROPARACAINE HYDROCHLORIDE 5 MG/ML
1 SOLUTION/ DROPS OPHTHALMIC
Status: COMPLETED | OUTPATIENT
Start: 2024-03-12 | End: 2024-03-12

## 2024-03-12 RX ORDER — MOXIFLOXACIN 5 MG/ML
SOLUTION/ DROPS OPHTHALMIC PRN
Status: DISCONTINUED | OUTPATIENT
Start: 2024-03-12 | End: 2024-03-12 | Stop reason: HOSPADM

## 2024-03-12 RX ORDER — LEVOBUNOLOL HYDROCHLORIDE 5 MG/ML
SOLUTION/ DROPS OPHTHALMIC PRN
Status: DISCONTINUED | OUTPATIENT
Start: 2024-03-12 | End: 2024-03-12 | Stop reason: HOSPADM

## 2024-03-12 RX ORDER — FENTANYL CITRATE 50 UG/ML
50 INJECTION, SOLUTION INTRAMUSCULAR; INTRAVENOUS EVERY 5 MIN PRN
Status: DISCONTINUED | OUTPATIENT
Start: 2024-03-12 | End: 2024-03-12 | Stop reason: HOSPADM

## 2024-03-12 RX ORDER — NALOXONE HYDROCHLORIDE 0.4 MG/ML
0.1 INJECTION, SOLUTION INTRAMUSCULAR; INTRAVENOUS; SUBCUTANEOUS
Status: DISCONTINUED | OUTPATIENT
Start: 2024-03-12 | End: 2024-03-12 | Stop reason: HOSPADM

## 2024-03-12 RX ORDER — HYDROMORPHONE HYDROCHLORIDE 1 MG/ML
0.4 INJECTION, SOLUTION INTRAMUSCULAR; INTRAVENOUS; SUBCUTANEOUS EVERY 5 MIN PRN
Status: DISCONTINUED | OUTPATIENT
Start: 2024-03-12 | End: 2024-03-12 | Stop reason: HOSPADM

## 2024-03-12 RX ORDER — CYCLOPENTOLAT/TROPIC/PHENYLEPH 1%-1%-2.5%
1 DROPS (EA) OPHTHALMIC (EYE)
Status: COMPLETED | OUTPATIENT
Start: 2024-03-12 | End: 2024-03-12

## 2024-03-12 RX ADMIN — MOXIFLOXACIN OPHTHALMIC SOLUTION 1 DROP: 5 SOLUTION/ DROPS OPHTHALMIC at 12:14

## 2024-03-12 RX ADMIN — MOXIFLOXACIN OPHTHALMIC SOLUTION 1 DROP: 5 SOLUTION/ DROPS OPHTHALMIC at 12:21

## 2024-03-12 RX ADMIN — MOXIFLOXACIN OPHTHALMIC SOLUTION 1 DROP: 5 SOLUTION/ DROPS OPHTHALMIC at 12:27

## 2024-03-12 RX ADMIN — MIDAZOLAM 2 MG: 1 INJECTION INTRAMUSCULAR; INTRAVENOUS at 13:19

## 2024-03-12 RX ADMIN — CYCLOPENTOLATE HYDROCHLORIDE 1 DROP: 20 SOLUTION/ DROPS OPHTHALMIC at 12:10

## 2024-03-12 RX ADMIN — PROPARACAINE HYDROCHLORIDE 1 DROP: 5 SOLUTION/ DROPS OPHTHALMIC at 12:10

## 2024-03-12 RX ADMIN — MIDAZOLAM 1 MG: 1 INJECTION INTRAMUSCULAR; INTRAVENOUS at 13:20

## 2024-03-12 RX ADMIN — LIDOCAINE HYDROCHLORIDE: 20 JELLY TOPICAL at 12:33

## 2024-03-12 RX ADMIN — PROPARACAINE HYDROCHLORIDE 1 DROP: 5 SOLUTION/ DROPS OPHTHALMIC at 12:29

## 2024-03-12 RX ADMIN — Medication 1 DROP: at 12:14

## 2024-03-12 RX ADMIN — Medication 1 DROP: at 12:33

## 2024-03-12 ASSESSMENT — ACTIVITIES OF DAILY LIVING (ADL)
ADLS_ACUITY_SCORE: 37

## 2024-03-12 NOTE — ANESTHESIA POSTPROCEDURE EVALUATION
Patient: Glenda Robins    Procedure: Procedure(s):  Phacoemulsification Cataract Extraction Posterior Chamber Lens Right Eye       Anesthesia Type:  No value filed.    Note:  Disposition: Outpatient   Postop Pain Control: Uneventful            Sign Out: Well controlled pain   PONV: No   Neuro/Psych: Uneventful            Sign Out: Acceptable/Baseline neuro status   Airway/Respiratory: Uneventful            Sign Out: Acceptable/Baseline resp. status   CV/Hemodynamics: Uneventful            Sign Out: Acceptable CV status; No obvious hypovolemia; No obvious fluid overload   Other NRE: NONE   DID A NON-ROUTINE EVENT OCCUR? No       Last vitals:  Vitals Value Taken Time   BP 81/48 03/12/24 1349   Temp 97.8  F (36.6  C) 03/12/24 1349   Pulse 84 03/12/24 1349   Resp 16 03/12/24 1349   SpO2 92 % 03/12/24 1357   Vitals shown include unfiled device data.    Electronically Signed By: MADI Hurley CRNA  March 12, 2024  1:58 PM

## 2024-03-12 NOTE — DISCHARGE INSTRUCTIONS
After Anesthesia (Sleep Medicine)  What should I do after anesthesia?  You should rest and relax for the next 24 hours. Avoid risky or difficult (strenuous) activity. A responsible adult should stay with you overnight.  Don't drive or use any heavy equipment for 24 hours. Even if you feel normal, your reactions may be affected by the sleep medicine given to you.  Don't drink alcohol or make any important decisions for 24 hours.  Slowly get back to your regular diet, as you feel able.  How should I expect to feel?  It's normal to feel dizzy, light-headed, or faint for up to a full day after anesthesia or while taking pain medicine. If this happens:   Sit down for a few minutes before standing.  Have someone help you when you get up to walk or use the bathroom.  If you have nausea (feel sick to your stomach) or vomit (throw up):   Drink clear liquids (such as apple juice, ginger ale, broth, or 7UP) until you feel better.  If you feel sick to your stomach, or you keep vomiting for 24 hours, please call the doctor.  What else should I know?  You might have a dry mouth, sore throat, muscle aches, or trouble sleeping. These should go away after 24 hours.  Please contact your doctor if you have any other symptoms that concern you, such as fever, pain, bleeding, fluid drainage, swelling, or headache, or if it's been over 8 to 10 hours and you still aren't able to pee (urinate).  If you have a history of sleep apnea, it's very important to use your CPAP machine for the next 24 hours when you nap or sleep.   For informational purposes only. Not to replace the advice of your health care provider. Copyright   2023 [facility]. All rights reserved. Clinically reviewed by Peewee Hussein MD. Mozes [id number] - REV 09/23.

## 2024-03-12 NOTE — ANESTHESIA CARE TRANSFER NOTE
Patient: Glenda Robins    Procedure: Procedure(s):  Phacoemulsification Cataract Extraction Posterior Chamber Lens Right Eye       Diagnosis: Combined form of age-related cataract, right eye [H25.811]  Diagnosis Additional Information: No value filed.    Anesthesia Type:   No value filed.     Note:    Oropharynx: spontaneously breathing  Level of Consciousness: awake  Oxygen Supplementation: room air      Dentition: dentition unchanged  Vital Signs Stable: post-procedure vital signs reviewed and stable  Report to RN Given: handoff report given  Patient transferred to: Phase II    Handoff Report: Identified the Reponsible Provider, Reviewed the pertinent medical history, Discussed the surgical course, Reviewed Intra-OP anesthesia mangement and issues during anesthesia, Set expectations for post-procedure period, Allowed opportunity for questions and acknowledgement of understanding and Identifed the Patient      Vitals:  Vitals Value Taken Time   BP     Temp     Pulse     Resp     SpO2         Electronically Signed By: MADI Lagunas CRNA  March 12, 2024  1:52 PM

## 2024-03-12 NOTE — INTERVAL H&P NOTE
I have reviewed the surgical (or preoperative) H&P that is linked to this encounter, and examined the patient. There are no significant changes.  Luis Camarena MD      Clinical Conditions Present on Arrival:  Clinically Significant Risk Factors Present on Admission

## 2024-03-12 NOTE — OP NOTE
[facility]  Ophthalmology Full Operative Note    Pre-operative diagnosis: Combined form of age-related cataract, right eye [H25.811]   Post-operative diagnosis Same   Procedure: Procedure(s):  Phacoemulsification Cataract Extraction Posterior Chamber Lens Right Eye   Surgeon: Luis Camarena MD   Assistants(s):    Anesthesia: MAC with Local    Estimated blood loss: None    Total IV fluids: (See anesthesia record)   Specimens: None   Implants: 22 [id number]   Findings:    Complications: None   Condition: Stable   Comments:       PROCEDURAL ANESTHESIA:     Topical/MAC.  Lidocaine 2% jelly topically and Lidocaine 1% preservative-free intracamerally.     PROCEDURE:  The patient was brought to the Operating Room and prepped and draped in a sterile manner.  A wire lid speculum was placed.  A paracentesis was created and 1% Lidocaine was instilled in the anterior chamber.  The anterior chamber was then filled with Amvisc viscoelastic.  A clear cornea temporal wound was created using a 2.8 mm keratome.  Positive pressure on the capsule noted throughout the case.A cystotome was used to initiate a flap in the anterior capsule and a Utrata forceps was used to create a continuous tear capsulorhexis.  Hydrodissection was performed.  The phacoemulsification tip was inserted into the eye and the nucleus and epinucleus were removed using a divide and conquer technique.  The residual cortex was removed using the I/A handpiece.  Noted loose zonules nasally and positive pressure, so a small amount of cortex was left nasally. No vitreous presentation. The anterior chamber was then refilled with viscoelastic and the wound was enlarged with the keratome.  The intraocular lens, 22 diopter, Model [id number], was placed into the injector and injected into the capsular bag. The haptics were placed horizontally to support the capsule. It was checked to make sure that it was central and stable.  Residual viscoelastic was removed using the  I/A.  The anterior chamber was refilled with BSS.  The wounds were hydrated with BSS and were noted to be watertight with no suture necessary. Good incision seal was noted even with palpation with a sponge spear.  Topical pilocarpine 1%, Betagan, and Vigamox was applied.  A hard shield was placed.     The patient tolerated the procedure well and was sent to the Recovery Room in satisfactory condition.

## 2024-03-13 ENCOUNTER — TRANSFERRED RECORDS (OUTPATIENT)
Dept: HEALTH INFORMATION MANAGEMENT | Facility: CLINIC | Age: 54
End: 2024-03-13

## 2024-03-15 ENCOUNTER — ANESTHESIA EVENT (OUTPATIENT)
Dept: SURGERY | Facility: HOSPITAL | Age: 54
End: 2024-03-15
Payer: COMMERCIAL

## 2024-03-18 ASSESSMENT — LIFESTYLE VARIABLES: TOBACCO_USE: 1

## 2024-03-18 NOTE — OR NURSING
Instructed to bring Albuterol inhaler, continue medications as prescribed up to night before surgery & take tylenol 650 mg 1 hr before arrival.  
No

## 2024-03-19 NOTE — ANESTHESIA PREPROCEDURE EVALUATION
Anesthesia Pre-Procedure Evaluation    Patient: Glenda Robins   MRN: [id number] : 1970        Procedure : Procedure(s):  Phacoemulsification Cataract Extraction Posterior Chamber Lens Left Eye          No past medical history on file.   Past Surgical History:   Procedure Laterality Date     PHACOEMULSIFICATION WITH STANDARD INTRAOCULAR LENS IMPLANT Right 3/12/2024    Procedure: Phacoemulsification Cataract Extraction Posterior Chamber Lens Right Eye;  Surgeon: Luis Camarena MD;  Location: [facility] OR      Allergies   Allergen Reactions     Shrimp Anaphylaxis     Shrimp (Diagnostic) Anaphylaxis     Risperdal [Risperidone] Other (See Comments)     Elevated prolactin      Social History     Tobacco Use     Smoking status: Some Days     Types: Cigarettes     Passive exposure: Current     Smokeless tobacco: Never   Substance Use Topics     Alcohol use: Not on file      Wt Readings from Last 1 Encounters:   24 81.2 kg (179 lb)        Anesthesia Evaluation   Pt has had prior anesthetic. Type: MAC.    No history of anesthetic complications       ROS/MED HX  ENT/Pulmonary: Comment: Hx of Acute respiratory failure with hypoxia (23)    Chest CT 23   No acute pulmonary emboli to the subsegmental level.     Patchy airspace consolidation in the dependent, mid right more so than left lungs. Differential considerations include multifocal pneumonia or aspiration pneumonitis. Recommend follow-up to resolution.     Polycystic disease of the liver.      (+)     LO risk factors,   obese,        tobacco use, Current use,    Intermittent, asthma (moderate/severe - 3/4/24 well controlled (uses albuterol 1x/month))  Treatment: Inhaler prn and Nebulizer prn,       recent URI, resolved, 23 was hospitalized and discharged home oxygen, 24 f/up stated no home oxygen needed anymore:        Neurologic:  - neg neurologic ROS  (-) no CVA   Cardiovascular:  - neg cardiovascular ROS   (+)  - -   -  - -                  "                Previous cardiac testing   Echo: Date: Results:    Stress Test:  Date: Results:    ECG Reviewed:  Date: 12/4/23 Results:  , Sinus tachycardia   Low voltage QRS   Possible Inferior infarct , age undetermined   Abnormal ECG     Cath:  Date: Results:   (-) taking anticoagulants/antiplatelets, CHF and HOUGH   METS/Exercise Tolerance: >4 METS    Hematologic:  - neg hematologic  ROS  (-) history of blood clots and anemia   Musculoskeletal:  - neg musculoskeletal ROS     GI/Hepatic:  - neg GI/hepatic ROS     Renal/Genitourinary:  - neg Renal ROS     Endo:     (+)               Obesity (BMI 36.58 (3/4/24)),       Psychiatric/Substance Use:     (+) psychiatric history other (comment), schizophrenia and depression (Disorganized behavior, psychosis)   Recreational drug usage: Cannabis.    Infectious Disease:  - neg infectious disease ROS     Malignancy:  - neg malignancy ROS     Other:  - neg other ROS          Physical Exam    Airway        Mallampati: III   TM distance: > 3 FB   Neck ROM: full   Mouth opening: > 3 cm    Respiratory Devices and Support         Dental       (+) Modest Abnormalities - crowns, retainers, 1 or 2 missing teeth      Cardiovascular   cardiovascular exam normal          Pulmonary   pulmonary exam normal            OUTSIDE LABS:  CBC:   Lab Results   Component Value Date    WBC 10.8 12/09/2023    WBC 12.2 (H) 12/08/2023    HGB 14.5 12/09/2023    HGB 13.9 12/08/2023    HCT 42.9 12/09/2023    HCT 42.3 12/08/2023     12/09/2023     12/08/2023     BMP:   Lab Results   Component Value Date     12/09/2023     12/08/2023    POTASSIUM 3.9 12/09/2023    POTASSIUM 4.6 12/08/2023    CHLORIDE 101 12/09/2023    CHLORIDE 106 12/08/2023    CO2 24 12/09/2023    CO2 26 12/08/2023    BUN 15.6 12/09/2023    BUN 14.1 12/08/2023    CR 0.56 12/09/2023    CR 0.47 (L) 12/08/2023     (H) 12/09/2023     (H) 12/08/2023     COAGS: No results found for: \"PTT\", \"INR\", " "\"FIBR\"  POC:   Lab Results   Component Value Date    HCGS Negative 03/21/2018     HEPATIC:   Lab Results   Component Value Date    ALBUMIN 4.4 12/04/2023    PROTTOTAL 7.7 12/04/2023    ALT 23 12/04/2023    AST 25 12/04/2023    ALKPHOS 90 12/04/2023    BILITOTAL 0.5 12/04/2023     OTHER:   Lab Results   Component Value Date    LACT 1.0 03/21/2018    BEST 9.5 12/09/2023    MAG 2.1 12/06/2023    TSH 1.21 03/13/2018       Anesthesia Plan    ASA Status:  2    NPO Status:  NPO Appropriate    Anesthesia Type: MAC.     - Reason for MAC: straight local not clinically adequate   Induction: N/a.   Maintenance: N/A.        Consents    Anesthesia Plan(s) and associated risks, benefits, and realistic alternatives discussed. Questions answered and patient/representative(s) expressed understanding.     - Discussed: Risks, Benefits and Alternatives for BOTH SEDATION and the PROCEDURE were discussed     - Discussed with:  Patient      - Extended Intubation/Ventilatory Support Discussed: No.      - Patient is DNR/DNI Status: No     Use of blood products discussed: No .     Postoperative Care            Comments:    Other Comments: Prior surgery was 3/12/24 for other eye.   Dr. Camarena requested more sedation for next cataract (per note from Adeel Longoria on 3/12/24). (Got 3mg midazolam for other eye)    Reviewed 3/4/24 preop with [place].            Liss Frazier, APRN CNP    I have reviewed the pertinent notes and labs in the chart from the past 30 days and (re)examined the patient.  Any updates or changes from those notes are reflected in this note.              # Obesity: Estimated body mass index is 34.96 kg/m  as calculated from the following:    Height as of 3/12/24: 1.524 m (5').    Weight as of 3/12/24: 81.2 kg (179 lb).      "

## 2024-03-26 ENCOUNTER — HOSPITAL ENCOUNTER (OUTPATIENT)
Facility: HOSPITAL | Age: 54
Discharge: HOME | End: 2024-03-26
Attending: OPHTHALMOLOGY | Admitting: OPHTHALMOLOGY
Payer: COMMERCIAL

## 2024-03-26 ENCOUNTER — ANESTHESIA (OUTPATIENT)
Dept: SURGERY | Facility: HOSPITAL | Age: 54
End: 2024-03-26
Payer: COMMERCIAL

## 2024-03-26 VITALS
BODY MASS INDEX: 33.38 KG/M2 | HEIGHT: 60 IN | WEIGHT: 170 LBS | RESPIRATION RATE: 18 BRPM | SYSTOLIC BLOOD PRESSURE: 107 MMHG | OXYGEN SATURATION: 94 % | TEMPERATURE: 97.3 F | HEART RATE: 81 BPM | DIASTOLIC BLOOD PRESSURE: 83 MMHG

## 2024-03-26 PROCEDURE — 272N000001 HC OR GENERAL SUPPLY STERILE: Performed by: OPHTHALMOLOGY

## 2024-03-26 PROCEDURE — 66984 XCAPSL CTRC RMVL W/O ECP: CPT | Performed by: NURSE ANESTHETIST, CERTIFIED REGISTERED

## 2024-03-26 PROCEDURE — 250N000011 HC RX IP 250 OP 636: Performed by: OPHTHALMOLOGY

## 2024-03-26 PROCEDURE — 360N000076 HC SURGERY LEVEL 3, PER MIN: Performed by: OPHTHALMOLOGY

## 2024-03-26 PROCEDURE — 370N000017 HC ANESTHESIA TECHNICAL FEE, PER MIN: Performed by: OPHTHALMOLOGY

## 2024-03-26 PROCEDURE — V2632 POST CHMBR INTRAOCULAR LENS: HCPCS | Performed by: OPHTHALMOLOGY

## 2024-03-26 PROCEDURE — 250N000011 HC RX IP 250 OP 636: Performed by: NURSE ANESTHETIST, CERTIFIED REGISTERED

## 2024-03-26 PROCEDURE — 250N000009 HC RX 250: Performed by: OPHTHALMOLOGY

## 2024-03-26 PROCEDURE — 710N000012 HC RECOVERY PHASE 2, PER MINUTE: Performed by: OPHTHALMOLOGY

## 2024-03-26 PROCEDURE — 999N000141 HC STATISTIC PRE-PROCEDURE NURSING ASSESSMENT: Performed by: OPHTHALMOLOGY

## 2024-03-26 DEVICE — LENS-SOFPORT 21.0: Type: IMPLANTABLE DEVICE | Site: EYE | Status: FUNCTIONAL

## 2024-03-26 RX ORDER — FENTANYL CITRATE 50 UG/ML
INJECTION, SOLUTION INTRAMUSCULAR; INTRAVENOUS PRN
Status: DISCONTINUED | OUTPATIENT
Start: 2024-03-26 | End: 2024-03-26

## 2024-03-26 RX ORDER — LIDOCAINE 40 MG/G
CREAM TOPICAL
Status: DISCONTINUED | OUTPATIENT
Start: 2024-03-26 | End: 2024-03-26 | Stop reason: HOSPADM

## 2024-03-26 RX ORDER — MOXIFLOXACIN 5 MG/ML
SOLUTION/ DROPS OPHTHALMIC PRN
Status: DISCONTINUED | OUTPATIENT
Start: 2024-03-26 | End: 2024-03-26 | Stop reason: HOSPADM

## 2024-03-26 RX ORDER — LEVOBUNOLOL HYDROCHLORIDE 5 MG/ML
SOLUTION/ DROPS OPHTHALMIC PRN
Status: DISCONTINUED | OUTPATIENT
Start: 2024-03-26 | End: 2024-03-26 | Stop reason: HOSPADM

## 2024-03-26 RX ORDER — ONDANSETRON 4 MG/1
4 TABLET, ORALLY DISINTEGRATING ORAL EVERY 30 MIN PRN
Status: DISCONTINUED | OUTPATIENT
Start: 2024-03-26 | End: 2024-03-26 | Stop reason: HOSPADM

## 2024-03-26 RX ORDER — NALOXONE HYDROCHLORIDE 0.4 MG/ML
0.1 INJECTION, SOLUTION INTRAMUSCULAR; INTRAVENOUS; SUBCUTANEOUS
Status: DISCONTINUED | OUTPATIENT
Start: 2024-03-26 | End: 2024-03-26 | Stop reason: HOSPADM

## 2024-03-26 RX ORDER — CYCLOPENTOLATE HYDROCHLORIDE 20 MG/ML
1 SOLUTION/ DROPS OPHTHALMIC ONCE
Status: COMPLETED | OUTPATIENT
Start: 2024-03-26 | End: 2024-03-26

## 2024-03-26 RX ORDER — PROPARACAINE HYDROCHLORIDE 5 MG/ML
1 SOLUTION/ DROPS OPHTHALMIC ONCE
Status: COMPLETED | OUTPATIENT
Start: 2024-03-26 | End: 2024-03-26

## 2024-03-26 RX ORDER — LIDOCAINE HYDROCHLORIDE 10 MG/ML
INJECTION, SOLUTION EPIDURAL; INFILTRATION; INTRACAUDAL; PERINEURAL PRN
Status: DISCONTINUED | OUTPATIENT
Start: 2024-03-26 | End: 2024-03-26 | Stop reason: HOSPADM

## 2024-03-26 RX ORDER — TETRACAINE HYDROCHLORIDE 5 MG/ML
SOLUTION OPHTHALMIC PRN
Status: DISCONTINUED | OUTPATIENT
Start: 2024-03-26 | End: 2024-03-26 | Stop reason: HOSPADM

## 2024-03-26 RX ORDER — PILOCARPINE HYDROCHLORIDE 10 MG/ML
SOLUTION/ DROPS OPHTHALMIC PRN
Status: DISCONTINUED | OUTPATIENT
Start: 2024-03-26 | End: 2024-03-26 | Stop reason: HOSPADM

## 2024-03-26 RX ORDER — CYCLOPENTOLAT/TROPIC/PHENYLEPH 1%-1%-2.5%
1 DROPS (EA) OPHTHALMIC (EYE)
Status: COMPLETED | OUTPATIENT
Start: 2024-03-26 | End: 2024-03-26

## 2024-03-26 RX ORDER — LIDOCAINE HYDROCHLORIDE 20 MG/ML
JELLY TOPICAL ONCE
Status: COMPLETED | OUTPATIENT
Start: 2024-03-26 | End: 2024-03-26

## 2024-03-26 RX ORDER — MOXIFLOXACIN 5 MG/ML
1 SOLUTION/ DROPS OPHTHALMIC
Status: COMPLETED | OUTPATIENT
Start: 2024-03-26 | End: 2024-03-26

## 2024-03-26 RX ORDER — ONDANSETRON 2 MG/ML
4 INJECTION INTRAMUSCULAR; INTRAVENOUS EVERY 30 MIN PRN
Status: DISCONTINUED | OUTPATIENT
Start: 2024-03-26 | End: 2024-03-26 | Stop reason: HOSPADM

## 2024-03-26 RX ORDER — PHENYLEPHRINE HYDROCHLORIDE 100 MG/ML
1 SOLUTION/ DROPS OPHTHALMIC
Status: DISCONTINUED | OUTPATIENT
Start: 2024-03-26 | End: 2024-03-26 | Stop reason: HOSPADM

## 2024-03-26 RX ORDER — SODIUM CHLORIDE, SODIUM LACTATE, POTASSIUM CHLORIDE, CALCIUM CHLORIDE 600; 310; 30; 20 MG/100ML; MG/100ML; MG/100ML; MG/100ML
INJECTION, SOLUTION INTRAVENOUS CONTINUOUS
Status: DISCONTINUED | OUTPATIENT
Start: 2024-03-26 | End: 2024-03-26 | Stop reason: HOSPADM

## 2024-03-26 RX ORDER — PROPARACAINE HYDROCHLORIDE 5 MG/ML
1 SOLUTION/ DROPS OPHTHALMIC
Status: COMPLETED | OUTPATIENT
Start: 2024-03-26 | End: 2024-03-26

## 2024-03-26 RX ORDER — ACETAMINOPHEN 325 MG/1
650 TABLET ORAL
Status: DISCONTINUED | OUTPATIENT
Start: 2024-03-26 | End: 2024-03-26 | Stop reason: HOSPADM

## 2024-03-26 RX ADMIN — LIDOCAINE HYDROCHLORIDE: 20 JELLY TOPICAL at 07:23

## 2024-03-26 RX ADMIN — MOXIFLOXACIN OPHTHALMIC SOLUTION 1 DROP: 5 SOLUTION/ DROPS OPHTHALMIC at 07:15

## 2024-03-26 RX ADMIN — PROPARACAINE HYDROCHLORIDE 1 DROP: 5 SOLUTION/ DROPS OPHTHALMIC at 07:19

## 2024-03-26 RX ADMIN — Medication 1 DROP: at 07:20

## 2024-03-26 RX ADMIN — Medication 1 DROP: at 07:04

## 2024-03-26 RX ADMIN — MOXIFLOXACIN OPHTHALMIC SOLUTION 1 DROP: 5 SOLUTION/ DROPS OPHTHALMIC at 07:06

## 2024-03-26 RX ADMIN — PROPARACAINE HYDROCHLORIDE 1 DROP: 5 SOLUTION/ DROPS OPHTHALMIC at 06:58

## 2024-03-26 RX ADMIN — CYCLOPENTOLATE HYDROCHLORIDE 1 DROP: 20 SOLUTION/ DROPS OPHTHALMIC at 06:59

## 2024-03-26 RX ADMIN — MOXIFLOXACIN OPHTHALMIC SOLUTION 1 DROP: 5 SOLUTION/ DROPS OPHTHALMIC at 07:11

## 2024-03-26 RX ADMIN — FENTANYL CITRATE 50 MCG: 50 INJECTION INTRAMUSCULAR; INTRAVENOUS at 08:11

## 2024-03-26 RX ADMIN — MIDAZOLAM 2 MG: 1 INJECTION INTRAMUSCULAR; INTRAVENOUS at 08:11

## 2024-03-26 ASSESSMENT — ACTIVITIES OF DAILY LIVING (ADL)
ADLS_ACUITY_SCORE: 37

## 2024-03-26 NOTE — ANESTHESIA CARE TRANSFER NOTE
Patient: Glenda Robins    Procedure: Procedure(s):  Phacoemulsification Cataract Extraction Posterior Chamber Lens Left Eye       Diagnosis: Combined form of age-related cataract, left eye [H25.812]  Diagnosis Additional Information: No value filed.    Anesthesia Type:   MAC     Note:    Oropharynx: spontaneously breathing  Level of Consciousness: awake  Oxygen Supplementation: room air    Independent Airway: airway patency satisfactory and stable  Dentition: dentition unchanged  Vital Signs Stable: post-procedure vital signs reviewed and stable  Report to RN Given: handoff report given  Patient transferred to: Phase II    Handoff Report: Identifed the Patient, Identified the Reponsible Provider, Reviewed the pertinent medical history, Discussed the surgical course, Reviewed Intra-OP anesthesia mangement and issues during anesthesia, Set expectations for post-procedure period and Allowed opportunity for questions and acknowledgement of understanding      Vitals:  Vitals Value Taken Time   BP     Temp     Pulse     Resp     SpO2         Electronically Signed By: MADI Lagunas CRNA  March 26, 2024  8:44 AM

## 2024-03-26 NOTE — DISCHARGE INSTRUCTIONS
After Anesthesia (Sleep Medicine)  What should I do after anesthesia?  You should rest and relax for the next 24 hours. Avoid risky or difficult (strenuous) activity. A responsible adult should stay with you overnight.  Don't drive or use any heavy equipment for 24 hours. Even if you feel normal, your reactions may be affected by the sleep medicine given to you.  Don't drink alcohol or make any important decisions for 24 hours.  Slowly get back to your regular diet, as you feel able.  How should I expect to feel?  It's normal to feel dizzy, light-headed, or faint for up to a full day after anesthesia or while taking pain medicine. If this happens:   Sit down for a few minutes before standing.  Have someone help you when you get up to walk or use the bathroom.  If you have nausea (feel sick to your stomach) or vomit (throw up):   Drink clear liquids (such as apple juice, ginger ale, broth, or 7UP) until you feel better.  If you feel sick to your stomach, or you keep vomiting for 24 hours, please call the doctor.  What else should I know?  You might have a dry mouth, sore throat, muscle aches, or trouble sleeping. These should go away after 24 hours.  Please contact your doctor if you have any other symptoms that concern you, such as fever, pain, bleeding, fluid drainage, swelling, or headache, or if it's been over 8 to 10 hours and you still aren't able to pee (urinate).  If you have a history of sleep apnea, it's very important to use your CPAP machine for the next 24 hours when you nap or sleep.   For informational purposes only. Not to replace the advice of your health care provider. Copyright   2023 [employer]. All rights reserved. Clinically reviewed by Peewee Hussein MD. Jamalon [id number] - REV 09/23.

## 2024-03-26 NOTE — ANESTHESIA POSTPROCEDURE EVALUATION
Patient: Glenda Robins    Procedure: Procedure(s):  Phacoemulsification Cataract Extraction Posterior Chamber Lens Left Eye       Anesthesia Type:  MAC    Note:  Disposition: Outpatient   Postop Pain Control: Uneventful            Sign Out: Well controlled pain   PONV: No   Neuro/Psych: Uneventful            Sign Out: Acceptable/Baseline neuro status   Airway/Respiratory: Uneventful            Sign Out: Acceptable/Baseline resp. status   CV/Hemodynamics: Uneventful            Sign Out: Acceptable CV status; No obvious hypovolemia; No obvious fluid overload   Other NRE: NONE   DID A NON-ROUTINE EVENT OCCUR? No         Last vitals:  Vitals Value Taken Time   /83 03/26/24 0900   Temp 97.3  F (36.3  C) 03/26/24 0900   Pulse 83 03/26/24 0855   Resp 18 03/26/24 0900   SpO2 94 % 03/26/24 0900   Vitals shown include unfiled device data.    Electronically Signed By: MADI Mccoy CRNA  March 26, 2024  10:53 AM

## 2024-03-26 NOTE — OP NOTE
[facility]  Ophthalmology Full Operative Note    Pre-operative diagnosis: Combined form of age-related cataract, left eye [H25.812]   Post-operative diagnosis Same   Procedure: Procedure(s):  Phacoemulsification Cataract Extraction Posterior Chamber Lens Left Eye   Surgeon: Luis Camarena MD   Assistants(s):    Anesthesia: MAC with Local    Estimated blood loss: None    Total IV fluids: (See anesthesia record)   Specimens: None   Implants: 21 LI61AO   Findings:    Complications: None   Condition: Stable   Comments:       PROCEDURAL ANESTHESIA:     Topical/MAC.  Lidocaine 2% jelly topically and Lidocaine 1% preservative-free intracamerally.     PROCEDURE:  The patient was brought to the Operating Room and prepped and draped in a sterile manner.  A wire lid speculum was placed.  A paracentesis was created and 1% Lidocaine was instilled in the anterior chamber.  Seemed to have loose capsule/zonules with positive pressure the entire case. No zonular dehiscence noted.  The anterior chamber was then filled with Viscoat viscoelastic.  A clear cornea temporal wound was created using a 2.8 mm keratome.  A cystotome was used to initiate a flap in the anterior capsule and a Utrata forceps was used to create a continuous tear capsulorhexis.  Hydrodissection was performed.  The phacoemulsification tip was inserted into the eye and the nucleus and epinucleus were removed using a divide and conquer technique.  The residual cortex was removed using the I/A handpiece.  The anterior chamber was then refilled with Amvisc viscoelastic and the wound was enlarged with the keratome.  The intraocular lens, 21 diopter, Model LI61AO, was placed into the injector and injected into the capsular bag. It was checked to make sure that it was central and stable.  Residual viscoelastic was removed using the I/A.  The anterior chamber was refilled with BSS.  The wounds were hydrated with BSS and were noted to be watertight with no  suture necessary.  Topical pilocarpine 1%, Betagan, and Vigamox was applied.  A hard shield was placed.     The patient tolerated the procedure well and was sent to the Recovery Room in satisfactory condition.

## 2024-04-13 ENCOUNTER — HOSPITAL ENCOUNTER (EMERGENCY)
Facility: HOSPITAL | Age: 54
Discharge: HOME | End: 2024-04-13
Attending: PHYSICIAN ASSISTANT | Admitting: PHYSICIAN ASSISTANT
Payer: COMMERCIAL

## 2024-04-13 ENCOUNTER — APPOINTMENT (OUTPATIENT)
Dept: GENERAL RADIOLOGY | Facility: HOSPITAL | Age: 54
End: 2024-04-13
Attending: PHYSICIAN ASSISTANT
Payer: COMMERCIAL

## 2024-04-13 VITALS
TEMPERATURE: 99.7 F | HEART RATE: 103 BPM | OXYGEN SATURATION: 94 % | RESPIRATION RATE: 16 BRPM | DIASTOLIC BLOOD PRESSURE: 83 MMHG | SYSTOLIC BLOOD PRESSURE: 133 MMHG

## 2024-04-13 DIAGNOSIS — R06.2 WHEEZING: ICD-10-CM

## 2024-04-13 DIAGNOSIS — J06.9 URI (UPPER RESPIRATORY INFECTION): ICD-10-CM

## 2024-04-13 LAB
BASOPHILS # BLD AUTO: 0 10E3/UL (ref 0–0.2)
BASOPHILS NFR BLD AUTO: 0 %
EOSINOPHIL # BLD AUTO: 0.1 10E3/UL (ref 0–0.7)
EOSINOPHIL NFR BLD AUTO: 1 %
ERYTHROCYTE [DISTWIDTH] IN BLOOD BY AUTOMATED COUNT: 13.3 % (ref 10–15)
HCT VFR BLD AUTO: 45.2 % (ref 35–47)
HGB BLD-MCNC: 15.3 G/DL (ref 11.7–15.7)
HOLD SPECIMEN: NORMAL
IMM GRANULOCYTES # BLD: 0.1 10E3/UL
IMM GRANULOCYTES NFR BLD: 1 %
LYMPHOCYTES # BLD AUTO: 1.7 10E3/UL (ref 0.8–5.3)
LYMPHOCYTES NFR BLD AUTO: 21 %
MCH RBC QN AUTO: 32.8 PG (ref 26.5–33)
MCHC RBC AUTO-ENTMCNC: 33.8 G/DL (ref 31.5–36.5)
MCV RBC AUTO: 97 FL (ref 78–100)
MONOCYTES # BLD AUTO: 0.8 10E3/UL (ref 0–1.3)
MONOCYTES NFR BLD AUTO: 10 %
NEUTROPHILS # BLD AUTO: 5.5 10E3/UL (ref 1.6–8.3)
NEUTROPHILS NFR BLD AUTO: 67 %
NRBC # BLD AUTO: 0 10E3/UL
NRBC BLD AUTO-RTO: 0 /100
PLATELET # BLD AUTO: 226 10E3/UL (ref 150–450)
RBC # BLD AUTO: 4.67 10E6/UL (ref 3.8–5.2)
WBC # BLD AUTO: 8.3 10E3/UL (ref 4–11)

## 2024-04-13 PROCEDURE — 71046 X-RAY EXAM CHEST 2 VIEWS: CPT

## 2024-04-13 PROCEDURE — 99213 OFFICE O/P EST LOW 20 MIN: CPT | Performed by: PHYSICIAN ASSISTANT

## 2024-04-13 PROCEDURE — 250N000009 HC RX 250: Performed by: PHYSICIAN ASSISTANT

## 2024-04-13 PROCEDURE — G0463 HOSPITAL OUTPT CLINIC VISIT: HCPCS | Mod: 25

## 2024-04-13 PROCEDURE — 85025 COMPLETE CBC W/AUTO DIFF WBC: CPT | Performed by: PHYSICIAN ASSISTANT

## 2024-04-13 PROCEDURE — 94640 AIRWAY INHALATION TREATMENT: CPT

## 2024-04-13 PROCEDURE — 999N000157 HC STATISTIC RCP TIME EA 10 MIN

## 2024-04-13 PROCEDURE — 36415 COLL VENOUS BLD VENIPUNCTURE: CPT | Performed by: PHYSICIAN ASSISTANT

## 2024-04-13 RX ORDER — IPRATROPIUM BROMIDE AND ALBUTEROL SULFATE 2.5; .5 MG/3ML; MG/3ML
3 SOLUTION RESPIRATORY (INHALATION) ONCE
Status: COMPLETED | OUTPATIENT
Start: 2024-04-13 | End: 2024-04-13

## 2024-04-13 RX ADMIN — IPRATROPIUM BROMIDE AND ALBUTEROL SULFATE 3 ML: .5; 3 SOLUTION RESPIRATORY (INHALATION) at 13:14

## 2024-04-13 ASSESSMENT — ACTIVITIES OF DAILY LIVING (ADL)
ADLS_ACUITY_SCORE: 37
ADLS_ACUITY_SCORE: 35
ADLS_ACUITY_SCORE: 37

## 2024-04-13 ASSESSMENT — ENCOUNTER SYMPTOMS
TROUBLE SWALLOWING: 0
SHORTNESS OF BREATH: 0
HEADACHES: 0
NAUSEA: 0
APPETITE CHANGE: 0
WHEEZING: 1
COUGH: 1
CHILLS: 0
FATIGUE: 0
VOMITING: 0
ABDOMINAL PAIN: 0
ACTIVITY CHANGE: 0
FEVER: 0

## 2024-04-13 ASSESSMENT — COLUMBIA-SUICIDE SEVERITY RATING SCALE - C-SSRS
6. HAVE YOU EVER DONE ANYTHING, STARTED TO DO ANYTHING, OR PREPARED TO DO ANYTHING TO END YOUR LIFE?: NO
2. HAVE YOU ACTUALLY HAD ANY THOUGHTS OF KILLING YOURSELF IN THE PAST MONTH?: NO
1. IN THE PAST MONTH, HAVE YOU WISHED YOU WERE DEAD OR WISHED YOU COULD GO TO SLEEP AND NOT WAKE UP?: NO

## 2024-04-13 NOTE — ED TRIAGE NOTES
Pt presents today with c/o chest tightness/lung tightness. Started 3 days ago. Pt also has a cough.

## 2024-04-13 NOTE — ED NOTES
Urgent Care Provider assessed patient in triage and determined patient Urgent Care appropriate. Will be seen in Urgent Care.

## 2024-04-13 NOTE — ED PROVIDER NOTES
History     Chief Complaint   Patient presents with    Cough     HPI  Glenda Robins is a 54 year old female who presents to UC/ER, red flag determined appropriate for UC. She c/o lung tightness, wheezing, cough.  Onset of symptoms x 3 days.  Does have a history of asthma, but not taking her inhaler on a regular basis.  Overall feels ill.  Denies diaphoresis, radiating neck/arm pain, chest pressure, dyspnea, abdominal pain, fevers, chills, sweats.  Of note, she was hospitalized back in December 2023 due to pneumonia, but at that time she waited nearly a month for evaluation of progressive respiratory issues.    Allergies:  Allergies   Allergen Reactions    Shrimp Anaphylaxis    Shrimp (Diagnostic) Anaphylaxis    Risperdal [Risperidone] Other (See Comments)     Elevated prolactin       Problem List:    Patient Active Problem List    Diagnosis Date Noted    Acute respiratory failure with hypoxia (H) 12/04/2023     Priority: Medium    Multifocal pneumonia 12/04/2023     Priority: Medium    Multifocal lung consolidation (H24) 12/04/2023     Priority: Medium    Cigarette nicotine dependence, uncomplicated 10/15/2020     Priority: Medium    Closed fracture of multiple ribs of left side with routine healing 10/15/2020     Priority: Medium    Closed fracture of right proximal tibia 10/15/2020     Priority: Medium    Closed nondisplaced fracture of greater tuberosity of left humerus 10/15/2020     Priority: Medium    Schizoaffective disorder, bipolar type (H) 10/15/2020     Priority: Medium    Myopia of both eyes with astigmatism and presbyopia 09/24/2020     Priority: Medium    Posterior subcapsular age-related cataract of both eyes 09/24/2020     Priority: Medium    Disorganized behavior 08/10/2020     Priority: Medium    Schizophrenia (H) 03/21/2018     Priority: Medium    Psychosis (H) 03/14/2018     Priority: Medium        Past Medical History:    No past medical history on file.    Past Surgical History:    Past  Surgical History:   Procedure Laterality Date    PHACOEMULSIFICATION WITH STANDARD INTRAOCULAR LENS IMPLANT Right 3/12/2024    Procedure: Phacoemulsification Cataract Extraction Posterior Chamber Lens Right Eye;  Surgeon: Luis Camarena MD;  Location: [facility] OR    PHACOEMULSIFICATION WITH STANDARD INTRAOCULAR LENS IMPLANT Left 3/26/2024    Procedure: Phacoemulsification Cataract Extraction Posterior Chamber Lens Left Eye;  Surgeon: Luis Camarena MD;  Location: [facility] OR       Family History:    No family history on file.    Social History:  Marital Status:  Single [1]  Social History     Tobacco Use    Smoking status: Some Days     Types: Cigarettes     Passive exposure: Current    Smokeless tobacco: Never   Vaping Use    Vaping status: Never Used   Substance Use Topics    Drug use: Yes     Types: Marijuana     Comment: Occasional        Medications:    albuterol (PROAIR HFA/PROVENTIL HFA/VENTOLIN HFA) 108 (90 Base) MCG/ACT inhaler  ARIPiprazole (ABILIFY) 5 MG tablet  LORazepam (ATIVAN) 0.5 MG tablet  moxifloxacin (VIGAMOX) 0.5 % ophthalmic solution  polyethylene glycol-propylene glycol (SYSTANE ULTRA) 0.4-0.3 % SOLN ophthalmic solution  QUEtiapine (SEROQUEL) 100 MG tablet  QUEtiapine (SEROQUEL) 50 MG tablet          Review of Systems   Constitutional:  Negative for activity change, appetite change, chills, fatigue and fever.   HENT:  Negative for trouble swallowing.    Respiratory:  Positive for cough and wheezing. Negative for shortness of breath.    Cardiovascular:  Negative for chest pain.   Gastrointestinal:  Negative for abdominal pain, nausea and vomiting.   Neurological:  Negative for headaches.       Physical Exam   BP: 133/83  Pulse: 103  Temp: 99.7  F (37.6  C)  Resp: 16  SpO2: 94 %      Physical Exam  Vitals and nursing note reviewed.   Constitutional:       General: She is not in acute distress.     Appearance: Normal appearance. She is not toxic-appearing or diaphoretic.   HENT:      Head: Normocephalic.       Nose: Congestion present.      Mouth/Throat:      Mouth: Mucous membranes are moist.      Pharynx: Oropharynx is clear.   Eyes:      Conjunctiva/sclera: Conjunctivae normal.   Cardiovascular:      Rate and Rhythm: Normal rate and regular rhythm.   Pulmonary:      Effort: Pulmonary effort is normal. No respiratory distress.      Breath sounds: No stridor. Wheezing present.   Musculoskeletal:      Cervical back: Normal range of motion.   Skin:     General: Skin is warm and dry.   Neurological:      General: No focal deficit present.      Mental Status: She is alert.         ED Course         Results for orders placed or performed during the hospital encounter of 04/13/24 (from the past 24 hour(s))   XR Chest 2 Views    Narrative    Exam:  XR CHEST 2 VIEWS    HISTORY: wheezing.    COMPARISON:  12/4/2023.    FINDINGS:     The cardiomediastinal contours are normal.      No focal consolidation, effusion, or pneumothorax.      No acute osseous abnormality.       Impression    IMPRESSION:      No acute cardiopulmonary process.      AUDREY KLEIN MD         SYSTEM ID:  [id number]   CBC with platelets differential    Narrative    The following orders were created for panel order CBC with platelets differential.  Procedure                               Abnormality         Status                     ---------                               -----------         ------                     CBC with platelets and d...[[id number]]                      Final result                 Please view results for these tests on the individual orders.   CBC with platelets and differential   Result Value Ref Range    WBC Count 8.3 4.0 - 11.0 10e3/uL    RBC Count 4.67 3.80 - 5.20 10e6/uL    Hemoglobin 15.3 11.7 - 15.7 g/dL    Hematocrit 45.2 35.0 - 47.0 %    MCV 97 78 - 100 fL    MCH 32.8 26.5 - 33.0 pg    MCHC 33.8 31.5 - 36.5 g/dL    RDW 13.3 10.0 - 15.0 %    Platelet Count 226 150 - 450 10e3/uL    % Neutrophils 67 %    % Lymphocytes 21 %     % Monocytes 10 %    % Eosinophils 1 %    % Basophils 0 %    % Immature Granulocytes 1 %    NRBCs per 100 WBC 0 <1 /100    Absolute Neutrophils 5.5 1.6 - 8.3 10e3/uL    Absolute Lymphocytes 1.7 0.8 - 5.3 10e3/uL    Absolute Monocytes 0.8 0.0 - 1.3 10e3/uL    Absolute Eosinophils 0.1 0.0 - 0.7 10e3/uL    Absolute Basophils 0.0 0.0 - 0.2 10e3/uL    Absolute Immature Granulocytes 0.1 <=0.4 10e3/uL    Absolute NRBCs 0.0 10e3/uL   Extra Tube    Narrative    The following orders were created for panel order Extra Tube.  Procedure                               Abnormality         Status                     ---------                               -----------         ------                     Extra Green Top (Lithium...[[id number]]                      Final result                 Please view results for these tests on the individual orders.   Extra Green Top (Lithium Heparin) Tube   Result Value Ref Range    Hold Specimen JI        Medications   ipratropium - albuterol 0.5 mg/2.5 mg/3 mL (DUONEB) neb solution 3 mL (3 mLs Nebulization $Given 4/13/24 1314)       Assessments & Plan (with Medical Decision Making)   Glenda LARA Shimon presents to the Urgent Care with diagnosis of URI and wheezing.    -No signs of respiratory distress or hypoxia  -Due to prior history of hospitalization, opted for chest x-ray, shows no pneumonia or consolidation  -Due to wheezing, gave DuoNeb treatment with good result/decreased wheezing and improved breathing, decreased cough  -No leukocytosis, no antibiotics warranted at this time  -Educated on inhaler use for shortness of breath and wheezing, to use prior to getting worsening symptoms  -Clinic staff gave spacer for inhaler    -Patient verbally educated on their diagnoses and has no urther questions or concerns  -Patient understands to seek further medical evaluation if symptoms worsen      -Follow-Up: PCP as needed      I have reviewed the nursing notes.    I have reviewed the findings,  diagnosis, plan and need for follow up with the patient.      Final diagnoses:   URI (upper respiratory infection)   Wheezing       4/13/2024   [facility]       Nolberto Mclean PA-C  04/13/24 [id number]

## 2024-04-24 ENCOUNTER — TELEPHONE (OUTPATIENT)
Dept: FAMILY MEDICINE | Facility: OTHER | Age: 54
End: 2024-04-24

## 2024-04-24 NOTE — TELEPHONE ENCOUNTER
2:13 PM    Reason for Call: OVERBOOK    Patient is having the following symptoms: Chest cold coughing fever going on two weeks and she went to the ER/UC on 4/13/24 she isn't any better and getting worse.    The patient is requesting an appointment for Friday 4/26/24 in the A.M. with Radha Andres.    Was an appointment offered for this call? No  If yes : Appointment type              Date    Preferred method for responding to this message: Telephone Call  What is your phone number ? [phone]    If we cannot reach you directly, may we leave a detailed response at the number you provided? Yes    Can this message wait until your PCP/provider returns, if unavailable today? NO

## 2024-04-25 NOTE — PROGRESS NOTES
Assessment & Plan     Fever, unspecified fever cause  Negative COVID, RSV and influenza  - Symptomatic Influenza A/B, RSV, & SARS-CoV2 PCR (COVID-19); Future  - Symptomatic Influenza A/B, RSV, & SARS-CoV2 PCR (COVID-19) Nose    Acute sinusitis with symptoms > 10 days  3 week history of not feeling well with sinus pressure continued.  Other viral symptoms are improving.   Treatment as indicated   Follow up if no improvement or worsening symptoms.  Discussed medications and side effects - she has previously used prednisone and is aware of the side effects such as change in mood and difficulty sleeping   - amoxicillin-clavulanate (AUGMENTIN) 875-125 MG tablet; Take 1 tablet by mouth 2 times daily  - predniSONE (DELTASONE) 20 MG tablet; Take 1 tablet (20 mg) by mouth 2 times daily for 5 days    Ordering of each unique test      See Patient Instructions    No follow-ups on file.    Melania Doshi is a 54 year old, presenting for the following health issues:  URI    History of Present Illness     Asthma:  She presents for follow up of asthma.  She has some cough, some wheezing, and some shortness of breath.  She is using a relief medication 2-3 times per day. She does not have a controller medication. Patient is aware of the following triggers: unaware of any triggers, exercise or sports and upper respiratory infections. The patient has had a visit to the Emergency Room, Urgent Care or Hospital due to asthma since the last clinic visit. She has been to the Emergency Room or Urgent Care 1 time.She has had a Hospitalization 0 times.    She eats 0-1 servings of fruits and vegetables daily.She consumes 1 sweetened beverage(s) daily.She exercises with enough effort to increase her heart rate 20 to 29 minutes per day.  She exercises with enough effort to increase her heart rate 3 or less days per week.   She is taking medications regularly.       Acute Illness  Acute illness concerns: uri symptoms  Onset/Duration: 3 weeks    Symptoms:  Fever: has not taken it at home   Chills/Sweats: YES  Headache (location?): YES  Sinus Pressure: YES  Conjunctivitis:  YES  Ear Pain: YES: bilateral  Rhinorrhea: No  Congestion: YES  Sore Throat: yes  Cough: YES  Wheeze: YES  Decreased Appetite: YES  Nausea: No  Vomiting: No  Diarrhea: No  Dysuria/Freq.: No  Dysuria or Hematuria: No  Fatigue/Achiness: YES  Sick/Strep Exposure: No  Therapies tried and outcome: sudafed help take the edge off of symptoms         Review of Systems  CONSTITUTIONAL:chills, fatigue, and fever low grade  INTEGUMENTARY/SKIN: NEGATIVE for worrisome rashes, moles or lesions  EYE: watery  ENT/MOUTH: ear pain bilateral, postnasal drainage, sinus pressure, and sore throat  RESP:cough - productive and SOB  CV: NEGATIVE for chest pain, palpitations or peripheral edema  GI: NEGATIVE for nausea, abdominal pain, heartburn, or change in bowel habits  : denies dysuria   MUSCULOSKELETAL: NEGATIVE for significant arthralgias or myalgia  NEURO: fatigued       Objective    /78 (BP Location: Left arm, Patient Position: Sitting, Cuff Size: Adult Large)   Pulse 94   Temp 97.8  F (36.6  C) (Tympanic)   Ht 1.524 m (5')   Wt 84.8 kg (187 lb)   SpO2 98%   BMI 36.52 kg/m    Body mass index is 36.52 kg/m .  Physical Exam   GENERAL: alert, no distress, and ill appearing   EYES: clear drainage   HENT: normal cephalic/atraumatic, ear canals and TM's normal, nose and mouth without ulcers or lesions, oropharynx clear, oral mucous membranes moist, and sinuses: maxillary, frontal tenderness on both sides  NECK: no adenopathy, no asymmetry, masses, or scars  RESP: lungs clear to auscultation - no rales, rhonchi or wheezes POSITIVE  for occasional cough  CV: regular rate and rhythm, normal S1 S2, no S3 or S4, no murmur, click or rub, no peripheral edema  ABDOMEN: soft, nontender, no hepatosplenomegaly, no masses and bowel sounds normal  NEURO: mentation intact and fatigued     Results for orders  placed or performed in visit on 04/26/24   Symptomatic Influenza A/B, RSV, & SARS-CoV2 PCR (COVID-19) Nose     Status: Normal    Specimen: Nose; Swab   Result Value Ref Range    Influenza A PCR Negative Negative    Influenza B PCR Negative Negative    RSV PCR Negative Negative    SARS CoV2 PCR Negative Negative    Narrative    Testing was performed using the Xpert Xpress CoV2/Flu/RSV Assay on the [employer] GeneXpert Instrument. This test should be ordered for the detection of SARS-CoV-2, influenza, and RSV viruses in individuals who meet clinical and/or epidemiological criteria. Test performance is unknown in asymptomatic patients. This test is for in vitro diagnostic use under the FDA EUA for laboratories certified under CLIA to perform high or moderate complexity testing. This test has not been FDA cleared or approved. A negative result does not rule out the presence of PCR inhibitors in the specimen or target RNA in concentration below the limit of detection for the assay. If only one viral target is positive but coinfection with multiple targets is suspected, the sample should be re-tested with another FDA cleared, approved, or authorized test, if coinfection would change clinical management. This test was validated by the [facility]. These laboratories are certified under the Clinical Laboratory Improvement Amendments of 1988 (CLIA-88) as qualified to perform high complexity laboratory testing.           Signed Electronically by: MADI Espinosa CNP

## 2024-04-26 ENCOUNTER — OFFICE VISIT (OUTPATIENT)
Dept: FAMILY MEDICINE | Facility: OTHER | Age: 54
End: 2024-04-26
Attending: NURSE PRACTITIONER
Payer: COMMERCIAL

## 2024-04-26 VITALS
TEMPERATURE: 97.8 F | BODY MASS INDEX: 36.71 KG/M2 | HEART RATE: 94 BPM | OXYGEN SATURATION: 98 % | WEIGHT: 187 LBS | DIASTOLIC BLOOD PRESSURE: 78 MMHG | HEIGHT: 60 IN | SYSTOLIC BLOOD PRESSURE: 130 MMHG

## 2024-04-26 DIAGNOSIS — J01.90 ACUTE SINUSITIS WITH SYMPTOMS > 10 DAYS: ICD-10-CM

## 2024-04-26 DIAGNOSIS — R50.9 FEVER, UNSPECIFIED FEVER CAUSE: Primary | ICD-10-CM

## 2024-04-26 LAB
FLUAV RNA SPEC QL NAA+PROBE: NEGATIVE
FLUBV RNA RESP QL NAA+PROBE: NEGATIVE
RSV RNA SPEC NAA+PROBE: NEGATIVE
SARS-COV-2 RNA RESP QL NAA+PROBE: NEGATIVE

## 2024-04-26 PROCEDURE — G0463 HOSPITAL OUTPT CLINIC VISIT: HCPCS

## 2024-04-26 PROCEDURE — 87637 SARSCOV2&INF A&B&RSV AMP PRB: CPT | Mod: ZL | Performed by: NURSE PRACTITIONER

## 2024-04-26 PROCEDURE — 99213 OFFICE O/P EST LOW 20 MIN: CPT | Mod: 24 | Performed by: NURSE PRACTITIONER

## 2024-04-26 RX ORDER — PREDNISOLONE ACETATE 10 MG/ML
SUSPENSION/ DROPS OPHTHALMIC
COMMUNITY
Start: 2024-03-13

## 2024-04-26 RX ORDER — PREDNISONE 20 MG/1
20 TABLET ORAL 2 TIMES DAILY
Qty: 10 TABLET | Refills: 0 | Status: SHIPPED | OUTPATIENT
Start: 2024-04-26 | End: 2024-05-01

## 2024-04-26 RX ORDER — KETOROLAC TROMETHAMINE 5 MG/ML
SOLUTION OPHTHALMIC
COMMUNITY
Start: 2024-03-13

## 2024-04-26 ASSESSMENT — PAIN SCALES - GENERAL: PAINLEVEL: NO PAIN (0)

## 2024-04-26 NOTE — PATIENT INSTRUCTIONS
Start antibiotic today take with food it can upset your stomach   If you do a probiotic or yogurt do not take within 2 hours of the antibiotic     Start prednisone in 2-3 days  - let the antibiotic work first

## 2024-05-31 ENCOUNTER — TELEPHONE (OUTPATIENT)
Dept: FAMILY MEDICINE | Facility: OTHER | Age: 54
End: 2024-05-31

## 2024-05-31 NOTE — TELEPHONE ENCOUNTER
Patient called clinic back, she is out of town for the foreseeable future and will call back to schedule when she is back in town.

## 2025-01-23 ENCOUNTER — TELEPHONE (OUTPATIENT)
Dept: FAMILY MEDICINE | Facility: OTHER | Age: 55
End: 2025-01-23

## 2025-01-23 NOTE — TELEPHONE ENCOUNTER
Attempt # 1: TT patient. She was going to call back to schedule physical with PCP per quality list.

## 2025-02-18 DIAGNOSIS — J45.20 MILD INTERMITTENT ASTHMA, UNSPECIFIED WHETHER COMPLICATED: ICD-10-CM

## 2025-02-18 RX ORDER — ALBUTEROL SULFATE 90 UG/1
INHALANT RESPIRATORY (INHALATION)
Qty: 18 G | Refills: 3 | Status: SHIPPED | OUTPATIENT
Start: 2025-02-18

## 2025-02-18 NOTE — TELEPHONE ENCOUNTER
Failed protocol    Short-Acting Beta Agonist Inhalers Protocol  [id number]/2025 02:39 PM   Protocol Details Asthma control assessment score within normal limits in last 6 months    Medication is active on med list and the sig matches. RN to manually verify dose and sig if red X/fail.    Recent (6 mo) or future (90 days) visit within the authorizing provider's specialty       AST   Date Value Ref Range Status   12/04/2023 25 0 - 45 U/L Final     Comment:     Reference intervals for this test were updated on 6/12/2023 to more accurately reflect our healthy population. There may be differences in the flagging of prior results with similar values performed with this method. Interpretation of those prior results can be made in the context of the updated reference intervals.   08/22/2020 22 0 - 45 U/L Final      albuterol (PROAIR HFA/PROVENTIL HFA/VENTOLIN HFA) 108 (90 Base) MCG/ACT inhaler       Last Written Prescription Date:  12/18/23  Last Fill Quantity: 18 g,   # refills: 3  Last Office Visit: 4/26/24  Future Office visit:       Routing refill request to provider for review/approval because:

## 2025-05-02 DIAGNOSIS — J45.20 MILD INTERMITTENT ASTHMA, UNSPECIFIED WHETHER COMPLICATED: ICD-10-CM

## 2025-05-02 NOTE — TELEPHONE ENCOUNTER
"Short-Acting Beta Agonist Inhalers Protocol  Failed      Asthma control assessment score within normal limits in last 6 months    Please review ACT score.     Medication is active on med list and the sig matches. RN to manually verify dose and sig if red X/fail.    If the protocol passes (green check), you do not need to verify med dose and sig.    A prescription matches if they are the same clinical intention.     For Example: once daily and every morning are the same.     The protocol can not identify upper and lower case letters as matching and will fail.      For Example: Take 1 tablet (50 mg) by mouth daily                           TAKE 1 TABLET (50 MG) BY MOUTH DAILY     For all fails (red x), verify dose and sig.    If the refill does match what is on file, the RN can still proceed to approve the refill request.        If they do not match, route to the appropriate provider.       Recent (6 mo) or future (90 days) visit within the authorizing provider's specialty    Patient had office visit in the last 6 months or has a visit in the next 30 days with authorizing provider or within the authorizing provider's specialty.  See \"Patient Info\" tab in inbasket, or \"Choose Columns\" in Meds & Orders section of the refill encounter.   "

## 2025-05-02 NOTE — TELEPHONE ENCOUNTER
Albuterol      Last Written Prescription Date:  2/18/25  Last Fill Quantity: 18g,   # refills: 3  Last Office Visit: 4/26/24  Future Office visit:       Routing refill request to provider for review/approval because:

## 2025-05-05 NOTE — TELEPHONE ENCOUNTER
Attempt # 1  Outcome: Left Message   Comment: LVM for pt to call and schedule an asthma follow up / med review. Please route message back to range refill pool once appt has been scheduled.

## 2025-05-07 NOTE — TELEPHONE ENCOUNTER
Attempt # 2  Outcome: Talked with Patient    Comment: called pt to schedule an asthma follow up / med review appt. Pt requested we call back in a couple of weeks.She stated she has an inhaler at the moment that will last for a while yet. Will call pt back in a couple of weeks if she has not scheduled.

## 2025-05-21 RX ORDER — ALBUTEROL SULFATE 90 UG/1
INHALANT RESPIRATORY (INHALATION)
Qty: 8.5 G | Refills: 0 | Status: SHIPPED | OUTPATIENT
Start: 2025-05-21

## 2025-05-21 NOTE — TELEPHONE ENCOUNTER
Next 5 appointments (look out 90 days)      Jun 18, 2025 8:30 AM  (Arrive by 8:15 AM)  Provider Visit with MADI Gomez CNP  [facility] [place] ([facility] [place] ) [address]  [place] [address]  [phone]

## 2025-06-04 DIAGNOSIS — J45.20 MILD INTERMITTENT ASTHMA, UNSPECIFIED WHETHER COMPLICATED: ICD-10-CM

## 2025-06-04 RX ORDER — ALBUTEROL SULFATE 90 UG/1
INHALANT RESPIRATORY (INHALATION)
Qty: 8.5 G | Refills: 0 | Status: SHIPPED | OUTPATIENT
Start: 2025-06-04

## 2025-06-04 NOTE — TELEPHONE ENCOUNTER
albuterol (PROAIR HFA/PROVENTIL HFA/VENTOLIN HFA) 108 (90 Base) MCG/ACT inhaler            Last Written Prescription Date:  5/21/25  Last Fill Quantity: 8.5 g,   # refills: 0  Last Office Visit: 4/26/24  Future Office visit:    Next 5 appointments (look out 90 days)      Jun 18, 2025 8:30 AM  (Arrive by 8:15 AM)  Provider Visit with MADI Gomez CNP  [facility] [place] ([facility] [place] ) [address]  [place] [address]  [phone]             Routing refill request to provider for review/approval because:  Short-Acting Beta Agonist Inhalers Protocol  Failed      Asthma control assessment score within normal limits in last 6 months    Please review ACT score.        12/18/2023 1/16/2024   Asthma Control Test   ACT Total Score (Goal Greater than or Equal to 20) 22 23        Recent (6 month) or future (90 days) visit with the authorizing provider's specialty (provided they have been seen in the past 9 months)

## 2025-06-18 ENCOUNTER — OFFICE VISIT (OUTPATIENT)
Dept: FAMILY MEDICINE | Facility: OTHER | Age: 55
End: 2025-06-18
Attending: NURSE PRACTITIONER
Payer: COMMERCIAL

## 2025-06-18 VITALS
HEIGHT: 60 IN | OXYGEN SATURATION: 92 % | TEMPERATURE: 97.8 F | DIASTOLIC BLOOD PRESSURE: 66 MMHG | BODY MASS INDEX: 37.97 KG/M2 | HEART RATE: 101 BPM | SYSTOLIC BLOOD PRESSURE: 94 MMHG | RESPIRATION RATE: 16 BRPM | WEIGHT: 193.4 LBS

## 2025-06-18 DIAGNOSIS — Z23 NEED FOR VACCINATION: ICD-10-CM

## 2025-06-18 DIAGNOSIS — J45.20 MILD INTERMITTENT ASTHMA, UNSPECIFIED WHETHER COMPLICATED: Primary | ICD-10-CM

## 2025-06-18 DIAGNOSIS — Z12.31 VISIT FOR SCREENING MAMMOGRAM: ICD-10-CM

## 2025-06-18 DIAGNOSIS — L30.9 DERMATITIS: ICD-10-CM

## 2025-06-18 DIAGNOSIS — Z23 HIGH PRIORITY FOR 2019-NCOV VACCINE: ICD-10-CM

## 2025-06-18 PROCEDURE — 90480 ADMN SARSCOV2 VAC 1/ONLY CMP: CPT

## 2025-06-18 PROCEDURE — G0009 ADMIN PNEUMOCOCCAL VACCINE: HCPCS

## 2025-06-18 PROCEDURE — G0463 HOSPITAL OUTPT CLINIC VISIT: HCPCS

## 2025-06-18 RX ORDER — TRIAMCINOLONE ACETONIDE 1 MG/G
CREAM TOPICAL 3 TIMES DAILY
Qty: 80 G | Refills: 5 | Status: SHIPPED | OUTPATIENT
Start: 2025-06-18

## 2025-06-18 RX ORDER — BUDESONIDE AND FORMOTEROL FUMARATE DIHYDRATE 160; 4.5 UG/1; UG/1
AEROSOL RESPIRATORY (INHALATION)
Qty: 20.4 G | Refills: 11 | Status: SHIPPED | OUTPATIENT
Start: 2025-06-18

## 2025-06-18 RX ORDER — ALBUTEROL SULFATE 90 UG/1
1-2 INHALANT RESPIRATORY (INHALATION) EVERY 4 HOURS PRN
Qty: 18 G | Refills: 3 | Status: SHIPPED | OUTPATIENT
Start: 2025-06-18

## 2025-06-18 ASSESSMENT — ASTHMA QUESTIONNAIRES
QUESTION_2 LAST FOUR WEEKS HOW OFTEN HAVE YOU HAD SHORTNESS OF BREATH: ONCE A DAY
ACT_TOTALSCORE: 15
QUESTION_4 LAST FOUR WEEKS HOW OFTEN HAVE YOU USED YOUR RESCUE INHALER OR NEBULIZER MEDICATION (SUCH AS ALBUTEROL): THREE OR MORE TIMES PER DAY
QUESTION_5 LAST FOUR WEEKS HOW WOULD YOU RATE YOUR ASTHMA CONTROL: SOMEWHAT CONTROLLED
QUESTION_1 LAST FOUR WEEKS HOW MUCH OF THE TIME DID YOUR ASTHMA KEEP YOU FROM GETTING AS MUCH DONE AT WORK, SCHOOL OR AT HOME: A LITTLE OF THE TIME
QUESTION_3 LAST FOUR WEEKS HOW OFTEN DID YOUR ASTHMA SYMPTOMS (WHEEZING, COUGHING, SHORTNESS OF BREATH, CHEST TIGHTNESS OR PAIN) WAKE YOU UP AT NIGHT OR EARLIER THAN USUAL IN THE MORNING: NOT AT ALL

## 2025-06-18 ASSESSMENT — PAIN SCALES - GENERAL: PAINLEVEL_OUTOF10: NO PAIN (0)

## 2025-06-18 NOTE — PATIENT INSTRUCTIONS
My Asthma Action Plan    Name: Glenda Robins   YOB: 1970  Date: 6/18/2025   My doctor: MADI Espinosa CNP   [facility]        My Rescue Medicine: Albuterol (Proair/Ventolin/Proventil HFA) 2-4 puffs EVERY 4 HOURS as needed. Use a spacer if recommended by your provider.   My Asthma Severity:   Intermittent / Exercise Induced  Know your asthma triggers: smoke  Patient is unaware of triggers          GREEN ZONE   Good Control  I feel good  No cough or wheeze  Can work, sleep and play without asthma symptoms       Take your asthma control medicine every day.     If exercise triggers your asthma, take your rescue medication  15 minutes before exercise or sports, and  During exercise if you have asthma symptoms  Spacer to use with inhaler: If you have a spacer, make sure to use it with your inhaler             YELLOW ZONE Getting Worse  I have ANY of these:  I do not feel good  Cough or wheeze  Chest feels tight  Wake up at night   Keep taking your Green Zone medications  Start taking your rescue medicine:  every 20 minutes for up to 1 hour. Then every 4 hours for 24-48 hours.  If you stay in the Yellow Zone for more than 12-24 hours, contact your doctor.  If you do not return to the Green Zone in 12-24 hours or you get worse, start taking your oral steroid medicine if prescribed by your provider.           RED ZONE Medical Alert - Get Help  I have ANY of these:  I feel awful  Medicine is not helping  Breathing getting harder  Trouble walking or talking  Nose opens wide to breathe       Take your rescue medicine NOW  If your provider has prescribed an oral steroid medicine, start taking it NOW  Call your doctor NOW  If you are still in the Red Zone after 20 minutes and you have not reached your doctor:  Take your rescue medicine again and  Call 911 or go to the emergency room right away    See your regular doctor within 2 weeks of an Emergency Room or Urgent  Care visit for follow-up treatment.          Annual Reminders:  Meet with Asthma Educator,  Flu Shot in the Fall, consider Pneumonia Vaccination for patients with asthma (aged 19 and older).    Pharmacy: [id number] - [place], [place] - [address]    Electronically signed by MADI Espinosa CNP   Date: 06/18/25                    Asthma Triggers  How To Control Things That Make Your Asthma Worse    Triggers are things that make your asthma worse.  Look at the list below to help you find your triggers and   what you can do about them. You can help prevent asthma flare-ups by staying away from your triggers.      Trigger                                                          What you can do   Cigarette Smoke  Tobacco smoke can make asthma worse. Do not allow smoking in your home, car or around you.  Be sure no one smokes at a child s day care or school.  If you smoke, ask your health care provider for ways to help you quit.  Ask family members to quit too.  Ask your health care provider for a referral to Quit Plan to help you quit smoking, or call 8-608-531-PLAN.     Colds, Flu, Bronchitis  These are common triggers of asthma. Wash your hands often.  Don t touch your eyes, nose or mouth.  Get a flu shot every year.     Dust Mites  These are tiny bugs that live in cloth or carpet. They are too small to see. Wash sheets and blankets in hot water every week.   Encase pillows and mattress in dust mite proof covers.  Avoid having carpet if you can. If you have carpet, vacuum weekly.   Use a dust mask and HEPA vacuum.   Pollen and Outdoor Mold  Some people are allergic to trees, grass, or weed pollen, or molds. Try to keep your windows closed.  Limit time out doors when pollen count is high.   Ask you health care provider about taking medicine during allergy season.     Animal Dander  Some people are allergic to skin flakes, urine or saliva from pets with fur or feathers. Keep pets with fur or  feathers out of your home.    If you can t keep the pet outdoors, then keep the pet out of your bedroom.  Keep the bedroom door closed.  Keep pets off cloth furniture and away from stuffed toys.     Mice, Rats, and Cockroaches  Some people are allergic to the waste from these pests.   Cover food and garbage.  Clean up spills and food crumbs.  Store grease in the refrigerator.   Keep food out of the bedroom.   Indoor Mold  This can be a trigger if your home has high moisture. Fix leaking faucets, pipes, or other sources of water.   Clean moldy surfaces.  Dehumidify basement if it is damp and smelly.   Smoke, Strong Odors, and Sprays  These can reduce air quality. Stay away from strong odors and sprays, such as perfume, powder, hair spray, paints, smoke incense, paint, cleaning products, candles and new carpet.   Exercise or Sports  Some people with asthma have this trigger. Be active!  Ask your doctor about taking medicine before sports or exercise to prevent symptoms.    Warm up for 5-10 minutes before and after sports or exercise.     Other Triggers of Asthma  Cold air:  Cover your nose and mouth with a scarf.  Sometimes laughing or crying can be a trigger.  Some medicines and food can trigger asthma.
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
## 2025-06-30 ENCOUNTER — TELEPHONE (OUTPATIENT)
Dept: FAMILY MEDICINE | Facility: OTHER | Age: 55
End: 2025-06-30

## 2025-06-30 DIAGNOSIS — J45.20 MILD INTERMITTENT ASTHMA, UNSPECIFIED WHETHER COMPLICATED: ICD-10-CM

## 2025-06-30 DIAGNOSIS — R19.5 POSITIVE COLORECTAL CANCER SCREENING USING COLOGUARD TEST: Primary | ICD-10-CM

## 2025-06-30 RX ORDER — BUDESONIDE AND FORMOTEROL FUMARATE DIHYDRATE 160; 4.5 UG/1; UG/1
AEROSOL RESPIRATORY (INHALATION)
Qty: 20.4 G | Refills: 11 | Status: SHIPPED | OUTPATIENT
Start: 2025-06-30

## 2025-06-30 RX ORDER — ALBUTEROL SULFATE 90 UG/1
1-2 INHALANT RESPIRATORY (INHALATION) EVERY 4 HOURS PRN
Qty: 18 G | Refills: 3 | Status: SHIPPED | OUTPATIENT
Start: 2025-06-30

## 2025-06-30 NOTE — TELEPHONE ENCOUNTER
Patient stated she needs Symbicort and inhaler sent back in. She stated she also had positive Cologuard and would like a referral sent in for colonoscopy. RX and colonoscopy order are pended.

## 2025-06-30 NOTE — TELEPHONE ENCOUNTER
8:26 AM    Reason for Call: Phone Call    Description: Patient Is calling wanting to speak to the nurse. :patient is stating that she needs an inhaler. albuterol (PROAIR HFA/PROVENTIL HFA/VENTOLIN HFA) 108 (90 Base) MCG/ACT inhale . Patient also wasn't to talk to nurse about her cologuard look abnormal. She would like to discuss these things.    Was an appointment offered for this call? No  If yes : Appointment type              Date    Preferred method for responding to this message: Telephone Call  What is your phone number ? [phone]    If we cannot reach you directly, may we leave a detailed response at the number you provided? Yes    Can this message wait until your PCP/provider returns, if available today? YES, Provider is in    Dedra Sumner

## 2025-07-02 ENCOUNTER — TELEPHONE (OUTPATIENT)
Dept: FAMILY MEDICINE | Facility: OTHER | Age: 55
End: 2025-07-02

## 2025-07-02 NOTE — TELEPHONE ENCOUNTER
Screening Questions for the Scheduling of Screening Colonoscopies     (If Colonoscopy is diagnostic, Provider should review the chart before scheduling.)    Are you younger than 50 or older than 80?  No    Do you take aspirin or fish oil?  No (if yes, tell patient to stop 1 week prior to Colonoscopy)    Do you take warfarin (Coumadin), clopidogrel (Plavix), apixaban (Eliquis), dabigatram (Pradaxa), rivaroxaban (Xarelto) or any blood thinner? No    Do you use oxygen at home?  No    Do you have kidney disease? No    Are you on dialysis? No    Have you had a stroke or heart attack in the last year? No    Have you had a stent in your heart or any blood vessel in the last year? No    Have you had a transplant of any organ?  No    Have you had a colonoscopy or upper endoscopy (EGD) before?  YES. Date-.         Date of scheduled Colonoscopy: 8/13/25    Provider: Dr. Kramer     Pharmacy [facility]

## 2025-07-08 ENCOUNTER — TELEPHONE (OUTPATIENT)
Dept: FAMILY MEDICINE | Facility: OTHER | Age: 55
End: 2025-07-08

## 2025-07-08 NOTE — TELEPHONE ENCOUNTER
Patient informed positive cologshamika.  Already has a colonoscopy date and preop with you on 8/4/25.

## 2025-08-05 ENCOUNTER — OFFICE VISIT (OUTPATIENT)
Dept: FAMILY MEDICINE | Facility: OTHER | Age: 55
End: 2025-08-05
Attending: NURSE PRACTITIONER
Payer: COMMERCIAL

## 2025-08-05 ENCOUNTER — LAB (OUTPATIENT)
Dept: LAB | Facility: OTHER | Age: 55
End: 2025-08-05
Payer: COMMERCIAL

## 2025-08-05 VITALS
TEMPERATURE: 97.5 F | SYSTOLIC BLOOD PRESSURE: 128 MMHG | WEIGHT: 198.8 LBS | DIASTOLIC BLOOD PRESSURE: 80 MMHG | RESPIRATION RATE: 16 BRPM | BODY MASS INDEX: 39.03 KG/M2 | HEART RATE: 94 BPM | HEIGHT: 60 IN | OXYGEN SATURATION: 96 %

## 2025-08-05 DIAGNOSIS — R19.5 POSITIVE COLORECTAL CANCER SCREENING USING COLOGUARD TEST: ICD-10-CM

## 2025-08-05 DIAGNOSIS — Z01.818 PREOP GENERAL PHYSICAL EXAM: Primary | ICD-10-CM

## 2025-08-05 DIAGNOSIS — Z01.818 PREOP GENERAL PHYSICAL EXAM: ICD-10-CM

## 2025-08-05 LAB
ANION GAP SERPL CALCULATED.3IONS-SCNC: 14 MMOL/L (ref 7–15)
BASOPHILS # BLD AUTO: 0.1 10E3/UL (ref 0–0.2)
BASOPHILS NFR BLD AUTO: 1 %
BUN SERPL-MCNC: 19.4 MG/DL (ref 6–20)
CALCIUM SERPL-MCNC: 9.5 MG/DL (ref 8.8–10.4)
CHLORIDE SERPL-SCNC: 103 MMOL/L (ref 98–107)
CREAT SERPL-MCNC: 0.72 MG/DL (ref 0.51–0.95)
EGFRCR SERPLBLD CKD-EPI 2021: >90 ML/MIN/1.73M2
EOSINOPHIL # BLD AUTO: 0.3 10E3/UL (ref 0–0.7)
EOSINOPHIL NFR BLD AUTO: 3 %
ERYTHROCYTE [DISTWIDTH] IN BLOOD BY AUTOMATED COUNT: 13.3 % (ref 10–15)
GLUCOSE SERPL-MCNC: 113 MG/DL (ref 70–99)
HCO3 SERPL-SCNC: 23 MMOL/L (ref 22–29)
HCT VFR BLD AUTO: 47.2 % (ref 35–47)
HGB BLD-MCNC: 16 G/DL (ref 11.7–15.7)
IMM GRANULOCYTES # BLD: 0.1 10E3/UL
IMM GRANULOCYTES NFR BLD: 1 %
LYMPHOCYTES # BLD AUTO: 3.2 10E3/UL (ref 0.8–5.3)
LYMPHOCYTES NFR BLD AUTO: 29 %
MCH RBC QN AUTO: 33.9 PG (ref 26.5–33)
MCHC RBC AUTO-ENTMCNC: 33.9 G/DL (ref 31.5–36.5)
MCV RBC AUTO: 100 FL (ref 78–100)
MONOCYTES # BLD AUTO: 0.6 10E3/UL (ref 0–1.3)
MONOCYTES NFR BLD AUTO: 5 %
NEUTROPHILS # BLD AUTO: 6.9 10E3/UL (ref 1.6–8.3)
NEUTROPHILS NFR BLD AUTO: 62 %
NRBC # BLD AUTO: 0 10E3/UL
NRBC BLD AUTO-RTO: 0 /100
PLATELET # BLD AUTO: 284 10E3/UL (ref 150–450)
POTASSIUM SERPL-SCNC: 4.4 MMOL/L (ref 3.4–5.3)
RBC # BLD AUTO: 4.72 10E6/UL (ref 3.8–5.2)
SODIUM SERPL-SCNC: 140 MMOL/L (ref 135–145)
WBC # BLD AUTO: 11.1 10E3/UL (ref 4–11)

## 2025-08-05 PROCEDURE — 93005 ELECTROCARDIOGRAM TRACING: CPT | Performed by: NURSE PRACTITIONER

## 2025-08-05 PROCEDURE — G0463 HOSPITAL OUTPT CLINIC VISIT: HCPCS

## 2025-08-05 PROCEDURE — 85041 AUTOMATED RBC COUNT: CPT | Mod: ZL

## 2025-08-05 PROCEDURE — 36415 COLL VENOUS BLD VENIPUNCTURE: CPT | Mod: ZL

## 2025-08-05 PROCEDURE — 82310 ASSAY OF CALCIUM: CPT | Mod: ZL

## 2025-08-05 ASSESSMENT — ASTHMA QUESTIONNAIRES
QUESTION_4 LAST FOUR WEEKS HOW OFTEN HAVE YOU USED YOUR RESCUE INHALER OR NEBULIZER MEDICATION (SUCH AS ALBUTEROL): TWO OR THREE TIMES PER WEEK
QUESTION_1 LAST FOUR WEEKS HOW MUCH OF THE TIME DID YOUR ASTHMA KEEP YOU FROM GETTING AS MUCH DONE AT WORK, SCHOOL OR AT HOME: A LITTLE OF THE TIME
QUESTION_3 LAST FOUR WEEKS HOW OFTEN DID YOUR ASTHMA SYMPTOMS (WHEEZING, COUGHING, SHORTNESS OF BREATH, CHEST TIGHTNESS OR PAIN) WAKE YOU UP AT NIGHT OR EARLIER THAN USUAL IN THE MORNING: NOT AT ALL
QUESTION_5 LAST FOUR WEEKS HOW WOULD YOU RATE YOUR ASTHMA CONTROL: WELL CONTROLLED
ACT_TOTALSCORE: 18
QUESTION_2 LAST FOUR WEEKS HOW OFTEN HAVE YOU HAD SHORTNESS OF BREATH: ONCE A DAY

## 2025-08-05 ASSESSMENT — PAIN SCALES - GENERAL: PAINLEVEL_OUTOF10: NO PAIN (0)

## 2025-08-06 LAB
ATRIAL RATE - MUSE: 91 BPM
DIASTOLIC BLOOD PRESSURE - MUSE: NORMAL MMHG
INTERPRETATION ECG - MUSE: NORMAL
P AXIS - MUSE: 64 DEGREES
PR INTERVAL - MUSE: 136 MS
QRS DURATION - MUSE: 78 MS
QT - MUSE: 368 MS
QTC - MUSE: 452 MS
R AXIS - MUSE: -16 DEGREES
SYSTOLIC BLOOD PRESSURE - MUSE: NORMAL MMHG
T AXIS - MUSE: 33 DEGREES
VENTRICULAR RATE- MUSE: 91 BPM

## 2025-08-08 ENCOUNTER — ANESTHESIA EVENT (OUTPATIENT)
Dept: SURGERY | Facility: HOSPITAL | Age: 55
End: 2025-08-08
Payer: COMMERCIAL

## 2025-08-08 ASSESSMENT — LIFESTYLE VARIABLES: TOBACCO_USE: 1

## 2025-08-13 ENCOUNTER — HOSPITAL ENCOUNTER (OUTPATIENT)
Facility: HOSPITAL | Age: 55
Discharge: HOME | End: 2025-08-13
Attending: SURGERY | Admitting: SURGERY
Payer: COMMERCIAL

## 2025-08-13 ENCOUNTER — ANESTHESIA (OUTPATIENT)
Dept: SURGERY | Facility: HOSPITAL | Age: 55
End: 2025-08-13
Payer: COMMERCIAL

## 2025-08-13 VITALS
BODY MASS INDEX: 36.44 KG/M2 | RESPIRATION RATE: 18 BRPM | OXYGEN SATURATION: 93 % | HEART RATE: 82 BPM | WEIGHT: 193 LBS | HEIGHT: 61 IN | SYSTOLIC BLOOD PRESSURE: 110 MMHG | DIASTOLIC BLOOD PRESSURE: 73 MMHG | TEMPERATURE: 98.3 F

## 2025-08-13 PROCEDURE — 250N000009 HC RX 250: Performed by: NURSE ANESTHETIST, CERTIFIED REGISTERED

## 2025-08-13 PROCEDURE — 272N000001 HC OR GENERAL SUPPLY STERILE: Performed by: SURGERY

## 2025-08-13 PROCEDURE — 258N000003 HC RX IP 258 OP 636: Performed by: NURSE PRACTITIONER

## 2025-08-13 PROCEDURE — 45385 COLONOSCOPY W/LESION REMOVAL: CPT | Mod: PT | Performed by: SURGERY

## 2025-08-13 PROCEDURE — 710N000012 HC RECOVERY PHASE 2, PER MINUTE: Performed by: SURGERY

## 2025-08-13 PROCEDURE — 999N000141 HC STATISTIC PRE-PROCEDURE NURSING ASSESSMENT: Performed by: SURGERY

## 2025-08-13 PROCEDURE — 88305 TISSUE EXAM BY PATHOLOGIST: CPT | Mod: TC | Performed by: SURGERY

## 2025-08-13 PROCEDURE — 88305 TISSUE EXAM BY PATHOLOGIST: CPT | Mod: 26 | Performed by: PATHOLOGY

## 2025-08-13 PROCEDURE — 45380 COLONOSCOPY AND BIOPSY: CPT | Mod: PT | Performed by: SURGERY

## 2025-08-13 PROCEDURE — 250N000011 HC RX IP 250 OP 636: Performed by: NURSE ANESTHETIST, CERTIFIED REGISTERED

## 2025-08-13 PROCEDURE — 370N000017 HC ANESTHESIA TECHNICAL FEE, PER MIN: Performed by: SURGERY

## 2025-08-13 PROCEDURE — 360N000075 HC SURGERY LEVEL 2, PER MIN: Performed by: SURGERY

## 2025-08-13 RX ORDER — LIDOCAINE HYDROCHLORIDE 20 MG/ML
INJECTION, SOLUTION INFILTRATION; PERINEURAL PRN
Status: DISCONTINUED | OUTPATIENT
Start: 2025-08-13 | End: 2025-08-13

## 2025-08-13 RX ORDER — ONDANSETRON 4 MG/1
4 TABLET, ORALLY DISINTEGRATING ORAL EVERY 30 MIN PRN
Status: DISCONTINUED | OUTPATIENT
Start: 2025-08-13 | End: 2025-08-13 | Stop reason: HOSPADM

## 2025-08-13 RX ORDER — DEXAMETHASONE SODIUM PHOSPHATE 10 MG/ML
4 INJECTION, SOLUTION INTRAMUSCULAR; INTRAVENOUS
Status: DISCONTINUED | OUTPATIENT
Start: 2025-08-13 | End: 2025-08-13 | Stop reason: HOSPADM

## 2025-08-13 RX ORDER — SODIUM CHLORIDE, SODIUM LACTATE, POTASSIUM CHLORIDE, CALCIUM CHLORIDE 600; 310; 30; 20 MG/100ML; MG/100ML; MG/100ML; MG/100ML
INJECTION, SOLUTION INTRAVENOUS CONTINUOUS
Status: DISCONTINUED | OUTPATIENT
Start: 2025-08-13 | End: 2025-08-13 | Stop reason: HOSPADM

## 2025-08-13 RX ORDER — PROPOFOL 10 MG/ML
INJECTION, EMULSION INTRAVENOUS PRN
Status: DISCONTINUED | OUTPATIENT
Start: 2025-08-13 | End: 2025-08-13

## 2025-08-13 RX ORDER — NALOXONE HYDROCHLORIDE 0.4 MG/ML
0.1 INJECTION, SOLUTION INTRAMUSCULAR; INTRAVENOUS; SUBCUTANEOUS
Status: DISCONTINUED | OUTPATIENT
Start: 2025-08-13 | End: 2025-08-13 | Stop reason: HOSPADM

## 2025-08-13 RX ORDER — ONDANSETRON 2 MG/ML
4 INJECTION INTRAMUSCULAR; INTRAVENOUS EVERY 30 MIN PRN
Status: DISCONTINUED | OUTPATIENT
Start: 2025-08-13 | End: 2025-08-13 | Stop reason: HOSPADM

## 2025-08-13 RX ORDER — LIDOCAINE 40 MG/G
CREAM TOPICAL
Status: DISCONTINUED | OUTPATIENT
Start: 2025-08-13 | End: 2025-08-13 | Stop reason: HOSPADM

## 2025-08-13 RX ADMIN — PROPOFOL 20 MG: 10 INJECTION, EMULSION INTRAVENOUS at 11:04

## 2025-08-13 RX ADMIN — PROPOFOL 50 MG: 10 INJECTION, EMULSION INTRAVENOUS at 11:20

## 2025-08-13 RX ADMIN — PROPOFOL 50 MG: 10 INJECTION, EMULSION INTRAVENOUS at 11:06

## 2025-08-13 RX ADMIN — PROPOFOL 50 MG: 10 INJECTION, EMULSION INTRAVENOUS at 11:29

## 2025-08-13 RX ADMIN — PROPOFOL 50 MG: 10 INJECTION, EMULSION INTRAVENOUS at 11:23

## 2025-08-13 RX ADMIN — LIDOCAINE HYDROCHLORIDE 50 MG: 20 INJECTION, SOLUTION INFILTRATION; PERINEURAL at 11:03

## 2025-08-13 RX ADMIN — SODIUM CHLORIDE, POTASSIUM CHLORIDE, SODIUM LACTATE AND CALCIUM CHLORIDE: 600; 310; 30; 20 INJECTION, SOLUTION INTRAVENOUS at 09:58

## 2025-08-13 RX ADMIN — PROPOFOL 50 MG: 10 INJECTION, EMULSION INTRAVENOUS at 11:16

## 2025-08-13 RX ADMIN — PROPOFOL 50 MG: 10 INJECTION, EMULSION INTRAVENOUS at 11:10

## 2025-08-13 RX ADMIN — PROPOFOL 80 MG: 10 INJECTION, EMULSION INTRAVENOUS at 11:03

## 2025-08-13 ASSESSMENT — ACTIVITIES OF DAILY LIVING (ADL)
ADLS_ACUITY_SCORE: 58

## 2025-08-15 LAB
PATH REPORT.COMMENTS IMP SPEC: NORMAL
PATH REPORT.FINAL DX SPEC: NORMAL
PATH REPORT.GROSS SPEC: NORMAL
PATH REPORT.MICROSCOPIC SPEC OTHER STN: NORMAL
PATH REPORT.RELEVANT HX SPEC: NORMAL
PHOTO IMAGE: NORMAL

## (undated) DEVICE — BIN-CATARACT BIN BN37

## (undated) DEVICE — CANNULA IRR 7/8IN 25GA VSTC VSCFL 45D 9MM DISP 585045

## (undated) DEVICE — GOWN SURG XL LVL 3 REINFORCED 9541

## (undated) DEVICE — BIN-TECNIS DCB00 LENSES

## (undated) DEVICE — CANNULA IRR 7/8IN 30GA VSTC VSCFL 45D 9MM DISP 585046

## (undated) DEVICE — CONNECTOR ERBEFLO 2 PORT 20325-215

## (undated) DEVICE — SUCTION MANIFOLD NEPTUNE 2 SYS 1 PORT 702-025-000

## (undated) DEVICE — CANISTER SUCTION MEDI-VAC GUARDIAN 2000ML 90D 65651-220

## (undated) DEVICE — EYE CANN IRR 25GA HYDRODISSECTING 585037

## (undated) DEVICE — EYE KNIFE SLIT XSTAR VISITEC 2.8MM 45DEG 373728

## (undated) DEVICE — BIN-LENS IMPLANT CART

## (undated) DEVICE — GLOVE PROTEXIS POWDER FREE 7.0 ORTHOPEDIC 2D73ET70

## (undated) DEVICE — PACK PHACO STELLARIS VAC 1 AUX DRP 1 NDL WRNCH BL5110

## (undated) DEVICE — EYE CANN IRR 25GA CYSTOTOME 581610

## (undated) DEVICE — PACK EYE CUSTOM SEY32EPMBO

## (undated) DEVICE — ENDO SNARE EXACTO COLD 9MM LOOP 2.4MMX230CM 00711115

## (undated) DEVICE — SET HANDPIECE IRRIG/ASPIRATION 2.2-2.8X5.4" 45DEG TIP 85910S

## (undated) DEVICE — FORCEPS BIOPSY RADIAL JAW 4 LARGE W/NEEDLE 240CM M00513332

## (undated) DEVICE — ENDO TRAP POLYP E-TRAP 00711099

## (undated) DEVICE — EYE PREP BETADINE 5% SOLUTION 30ML 0065-0411-30

## (undated) DEVICE — GLOVE PROTEXIS POWDER FREE CLSC 7.5  2D72PL75X

## (undated) DEVICE — INJECTOR PORT FOR IOL LENSES SOFPORT EZ-28

## (undated) DEVICE — TUBING SUCTION 20FT N620A

## (undated) DEVICE — SOL WATER IRRIG 1000ML BOTTLE 2F7114

## (undated) DEVICE — EYE KNIFE MICRO 15DEG BEVEL 377516

## (undated) DEVICE — INSTRUMENT WIPE VISIWIPE 581047

## (undated) RX ORDER — PROPOFOL 10 MG/ML
INJECTION, EMULSION INTRAVENOUS
Status: DISPENSED
Start: 2025-08-13

## (undated) RX ORDER — FENTANYL CITRATE 50 UG/ML
INJECTION, SOLUTION INTRAMUSCULAR; INTRAVENOUS
Status: DISPENSED
Start: 2024-03-26